# Patient Record
Sex: MALE | Race: WHITE
[De-identification: names, ages, dates, MRNs, and addresses within clinical notes are randomized per-mention and may not be internally consistent; named-entity substitution may affect disease eponyms.]

---

## 2017-03-08 ENCOUNTER — HOSPITAL ENCOUNTER (OUTPATIENT)
Dept: HOSPITAL 53 - M SFHCLERA | Age: 62
End: 2017-03-08
Attending: FAMILY MEDICINE
Payer: COMMERCIAL

## 2017-03-08 DIAGNOSIS — K86.9: Primary | ICD-10-CM

## 2017-03-08 DIAGNOSIS — I10: ICD-10-CM

## 2017-03-08 DIAGNOSIS — G45.9: ICD-10-CM

## 2017-03-08 LAB
ADD MORPHOLOGY?: YES
ALBUMIN SERPL BCG-MCNC: 3.5 GM/DL (ref 3.2–5.2)
ALBUMIN/GLOB SERPL: 1.17 {RATIO} (ref 1–1.93)
ALP SERPL-CCNC: 149 U/L (ref 45–117)
ALT SERPL W P-5'-P-CCNC: 17 U/L (ref 12–78)
AMYLASE SERPL-CCNC: 154 U/L (ref 25–115)
ANION GAP SERPL CALC-SCNC: 8 MEQ/L (ref 8–16)
ANISOCYTOSIS BLD QL SMEAR: (no result)
AST SERPL-CCNC: 21 U/L (ref 15–37)
BASOPHILS # BLD AUTO: 0 K/MM3 (ref 0–0.2)
BASOPHILS NFR BLD AUTO: 0.8 % (ref 0–1)
BILIRUB SERPL-MCNC: 0.2 MG/DL (ref 0.2–1)
BUN SERPL-MCNC: 23 MG/DL (ref 7–18)
CALCIUM SERPL-MCNC: 8.1 MG/DL (ref 8.8–10.2)
CHLORIDE SERPL-SCNC: 107 MEQ/L (ref 98–107)
CO2 SERPL-SCNC: 27 MEQ/L (ref 21–32)
CREAT SERPL-MCNC: 1.09 MG/DL (ref 0.7–1.3)
EOSINOPHIL # BLD AUTO: 0.3 K/MM3 (ref 0–0.5)
EOSINOPHIL NFR BLD AUTO: 6.3 % (ref 0–3)
ERYTHROCYTE [DISTWIDTH] IN BLOOD BY AUTOMATED COUNT: 14.9 % (ref 11.5–14.5)
GFR SERPL CREATININE-BSD FRML MDRD: > 60 ML/MIN/{1.73_M2} (ref 49–?)
GLUCOSE SERPL-MCNC: 72 MG/DL (ref 80–110)
HYPOCHROMIA BLD QL SMEAR: (no result)
LARGE UNSTAINED CELL #: 0.2 K/MM3 (ref 0–0.4)
LARGE UNSTAINED CELL %: 3.2 % (ref 0–4)
LYMPHOCYTES # BLD AUTO: 1.4 K/MM3 (ref 1.5–4.5)
LYMPHOCYTES NFR BLD AUTO: 27 % (ref 24–44)
MCH RBC QN AUTO: 19.5 PG (ref 27–33)
MCHC RBC AUTO-ENTMCNC: 27.5 G/DL (ref 32–36.5)
MCV RBC AUTO: 71 FL (ref 80–96)
MICROCYTES BLD QL SMEAR: (no result)
MONOCYTES # BLD AUTO: 0.4 K/MM3 (ref 0–0.8)
MONOCYTES NFR BLD AUTO: 7.4 % (ref 0–5)
NEUTROPHILS # BLD AUTO: 2.9 K/MM3 (ref 1.8–7.7)
NEUTROPHILS NFR BLD AUTO: 55.3 % (ref 36–66)
PLATELET # BLD AUTO: 219 K/MM3 (ref 150–450)
POTASSIUM SERPL-SCNC: 4.8 MEQ/L (ref 3.5–5.1)
PROT SERPL-MCNC: 6.5 GM/DL (ref 6.4–8.2)
SODIUM SERPL-SCNC: 142 MEQ/L (ref 136–145)
WBC # BLD AUTO: 5.2 K/MM3 (ref 4–10)

## 2017-03-22 ENCOUNTER — HOSPITAL ENCOUNTER (OUTPATIENT)
Dept: HOSPITAL 53 - M RAD | Age: 62
End: 2017-03-22
Attending: FAMILY MEDICINE
Payer: COMMERCIAL

## 2017-03-22 DIAGNOSIS — K86.9: Primary | ICD-10-CM

## 2017-03-22 NOTE — REP
MRI PANCREAS WITH AND WITHOUT CONTRAST:

 

TECHNIQUE: Multiple axial and coronal sequences obtained pre and post IV

gadolinium administration, with the intravenous administration of 13 mL of

gadolinium via thyroid injector.  This study is limited due to patient breathing

motion reportedly due to persistent coughing.

 

Comparison made with a CT from [facility] 11/09/2016 as well as CT exams from

[facility] dated 11/06/2016 and 02/18/2016.

 

Given the limitations of this exam, I do not see a definite mass involving the

pancreas.  There is no pancreatic duct dilatation and no evidence of biliary

dilatation.  Note is again made of multiple enlarged mesenteric lymph nodes as

seen on the CT exams dating back to 02/18/2016.  The adenopathy is seen centrally

in the mesentery just below the body of the pancreas and surrounding the

pancreatic head.  Findings appear similar to the prior CT of 11/09/2016.  I do

not see significant ascites in the visualized abdomen.  The visualized abdominal

aorta is normal in caliber. The visualized liver and spleen are grossly

unremarkable. There are small cysts in each kidney.

 

IMPRESSION:

 

Limited due to persistent breathing motion due to a persistent cough.  No

definite pancreatic mass within the limitations of this exam.  There is

mesenteric and peripancreatic adenopathy, which appears similar to the prior CT

study of 11/09/2016.  Recommend followup CT of the abdomen with multiphasic

intravenous contrast administration to better evaluate the pancreas and

mesentery, as CT exam would eliminate motion artifact.

 

 

Signed by

Leonel Orozco MD 03/22/2017 12:44 P

## 2017-05-03 ENCOUNTER — HOSPITAL ENCOUNTER (OUTPATIENT)
Dept: HOSPITAL 53 - M SFHCLERA | Age: 62
End: 2017-05-03
Attending: FAMILY MEDICINE
Payer: COMMERCIAL

## 2017-05-03 DIAGNOSIS — D64.89: Primary | ICD-10-CM

## 2017-05-03 DIAGNOSIS — F19.10: ICD-10-CM

## 2017-05-03 DIAGNOSIS — R59.1: ICD-10-CM

## 2017-05-03 DIAGNOSIS — K86.9: ICD-10-CM

## 2017-05-03 LAB
ALBUMIN SERPL BCG-MCNC: 3.3 GM/DL (ref 3.2–5.2)
ALBUMIN/GLOB SERPL: 1.18 {RATIO} (ref 1–1.93)
ALP SERPL-CCNC: 172 U/L (ref 45–117)
ALT SERPL W P-5'-P-CCNC: 21 U/L (ref 12–78)
ANION GAP SERPL CALC-SCNC: 8 MEQ/L (ref 8–16)
ANISOCYTOSIS BLD QL SMEAR: (no result)
AST SERPL-CCNC: 22 U/L (ref 15–37)
BILIRUB SERPL-MCNC: 0.4 MG/DL (ref 0.2–1)
BUN SERPL-MCNC: 28 MG/DL (ref 7–18)
CALCIUM SERPL-MCNC: 8 MG/DL (ref 8.8–10.2)
CHLORIDE SERPL-SCNC: 104 MEQ/L (ref 98–107)
CO2 SERPL-SCNC: 27 MEQ/L (ref 21–32)
CREAT SERPL-MCNC: 1.33 MG/DL (ref 0.7–1.3)
EOSINOPHIL NFR BLD MANUAL: 3 % (ref 0–5)
ERYTHROCYTE [DISTWIDTH] IN BLOOD BY AUTOMATED COUNT: 16.7 % (ref 11.5–14.5)
GFR SERPL CREATININE-BSD FRML MDRD: 58 ML/MIN/{1.73_M2} (ref 49–?)
GLUCOSE SERPL-MCNC: 92 MG/DL (ref 80–110)
HYPOCHROMIA BLD QL SMEAR: (no result)
MCH RBC QN AUTO: 19.9 PG (ref 27–33)
MCHC RBC AUTO-ENTMCNC: 28.4 G/DL (ref 32–36.5)
MCV RBC AUTO: 70.3 FL (ref 80–96)
MICROCYTES BLD QL SMEAR: (no result)
NEUTS BAND NFR BLD: 1 % (ref ?–11)
POIKILOCYTOSIS BLD QL SMEAR: (no result)
POTASSIUM SERPL-SCNC: 4.6 MEQ/L (ref 3.5–5.1)
PROT SERPL-MCNC: 6.1 GM/DL (ref 6.4–8.2)
SODIUM SERPL-SCNC: 139 MEQ/L (ref 136–145)
T4 FREE SERPL-MCNC: 1.09 NG/DL (ref 0.76–1.46)
WBC # BLD AUTO: 8.1 K/MM3 (ref 4–10)

## 2017-05-18 ENCOUNTER — HOSPITAL ENCOUNTER (OUTPATIENT)
Dept: HOSPITAL 53 - M SFHCLERA | Age: 62
End: 2017-05-18
Attending: FAMILY MEDICINE
Payer: COMMERCIAL

## 2017-05-18 DIAGNOSIS — R74.8: ICD-10-CM

## 2017-05-18 DIAGNOSIS — D64.9: Primary | ICD-10-CM

## 2017-05-18 LAB
FERRITIN SERPL-MCNC: 4 NG/ML (ref 26–388)
GGT SERPL-CCNC: 14 U/L (ref 15–85)
IRON SATN MFR SERPL: 5 % (ref 19.7–37.4)
TIBC SERPL-MCNC: 416 UG/DL (ref 250–450)

## 2017-05-24 ENCOUNTER — HOSPITAL ENCOUNTER (OUTPATIENT)
Dept: HOSPITAL 53 - M SFHCLERA | Age: 62
End: 2017-05-24
Attending: FAMILY MEDICINE
Payer: COMMERCIAL

## 2017-05-24 DIAGNOSIS — D64.9: Primary | ICD-10-CM

## 2017-05-24 LAB
ALBUMIN SERPL BCG-MCNC: 3.1 GM/DL (ref 3.2–5.2)
ALBUMIN/GLOB SERPL: 1.03 {RATIO} (ref 1–1.93)
ALP SERPL-CCNC: 169 U/L (ref 45–117)
ALT SERPL W P-5'-P-CCNC: 28 U/L (ref 12–78)
ANION GAP SERPL CALC-SCNC: 7 MEQ/L (ref 8–16)
ANISOCYTOSIS BLD QL SMEAR: (no result)
AST SERPL-CCNC: 25 U/L (ref 15–37)
BASOPHILS NFR BLD MANUAL: 3 % (ref 0–4)
BILIRUB SERPL-MCNC: 0.4 MG/DL (ref 0.2–1)
BUN SERPL-MCNC: 24 MG/DL (ref 7–18)
CALCIUM SERPL-MCNC: 7.8 MG/DL (ref 8.8–10.2)
CHLORIDE SERPL-SCNC: 102 MEQ/L (ref 98–107)
CO2 SERPL-SCNC: 29 MEQ/L (ref 21–32)
CREAT SERPL-MCNC: 1.28 MG/DL (ref 0.7–1.3)
EOSINOPHIL NFR BLD MANUAL: 5 % (ref 0–5)
ERYTHROCYTE [DISTWIDTH] IN BLOOD BY AUTOMATED COUNT: 16.4 % (ref 11.5–14.5)
GFR SERPL CREATININE-BSD FRML MDRD: > 60 ML/MIN/{1.73_M2} (ref 49–?)
GLUCOSE SERPL-MCNC: 91 MG/DL (ref 80–110)
HYPOCHROMIA BLD QL SMEAR: (no result)
MCH RBC QN AUTO: 19.7 PG (ref 27–33)
MCHC RBC AUTO-ENTMCNC: 29 G/DL (ref 32–36.5)
MCV RBC AUTO: 68.2 FL (ref 80–96)
MICROCYTES BLD QL SMEAR: (no result)
POTASSIUM SERPL-SCNC: 4.3 MEQ/L (ref 3.5–5.1)
PROT SERPL-MCNC: 6.1 GM/DL (ref 6.4–8.2)
SODIUM SERPL-SCNC: 138 MEQ/L (ref 136–145)
WBC # BLD AUTO: 8.8 K/MM3 (ref 4–10)

## 2017-06-07 ENCOUNTER — HOSPITAL ENCOUNTER (OUTPATIENT)
Dept: HOSPITAL 53 - M SFHCLERA | Age: 62
End: 2017-06-07
Attending: FAMILY MEDICINE
Payer: COMMERCIAL

## 2017-06-07 DIAGNOSIS — D64.9: Primary | ICD-10-CM

## 2017-06-07 LAB
ANION GAP SERPL CALC-SCNC: 6 MEQ/L (ref 8–16)
BUN SERPL-MCNC: 20 MG/DL (ref 7–18)
CALCIUM SERPL-MCNC: 8.1 MG/DL (ref 8.8–10.2)
CHLORIDE SERPL-SCNC: 107 MEQ/L (ref 98–107)
CO2 SERPL-SCNC: 28 MEQ/L (ref 21–32)
CREAT SERPL-MCNC: 1.1 MG/DL (ref 0.7–1.3)
ERYTHROCYTE [DISTWIDTH] IN BLOOD BY AUTOMATED COUNT: 16.2 % (ref 11.5–14.5)
GFR SERPL CREATININE-BSD FRML MDRD: > 60 ML/MIN/{1.73_M2} (ref 49–?)
GLUCOSE SERPL-MCNC: 92 MG/DL (ref 80–110)
MCH RBC QN AUTO: 19.3 PG (ref 27–33)
MCHC RBC AUTO-ENTMCNC: 27.7 G/DL (ref 32–36.5)
MCV RBC AUTO: 69.7 FL (ref 80–96)
PLATELET # BLD AUTO: 313 K/MM3 (ref 150–450)
POTASSIUM SERPL-SCNC: 4.1 MEQ/L (ref 3.5–5.1)
SODIUM SERPL-SCNC: 141 MEQ/L (ref 136–145)
WBC # BLD AUTO: 8.6 K/MM3 (ref 4–10)

## 2017-07-16 ENCOUNTER — HOSPITAL ENCOUNTER (EMERGENCY)
Dept: HOSPITAL 53 - M ED | Age: 62
Discharge: HOME | End: 2017-07-16
Payer: COMMERCIAL

## 2017-07-16 VITALS — SYSTOLIC BLOOD PRESSURE: 144 MMHG | DIASTOLIC BLOOD PRESSURE: 79 MMHG

## 2017-07-16 VITALS — WEIGHT: 136.69 LBS | BODY MASS INDEX: 20.24 KG/M2 | HEIGHT: 69 IN
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
## 2017-07-16 DIAGNOSIS — S80.861A: Primary | ICD-10-CM
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
## 2017-09-06 ENCOUNTER — HOSPITAL ENCOUNTER (OUTPATIENT)
Dept: HOSPITAL 53 - M SFHCLERA | Age: 62
End: 2017-09-06
Attending: FAMILY MEDICINE
Payer: COMMERCIAL

## 2017-09-06 ENCOUNTER — HOSPITAL ENCOUNTER (OUTPATIENT)
Dept: HOSPITAL 53 - M LRY | Age: 62
End: 2017-09-06
Attending: FAMILY MEDICINE
Payer: COMMERCIAL

## 2017-09-06 DIAGNOSIS — M79.5: ICD-10-CM

## 2017-09-06 DIAGNOSIS — M79.672: Primary | ICD-10-CM

## 2017-09-06 DIAGNOSIS — K86.9: Primary | ICD-10-CM

## 2017-09-06 NOTE — REP
LEFT FOOT, FOUR VIEWS:

 

HISTORY:  Foreign body

 

There is no acute fracture or dislocation.  The joint spaces are normal in

appearance. There is no radiopaque foreign body.

 

IMPRESSION:

 

There is no acute fracture or dislocation.

 

 

Signed by

Rodney Stevens MD 09/06/2017 03:20 P

## 2017-09-14 ENCOUNTER — HOSPITAL ENCOUNTER (OUTPATIENT)
Dept: HOSPITAL 53 - M SFHCLERA | Age: 62
End: 2017-09-14
Attending: FAMILY MEDICINE
Payer: COMMERCIAL

## 2017-09-14 DIAGNOSIS — F19.10: Primary | ICD-10-CM

## 2017-09-14 LAB
ALBUMIN SERPL BCG-MCNC: 2.6 GM/DL (ref 3.2–5.2)
ALBUMIN/GLOB SERPL: 0.84 {RATIO} (ref 1–1.93)
ALP SERPL-CCNC: 146 U/L (ref 45–117)
ALT SERPL W P-5'-P-CCNC: 23 U/L (ref 12–78)
ANION GAP SERPL CALC-SCNC: 10 MEQ/L (ref 8–16)
AST SERPL-CCNC: 21 U/L (ref 15–37)
BILIRUB SERPL-MCNC: 0.3 MG/DL (ref 0.2–1)
BUN SERPL-MCNC: 22 MG/DL (ref 7–18)
CALCIUM SERPL-MCNC: 8.2 MG/DL (ref 8.8–10.2)
CHLORIDE SERPL-SCNC: 104 MEQ/L (ref 98–107)
CO2 SERPL-SCNC: 25 MEQ/L (ref 21–32)
CREAT SERPL-MCNC: 1.34 MG/DL (ref 0.7–1.3)
ERYTHROCYTE [DISTWIDTH] IN BLOOD BY AUTOMATED COUNT: 19.8 % (ref 11.5–14.5)
GFR SERPL CREATININE-BSD FRML MDRD: 57.5 ML/MIN/{1.73_M2} (ref 49–?)
GLUCOSE SERPL-MCNC: 185 MG/DL (ref 80–110)
MCH RBC QN AUTO: 22.2 PG (ref 27–33)
MCHC RBC AUTO-ENTMCNC: 30.7 G/DL (ref 32–36.5)
MCV RBC AUTO: 72.3 FL (ref 80–96)
PLATELET # BLD AUTO: 298 K/MM3 (ref 150–450)
POTASSIUM SERPL-SCNC: 4.3 MEQ/L (ref 3.5–5.1)
PROT SERPL-MCNC: 5.7 GM/DL (ref 6.4–8.2)
SODIUM SERPL-SCNC: 139 MEQ/L (ref 136–145)
WBC # BLD AUTO: 6.8 K/MM3 (ref 4–10)

## 2017-09-26 LAB — SUMMARY: (no result)

## 2017-10-23 ENCOUNTER — HOSPITAL ENCOUNTER (EMERGENCY)
Dept: HOSPITAL 53 - M ED | Age: 62
Discharge: HOME | End: 2017-10-23
Payer: COMMERCIAL

## 2017-10-23 VITALS — HEIGHT: 66 IN | WEIGHT: 143.74 LBS | BODY MASS INDEX: 23.1 KG/M2

## 2017-10-23 VITALS — DIASTOLIC BLOOD PRESSURE: 79 MMHG | SYSTOLIC BLOOD PRESSURE: 135 MMHG

## 2017-10-23 DIAGNOSIS — Z72.0: ICD-10-CM

## 2017-10-23 DIAGNOSIS — K29.00: Primary | ICD-10-CM

## 2017-10-23 DIAGNOSIS — K86.9: ICD-10-CM

## 2017-10-23 DIAGNOSIS — F19.10: ICD-10-CM

## 2017-10-23 LAB
ADD MORPHOLOGY?: YES
ALBUMIN SERPL BCG-MCNC: 2.3 GM/DL (ref 3.2–5.2)
ALBUMIN/GLOB SERPL: 0.59 {RATIO} (ref 1–1.93)
ALP SERPL-CCNC: 141 U/L (ref 45–117)
ALT SERPL W P-5'-P-CCNC: 20 U/L (ref 12–78)
ANION GAP SERPL CALC-SCNC: 6 MEQ/L (ref 8–16)
AST SERPL-CCNC: 14 U/L (ref 15–37)
BASOPHILS # BLD AUTO: 0.1 10^3/UL (ref 0–0.2)
BASOPHILS NFR BLD AUTO: 0.6 % (ref 0–1)
BILIRUB CONJ SERPL-MCNC: < 0.1 MG/DL (ref 0–0.2)
BILIRUB SERPL-MCNC: 0.2 MG/DL (ref 0.2–1)
BUN SERPL-MCNC: 17 MG/DL (ref 7–18)
CALCIUM SERPL-MCNC: 8.2 MG/DL (ref 8.8–10.2)
CHLORIDE SERPL-SCNC: 104 MEQ/L (ref 98–107)
CO2 SERPL-SCNC: 27 MEQ/L (ref 21–32)
CREAT SERPL-MCNC: 1.03 MG/DL (ref 0.7–1.3)
EOSINOPHIL # BLD AUTO: 0.3 10^3/UL (ref 0–0.5)
EOSINOPHIL NFR BLD AUTO: 2.9 % (ref 0–3)
ERYTHROCYTE [DISTWIDTH] IN BLOOD BY AUTOMATED COUNT: 19 % (ref 11.5–14.5)
GFR SERPL CREATININE-BSD FRML MDRD: > 60 ML/MIN/{1.73_M2} (ref 49–?)
GLUCOSE SERPL-MCNC: 123 MG/DL (ref 80–110)
IMM GRANULOCYTES NFR BLD: 0.3 % (ref 0–0)
LYMPHOCYTES # BLD AUTO: 1.1 10^3/UL (ref 1.5–4.5)
LYMPHOCYTES NFR BLD AUTO: 11.6 % (ref 24–44)
MCH RBC QN AUTO: 22 PG (ref 27–33)
MCHC RBC AUTO-ENTMCNC: 30.1 G/DL (ref 32–36.5)
MCV RBC AUTO: 73.1 FL (ref 80–96)
METHADONE UR QL SCN: NEGATIVE
MICROCYTES BLD QL SMEAR: (no result)
MONOCYTES # BLD AUTO: 0.8 10^3/UL (ref 0–0.8)
MONOCYTES NFR BLD AUTO: 9.2 % (ref 0–5)
NEUTROPHILS # BLD AUTO: 6.9 10^3/UL (ref 1.8–7.7)
NEUTROPHILS NFR BLD AUTO: 75.4 % (ref 36–66)
NRBC BLD AUTO-RTO: 0 % (ref 0–0)
PLATELET # BLD AUTO: 267 10^3/UL (ref 150–450)
POSITIVE MORPH: (no result)
POTASSIUM SERPL-SCNC: 4.1 MEQ/L (ref 3.5–5.1)
PROT SERPL-MCNC: 6.2 GM/DL (ref 6.4–8.2)
SODIUM SERPL-SCNC: 137 MEQ/L (ref 136–145)
WBC # BLD AUTO: 9.1 10^3/UL (ref 4–10)

## 2017-10-23 PROCEDURE — 74177 CT ABD & PELVIS W/CONTRAST: CPT

## 2017-10-23 PROCEDURE — 71260 CT THORAX DX C+: CPT

## 2017-10-23 PROCEDURE — 83690 ASSAY OF LIPASE: CPT

## 2017-10-23 PROCEDURE — 80076 HEPATIC FUNCTION PANEL: CPT

## 2017-10-23 PROCEDURE — 93041 RHYTHM ECG TRACING: CPT

## 2017-10-23 PROCEDURE — 96374 THER/PROPH/DIAG INJ IV PUSH: CPT

## 2017-10-23 PROCEDURE — 80048 BASIC METABOLIC PNL TOTAL CA: CPT

## 2017-10-23 PROCEDURE — 80307 DRUG TEST PRSMV CHEM ANLYZR: CPT

## 2017-10-23 PROCEDURE — 74022 RADEX COMPL AQT ABD SERIES: CPT

## 2017-10-23 PROCEDURE — 96372 THER/PROPH/DIAG INJ SC/IM: CPT

## 2017-10-23 PROCEDURE — 85025 COMPLETE CBC W/AUTO DIFF WBC: CPT

## 2017-10-23 PROCEDURE — 99284 EMERGENCY DEPT VISIT MOD MDM: CPT

## 2017-10-23 NOTE — REP
CT of the chest with IV contrast:

 

Comparison is the most recent prior study of 12/12/2015.

 

There are no lung masses or nodules.  There are no infiltrates or effusions.

There is a calcified granuloma in the lingula.  This is unchanged.  There are

numerous small bulla throughout the lung parenchyma bilaterally compatible with

bullous emphysema, unchanged.

 

There is no mediastinal, hilar or axillary lymphadenopathy.

 

The thoracic aorta is unremarkable.  Cardiac size is normal.  There is no

pericardial effusion.

 

There are no lytic, blastic or destructive changes in the skeletal structures.

 

There are calcified granulomas in the right and left inés. .

 

Impression:

 

No evidence of metastatic disease.  No lymphadenopathy.  No infiltrate or

effusion.  Bullous emphysema.  Calcified granulomas.

 

 

Signed by

Leonel Welch MD 10/23/2017 12:11 P

## 2017-10-23 NOTE — REP
ABDOMEN SERIES:  Three views.

 

HISTORY:  Abdomen pain.

 

FINDINGS:  Upright chest radiograph is compared with the August 30, 2017 prior

study.  There are old granulomatous calcifications in the right upper lobe and in

the left hilus and left mediastinum unchanged.  Multiple healed bilateral rib

fractures are noted as before.  Heart is not enlarged.  Lung fields are otherwise

clear.  No infiltrate or free subdiaphragmatic air is seen.

 

Supine and erect views of the abdomen show a normal bowel gas pattern with air

and stool in a nondistended colon.  There is a bone island in the left iliac bone

unchanged from the August 30, 2017 prior radiographs.  Old rib fractures are

again noted.  Some vascular calcification is seen.  Psoas margins and flank

stripes are intact.  No mass organomegaly is seen.  No significant air fluid

level noted.

 

IMPRESSION:

 

Unremarkable bowel gas pattern.  Multiple old rib fractures bilaterally.  Old

granulomatous disease.

 

 

Signed by

Kumar Radford MD 10/23/2017 12:58 P

## 2017-10-23 NOTE — REP
CT abdomen pelvis with IV contrast, without bowel contrast:

 

Comparison is 08/30/2017.

 

The hepatic parenchyma is homogeneous and unchanged.  The gallbladder is

unremarkable and unchanged.

 

The head of the pancreas is enlarged measuring 4.7 cm transversely by 2.9 cm AP

by 4.8 cm craniocaudad.  This is unchanged from the comparison study.  The body

and tail of the pancreas are normal size, unchanged and unremarkable.

 

There is diffuse gastric wall thickening as previously.  This is nonspecific and

the stomach is nondistended and could merely be artifact from under distension.

 

There are numerous enlarged peripancreatic and mesenteric lymph nodes.  These are

similar to the prior study.

 

Pelvis:

 

There is no ascites.  There is no pelvic sidewall adenopathy.  The bladder is

unremarkable.  There is no inguinal adenopathy.  I suspect there is a left

scrotal hydrocele.

 

Impression:

 

Enlarged pancreatic head, unchanged from the prior study.

 

Parapancreatic and mesenteric lymphadenopathy, unchanged from the prior study.

 

No ascites.

 

Left scrotal hydrocele, unchanged.

 

There are no lytic, blastic or destructive skeletal changes.  There is

degenerative disc disease in the lumbar spine L5 S1.  I suspect there is an old

healed fracture of the posterior arch of the right tenth rib.

 

 

 

 

Signed by

Leonel Welch MD 10/23/2017 12:21 P

## 2017-12-19 ENCOUNTER — HOSPITAL ENCOUNTER (OUTPATIENT)
Dept: HOSPITAL 53 - M SFHCLERA | Age: 62
End: 2017-12-19
Attending: FAMILY MEDICINE
Payer: COMMERCIAL

## 2017-12-19 DIAGNOSIS — I10: Primary | ICD-10-CM

## 2017-12-19 LAB
ALBUMIN SERPL BCG-MCNC: 2.5 GM/DL (ref 3.2–5.2)
ALBUMIN/GLOB SERPL: 0.76 {RATIO} (ref 1–1.93)
ALP SERPL-CCNC: 145 U/L (ref 45–117)
ALT SERPL W P-5'-P-CCNC: 30 U/L (ref 12–78)
ANION GAP SERPL CALC-SCNC: 9 MEQ/L (ref 8–16)
AST SERPL-CCNC: 26 U/L (ref 7–37)
BILIRUB SERPL-MCNC: 0.1 MG/DL (ref 0.2–1)
BUN SERPL-MCNC: 22 MG/DL (ref 7–18)
CALCIUM SERPL-MCNC: 7.3 MG/DL (ref 8.8–10.2)
CHLORIDE SERPL-SCNC: 111 MEQ/L (ref 98–107)
CO2 SERPL-SCNC: 23 MEQ/L (ref 21–32)
CREAT SERPL-MCNC: 1.05 MG/DL (ref 0.7–1.3)
GFR SERPL CREATININE-BSD FRML MDRD: > 60 ML/MIN/{1.73_M2} (ref 49–?)
GLUCOSE SERPL-MCNC: 95 MG/DL (ref 80–110)
POTASSIUM SERPL-SCNC: 4.2 MEQ/L (ref 3.5–5.1)
PROT SERPL-MCNC: 5.8 GM/DL (ref 6.4–8.2)
SODIUM SERPL-SCNC: 143 MEQ/L (ref 136–145)

## 2018-03-21 ENCOUNTER — HOSPITAL ENCOUNTER (INPATIENT)
Dept: HOSPITAL 53 - M MS5PR | Age: 63
LOS: 5 days | Discharge: HOME | DRG: 383 | End: 2018-03-26
Attending: HOSPITALIST | Admitting: HOSPITALIST
Payer: COMMERCIAL

## 2018-03-21 DIAGNOSIS — L03.115: Primary | ICD-10-CM

## 2018-03-21 DIAGNOSIS — Z79.899: ICD-10-CM

## 2018-03-21 DIAGNOSIS — Z88.8: ICD-10-CM

## 2018-03-21 DIAGNOSIS — Z95.5: ICD-10-CM

## 2018-03-21 DIAGNOSIS — Z87.891: ICD-10-CM

## 2018-03-21 DIAGNOSIS — K21.9: ICD-10-CM

## 2018-03-21 DIAGNOSIS — I10: ICD-10-CM

## 2018-03-21 DIAGNOSIS — Z90.411: ICD-10-CM

## 2018-03-21 DIAGNOSIS — Z88.1: ICD-10-CM

## 2018-03-21 DIAGNOSIS — E03.9: ICD-10-CM

## 2018-03-21 DIAGNOSIS — I25.10: ICD-10-CM

## 2018-03-21 DIAGNOSIS — D50.9: ICD-10-CM

## 2018-03-21 DIAGNOSIS — J44.9: ICD-10-CM

## 2018-03-21 DIAGNOSIS — Z91.14: ICD-10-CM

## 2018-03-21 DIAGNOSIS — Z86.73: ICD-10-CM

## 2018-03-21 LAB
ANION GAP: 6 MEQ/L (ref 8–16)
BASO #: 0.1 10^3/UL (ref 0–0.2)
BASO %: 0.4 % (ref 0–1)
BLOOD UREA NITROGEN: 17 MG/DL (ref 7–18)
CALCIUM LEVEL: 7.4 MG/DL (ref 8.8–10.2)
CARBON DIOXIDE LEVEL: 28 MEQ/L (ref 21–32)
CHLORIDE LEVEL: 106 MEQ/L (ref 98–107)
CREATININE FOR GFR: 1.12 MG/DL (ref 0.7–1.3)
EOS #: 0.2 10^3/UL (ref 0–0.5)
EOSINOPHIL NFR BLD AUTO: 1.4 % (ref 0–3)
GFR SERPL CREATININE-BSD FRML MDRD: > 60 ML/MIN/{1.73_M2} (ref 49–?)
GLUCOSE, FASTING: 90 MG/DL (ref 70–100)
HEMATOCRIT: 27 % (ref 42–52)
HEMOGLOBIN: 7.2 G/DL (ref 14–18)
IMMATURE GRANULOCYTE %: 0.5 % (ref 0–3)
LACTIC ACID SEPSIS PROTOCOL: 1.7 MMOL/L (ref 0.4–2)
LYMPH #: 1.1 10^3/UL (ref 1.5–4.5)
LYMPH %: 8.4 % (ref 24–44)
MEAN CORPUSCULAR HEMOGLOBIN: 17.4 PG (ref 27–33)
MEAN CORPUSCULAR HGB CONC: 26.7 G/DL (ref 32–36.5)
MEAN CORPUSCULAR VOLUME: 65.4 FL (ref 80–96)
MONO #: 1.6 10^3/UL (ref 0–0.8)
MONO %: 12.3 % (ref 0–5)
NEUTROPHILS #: 10.1 10^3/UL (ref 1.8–7.7)
NEUTROPHILS %: 77 % (ref 36–66)
NRBC BLD AUTO-RTO: 0 % (ref 0–0)
PLATELET COUNT, AUTOMATED: 348 10^3/UL (ref 150–450)
POTASSIUM SERUM: 4.1 MEQ/L (ref 3.5–5.1)
RED BLOOD COUNT: 4.13 10^6/UL (ref 4.3–6.1)
RED CELL DISTRIBUTION WIDTH: 18.5 % (ref 11.5–14.5)
SODIUM LEVEL: 140 MEQ/L (ref 136–145)
WHITE BLOOD COUNT: 13.1 10^3/UL (ref 4–10)

## 2018-03-21 RX ADMIN — MORPHINE SULFATE 1 MG: 4 INJECTION INTRAVENOUS at 20:01

## 2018-03-21 RX ADMIN — SODIUM CHLORIDE 1 MLS/HR: 9 INJECTION, SOLUTION INTRAVENOUS at 19:54

## 2018-03-21 RX ADMIN — CEFAZOLIN SODIUM 1 MLS/HR: 100 INJECTION, POWDER, LYOPHILIZED, FOR SOLUTION INTRAVENOUS at 20:35

## 2018-03-21 RX ADMIN — MORPHINE SULFATE 1 MG: 4 INJECTION INTRAVENOUS at 22:43

## 2018-03-22 LAB
ANION GAP: 7 MEQ/L (ref 8–16)
BASO #: 0 10^3/UL (ref 0–0.2)
BASO %: 0.4 % (ref 0–1)
BLOOD UREA NITROGEN: 15 MG/DL (ref 7–18)
CALCIUM LEVEL: 7.7 MG/DL (ref 8.8–10.2)
CARBON DIOXIDE LEVEL: 24 MEQ/L (ref 21–32)
CHLORIDE LEVEL: 104 MEQ/L (ref 98–107)
CREATININE FOR GFR: 1.1 MG/DL (ref 0.7–1.3)
CRP SERPL-MCNC: 11.6 MG/DL (ref 0–0.3)
EOS #: 0.3 10^3/UL (ref 0–0.5)
EOSINOPHIL NFR BLD AUTO: 3.1 % (ref 0–3)
ERYTHROCYTE SEDIMENTATION RATE: 27 MM/HR (ref 0–20)
GFR SERPL CREATININE-BSD FRML MDRD: > 60 ML/MIN/{1.73_M2} (ref 49–?)
GLUCOSE, FASTING: 67 MG/DL (ref 70–100)
HEMATOCRIT: 34.3 % (ref 42–52)
HEMOGLOBIN: 9.7 G/DL (ref 14–18)
IMMATURE GRANULOCYTE %: 0.4 % (ref 0–3)
IMMEDIATE SPIN CROSSMATCH: 1 2
LYMPH #: 0.9 10^3/UL (ref 1.5–4.5)
LYMPH %: 9.4 % (ref 24–44)
MAGNESIUM LEVEL: 1.8 MG/DL (ref 1.8–2.4)
MEAN CORPUSCULAR HEMOGLOBIN: 19.5 PG (ref 27–33)
MEAN CORPUSCULAR HGB CONC: 28.3 G/DL (ref 32–36.5)
MEAN CORPUSCULAR VOLUME: 68.9 FL (ref 80–96)
MONO #: 1.2 10^3/UL (ref 0–0.8)
MONO %: 12 % (ref 0–5)
NEUTROPHILS #: 7.2 10^3/UL (ref 1.8–7.7)
NEUTROPHILS %: 74.7 % (ref 36–66)
NRBC BLD AUTO-RTO: 0 % (ref 0–0)
PLATELET COUNT, AUTOMATED: 238 10^3/UL (ref 150–450)
POTASSIUM SERUM: 4.1 MEQ/L (ref 3.5–5.1)
RED BLOOD COUNT: 4.98 10^6/UL (ref 4.3–6.1)
RED CELL DISTRIBUTION WIDTH: 21.2 % (ref 11.5–14.5)
SODIUM LEVEL: 135 MEQ/L (ref 136–145)
WHITE BLOOD COUNT: 9.7 10^3/UL (ref 4–10)

## 2018-03-22 PROCEDURE — 30233N1 TRANSFUSION OF NONAUTOLOGOUS RED BLOOD CELLS INTO PERIPHERAL VEIN, PERCUTANEOUS APPROACH: ICD-10-PCS

## 2018-03-22 RX ADMIN — DEXTROSE MONOHYDRATE 1 MLS/HR: 50 INJECTION, SOLUTION INTRAVENOUS at 08:52

## 2018-03-22 RX ADMIN — PANCRELIPASE 1 EA: 24000; 76000; 120000 CAPSULE, DELAYED RELEASE PELLETS ORAL at 17:49

## 2018-03-22 RX ADMIN — IPRATROPIUM BROMIDE AND ALBUTEROL SULFATE 1 ML: .5; 3 SOLUTION RESPIRATORY (INHALATION) at 12:08

## 2018-03-22 RX ADMIN — IPRATROPIUM BROMIDE AND ALBUTEROL SULFATE 1 ML: .5; 3 SOLUTION RESPIRATORY (INHALATION) at 14:00

## 2018-03-22 RX ADMIN — SUCRALFATE 1 GM: 1 TABLET ORAL at 20:22

## 2018-03-22 RX ADMIN — PANCRELIPASE 1 EA: 24000; 76000; 120000 CAPSULE, DELAYED RELEASE PELLETS ORAL at 12:43

## 2018-03-22 RX ADMIN — FERROUS SULFATE TAB 325 MG (65 MG ELEMENTAL FE) 1 MG: 325 (65 FE) TAB at 16:44

## 2018-03-22 RX ADMIN — SUCRALFATE 1 GM: 1 TABLET ORAL at 16:44

## 2018-03-22 RX ADMIN — ATORVASTATIN CALCIUM 1 MG: 20 TABLET, FILM COATED ORAL at 08:52

## 2018-03-22 RX ADMIN — SUCRALFATE 1 GM: 1 TABLET ORAL at 12:43

## 2018-03-22 RX ADMIN — FERROUS SULFATE TAB 325 MG (65 MG ELEMENTAL FE) 1 MG: 325 (65 FE) TAB at 20:22

## 2018-03-22 RX ADMIN — DEXTROSE MONOHYDRATE 1 MLS/HR: 50 INJECTION, SOLUTION INTRAVENOUS at 20:22

## 2018-03-22 RX ADMIN — PANCRELIPASE 1 EA: 24000; 76000; 120000 CAPSULE, DELAYED RELEASE PELLETS ORAL at 08:52

## 2018-03-22 RX ADMIN — IPRATROPIUM BROMIDE AND ALBUTEROL SULFATE 1 ML: .5; 3 SOLUTION RESPIRATORY (INHALATION) at 20:00

## 2018-03-22 RX ADMIN — LEVOFLOXACIN 1 MLS/HR: 5 INJECTION, SOLUTION INTRAVENOUS at 01:11

## 2018-03-22 RX ADMIN — ENOXAPARIN SODIUM 1 MG: 40 INJECTION SUBCUTANEOUS at 08:54

## 2018-03-22 RX ADMIN — FERROUS SULFATE TAB 325 MG (65 MG ELEMENTAL FE) 1 MG: 325 (65 FE) TAB at 12:44

## 2018-03-22 RX ADMIN — SUCRALFATE 1 GM: 1 TABLET ORAL at 09:01

## 2018-03-22 RX ADMIN — DEXTROSE MONOHYDRATE 1 MLS/HR: 50 INJECTION, SOLUTION INTRAVENOUS at 03:15

## 2018-03-22 RX ADMIN — LEVOFLOXACIN 1 MLS/HR: 5 INJECTION, SOLUTION INTRAVENOUS at 23:03

## 2018-03-22 RX ADMIN — PANTOPRAZOLE SODIUM 1 MG: 40 TABLET, DELAYED RELEASE ORAL at 08:53

## 2018-03-22 RX ADMIN — SUCRALFATE 1 GM: 1 TABLET ORAL at 01:17

## 2018-03-22 RX ADMIN — ASPIRIN 1 MG: 81 TABLET ORAL at 12:44

## 2018-03-23 LAB
ANION GAP: 8 MEQ/L (ref 8–16)
BASO #: 0 10^3/UL (ref 0–0.2)
BASO %: 0.4 % (ref 0–1)
BLOOD UREA NITROGEN: 17 MG/DL (ref 7–18)
CALCIUM LEVEL: 7.1 MG/DL (ref 8.8–10.2)
CARBON DIOXIDE LEVEL: 26 MEQ/L (ref 21–32)
CHLORIDE LEVEL: 101 MEQ/L (ref 98–107)
CREATININE FOR GFR: 1.24 MG/DL (ref 0.7–1.3)
EOS #: 0.2 10^3/UL (ref 0–0.5)
EOSINOPHIL NFR BLD AUTO: 2.4 % (ref 0–3)
GFR SERPL CREATININE-BSD FRML MDRD: > 60 ML/MIN/{1.73_M2} (ref 49–?)
GLUCOSE, FASTING: 102 MG/DL (ref 70–100)
HEMATOCRIT: 31.2 % (ref 42–52)
HEMOGLOBIN: 9.2 G/DL (ref 14–18)
IMMATURE GRANULOCYTE %: 0.3 % (ref 0–3)
LYMPH #: 1 10^3/UL (ref 1.5–4.5)
LYMPH %: 10.2 % (ref 24–44)
MAGNESIUM LEVEL: 1.8 MG/DL (ref 1.8–2.4)
MEAN CORPUSCULAR HEMOGLOBIN: 19.6 PG (ref 27–33)
MEAN CORPUSCULAR HGB CONC: 29.5 G/DL (ref 32–36.5)
MEAN CORPUSCULAR VOLUME: 66.4 FL (ref 80–96)
MONO #: 1.3 10^3/UL (ref 0–0.8)
MONO %: 13.6 % (ref 0–5)
NEUTROPHILS #: 6.9 10^3/UL (ref 1.8–7.7)
NEUTROPHILS %: 73.1 % (ref 36–66)
NRBC BLD AUTO-RTO: 0 % (ref 0–0)
PLATELET COUNT, AUTOMATED: 285 10^3/UL (ref 150–450)
POTASSIUM SERUM: 3.8 MEQ/L (ref 3.5–5.1)
RED BLOOD COUNT: 4.7 10^6/UL (ref 4.3–6.1)
RED CELL DISTRIBUTION WIDTH: 21.2 % (ref 11.5–14.5)
SODIUM LEVEL: 135 MEQ/L (ref 136–145)
THYROID STIMULATING HORMONE: 3.89 UIU/ML (ref 0.36–3.74)
VANCOMYCIN LEVEL TROUGH: 14.1 UG/ML (ref 10–20)
WHITE BLOOD COUNT: 9.4 10^3/UL (ref 4–10)

## 2018-03-23 RX ADMIN — IPRATROPIUM BROMIDE AND ALBUTEROL SULFATE 1 ML: .5; 3 SOLUTION RESPIRATORY (INHALATION) at 20:00

## 2018-03-23 RX ADMIN — SUCRALFATE 1 GM: 1 TABLET ORAL at 17:16

## 2018-03-23 RX ADMIN — FERROUS SULFATE TAB 325 MG (65 MG ELEMENTAL FE) 1 MG: 325 (65 FE) TAB at 10:17

## 2018-03-23 RX ADMIN — ENOXAPARIN SODIUM 1 MG: 40 INJECTION SUBCUTANEOUS at 10:16

## 2018-03-23 RX ADMIN — FERROUS SULFATE TAB 325 MG (65 MG ELEMENTAL FE) 1 MG: 325 (65 FE) TAB at 17:16

## 2018-03-23 RX ADMIN — FERROUS SULFATE TAB 325 MG (65 MG ELEMENTAL FE) 1 MG: 325 (65 FE) TAB at 20:49

## 2018-03-23 RX ADMIN — PANCRELIPASE 1 EA: 24000; 76000; 120000 CAPSULE, DELAYED RELEASE PELLETS ORAL at 10:19

## 2018-03-23 RX ADMIN — ASPIRIN 1 MG: 81 TABLET ORAL at 10:16

## 2018-03-23 RX ADMIN — DEXTROSE MONOHYDRATE 1 MLS/HR: 50 INJECTION, SOLUTION INTRAVENOUS at 10:09

## 2018-03-23 RX ADMIN — ALBUTEROL SULFATE 2 PUFF: 90 AEROSOL, METERED RESPIRATORY (INHALATION) at 04:01

## 2018-03-23 RX ADMIN — IPRATROPIUM BROMIDE AND ALBUTEROL SULFATE 1 ML: .5; 3 SOLUTION RESPIRATORY (INHALATION) at 02:00

## 2018-03-23 RX ADMIN — IPRATROPIUM BROMIDE AND ALBUTEROL SULFATE 1 ML: .5; 3 SOLUTION RESPIRATORY (INHALATION) at 13:33

## 2018-03-23 RX ADMIN — SUCRALFATE 1 GM: 1 TABLET ORAL at 07:37

## 2018-03-23 RX ADMIN — IPRATROPIUM BROMIDE AND ALBUTEROL SULFATE 1 ML: .5; 3 SOLUTION RESPIRATORY (INHALATION) at 07:31

## 2018-03-23 RX ADMIN — PANCRELIPASE 1 EA: 24000; 76000; 120000 CAPSULE, DELAYED RELEASE PELLETS ORAL at 12:30

## 2018-03-23 RX ADMIN — PANCRELIPASE 1 EA: 24000; 76000; 120000 CAPSULE, DELAYED RELEASE PELLETS ORAL at 17:16

## 2018-03-23 RX ADMIN — DEXTROSE MONOHYDRATE 1 MLS/HR: 50 INJECTION, SOLUTION INTRAVENOUS at 20:49

## 2018-03-23 RX ADMIN — SUCRALFATE 1 GM: 1 TABLET ORAL at 20:49

## 2018-03-23 RX ADMIN — PANTOPRAZOLE SODIUM 1 MG: 40 TABLET, DELAYED RELEASE ORAL at 10:20

## 2018-03-23 RX ADMIN — ATORVASTATIN CALCIUM 1 MG: 20 TABLET, FILM COATED ORAL at 10:16

## 2018-03-23 RX ADMIN — SUCRALFATE 1 GM: 1 TABLET ORAL at 12:30

## 2018-03-24 LAB
ANION GAP: 7 MEQ/L (ref 8–16)
BASO #: 0 10^3/UL (ref 0–0.2)
BASO %: 0.5 % (ref 0–1)
BLOOD UREA NITROGEN: 13 MG/DL (ref 7–18)
CALCIUM LEVEL: 7 MG/DL (ref 8.8–10.2)
CARBON DIOXIDE LEVEL: 26 MEQ/L (ref 21–32)
CHLORIDE LEVEL: 103 MEQ/L (ref 98–107)
CREATININE FOR GFR: 1.03 MG/DL (ref 0.7–1.3)
EOS #: 0.3 10^3/UL (ref 0–0.5)
EOSINOPHIL NFR BLD AUTO: 3.7 % (ref 0–3)
FREE T4: 0.9 NG/DL (ref 0.76–1.46)
GFR SERPL CREATININE-BSD FRML MDRD: > 60 ML/MIN/{1.73_M2} (ref 49–?)
GLUCOSE, FASTING: 109 MG/DL (ref 70–100)
HEMATOCRIT: 31.3 % (ref 42–52)
HEMOGLOBIN: 9.1 G/DL (ref 14–18)
IMMATURE GRANULOCYTE %: 0.6 % (ref 0–3)
LYMPH #: 0.9 10^3/UL (ref 1.5–4.5)
LYMPH %: 10.4 % (ref 24–44)
MAGNESIUM LEVEL: 1.8 MG/DL (ref 1.8–2.4)
MEAN CORPUSCULAR HEMOGLOBIN: 19.4 PG (ref 27–33)
MEAN CORPUSCULAR HGB CONC: 29.1 G/DL (ref 32–36.5)
MEAN CORPUSCULAR VOLUME: 66.9 FL (ref 80–96)
MONO #: 1 10^3/UL (ref 0–0.8)
MONO %: 11.7 % (ref 0–5)
NEUTROPHILS #: 6.2 10^3/UL (ref 1.8–7.7)
NEUTROPHILS %: 73.1 % (ref 36–66)
NRBC BLD AUTO-RTO: 0 % (ref 0–0)
PLATELET COUNT, AUTOMATED: 311 10^3/UL (ref 150–450)
POTASSIUM SERUM: 3.8 MEQ/L (ref 3.5–5.1)
RED BLOOD COUNT: 4.68 10^6/UL (ref 4.3–6.1)
RED CELL DISTRIBUTION WIDTH: 21.7 % (ref 11.5–14.5)
SODIUM LEVEL: 136 MEQ/L (ref 136–145)
WHITE BLOOD COUNT: 8.5 10^3/UL (ref 4–10)

## 2018-03-24 RX ADMIN — IPRATROPIUM BROMIDE AND ALBUTEROL SULFATE 1 ML: .5; 3 SOLUTION RESPIRATORY (INHALATION) at 02:00

## 2018-03-24 RX ADMIN — DEXTROSE MONOHYDRATE 1 MLS/HR: 50 INJECTION, SOLUTION INTRAVENOUS at 20:48

## 2018-03-24 RX ADMIN — ENOXAPARIN SODIUM 1 MG: 40 INJECTION SUBCUTANEOUS at 09:19

## 2018-03-24 RX ADMIN — PANCRELIPASE 1 EA: 24000; 76000; 120000 CAPSULE, DELAYED RELEASE PELLETS ORAL at 07:39

## 2018-03-24 RX ADMIN — DEXTROSE MONOHYDRATE 1 MLS/HR: 50 INJECTION, SOLUTION INTRAVENOUS at 09:18

## 2018-03-24 RX ADMIN — SUCRALFATE 1 GM: 1 TABLET ORAL at 20:48

## 2018-03-24 RX ADMIN — IPRATROPIUM BROMIDE AND ALBUTEROL SULFATE 1 ML: .5; 3 SOLUTION RESPIRATORY (INHALATION) at 07:14

## 2018-03-24 RX ADMIN — LEVOFLOXACIN 1 MLS/HR: 5 INJECTION, SOLUTION INTRAVENOUS at 00:12

## 2018-03-24 RX ADMIN — FERROUS SULFATE TAB 325 MG (65 MG ELEMENTAL FE) 1 MG: 325 (65 FE) TAB at 09:17

## 2018-03-24 RX ADMIN — SUCRALFATE 1 GM: 1 TABLET ORAL at 07:39

## 2018-03-24 RX ADMIN — PANCRELIPASE 1 EA: 24000; 76000; 120000 CAPSULE, DELAYED RELEASE PELLETS ORAL at 12:19

## 2018-03-24 RX ADMIN — FERROUS SULFATE TAB 325 MG (65 MG ELEMENTAL FE) 1 MG: 325 (65 FE) TAB at 16:17

## 2018-03-24 RX ADMIN — SUCRALFATE 1 GM: 1 TABLET ORAL at 18:04

## 2018-03-24 RX ADMIN — IPRATROPIUM BROMIDE AND ALBUTEROL SULFATE 1 ML: .5; 3 SOLUTION RESPIRATORY (INHALATION) at 13:20

## 2018-03-24 RX ADMIN — PANCRELIPASE 1 EA: 24000; 76000; 120000 CAPSULE, DELAYED RELEASE PELLETS ORAL at 18:04

## 2018-03-24 RX ADMIN — IPRATROPIUM BROMIDE AND ALBUTEROL SULFATE 1 ML: .5; 3 SOLUTION RESPIRATORY (INHALATION) at 20:00

## 2018-03-24 RX ADMIN — SUCRALFATE 1 GM: 1 TABLET ORAL at 12:19

## 2018-03-24 RX ADMIN — PANTOPRAZOLE SODIUM 1 MG: 40 TABLET, DELAYED RELEASE ORAL at 09:16

## 2018-03-24 RX ADMIN — ATORVASTATIN CALCIUM 1 MG: 20 TABLET, FILM COATED ORAL at 09:17

## 2018-03-24 RX ADMIN — FERROUS SULFATE TAB 325 MG (65 MG ELEMENTAL FE) 1 MG: 325 (65 FE) TAB at 20:48

## 2018-03-24 RX ADMIN — ASPIRIN 1 MG: 81 TABLET ORAL at 09:16

## 2018-03-25 LAB
ANION GAP: 9 MEQ/L (ref 8–16)
BASO #: 0.1 10^3/UL (ref 0–0.2)
BASO %: 0.5 % (ref 0–1)
BLOOD UREA NITROGEN: 16 MG/DL (ref 7–18)
CALCIUM LEVEL: 7.2 MG/DL (ref 8.8–10.2)
CARBON DIOXIDE LEVEL: 25 MEQ/L (ref 21–32)
CHLORIDE LEVEL: 102 MEQ/L (ref 98–107)
CREATININE FOR GFR: 1.05 MG/DL (ref 0.7–1.3)
EOS #: 0.6 10^3/UL (ref 0–0.5)
EOSINOPHIL NFR BLD AUTO: 5.9 % (ref 0–3)
GFR SERPL CREATININE-BSD FRML MDRD: > 60 ML/MIN/{1.73_M2} (ref 49–?)
GLUCOSE, FASTING: 100 MG/DL (ref 70–100)
HEMATOCRIT: 33.4 % (ref 42–52)
HEMOGLOBIN: 9.9 G/DL (ref 14–18)
IMMATURE GRANULOCYTE %: 0.7 % (ref 0–3)
LYMPH #: 0.8 10^3/UL (ref 1.5–4.5)
LYMPH %: 7.7 % (ref 24–44)
MAGNESIUM LEVEL: 1.8 MG/DL (ref 1.8–2.4)
MEAN CORPUSCULAR HEMOGLOBIN: 19.8 PG (ref 27–33)
MEAN CORPUSCULAR HGB CONC: 29.6 G/DL (ref 32–36.5)
MEAN CORPUSCULAR VOLUME: 66.7 FL (ref 80–96)
MONO #: 0.8 10^3/UL (ref 0–0.8)
MONO %: 7.4 % (ref 0–5)
NEUTROPHILS #: 8.2 10^3/UL (ref 1.8–7.7)
NEUTROPHILS %: 77.8 % (ref 36–66)
NRBC BLD AUTO-RTO: 0 % (ref 0–0)
PLATELET COUNT, AUTOMATED: 388 10^3/UL (ref 150–450)
POTASSIUM SERUM: 4 MEQ/L (ref 3.5–5.1)
RED BLOOD COUNT: 5.01 10^6/UL (ref 4.3–6.1)
RED CELL DISTRIBUTION WIDTH: 22.3 % (ref 11.5–14.5)
SODIUM LEVEL: 136 MEQ/L (ref 136–145)
WHITE BLOOD COUNT: 10.5 10^3/UL (ref 4–10)

## 2018-03-25 RX ADMIN — PANTOPRAZOLE SODIUM 1 MG: 40 TABLET, DELAYED RELEASE ORAL at 08:06

## 2018-03-25 RX ADMIN — FERROUS SULFATE TAB 325 MG (65 MG ELEMENTAL FE) 1 MG: 325 (65 FE) TAB at 20:27

## 2018-03-25 RX ADMIN — SULFAMETHOXAZOLE AND TRIMETHOPRIM 1 TAB: 800; 160 TABLET ORAL at 20:27

## 2018-03-25 RX ADMIN — SUCRALFATE 1 GM: 1 TABLET ORAL at 08:06

## 2018-03-25 RX ADMIN — LEVOFLOXACIN 1 MLS/HR: 5 INJECTION, SOLUTION INTRAVENOUS at 00:34

## 2018-03-25 RX ADMIN — IPRATROPIUM BROMIDE AND ALBUTEROL SULFATE 1 ML: .5; 3 SOLUTION RESPIRATORY (INHALATION) at 02:00

## 2018-03-25 RX ADMIN — FERROUS SULFATE TAB 325 MG (65 MG ELEMENTAL FE) 1 MG: 325 (65 FE) TAB at 08:06

## 2018-03-25 RX ADMIN — DEXTROSE MONOHYDRATE 1 MLS/HR: 50 INJECTION, SOLUTION INTRAVENOUS at 08:08

## 2018-03-25 RX ADMIN — SUCRALFATE 1 GM: 1 TABLET ORAL at 20:27

## 2018-03-25 RX ADMIN — ASPIRIN 1 MG: 81 TABLET ORAL at 08:07

## 2018-03-25 RX ADMIN — IPRATROPIUM BROMIDE AND ALBUTEROL SULFATE 1 ML: .5; 3 SOLUTION RESPIRATORY (INHALATION) at 13:41

## 2018-03-25 RX ADMIN — CALCIUM CARBONATE (ANTACID) CHEW TAB 500 MG 1 MG: 500 CHEW TAB at 16:42

## 2018-03-25 RX ADMIN — SUCRALFATE 1 GM: 1 TABLET ORAL at 16:41

## 2018-03-25 RX ADMIN — IPRATROPIUM BROMIDE AND ALBUTEROL SULFATE 1 ML: .5; 3 SOLUTION RESPIRATORY (INHALATION) at 20:50

## 2018-03-25 RX ADMIN — PANCRELIPASE 1 EA: 24000; 76000; 120000 CAPSULE, DELAYED RELEASE PELLETS ORAL at 17:31

## 2018-03-25 RX ADMIN — PANCRELIPASE 1 EA: 24000; 76000; 120000 CAPSULE, DELAYED RELEASE PELLETS ORAL at 08:06

## 2018-03-25 RX ADMIN — PANCRELIPASE 1 EA: 24000; 76000; 120000 CAPSULE, DELAYED RELEASE PELLETS ORAL at 12:32

## 2018-03-25 RX ADMIN — IPRATROPIUM BROMIDE AND ALBUTEROL SULFATE 1 ML: .5; 3 SOLUTION RESPIRATORY (INHALATION) at 08:00

## 2018-03-25 RX ADMIN — ATORVASTATIN CALCIUM 1 MG: 20 TABLET, FILM COATED ORAL at 08:06

## 2018-03-25 RX ADMIN — SUCRALFATE 1 GM: 1 TABLET ORAL at 12:32

## 2018-03-25 RX ADMIN — ENOXAPARIN SODIUM 1 MG: 40 INJECTION SUBCUTANEOUS at 08:07

## 2018-03-25 RX ADMIN — FERROUS SULFATE TAB 325 MG (65 MG ELEMENTAL FE) 1 MG: 325 (65 FE) TAB at 16:41

## 2018-03-26 LAB
ANION GAP: 7 MEQ/L (ref 8–16)
BASO #: 0.1 10^3/UL (ref 0–0.2)
BASO %: 0.6 % (ref 0–1)
BLOOD UREA NITROGEN: 26 MG/DL (ref 7–18)
CALCIUM LEVEL: 7.6 MG/DL (ref 8.8–10.2)
CARBON DIOXIDE LEVEL: 25 MEQ/L (ref 21–32)
CHLORIDE LEVEL: 103 MEQ/L (ref 98–107)
CREATININE FOR GFR: 1.23 MG/DL (ref 0.7–1.3)
CRP SERPL-MCNC: 1.16 MG/DL (ref 0–0.3)
EOS #: 0.6 10^3/UL (ref 0–0.5)
EOSINOPHIL NFR BLD AUTO: 7.3 % (ref 0–3)
GFR SERPL CREATININE-BSD FRML MDRD: > 60 ML/MIN/{1.73_M2} (ref 49–?)
GLUCOSE, FASTING: 102 MG/DL (ref 70–100)
HEMATOCRIT: 33.8 % (ref 42–52)
HEMOGLOBIN: 9.9 G/DL (ref 14–18)
IMMATURE GRANULOCYTE %: 0.8 % (ref 0–3)
LYMPH #: 1.1 10^3/UL (ref 1.5–4.5)
LYMPH %: 13.3 % (ref 24–44)
MAGNESIUM LEVEL: 1.9 MG/DL (ref 1.8–2.4)
MEAN CORPUSCULAR HEMOGLOBIN: 19.4 PG (ref 27–33)
MEAN CORPUSCULAR HGB CONC: 29.3 G/DL (ref 32–36.5)
MEAN CORPUSCULAR VOLUME: 66.4 FL (ref 80–96)
MONO #: 0.7 10^3/UL (ref 0–0.8)
MONO %: 8 % (ref 0–5)
NEUTROPHILS #: 5.9 10^3/UL (ref 1.8–7.7)
NEUTROPHILS %: 70 % (ref 36–66)
NRBC BLD AUTO-RTO: 0 % (ref 0–0)
PLATELET COUNT, AUTOMATED: 366 10^3/UL (ref 150–450)
POTASSIUM SERUM: 4 MEQ/L (ref 3.5–5.1)
RED BLOOD COUNT: 5.09 10^6/UL (ref 4.3–6.1)
RED CELL DISTRIBUTION WIDTH: 22.4 % (ref 11.5–14.5)
SODIUM LEVEL: 135 MEQ/L (ref 136–145)
WHITE BLOOD COUNT: 8.5 10^3/UL (ref 4–10)

## 2018-03-26 RX ADMIN — IPRATROPIUM BROMIDE AND ALBUTEROL SULFATE 1 ML: .5; 3 SOLUTION RESPIRATORY (INHALATION) at 08:40

## 2018-03-26 RX ADMIN — SUCRALFATE 1 GM: 1 TABLET ORAL at 08:27

## 2018-03-26 RX ADMIN — ATORVASTATIN CALCIUM 1 MG: 20 TABLET, FILM COATED ORAL at 08:21

## 2018-03-26 RX ADMIN — PANCRELIPASE 1 EA: 24000; 76000; 120000 CAPSULE, DELAYED RELEASE PELLETS ORAL at 08:20

## 2018-03-26 RX ADMIN — SULFAMETHOXAZOLE AND TRIMETHOPRIM 1 TAB: 800; 160 TABLET ORAL at 08:20

## 2018-03-26 RX ADMIN — ENOXAPARIN SODIUM 1 MG: 40 INJECTION SUBCUTANEOUS at 08:33

## 2018-03-26 RX ADMIN — PANTOPRAZOLE SODIUM 1 MG: 40 TABLET, DELAYED RELEASE ORAL at 08:21

## 2018-03-26 RX ADMIN — IPRATROPIUM BROMIDE AND ALBUTEROL SULFATE 1 ML: .5; 3 SOLUTION RESPIRATORY (INHALATION) at 02:00

## 2018-03-26 RX ADMIN — FERROUS SULFATE TAB 325 MG (65 MG ELEMENTAL FE) 1 MG: 325 (65 FE) TAB at 08:20

## 2018-03-26 RX ADMIN — ASPIRIN 1 MG: 81 TABLET ORAL at 08:20

## 2018-06-24 ENCOUNTER — HOSPITAL ENCOUNTER (EMERGENCY)
Dept: HOSPITAL 53 - M ED | Age: 63
Discharge: HOME | End: 2018-06-24
Payer: COMMERCIAL

## 2018-06-24 DIAGNOSIS — Z95.1: ICD-10-CM

## 2018-06-24 DIAGNOSIS — Z88.0: ICD-10-CM

## 2018-06-24 DIAGNOSIS — I12.9: ICD-10-CM

## 2018-06-24 DIAGNOSIS — Z88.8: ICD-10-CM

## 2018-06-24 DIAGNOSIS — N18.9: ICD-10-CM

## 2018-06-24 DIAGNOSIS — Z79.899: ICD-10-CM
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
## 2018-06-24 DIAGNOSIS — S61.250A: Primary | ICD-10-CM

## 2018-06-24 DIAGNOSIS — I25.10: ICD-10-CM

## 2018-06-24 DIAGNOSIS — G47.33: ICD-10-CM
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
## 2018-06-24 DIAGNOSIS — Z86.73: ICD-10-CM

## 2018-06-24 DIAGNOSIS — K21.9: ICD-10-CM

## 2018-06-24 PROCEDURE — 90715 TDAP VACCINE 7 YRS/> IM: CPT

## 2018-06-24 RX ADMIN — CEFTRIAXONE 1 GM: 1 INJECTION, POWDER, FOR SOLUTION INTRAMUSCULAR; INTRAVENOUS at 02:33

## 2018-06-24 RX ADMIN — TETANUS TOXOID, REDUCED DIPHTHERIA TOXOID AND ACELLULAR PERTUSSIS VACCINE, ADSORBED 1 ML: 5; 2.5; 8; 8; 2.5 SUSPENSION INTRAMUSCULAR at 02:34

## 2018-06-24 RX ADMIN — METRONIDAZOLE 1 MG: 500 TABLET ORAL at 02:33

## 2018-07-31 ENCOUNTER — HOSPITAL ENCOUNTER (OUTPATIENT)
Dept: HOSPITAL 53 - M SFHCLERA | Age: 63
End: 2018-07-31
Attending: FAMILY MEDICINE
Payer: COMMERCIAL

## 2018-07-31 DIAGNOSIS — I10: Primary | ICD-10-CM

## 2018-07-31 DIAGNOSIS — F15.10: ICD-10-CM

## 2018-07-31 LAB
ALBUMIN/GLOBULIN RATIO: 0.8 (ref 1–1.93)
ALBUMIN: 2.8 GM/DL (ref 3.2–5.2)
ALKALINE PHOSPHATASE: 144 U/L (ref 45–117)
ALT SERPL W P-5'-P-CCNC: 23 U/L (ref 12–78)
ANION GAP: 10 MEQ/L (ref 8–16)
APPEARANCE, URINE: CLEAR
AST SERPL-CCNC: 24 U/L (ref 7–37)
BACTERIA UR QL AUTO: NEGATIVE
BASO #: 0.1 10^3/UL (ref 0–0.2)
BASO %: 0.7 % (ref 0–1)
BILIRUBIN, URINE AUTO: NEGATIVE
BILIRUBIN,TOTAL: 0.3 MG/DL (ref 0.2–1)
BLOOD UREA NITROGEN: 21 MG/DL (ref 7–18)
BLOOD, URINE BLOOD: NEGATIVE
CALCIUM LEVEL: 8.1 MG/DL (ref 8.8–10.2)
CARBON DIOXIDE LEVEL: 25 MEQ/L (ref 21–32)
CHLORIDE LEVEL: 108 MEQ/L (ref 98–107)
CHOLEST SERPL-MCNC: 134 MG/DL (ref ?–200)
CHOLESTEROL RISK RATIO: 2.98 (ref ?–5)
CREATININE FOR GFR: 1.42 MG/DL (ref 0.7–1.3)
EOS #: 0.1 10^3/UL (ref 0–0.5)
EOSINOPHIL NFR BLD AUTO: 1.9 % (ref 0–3)
GFR SERPL CREATININE-BSD FRML MDRD: 53.6 ML/MIN/{1.73_M2} (ref 49–?)
GLUCOSE, FASTING: 78 MG/DL (ref 70–100)
GLUCOSE, URINE (UA) AUTO: NEGATIVE MG/DL
HDLC SERPL-MCNC: 45 MG/DL (ref 40–?)
HEMATOCRIT: 29.5 % (ref 42–52)
HEMOGLOBIN: 8.1 G/DL (ref 13.5–17.5)
IMMATURE GRANULOCYTE %: 0.3 % (ref 0–3)
KETONE, URINE AUTO: NEGATIVE MG/DL
LDL CHOLESTEROL: 63.6 MG/DL (ref ?–100)
LEUKOCYTE ESTERASE UR QL STRIP.AUTO: NEGATIVE
LYMPH #: 1.2 10^3/UL (ref 1.5–4.5)
LYMPH %: 17.8 % (ref 24–44)
MEAN CORPUSCULAR HEMOGLOBIN: 19 PG (ref 27–33)
MEAN CORPUSCULAR HGB CONC: 27.5 G/DL (ref 32–36.5)
MEAN CORPUSCULAR VOLUME: 69.1 FL (ref 80–96)
MONO #: 0.6 10^3/UL (ref 0–0.8)
MONO %: 8.5 % (ref 0–5)
MUCUS, URINE: (no result)
NEUTROPHILS #: 4.9 10^3/UL (ref 1.8–7.7)
NEUTROPHILS %: 70.8 % (ref 36–66)
NITRITE, URINE AUTO: NEGATIVE
NONHDLC SERPL-MCNC: 89 MG/DL
NRBC BLD AUTO-RTO: 0 % (ref 0–0)
PH,URINE: 5 UNITS (ref 5–9)
PLATELET COUNT, AUTOMATED: 278 10^3/UL (ref 150–450)
POTASSIUM SERUM: 4.5 MEQ/L (ref 3.5–5.1)
PROT UR QL STRIP.AUTO: NEGATIVE MG/DL
RBC, URINE AUTO: 1 /HPF (ref 0–3)
RED BLOOD COUNT: 4.27 10^6/UL (ref 4.3–6.1)
RED CELL DISTRIBUTION WIDTH: 17.1 % (ref 11.5–14.5)
SODIUM LEVEL: 143 MEQ/L (ref 136–145)
SPECIFIC GRAVITY URINE AUTO: 1.01 (ref 1–1.03)
SQUAMOUS #/AREA URNS AUTO: 0 /HPF (ref 0–6)
THYROID STIMULATING HORMONE: 5.45 UIU/ML (ref 0.36–3.74)
TOTAL PROTEIN: 6.3 GM/DL (ref 6.4–8.2)
TRIGLYCERIDES LEVEL: 127 MG/DL (ref ?–150)
UROBILINOGEN, URINE AUTO: 0.2 MG/DL (ref 0–2)
WBC, URINE AUTO: 1 /HPF (ref 0–3)
WHITE BLOOD COUNT: 6.9 10^3/UL (ref 4–10)

## 2018-08-08 LAB — SUMMARY: (no result)

## 2018-09-19 ENCOUNTER — HOSPITAL ENCOUNTER (EMERGENCY)
Dept: HOSPITAL 53 - M ED | Age: 63
LOS: 1 days | Discharge: HOME | End: 2018-09-20
Payer: COMMERCIAL

## 2018-09-19 DIAGNOSIS — H40.9: ICD-10-CM

## 2018-09-19 DIAGNOSIS — Z88.8: ICD-10-CM

## 2018-09-19 DIAGNOSIS — I10: ICD-10-CM

## 2018-09-19 DIAGNOSIS — J44.9: ICD-10-CM

## 2018-09-19 DIAGNOSIS — Z87.891: ICD-10-CM

## 2018-09-19 DIAGNOSIS — E07.9: ICD-10-CM

## 2018-09-19 DIAGNOSIS — D64.9: Primary | ICD-10-CM

## 2018-09-19 DIAGNOSIS — Z88.0: ICD-10-CM

## 2018-09-19 DIAGNOSIS — R19.7: ICD-10-CM

## 2018-09-19 DIAGNOSIS — N43.40: ICD-10-CM

## 2018-09-19 DIAGNOSIS — F19.10: ICD-10-CM

## 2018-09-19 DIAGNOSIS — K86.89: ICD-10-CM

## 2018-09-20 LAB
ALBUMIN/GLOBULIN RATIO: 0.76 (ref 1–1.93)
ALBUMIN: 2.6 GM/DL (ref 3.2–5.2)
ALKALINE PHOSPHATASE: 140 U/L (ref 45–117)
ALT SERPL W P-5'-P-CCNC: 27 U/L (ref 12–78)
ANION GAP: 7 MEQ/L (ref 8–16)
AST SERPL-CCNC: 29 U/L (ref 7–37)
BASO #: 0.1 10^3/UL (ref 0–0.2)
BASO %: 0.7 % (ref 0–1)
BILIRUB CONJ SERPL-MCNC: < 0.1 MG/DL (ref 0–0.2)
BILIRUBIN,TOTAL: 0.2 MG/DL (ref 0.2–1)
BLOOD UREA NITROGEN: 14 MG/DL (ref 7–18)
CALCIUM LEVEL: 6.7 MG/DL (ref 8.8–10.2)
CARBON DIOXIDE LEVEL: 23 MEQ/L (ref 21–32)
CHLORIDE LEVEL: 110 MEQ/L (ref 98–107)
CREATININE FOR GFR: 0.9 MG/DL (ref 0.7–1.3)
EOS #: 0.4 10^3/UL (ref 0–0.5)
EOSINOPHIL NFR BLD AUTO: 6.2 % (ref 0–3)
GFR SERPL CREATININE-BSD FRML MDRD: > 60 ML/MIN/{1.73_M2} (ref 49–?)
GLUCOSE, FASTING: 74 MG/DL (ref 70–100)
HEMATOCRIT: 29.1 % (ref 42–52)
HEMOGLOBIN: 7.9 G/DL (ref 13.5–17.5)
IMMATURE GRANULOCYTE %: 0.1 % (ref 0–3)
LEUKOCYTE ESTERASE UR AUTO RFX: NEGATIVE
LIPASE: 452 U/L (ref 73–393)
LYMPH #: 1.5 10^3/UL (ref 1.5–4.5)
LYMPH %: 20.3 % (ref 24–44)
MEAN CORPUSCULAR HEMOGLOBIN: 18.5 PG (ref 27–33)
MEAN CORPUSCULAR HGB CONC: 27.1 G/DL (ref 32–36.5)
MEAN CORPUSCULAR VOLUME: 68.1 FL (ref 80–96)
MONO #: 0.9 10^3/UL (ref 0–0.8)
MONO %: 12.5 % (ref 0–5)
NEUTROPHILS #: 4.3 10^3/UL (ref 1.8–7.7)
NEUTROPHILS %: 60.2 % (ref 36–66)
NRBC BLD AUTO-RTO: 0 % (ref 0–0)
PLATELET COUNT, AUTOMATED: 194 10^3/UL (ref 150–450)
POTASSIUM SERUM: 4.5 MEQ/L (ref 3.5–5.1)
RED BLOOD COUNT: 4.27 10^6/UL (ref 4.3–6.1)
RED CELL DISTRIBUTION WIDTH: 17.1 % (ref 11.5–14.5)
SODIUM LEVEL: 140 MEQ/L (ref 136–145)
SPECIFIC GRAVITY UR AUTO RFX: 1.01 (ref 1–1.03)
SQUAM EPITHELIAL CELL UR AURFX: 0 /HPF (ref 0–6)
TOTAL PROTEIN: 6 GM/DL (ref 6.4–8.2)
WHITE BLOOD COUNT: 7.1 10^3/UL (ref 4–10)

## 2018-09-20 RX ADMIN — DIATRIZOATE MEGLUMINE AND DIATRIZOATE SODIUM 1 ML: 600; 100 SOLUTION ORAL; RECTAL at 02:11

## 2018-09-20 RX ADMIN — DIATRIZOATE MEGLUMINE AND DIATRIZOATE SODIUM 1 ML: 600; 100 SOLUTION ORAL; RECTAL at 02:40

## 2018-09-20 RX ADMIN — SODIUM CHLORIDE 1 MLS/HR: 9 INJECTION, SOLUTION INTRAVENOUS at 02:11

## 2018-11-15 ENCOUNTER — HOSPITAL ENCOUNTER (OUTPATIENT)
Dept: HOSPITAL 53 - M SFHCLERA | Age: 63
End: 2018-11-15
Attending: FAMILY MEDICINE
Payer: COMMERCIAL

## 2018-11-15 DIAGNOSIS — D50.9: Primary | ICD-10-CM

## 2018-11-15 DIAGNOSIS — Z53.8: ICD-10-CM

## 2018-11-15 DIAGNOSIS — F15.10: ICD-10-CM

## 2018-11-15 DIAGNOSIS — E03.9: ICD-10-CM

## 2018-12-20 ENCOUNTER — HOSPITAL ENCOUNTER (OUTPATIENT)
Dept: HOSPITAL 53 - M ED | Age: 63
Setting detail: OBSERVATION
LOS: 2 days | Discharge: HOME | End: 2018-12-22
Attending: INTERNAL MEDICINE | Admitting: HOSPITALIST
Payer: COMMERCIAL

## 2018-12-20 ENCOUNTER — HOSPITAL ENCOUNTER (OUTPATIENT)
Dept: HOSPITAL 53 - M SFHCLERA | Age: 63
End: 2018-12-20
Attending: FAMILY MEDICINE
Payer: COMMERCIAL

## 2018-12-20 DIAGNOSIS — J44.9: ICD-10-CM

## 2018-12-20 DIAGNOSIS — D50.9: Primary | ICD-10-CM

## 2018-12-20 DIAGNOSIS — E03.9: ICD-10-CM

## 2018-12-20 DIAGNOSIS — Z88.0: ICD-10-CM

## 2018-12-20 DIAGNOSIS — K21.9: ICD-10-CM

## 2018-12-20 DIAGNOSIS — Z87.891: ICD-10-CM

## 2018-12-20 DIAGNOSIS — Z88.8: ICD-10-CM

## 2018-12-20 DIAGNOSIS — M54.5: ICD-10-CM

## 2018-12-20 DIAGNOSIS — F12.90: ICD-10-CM

## 2018-12-20 DIAGNOSIS — F15.10: ICD-10-CM

## 2018-12-20 DIAGNOSIS — Z79.899: ICD-10-CM

## 2018-12-20 DIAGNOSIS — F10.11: ICD-10-CM

## 2018-12-20 DIAGNOSIS — I10: ICD-10-CM

## 2018-12-20 DIAGNOSIS — F11.11: ICD-10-CM

## 2018-12-20 DIAGNOSIS — I25.10: ICD-10-CM

## 2018-12-20 DIAGNOSIS — E78.00: ICD-10-CM

## 2018-12-20 DIAGNOSIS — F15.20: ICD-10-CM

## 2018-12-20 DIAGNOSIS — Z98.61: ICD-10-CM

## 2018-12-20 LAB
ANION GAP: 7 MEQ/L (ref 8–16)
BASO #: 0 10^3/UL (ref 0–0.2)
BASO %: 0.6 % (ref 0–1)
BASOPHILS # BLD AUTO: 0 10^3/UL (ref 0–0.2)
BASOPHILS NFR BLD AUTO: 0.4 % (ref 0–1)
BLOOD UREA NITROGEN: 21 MG/DL (ref 7–18)
BUN SERPL-MCNC: 21 MG/DL (ref 7–18)
CALCIUM LEVEL: 7 MG/DL (ref 8.8–10.2)
CALCIUM SERPL-MCNC: 7 MG/DL (ref 8.8–10.2)
CARBON DIOXIDE LEVEL: 29 MEQ/L (ref 21–32)
CHLORIDE LEVEL: 105 MEQ/L (ref 98–107)
CHLORIDE SERPL-SCNC: 106 MEQ/L (ref 98–107)
CO2 SERPL-SCNC: 25 MEQ/L (ref 21–32)
CREAT SERPL-MCNC: 1.21 MG/DL (ref 0.7–1.3)
CREATININE FOR GFR: 1.3 MG/DL (ref 0.7–1.3)
EOS #: 0.3 10^3/UL (ref 0–0.5)
EOSINOPHIL # BLD AUTO: 0.3 10^3/UL (ref 0–0.5)
EOSINOPHIL NFR BLD AUTO: 2.9 % (ref 0–3)
EOSINOPHIL NFR BLD AUTO: 4.8 % (ref 0–3)
EST. AVERAGE GLUCOSE BLD GHB EST-MCNC: 114 MG/DL (ref 60–110)
FERRITIN SERPL-MCNC: 5 NG/ML (ref 26–388)
GFR SERPL CREATININE-BSD FRML MDRD: 59.2 ML/MIN/{1.73_M2} (ref 49–?)
GFR SERPL CREATININE-BSD FRML MDRD: > 60 ML/MIN/{1.73_M2} (ref 49–?)
GLUCOSE SERPL-MCNC: 79 MG/DL (ref 70–100)
GLUCOSE, FASTING: 90 MG/DL (ref 70–100)
HCT VFR BLD AUTO: 27.6 % (ref 42–52)
HEMATOCRIT: 24.8 % (ref 42–52)
HEMOGLOBIN: 6.7 G/DL (ref 13.5–17.5)
HGB BLD-MCNC: 7.1 G/DL (ref 13.5–17.5)
IMMATURE GRANULOCYTE %: 0.4 % (ref 0–3)
IMMEDIATE SPIN CROSSMATCH: 1 2
INR: 1.03
IRON SATN MFR SERPL: 7.9 % (ref 19.7–50)
IRON SERPL-MCNC: 33 UG/DL (ref 65–175)
LYMPH #: 1.1 10^3/UL (ref 1.5–4.5)
LYMPH %: 16.3 % (ref 24–44)
LYMPHOCYTES # BLD AUTO: 1.1 10^3/UL (ref 1.5–4.5)
LYMPHOCYTES NFR BLD AUTO: 10.7 % (ref 24–44)
MCH RBC QN AUTO: 17.9 PG (ref 27–33)
MCHC RBC AUTO-ENTMCNC: 25.7 G/DL (ref 32–36.5)
MCV RBC AUTO: 69.7 FL (ref 80–96)
MEAN CORPUSCULAR HEMOGLOBIN: 18.6 PG (ref 27–33)
MEAN CORPUSCULAR HGB CONC: 27 G/DL (ref 32–36.5)
MEAN CORPUSCULAR VOLUME: 68.9 FL (ref 80–96)
MONO #: 0.6 10^3/UL (ref 0–0.8)
MONO %: 8.8 % (ref 0–5)
MONOCYTES # BLD AUTO: 0.8 10^3/UL (ref 0–0.8)
MONOCYTES NFR BLD AUTO: 8 % (ref 0–5)
NEUTROPHILS # BLD AUTO: 8 10^3/UL (ref 1.8–7.7)
NEUTROPHILS #: 4.6 10^3/UL (ref 1.8–7.7)
NEUTROPHILS %: 69.1 % (ref 36–66)
NEUTROPHILS NFR BLD AUTO: 77.6 % (ref 36–66)
NRBC BLD AUTO-RTO: 0 % (ref 0–0)
PARTIAL THROMBOPLASTIN TIME: 24.4 SECONDS (ref 25.4–37.6)
PLATELET # BLD AUTO: 293 10^3/UL (ref 150–450)
PLATELET COUNT, AUTOMATED: 238 10^3/UL (ref 150–450)
POTASSIUM SERPL-SCNC: 4 MEQ/L (ref 3.5–5.1)
POTASSIUM SERUM: 3.7 MEQ/L (ref 3.5–5.1)
PROTHROMBIN TIME: 13.6 SECONDS (ref 12.1–14.4)
RBC # BLD AUTO: 3.96 10^6/UL (ref 4.3–6.1)
RED BLOOD COUNT: 3.6 10^6/UL (ref 4.3–6.1)
RED CELL DISTRIBUTION WIDTH: 18.1 % (ref 11.5–14.5)
SODIUM LEVEL: 141 MEQ/L (ref 136–145)
SODIUM SERPL-SCNC: 139 MEQ/L (ref 136–145)
TIBC SERPL-MCNC: 417 UG/DL (ref 250–450)
TSH SERPL DL<=0.005 MIU/L-ACNC: 3.2 UIU/ML (ref 0.36–3.74)
WBC # BLD AUTO: 10.3 10^3/UL (ref 4–10)
WHITE BLOOD COUNT: 6.7 10^3/UL (ref 4–10)

## 2018-12-20 PROCEDURE — 36430 TRANSFUSION BLD/BLD COMPNT: CPT

## 2018-12-20 RX ADMIN — ACETAMINOPHEN 1 MG: 325 TABLET ORAL at 23:29

## 2018-12-21 LAB
ANION GAP: 8 MEQ/L (ref 8–16)
APPEARANCE, URINE: CLEAR
BACTERIA UR QL AUTO: NEGATIVE
BILIRUBIN, URINE AUTO: NEGATIVE
BLOOD UREA NITROGEN: 21 MG/DL (ref 7–18)
BLOOD, URINE BLOOD: NEGATIVE
CALCIUM LEVEL: 7.3 MG/DL (ref 8.8–10.2)
CARBON DIOXIDE LEVEL: 25 MEQ/L (ref 21–32)
CHLORIDE LEVEL: 109 MEQ/L (ref 98–107)
CREATININE FOR GFR: 1.17 MG/DL (ref 0.7–1.3)
FOLATE SERPL-MCNC: 16.5 NG/ML (ref 5.4–?)
GFR SERPL CREATININE-BSD FRML MDRD: > 60 ML/MIN/{1.73_M2} (ref 49–?)
GLUCOSE, FASTING: 117 MG/DL (ref 70–100)
GLUCOSE, URINE (UA) AUTO: NEGATIVE MG/DL
HEMATOCRIT: 29.2 % (ref 42–52)
HEMOGLOBIN: 8.5 G/DL (ref 13.5–17.5)
KETONE, URINE AUTO: NEGATIVE MG/DL
LEUKOCYTE ESTERASE UR QL STRIP.AUTO: NEGATIVE
MEAN CORPUSCULAR HEMOGLOBIN: 20.8 PG (ref 27–33)
MEAN CORPUSCULAR HGB CONC: 29.1 G/DL (ref 32–36.5)
MEAN CORPUSCULAR VOLUME: 71.6 FL (ref 80–96)
NITRITE, URINE AUTO: NEGATIVE
NRBC BLD AUTO-RTO: 0 % (ref 0–0)
PH,URINE: 6 UNITS (ref 5–9)
PLATELET COUNT, AUTOMATED: 209 10^3/UL (ref 150–450)
POTASSIUM SERUM: 3.8 MEQ/L (ref 3.5–5.1)
PROT UR QL STRIP.AUTO: NEGATIVE MG/DL
RBC, URINE AUTO: 1 /HPF (ref 0–3)
RED BLOOD COUNT: 4.08 10^6/UL (ref 4.3–6.1)
RED CELL DISTRIBUTION WIDTH: 21.6 % (ref 11.5–14.5)
SODIUM LEVEL: 142 MEQ/L (ref 136–145)
SPECIFIC GRAVITY URINE AUTO: 1.02 (ref 1–1.03)
SQUAMOUS #/AREA URNS AUTO: 0 /HPF (ref 0–6)
TOTAL PROTEIN: 6 GM/DL (ref 6.4–8.2)
UROBILINOGEN, URINE AUTO: 0.2 MG/DL (ref 0–2)
VITAMIN B12 LEVEL: 871 PG/ML (ref 247–911)
WBC, URINE AUTO: 0 /HPF (ref 0–3)
WHITE BLOOD COUNT: 5 10^3/UL (ref 4–10)

## 2018-12-21 RX ADMIN — ONDANSETRON 1 MG: 2 INJECTION INTRAMUSCULAR; INTRAVENOUS at 22:13

## 2018-12-21 RX ADMIN — FERROUS SULFATE TAB 325 MG (65 MG ELEMENTAL FE) 1 MG: 325 (65 FE) TAB at 20:23

## 2018-12-21 RX ADMIN — SODIUM CHLORIDE 1 MLS/HR: 9 INJECTION, SOLUTION INTRAVENOUS at 10:18

## 2018-12-21 RX ADMIN — IPRATROPIUM BROMIDE AND ALBUTEROL SULFATE 1 ML: .5; 3 SOLUTION RESPIRATORY (INHALATION) at 20:36

## 2018-12-21 RX ADMIN — ALUMINUM HYDROXIDE, MAGNESIUM HYDROXIDE, AND SIMETHICONE 1 ML: 200; 200; 20 SUSPENSION ORAL at 16:55

## 2018-12-21 RX ADMIN — IPRATROPIUM BROMIDE AND ALBUTEROL SULFATE 1 ML: .5; 3 SOLUTION RESPIRATORY (INHALATION) at 07:05

## 2018-12-21 RX ADMIN — FERROUS SULFATE TAB 325 MG (65 MG ELEMENTAL FE) 1 MG: 325 (65 FE) TAB at 09:30

## 2018-12-21 RX ADMIN — LEVOTHYROXINE SODIUM 1 MCG: 88 TABLET ORAL at 05:35

## 2018-12-21 RX ADMIN — IPRATROPIUM BROMIDE AND ALBUTEROL SULFATE 1 ML: .5; 3 SOLUTION RESPIRATORY (INHALATION) at 13:20

## 2018-12-21 RX ADMIN — SODIUM CHLORIDE 1 MLS/HR: 9 INJECTION, SOLUTION INTRAVENOUS at 03:58

## 2018-12-21 RX ADMIN — ACETAMINOPHEN 1 MG: 325 TABLET ORAL at 03:31

## 2018-12-21 RX ADMIN — IPRATROPIUM BROMIDE AND ALBUTEROL SULFATE 1 ML: .5; 3 SOLUTION RESPIRATORY (INHALATION) at 02:22

## 2018-12-21 RX ADMIN — PANTOPRAZOLE SODIUM 1 MG: 40 TABLET, DELAYED RELEASE ORAL at 20:23

## 2018-12-21 RX ADMIN — SODIUM CHLORIDE 1 MLS/HR: 9 INJECTION, SOLUTION INTRAVENOUS at 20:24

## 2018-12-22 LAB
BASO #: 0.1 10^3/UL (ref 0–0.2)
BASO %: 0.6 % (ref 0–1)
EOS #: 0.5 10^3/UL (ref 0–0.5)
EOSINOPHIL NFR BLD AUTO: 5.3 % (ref 0–3)
HEMATOCRIT: 31.1 % (ref 42–52)
HEMOGLOBIN: 9 G/DL (ref 13.5–17.5)
IMMATURE GRANULOCYTE %: 0.5 % (ref 0–3)
LYMPH #: 0.9 10^3/UL (ref 1.5–4.5)
LYMPH %: 10 % (ref 24–44)
MEAN CORPUSCULAR HEMOGLOBIN: 20.7 PG (ref 27–33)
MEAN CORPUSCULAR HGB CONC: 28.9 G/DL (ref 32–36.5)
MEAN CORPUSCULAR VOLUME: 71.5 FL (ref 80–96)
MONO #: 0.6 10^3/UL (ref 0–0.8)
MONO %: 7.4 % (ref 0–5)
NEUTROPHILS #: 6.5 10^3/UL (ref 1.8–7.7)
NEUTROPHILS %: 76.2 % (ref 36–66)
NRBC BLD AUTO-RTO: 0.6 % (ref 0–0)
PLATELET COUNT, AUTOMATED: 280 10^3/UL (ref 150–450)
RED BLOOD COUNT: 4.35 10^6/UL (ref 4.3–6.1)
RED CELL DISTRIBUTION WIDTH: 21.6 % (ref 11.5–14.5)
WHITE BLOOD COUNT: 8.5 10^3/UL (ref 4–10)

## 2018-12-22 RX ADMIN — ALUMINUM HYDROXIDE, MAGNESIUM HYDROXIDE, AND SIMETHICONE 1 ML: 200; 200; 20 SUSPENSION ORAL at 02:13

## 2018-12-22 RX ADMIN — IPRATROPIUM BROMIDE AND ALBUTEROL SULFATE 1 ML: .5; 3 SOLUTION RESPIRATORY (INHALATION) at 01:47

## 2018-12-22 RX ADMIN — IPRATROPIUM BROMIDE AND ALBUTEROL SULFATE 1 ML: .5; 3 SOLUTION RESPIRATORY (INHALATION) at 08:36

## 2018-12-22 RX ADMIN — LEVOTHYROXINE SODIUM 1 MCG: 88 TABLET ORAL at 06:47

## 2018-12-22 RX ADMIN — FERROUS SULFATE TAB 325 MG (65 MG ELEMENTAL FE) 1 MG: 325 (65 FE) TAB at 09:48

## 2018-12-25 LAB
ALBUMIN %: 48.5 % (ref 55.8–66.1)
ALBUMIN: 2.91 GM/DL (ref 3.29–5.55)
ALPHA-1-GLOBULIN %: 8.5 % (ref 2.9–4.9)
ALPHA-1-GLOBULINS: 0.51 GM/DL (ref 0.17–0.41)
ALPHA-2-GLOBULINS %: 10 % (ref 7.1–11.8)
ALPHA-2-GLOBULINS: 0.6 GM/DL (ref 0.42–0.99)
B-GLOBULIN SERPL ELPH-MCNC: 0.6 GM/DL (ref 0.28–0.6)
BETA1 GLOB MFR SERPL ELPH: 10 % (ref 4.7–7.2)
BETA2 GLOB MFR SERPL ELPH: 8.7 % (ref 3.2–6.5)
BETA2 GLOB SERPL ELPH-MCNC: 0.52 GM/DL (ref 0.19–0.55)
GAMMA GLOBULIN %: 14.3 % (ref 11.1–18.8)
GAMMA GLOBULINS: 0.86 GM/DL (ref 0.65–1.58)
SPEP INTERPRETATION: (no result)

## 2019-06-25 ENCOUNTER — HOSPITAL ENCOUNTER (OUTPATIENT)
Dept: HOSPITAL 53 - M SFHCLERA | Age: 64
End: 2019-06-25
Attending: FAMILY MEDICINE
Payer: COMMERCIAL

## 2019-06-25 DIAGNOSIS — I10: Primary | ICD-10-CM

## 2019-06-25 DIAGNOSIS — E03.9: ICD-10-CM

## 2019-06-25 LAB
ALBUMIN SERPL BCG-MCNC: 3.2 GM/DL (ref 3.2–5.2)
ALT SERPL W P-5'-P-CCNC: 17 U/L (ref 12–78)
BASOPHILS # BLD AUTO: 0 10^3/UL (ref 0–0.2)
BASOPHILS NFR BLD AUTO: 0.3 % (ref 0–1)
BILIRUB SERPL-MCNC: 0.2 MG/DL (ref 0.2–1)
BUN SERPL-MCNC: 23 MG/DL (ref 7–18)
CALCIUM SERPL-MCNC: 8.2 MG/DL (ref 8.8–10.2)
CHLORIDE SERPL-SCNC: 106 MEQ/L (ref 98–107)
CO2 SERPL-SCNC: 26 MEQ/L (ref 21–32)
CREAT SERPL-MCNC: 1.17 MG/DL (ref 0.7–1.3)
EOSINOPHIL # BLD AUTO: 0.3 10^3/UL (ref 0–0.5)
EOSINOPHIL NFR BLD AUTO: 5 % (ref 0–3)
GFR SERPL CREATININE-BSD FRML MDRD: > 60 ML/MIN/{1.73_M2} (ref 49–?)
GLUCOSE SERPL-MCNC: 77 MG/DL (ref 70–100)
HCT VFR BLD AUTO: 40.2 % (ref 42–52)
HGB BLD-MCNC: 12.3 G/DL (ref 13.5–17.5)
LYMPHOCYTES # BLD AUTO: 1 10^3/UL (ref 1.5–4.5)
LYMPHOCYTES NFR BLD AUTO: 16.9 % (ref 24–44)
MCH RBC QN AUTO: 25.1 PG (ref 27–33)
MCHC RBC AUTO-ENTMCNC: 30.6 G/DL (ref 32–36.5)
MCV RBC AUTO: 82 FL (ref 80–96)
MONOCYTES # BLD AUTO: 0.5 10^3/UL (ref 0–0.8)
MONOCYTES NFR BLD AUTO: 8.8 % (ref 0–5)
NEUTROPHILS # BLD AUTO: 4.2 10^3/UL (ref 1.8–7.7)
NEUTROPHILS NFR BLD AUTO: 68.7 % (ref 36–66)
PLATELET # BLD AUTO: 143 10^3/UL (ref 150–450)
POTASSIUM SERPL-SCNC: 4.1 MEQ/L (ref 3.5–5.1)
PROT SERPL-MCNC: 6.5 GM/DL (ref 6.4–8.2)
RBC # BLD AUTO: 4.9 10^6/UL (ref 4.3–6.1)
SODIUM SERPL-SCNC: 140 MEQ/L (ref 136–145)
TSH SERPL DL<=0.005 MIU/L-ACNC: 3.66 UIU/ML (ref 0.36–3.74)
WBC # BLD AUTO: 6.2 10^3/UL (ref 4–10)

## 2019-09-17 ENCOUNTER — HOSPITAL ENCOUNTER (OUTPATIENT)
Dept: HOSPITAL 53 - M SFHCLERA | Age: 64
End: 2019-09-17
Attending: FAMILY MEDICINE
Payer: COMMERCIAL

## 2019-09-17 DIAGNOSIS — R10.84: Primary | ICD-10-CM

## 2019-09-17 DIAGNOSIS — E03.9: ICD-10-CM

## 2019-09-17 LAB
BASOPHILS # BLD AUTO: 0.1 10^3/UL (ref 0–0.2)
BASOPHILS NFR BLD AUTO: 0.6 % (ref 0–1)
EOSINOPHIL # BLD AUTO: 0.7 10^3/UL (ref 0–0.5)
EOSINOPHIL NFR BLD AUTO: 8.5 % (ref 0–3)
HCT VFR BLD AUTO: 39 % (ref 42–52)
HGB BLD-MCNC: 12.2 G/DL (ref 13.5–17.5)
LYMPHOCYTES # BLD AUTO: 1.4 10^3/UL (ref 1.5–5)
LYMPHOCYTES NFR BLD AUTO: 17.1 % (ref 24–44)
MCH RBC QN AUTO: 25.5 PG (ref 27–33)
MCHC RBC AUTO-ENTMCNC: 31.3 G/DL (ref 32–36.5)
MCV RBC AUTO: 81.6 FL (ref 80–96)
MONOCYTES # BLD AUTO: 0.9 10^3/UL (ref 0–0.8)
MONOCYTES NFR BLD AUTO: 10.9 % (ref 0–5)
NEUTROPHILS # BLD AUTO: 5 10^3/UL (ref 1.5–8.5)
NEUTROPHILS NFR BLD AUTO: 62 % (ref 36–66)
PLATELET # BLD AUTO: 193 10^3/UL (ref 150–450)
RBC # BLD AUTO: 4.78 10^6/UL (ref 4.3–6.1)
WBC # BLD AUTO: 8.1 10^3/UL (ref 4–10)

## 2019-09-30 ENCOUNTER — HOSPITAL ENCOUNTER (EMERGENCY)
Dept: HOSPITAL 53 - M ED | Age: 64
Discharge: HOME | End: 2019-09-30
Payer: COMMERCIAL

## 2019-09-30 VITALS — SYSTOLIC BLOOD PRESSURE: 141 MMHG | DIASTOLIC BLOOD PRESSURE: 79 MMHG

## 2019-09-30 VITALS — WEIGHT: 140.3 LBS | HEIGHT: 65 IN | BODY MASS INDEX: 23.38 KG/M2

## 2019-09-30 DIAGNOSIS — Z87.891: ICD-10-CM

## 2019-09-30 DIAGNOSIS — Z88.8: ICD-10-CM

## 2019-09-30 DIAGNOSIS — Z88.1: ICD-10-CM

## 2019-09-30 DIAGNOSIS — Z79.899: ICD-10-CM

## 2019-09-30 DIAGNOSIS — I10: ICD-10-CM

## 2019-09-30 DIAGNOSIS — Z79.51: ICD-10-CM

## 2019-09-30 DIAGNOSIS — J44.9: ICD-10-CM

## 2019-09-30 DIAGNOSIS — F19.10: Primary | ICD-10-CM

## 2019-09-30 LAB
ALBUMIN SERPL BCG-MCNC: 3.3 GM/DL (ref 3.2–5.2)
ALT SERPL W P-5'-P-CCNC: 19 U/L (ref 12–78)
AMPHETAMINES UR QL SCN: POSITIVE
APAP SERPL-MCNC: < 2 UG/ML (ref 10–30)
BARBITURATES UR QL SCN: NEGATIVE
BENZODIAZ UR QL SCN: NEGATIVE
BILIRUB CONJ SERPL-MCNC: < 0.1 MG/DL (ref 0–0.2)
BILIRUB SERPL-MCNC: 0.3 MG/DL (ref 0.2–1)
BUN SERPL-MCNC: 25 MG/DL (ref 7–18)
BZE UR QL SCN: NEGATIVE
CALCIUM SERPL-MCNC: 8.3 MG/DL (ref 8.8–10.2)
CANNABINOIDS UR QL SCN: POSITIVE
CHLORIDE SERPL-SCNC: 107 MEQ/L (ref 98–107)
CO2 SERPL-SCNC: 27 MEQ/L (ref 21–32)
CREAT SERPL-MCNC: 1.14 MG/DL (ref 0.7–1.3)
ETHANOL SERPL-MCNC: < 0.003 % (ref 0–0.01)
GFR SERPL CREATININE-BSD FRML MDRD: > 60 ML/MIN/{1.73_M2} (ref 49–?)
GLUCOSE SERPL-MCNC: 94 MG/DL (ref 70–100)
HCT VFR BLD AUTO: 39.6 % (ref 42–52)
HGB BLD-MCNC: 12.8 G/DL (ref 13.5–17.5)
MCH RBC QN AUTO: 26.3 PG (ref 27–33)
MCHC RBC AUTO-ENTMCNC: 32.3 G/DL (ref 32–36.5)
MCV RBC AUTO: 81.3 FL (ref 80–96)
METHADONE UR QL SCN: NEGATIVE
OPIATES UR QL SCN: NEGATIVE
PCP UR QL SCN: NEGATIVE
PLATELET # BLD AUTO: 188 10^3/UL (ref 150–450)
POTASSIUM SERPL-SCNC: 4.5 MEQ/L (ref 3.5–5.1)
PROT SERPL-MCNC: 6.4 GM/DL (ref 6.4–8.2)
RBC # BLD AUTO: 4.87 10^6/UL (ref 4.3–6.1)
SALICYLATES SERPL-MCNC: < 1.7 MG/DL (ref 5–30)
SODIUM SERPL-SCNC: 140 MEQ/L (ref 136–145)
TSH SERPL DL<=0.005 MIU/L-ACNC: 1.89 UIU/ML (ref 0.36–3.74)
WBC # BLD AUTO: 6.4 10^3/UL (ref 4–10)

## 2019-09-30 PROCEDURE — 80076 HEPATIC FUNCTION PANEL: CPT

## 2019-09-30 PROCEDURE — 96372 THER/PROPH/DIAG INJ SC/IM: CPT

## 2019-09-30 PROCEDURE — 85027 COMPLETE CBC AUTOMATED: CPT

## 2019-09-30 PROCEDURE — 99285 EMERGENCY DEPT VISIT HI MDM: CPT

## 2019-09-30 PROCEDURE — 93005 ELECTROCARDIOGRAM TRACING: CPT

## 2019-09-30 PROCEDURE — 84443 ASSAY THYROID STIM HORMONE: CPT

## 2019-09-30 PROCEDURE — 80048 BASIC METABOLIC PNL TOTAL CA: CPT

## 2019-09-30 PROCEDURE — 80307 DRUG TEST PRSMV CHEM ANLYZR: CPT

## 2019-10-02 NOTE — ECGEPIP
[facility] - ED

                                       

                                       Test Date:    2019

Pat Name:     CHACORTA NOBLES             Department:   

Patient ID:   [id number]                 Room:         -

Gender:       Male                     Technician:   

:          1955               Requested By: Ashely Varner 

Order Number: [id number]     Reading MD:   Ashely Varner

                                 Measurements

Intervals                              Axis          

Rate:         79                       P:            76

IN:           143                      QRS:          64

QRSD:         97                       T:            97

QT:           390                                    

QTc:          448                                    

                           Interpretive Statements

SINUS RHYTHM

POSSIBLE INFERIOR MYOCARDIAL INFARCTION, PROBABLY OLD

NO PRIOR

Electronically Signed on 10-2-2019 7:24:07 EDT by Ashely Varner

## 2020-01-27 ENCOUNTER — HOSPITAL ENCOUNTER (OUTPATIENT)
Dept: HOSPITAL 53 - M SFHCLERA | Age: 65
End: 2020-01-27
Attending: FAMILY MEDICINE
Payer: MEDICARE

## 2020-01-27 DIAGNOSIS — E03.9: ICD-10-CM

## 2020-01-27 DIAGNOSIS — I10: Primary | ICD-10-CM

## 2020-01-27 LAB
ALBUMIN SERPL BCG-MCNC: 3.2 GM/DL (ref 3.2–5.2)
ALT SERPL W P-5'-P-CCNC: 19 U/L (ref 12–78)
BASOPHILS # BLD AUTO: 0.1 10^3/UL (ref 0–0.2)
BASOPHILS NFR BLD AUTO: 0.7 % (ref 0–1)
BILIRUB SERPL-MCNC: 0.2 MG/DL (ref 0.2–1)
BUN SERPL-MCNC: 21 MG/DL (ref 7–18)
CALCIUM SERPL-MCNC: 7.7 MG/DL (ref 8.8–10.2)
CHLORIDE SERPL-SCNC: 106 MEQ/L (ref 98–107)
CO2 SERPL-SCNC: 27 MEQ/L (ref 21–32)
CREAT SERPL-MCNC: 1.13 MG/DL (ref 0.7–1.3)
EOSINOPHIL # BLD AUTO: 0.5 10^3/UL (ref 0–0.5)
EOSINOPHIL NFR BLD AUTO: 7.2 % (ref 0–3)
GFR SERPL CREATININE-BSD FRML MDRD: > 60 ML/MIN/{1.73_M2} (ref 49–?)
GLUCOSE SERPL-MCNC: 117 MG/DL (ref 70–100)
HCT VFR BLD AUTO: 39.5 % (ref 42–52)
HGB BLD-MCNC: 12.1 G/DL (ref 13.5–17.5)
LYMPHOCYTES # BLD AUTO: 1.3 10^3/UL (ref 1.5–5)
LYMPHOCYTES NFR BLD AUTO: 18 % (ref 24–44)
MCH RBC QN AUTO: 24.5 PG (ref 27–33)
MCHC RBC AUTO-ENTMCNC: 30.6 G/DL (ref 32–36.5)
MCV RBC AUTO: 80.1 FL (ref 80–96)
MONOCYTES # BLD AUTO: 0.7 10^3/UL (ref 0–0.8)
MONOCYTES NFR BLD AUTO: 9.8 % (ref 0–5)
NEUTROPHILS # BLD AUTO: 4.5 10^3/UL (ref 1.5–8.5)
NEUTROPHILS NFR BLD AUTO: 64.2 % (ref 36–66)
PLATELET # BLD AUTO: 190 10^3/UL (ref 150–450)
POTASSIUM SERPL-SCNC: 4.4 MEQ/L (ref 3.5–5.1)
PROT SERPL-MCNC: 6.2 GM/DL (ref 6.4–8.2)
RBC # BLD AUTO: 4.93 10^6/UL (ref 4.3–6.1)
SODIUM SERPL-SCNC: 139 MEQ/L (ref 136–145)
T4 FREE SERPL-MCNC: 1.03 NG/DL (ref 0.76–1.46)
TSH SERPL DL<=0.005 MIU/L-ACNC: 2.34 UIU/ML (ref 0.36–3.74)
WBC # BLD AUTO: 7 10^3/UL (ref 4–10)

## 2020-03-31 ENCOUNTER — HOSPITAL ENCOUNTER (OUTPATIENT)
Dept: HOSPITAL 53 - M SFHCLERA | Age: 65
End: 2020-03-31
Attending: FAMILY MEDICINE
Payer: COMMERCIAL

## 2020-03-31 DIAGNOSIS — E83.51: ICD-10-CM

## 2020-03-31 DIAGNOSIS — Z11.59: Primary | ICD-10-CM

## 2020-03-31 DIAGNOSIS — K86.9: ICD-10-CM

## 2020-09-17 ENCOUNTER — HOSPITAL ENCOUNTER (EMERGENCY)
Dept: HOSPITAL 53 - M ED | Age: 65
Discharge: HOME | End: 2020-09-17
Payer: MEDICARE

## 2020-09-17 VITALS — HEIGHT: 66 IN | WEIGHT: 150.31 LBS | BODY MASS INDEX: 24.16 KG/M2

## 2020-09-17 VITALS — SYSTOLIC BLOOD PRESSURE: 141 MMHG | DIASTOLIC BLOOD PRESSURE: 74 MMHG

## 2020-09-17 DIAGNOSIS — F10.10: ICD-10-CM

## 2020-09-17 DIAGNOSIS — Z79.899: ICD-10-CM

## 2020-09-17 DIAGNOSIS — J44.9: ICD-10-CM

## 2020-09-17 DIAGNOSIS — Z88.0: ICD-10-CM

## 2020-09-17 DIAGNOSIS — Z79.82: ICD-10-CM

## 2020-09-17 DIAGNOSIS — E78.5: ICD-10-CM

## 2020-09-17 DIAGNOSIS — F11.10: ICD-10-CM

## 2020-09-17 DIAGNOSIS — F15.10: Primary | ICD-10-CM

## 2020-09-17 DIAGNOSIS — F17.210: ICD-10-CM

## 2020-09-17 DIAGNOSIS — I10: ICD-10-CM

## 2020-09-17 DIAGNOSIS — Z88.8: ICD-10-CM

## 2020-09-17 DIAGNOSIS — E11.9: ICD-10-CM

## 2020-09-17 LAB
AMPHETAMINES UR QL SCN: POSITIVE
BARBITURATES UR QL SCN: NEGATIVE
BENZODIAZ UR QL SCN: NEGATIVE
BZE UR QL SCN: NEGATIVE
CANNABINOIDS UR QL SCN: NEGATIVE
METHADONE UR QL SCN: NEGATIVE
OPIATES UR QL SCN: NEGATIVE
PCP UR QL SCN: NEGATIVE

## 2020-09-17 NOTE — REPVR
PROCEDURE INFORMATION: 

Exam: XR Chest, 2 Views 

Exam date and time: 9/17/2020 9:06 PM 

Age: 64 years old 

Clinical indication: Shortness of breath; Additional info: SOB 



TECHNIQUE: 

Imaging protocol: XR of the chest 

Views: 2 views. 



COMPARISON: 

CT Chest with contrast 10/23/2017 11:26 AM 



FINDINGS: 

Lungs: There are small partially calcified granulomas on the right. There is 

mild prominence of the vascular markings which could be the result of 

borderline congestive change. 

Pleural space: There are small bilateral pleural effusions.  There is a small 

amount of fluid within the minor fissure. 

Heart/Mediastinum: Prosthetic valve is noted. 

Bones/joints: Severe degenerative and arthritic change right and left shoulder 

with osteophyte formation. Old healed fracture of the right clavicle. Old 

healed rib fractures right and left lower thorax. Sternal wires are present. 

There is mild kyphosis of the thoracic spine. 



IMPRESSION: 

1. There are small bilateral pleural effusions. 

2. Possible borderline congestive change 



Electronically signed by: Yasmany Mooney On 09/17/2020  21:35:19 PM

## 2020-09-18 ENCOUNTER — HOSPITAL ENCOUNTER (INPATIENT)
Dept: HOSPITAL 53 - M MSPAV | Age: 65
LOS: 5 days | Discharge: HOME | DRG: 291 | End: 2020-09-23
Attending: STUDENT IN AN ORGANIZED HEALTH CARE EDUCATION/TRAINING PROGRAM | Admitting: INTERNAL MEDICINE
Payer: MEDICARE

## 2020-09-18 VITALS — SYSTOLIC BLOOD PRESSURE: 154 MMHG | DIASTOLIC BLOOD PRESSURE: 74 MMHG

## 2020-09-18 VITALS — WEIGHT: 139.99 LBS | BODY MASS INDEX: 21.97 KG/M2 | HEIGHT: 67 IN

## 2020-09-18 DIAGNOSIS — E03.9: ICD-10-CM

## 2020-09-18 DIAGNOSIS — F12.10: ICD-10-CM

## 2020-09-18 DIAGNOSIS — Z86.73: ICD-10-CM

## 2020-09-18 DIAGNOSIS — Z88.8: ICD-10-CM

## 2020-09-18 DIAGNOSIS — E11.9: ICD-10-CM

## 2020-09-18 DIAGNOSIS — M54.5: ICD-10-CM

## 2020-09-18 DIAGNOSIS — Z87.891: ICD-10-CM

## 2020-09-18 DIAGNOSIS — D64.9: ICD-10-CM

## 2020-09-18 DIAGNOSIS — G25.81: ICD-10-CM

## 2020-09-18 DIAGNOSIS — I11.0: Primary | ICD-10-CM

## 2020-09-18 DIAGNOSIS — F15.10: ICD-10-CM

## 2020-09-18 DIAGNOSIS — M19.90: ICD-10-CM

## 2020-09-18 DIAGNOSIS — Z91.14: ICD-10-CM

## 2020-09-18 DIAGNOSIS — Z95.1: ICD-10-CM

## 2020-09-18 DIAGNOSIS — J96.91: ICD-10-CM

## 2020-09-18 DIAGNOSIS — I50.41: ICD-10-CM

## 2020-09-18 DIAGNOSIS — I25.10: ICD-10-CM

## 2020-09-18 DIAGNOSIS — Z88.0: ICD-10-CM

## 2020-09-18 DIAGNOSIS — E78.5: ICD-10-CM

## 2020-09-18 DIAGNOSIS — J44.9: ICD-10-CM

## 2020-09-18 DIAGNOSIS — K21.9: ICD-10-CM

## 2020-09-18 DIAGNOSIS — Z79.899: ICD-10-CM

## 2020-09-19 VITALS — DIASTOLIC BLOOD PRESSURE: 70 MMHG | SYSTOLIC BLOOD PRESSURE: 143 MMHG

## 2020-09-19 VITALS — DIASTOLIC BLOOD PRESSURE: 64 MMHG | SYSTOLIC BLOOD PRESSURE: 117 MMHG

## 2020-09-19 VITALS — DIASTOLIC BLOOD PRESSURE: 62 MMHG | SYSTOLIC BLOOD PRESSURE: 112 MMHG

## 2020-09-19 LAB
ALBUMIN SERPL BCG-MCNC: 2.8 GM/DL (ref 3.2–5.2)
ALT SERPL W P-5'-P-CCNC: 27 U/L (ref 12–78)
BILIRUB SERPL-MCNC: 0.3 MG/DL (ref 0.2–1)
BUN SERPL-MCNC: 22 MG/DL (ref 7–18)
CALCIUM SERPL-MCNC: 8 MG/DL (ref 8.8–10.2)
CHLORIDE SERPL-SCNC: 103 MEQ/L (ref 98–107)
CO2 SERPL-SCNC: 26 MEQ/L (ref 21–32)
CREAT SERPL-MCNC: 1.33 MG/DL (ref 0.7–1.3)
GFR SERPL CREATININE-BSD FRML MDRD: 57.4 ML/MIN/{1.73_M2} (ref 49–?)
GLUCOSE SERPL-MCNC: 184 MG/DL (ref 70–100)
HCT VFR BLD AUTO: 35.5 % (ref 42–52)
HGB BLD-MCNC: 10.5 G/DL (ref 13.5–17.5)
MAGNESIUM SERPL-MCNC: 1.4 MG/DL (ref 1.8–2.4)
MCH RBC QN AUTO: 25 PG (ref 27–33)
MCHC RBC AUTO-ENTMCNC: 29.6 G/DL (ref 32–36.5)
MCV RBC AUTO: 84.5 FL (ref 80–96)
PLATELET # BLD AUTO: 206 10^3/UL (ref 150–450)
POTASSIUM SERPL-SCNC: 3.9 MEQ/L (ref 3.5–5.1)
PROT SERPL-MCNC: 6 GM/DL (ref 6.4–8.2)
RBC # BLD AUTO: 4.2 10^6/UL (ref 4.3–6.1)
SODIUM SERPL-SCNC: 136 MEQ/L (ref 136–145)
T4 FREE SERPL-MCNC: 0.97 NG/DL (ref 0.76–1.46)
TSH SERPL DL<=0.005 MIU/L-ACNC: 1.34 UIU/ML (ref 0.36–3.74)
WBC # BLD AUTO: 5.8 10^3/UL (ref 4–10)

## 2020-09-19 RX ADMIN — FUROSEMIDE SCH MG: 10 INJECTION, SOLUTION INTRAMUSCULAR; INTRAVENOUS at 11:26

## 2020-09-19 RX ADMIN — CALCIUM CARBONATE (ANTACID) CHEW TAB 500 MG PRN MG: 500 CHEW TAB at 13:19

## 2020-09-19 RX ADMIN — ALBUTEROL SULFATE PRN PUFF: 90 AEROSOL, METERED RESPIRATORY (INHALATION) at 08:23

## 2020-09-19 RX ADMIN — IPRATROPIUM BROMIDE AND ALBUTEROL PRN PUFF: 20; 100 SPRAY, METERED RESPIRATORY (INHALATION) at 16:47

## 2020-09-19 RX ADMIN — ATORVASTATIN CALCIUM SCH MG: 20 TABLET, FILM COATED ORAL at 21:40

## 2020-09-19 RX ADMIN — MAGNESIUM SULFATE IN DEXTROSE SCH MLS/HR: 10 INJECTION, SOLUTION INTRAVENOUS at 14:44

## 2020-09-19 RX ADMIN — PANTOPRAZOLE SODIUM SCH MG: 20 TABLET, DELAYED RELEASE ORAL at 13:22

## 2020-09-19 RX ADMIN — CALCIUM CARBONATE (ANTACID) CHEW TAB 500 MG PRN MG: 500 CHEW TAB at 20:08

## 2020-09-19 RX ADMIN — IPRATROPIUM BROMIDE AND ALBUTEROL PRN PUFF: 20; 100 SPRAY, METERED RESPIRATORY (INHALATION) at 11:58

## 2020-09-19 RX ADMIN — FUROSEMIDE SCH MG: 10 INJECTION, SOLUTION INTRAMUSCULAR; INTRAVENOUS at 20:08

## 2020-09-19 RX ADMIN — LOSARTAN POTASSIUM SCH MG: 25 TABLET, FILM COATED ORAL at 21:39

## 2020-09-19 RX ADMIN — LEVOFLOXACIN SCH MLS/HR: 5 INJECTION, SOLUTION INTRAVENOUS at 23:37

## 2020-09-19 RX ADMIN — ENOXAPARIN SODIUM SCH MG: 40 INJECTION SUBCUTANEOUS at 09:38

## 2020-09-19 RX ADMIN — LEVOTHYROXINE SODIUM SCH MCG: 88 TABLET ORAL at 06:04

## 2020-09-19 RX ADMIN — FUROSEMIDE SCH MG: 10 INJECTION, SOLUTION INTRAMUSCULAR; INTRAVENOUS at 06:04

## 2020-09-19 RX ADMIN — MAGNESIUM SULFATE IN DEXTROSE SCH MLS/HR: 10 INJECTION, SOLUTION INTRAVENOUS at 13:20

## 2020-09-19 RX ADMIN — ACETAMINOPHEN PRN MG: 325 TABLET ORAL at 20:09

## 2020-09-19 RX ADMIN — LEVOFLOXACIN SCH MLS/HR: 5 INJECTION, SOLUTION INTRAVENOUS at 02:00

## 2020-09-19 RX ADMIN — ROPINIROLE HYDROCHLORIDE SCH MG: 0.25 TABLET, FILM COATED ORAL at 20:08

## 2020-09-19 RX ADMIN — FUROSEMIDE SCH MG: 10 INJECTION, SOLUTION INTRAMUSCULAR; INTRAVENOUS at 01:19

## 2020-09-19 RX ADMIN — ROPINIROLE HYDROCHLORIDE SCH MG: 0.25 TABLET, FILM COATED ORAL at 09:38

## 2020-09-19 RX ADMIN — LEVOFLOXACIN SCH MLS/HR: 5 INJECTION, SOLUTION INTRAVENOUS at 01:30

## 2020-09-19 RX ADMIN — MAGNESIUM OXIDE SCH MG: 400 TABLET ORAL at 21:40

## 2020-09-19 RX ADMIN — MAGNESIUM SULFATE IN DEXTROSE SCH MLS/HR: 10 INJECTION, SOLUTION INTRAVENOUS at 17:17

## 2020-09-19 RX ADMIN — ACETAMINOPHEN PRN MG: 325 TABLET ORAL at 01:26

## 2020-09-19 NOTE — REPVR
PROCEDURE INFORMATION: 

Exam: XR Chest, 1 View 

Exam date and time: 9/19/2020 1:33 AM 

Age: 64 years old 

Clinical indication: Shortness of breath; Additional info: SOB 



TECHNIQUE: 

Imaging protocol: XR of the chest 

Views: 1 view. 



COMPARISON: 

CR Chest, 2 view PA, Lat 9/17/2020 8:52 PM 



FINDINGS: 

Lungs: Slight interstitial prominence which is similar to the prior study. 

Minimal bibasilar infiltrates since the prior study. 

Pleural space: Unremarkable. No pleural effusion. No pneumothorax. 

Heart/Mediastinum: Left atrial occluding device. Mild cardiomegaly. 

Bones/joints: Status post sternotomy. Degenerative changes are noted in the 

shoulders, left greater than right. 



IMPRESSION: 

1. Minimal right base and left infrahilar infiltrates since 09/17/2020 which 

may reflect early pulmonary venous congestion. 

2. Otherwise stable chest. 



Electronically signed by: Kev Lucas On 09/19/2020  01:40:53 AM

## 2020-09-19 NOTE — HPEPDOC
[facility] Medical History & Physical


Date of Admission


Sep 18, 2020


Date of Service:  Sep 18, 2020


Attending Physician:  CHAVA PACHECO MD





History and Physical


CHIEF COMPLAINT: Dyspnea





HISTORY OF PRESENT ILLNESS:


64-year-old M with a recent history of a 5 vessel CABG for CAD, history of PSUD 

currently smoking meth, last confirmed use 2020, history of anemia, 

hypertension, hyperlipidemia, hypothyroidism, COPD/asthma, osteoarthritis, low 

back pain, GERD, TIA, history of pancreatic mass s/p resection c/b pancreatic 

insufficiency, DM who presented to [facility] with worsening SOB and GUPTA over a 

few days without a recent history of fever, chills, nausea, vomiting, diarrhea, 

peripheral edema, chest pain or palpitations.





At [facility] initial vitals were 121/85, hypoxemic requiring 3L NC, tachypneic to

a RR of 25, HR 99. On examination there, he had diminished breath sounds some 

scattered wheezing without stevenson crackles or edema. Work up at [facility] revealed

WBC 8.6, Hgb 11.6, platelets 206, K 3, Cl 115, bicarb 14, BUN 11, Cr 0.7, Ca 4.

9, lactate 3.1, EKG without acute ischemic signs with QTc of 493 otherwise NSR, 

elevated D-dimer for which he had a CTA that showed bilateral R>L (R loculated) 

moderate pleural effusions without a PE, while UA was bland, tox screen was 

negative, troponin was wnl (0.04) and proBNP was 4913. He is now being 

transferred from [facility] for new onset CHF with request for possible pulm/th

oracic surgery involvement for pleural effusions.





Review system: 10 point review systems negative and then those described in HPI.





PAST MEDICAL HISTORY:


HTN


Hypercholesterolemia


Hypothyroidism


COPD


Asthma


Osteoarthritis


Low Back Pain


Cataract


Acid Reflux


TIA


History of pancreatic mass


Anemia


Substance abuse: former EtOH and former smoker. Former heroine user. Still doing

meth and marijuana.


Noncompliance with medications





PAST SURGICAL HISTORY:


Tonsillectomy


Carotid endarterectomy


Left Subclavian artery bypass


Partial pancreatectomy


Recent CABG within the last 1 month





FAMILY HISTORY:


Father:  of cancer


Mother:  of MI and had a hx of cancer


5 sisters; 1  of cancer, and does not know the health history of his other 4

sisters.








SOCIAL HISTORY:


Quit smoking. Smoked 1 PPD x 40 years.


Quit EtOH. Used to drink whiskey.


Still smokes marijuana and uses methamphetamine.


Did use heroine in the past.


Has a remote hx of cocaine use.


On Social Security Disability. 


Does not work. Last worked in .





PHYSICAL EXAMINATION:


General: Appears older than stated age, NAD


HEENT: NCAT, wears corrective lenses, PERRLA, EOMI, clear posterior oropharynx


Neck: No JVD, supple


Pulm: Diminished breath sounds, better at apices, no stevenson crackles, wheezing or

rhonchi, on 4L NC


Cardiac: RRR, no mrg


Abd: normoactive sounds, soft, NTND


Ext: no LE edema, WWP


Neuro: CN2-12 intact, moving all extremities


Psych: AOx3





LABORATORY DATA: See above from [facility]





IMAGING: as described above.





MICROBIOLOGY: Had BCx drawn at [facility].





Assessment and plan:


64-year-old M with a recent history of a 5 vessel CABG for CAD, history of PSUD 

currently smoking meth, last confirmed use 2020, history of anemia, 

hypertension, hyperlipidemia, hypothyroidism, COPD/asthma, osteoarthritis, low 

back pain, GERD, TIA, history of pancreatic mass s/p resection c/b pancreatic 

insufficiency, DM who presented to [facility] with worsening SOB and GUPTA over a 

few days and found to have new onset HF with bilateral pleural effusions and 

interstitial edema and elevated BNP without evidence of ongoing ACS now 

transferred to [facility].





New onset CHF s/p recent CABG without evidence of ACS


-TTE


-Diuresis with 40 IV lasix Q46H, goal net negative 2L/24h


-daily weights


-consistent carb diet with 2g sodium and 2L/24h fluid restriction


-strict I/Os


-cardiology consult in the morning, does not have a cardiologist, just had a 

multivessel CABG, now with new onset HF


-EKG on arrival --> NSR


-telemetry


-Troponin 


-CXR





CAD:


-s/p recent CABG


-ASA 81





Hypoxemic respiratory failure: likely 2/2 CHF with bilateral pleural effusions 

however given active CT chest findings will entertain possible infectious 

etiology vs. other etiologies for the bilateral effusions


-respiratory panel on arrival for covid-19 r/o


-procalcitonin


-diuresis as above


-TTE as above


-CXR


-will defer to day team on decision to pursue thoracentesis, currently suspect 

HF post CABG and hemodynamically stable with mild hypoxemia


-empiric Levaquin


-CTA without PE





PSUD:


-No recent alcohol or nicotine, however admits to meth and marijuana and 

discussed deleterious health effects to which he reports plan to quit both 

immediately





DM:


-SSI


-hypoglycemia protocol


-FSBG AC/HS


-consistent carb diet





COPD:


-combivent and albuterol PRN


-hold off steroids at this time, s/p solumedrol at [facility]


-respiratory panel





HTN:


-hold amlodipine and HCTZ for hemodynamic accommodation to diurese


-lasix as above





RLS:


-continue ropinirole





DVT prophylaxis with TEDs and SCDs


Dispo: medsurg with tele





Home Medications


Scheduled


Amlodipine Besylate (Amlodipine Besylate) 5 Mg Tablet, 5 MG PO DAILY


Hydrochlorothiazide (Hydrochlorothiazide) 12.5 Mg Capsule, 12.5 MG PO DAILY


Levothyroxine Sodium (Synthroid) 88 Mcg Tab, 88 MCG PO DAILY


Ropinirole HCl (Ropinirole HCl) 0.5 Mg Tablet, 0.5 MG PO BID





Scheduled PRN


Albuterol Sulfate (Ventolin Hfa) 108 Mcg/Act Aer, 2 PUFFS INH Q4H PRN for 

SHORTNESS OF BREATH


Ipratropium/Albuterol Sulfate (Combivent Respimat  Mcg) 4 Gm Mist.inhal, 1

PUFF INH Q6H PRN for SHORTNESS OF BREATH


Tramadol HCl (Tramadol HCl) 50 Mg Tablet, 50 MG PO BID PRN for PAIN





Allergies


Coded Allergies:  


     amoxicillin (Verified  Allergy, Unknown, rash, 19)


     clavulanic acid (Verified  Allergy, Unknown, rash, 19)


     pregabalin (Verified  Adverse Reaction, Unknown, seizures, 19)


     warfarin (Verified  Adverse Reaction, Unknown, bleeding, 19)





A-FIB/CHADSVASC


A-FIB History


Current/History of A-Fib/PAF?:  No


Current PO Anticoag Therapy:  No





Age/Risk Factor Scoring


CHADSVASC:  








CHADSVASC Response (Comments) Value


 


Age Risk Factor Age < 65 years old 0


 


Gender Risk Factor Male 0


 


Hx of CHF Yes 1


 


Hx of HTN Yes 1


 


Hx of Stroke/TIA/or VTE No 0


 


Hx of Diabetes Yes 1


 


Hx of Vascular Disease Yes 1


 


Total  4











Treatment


Treatment ordered:  NONE


Reason Anticoagulant not given:  Not indicated/[id number]











CHAVA PACHECO MD   Sep 18, 2020 23:19

## 2020-09-19 NOTE — ECGEPIP
[facility]

                                       

                                       Test Date:    2020

Pat Name:     CHACORTA NOBLES             Department:   

Patient ID:   [id number]                 Room:         [place] [address]

Gender:       Male                     Technician:   OMAIRA

:          1955               Requested By: CHAVA EPPERSON

Order Number: [id number]     Reading MD:   Chuy Flores

                                 Measurements

Intervals                              Axis          

Rate:         87                       P:            67

AZ:           160                      QRS:          61

QRSD:         105                      T:            110

QT:           439                                    

QTc:          530                                    

                           Interpretive Statements

SINUS RHYTHM

POSSIBLE LEFT ATRIAL ENLARGEMENT

Prolonged QTc interval

Nonspecific ST-T wave abnormalities subtly changed from tracing done 19

Electronically Signed on 2020 16:51:39 EDT by Chuy Flores

## 2020-09-19 NOTE — IPNPDOC
Subjective


Date Seen


The patient was seen on 9/19/20.





Subjective


Chief Complaint/HPI


Mr. Roberts is a 64 year old male with history of polysubstance abuse with meth 

and a 5 vessel CABG for CAD here with dyspnea 2/2 decompensated heart failure. 

Overnight, he was diuresed which helped with his breathing. He is still 

requiring oxygen this morning, but we will wean as tolerated. Otherwise, denies 

fever/chills, N/V, diarrhea, chest pain, abdominal pain, or dysuria


Constitutional:  Denies: Chills, Fever


Pulmonary:  Reports: Dyspnea


Cardiovascular:  Denies: Chest Pain


Gastrointestinal:  Denies: Nausea, Vomiting, Abdominal Pain, Diarrhea


Genitourinary:  Denies: Dysuria





Objective


Physical Examination


General Exam:  Positive: Alert, Cooperative, Mild Distress


Eye Exam:  Positive: EOMI; 


   Negative: Sclera icteric


Neck Exam:  Positive: Supple


Chest Exam:  Positive: Other (Bibasilar crackles)


Heart Exam:  Positive: Rate Normal, Regular Rhythm


Abdomen Exam:  Positive: Normal bowel sounds


Extremity Exam:  Positive: Edema (bilateral pitting edema)


Psych Exam:  Positive: Mood NL





Assessment /Plan


Assessment


Mr. Roberts is a 64 year old male with polysubstance abuse with meth and recent 

5 vessel CABG here with dyspnea 2/2 CHF exacerbation. We will aggressively 

diurese him and put him on a 2L fluid restriction and 2g sodium diet. Echo has 

been ordered. Cardiology has been consulted, recommendations appreciated





Plan/VTE


VTE Prophylaxis Ordered?:  Yes





Plan


1. New onset CHF in setting of recent CABG


- Denies symptoms of ACS and troponin is within normal limits


- Will try to obtain records from [place] where he had his CABG


- Consistent carb diet with 2g sodium and 2L fluid restriction


- Continue with diuresis


- Pending results from [facility]


- Cardiology consulted, spoke with Dr. Bazzi, Recommendations appreciated





2. Hypoxemic respiratory failure


- Required 3L at [place]


- Improved with diuresis


- Continue oxygen supplementation as needed


- CXR demonstrated minimal pleural effusion, most likely CHF than infectious 

etiology (low WBC and improvement with diuresis), will wait for Procalcitonin 

results before discontinuing antibiotics. Since minimal pleural effusion on CXR,

will hold off on thoracic surgery consult for drainage. 





3. Recent 5 vessel CABG


- Continue ASA


- Will try to obtain records from [place]


- Cardiology consulted, recommendations appreciated





4. Polysubstance abuse


- Uses meth and marijuana


- No withdrawal symptoms at this time, will continue to monitor





5. DM 2


- Continue with SSI and consistent carbohydrate diet





6. Hypertension


- Amlodipine and HCTZ held due to diuresis


- Blood pressure stable 





7. RLS


- Ropinirole





8. DVT ppx


- SCD and TEDs





VS, I&O, 24H, Fishbone


Vital Signs/I&O





Vital Signs








  Date Time  Temp Pulse Resp B/P (MAP) Pulse Ox O2 Delivery O2 Flow Rate FiO2


 


9/19/20 08:56     97 Room Air  


 


9/19/20 06:00 97.9 84 20 117/64 (81)   3.0 














I&O- Last 24 Hours up to 6 AM 


 


 9/19/20





 06:00


 


Intake Total 0 ml


 


Output Total 1300 ml


 


Balance -1300 ml











Laboratory Data


24H LABS


Laboratory Tests 2


9/18/20 23:59: Bedside Glucose (Misc Panel) 264H


9/19/20 00:07: 


Lactic Acid Level 2.5*H, Troponin I 0.02


9/19/20 04:38: 


Anion Gap 7L, Glomerular Filtration Rate 57.4, Lactic Acid Followup at 4 Hours 

1.7, Calcium Level 8.0L, Magnesium Level 1.4L, Total Bilirubin 0.3, Aspartate 

Amino Transf (AST/SGOT) 15, Alanine Aminotransferase (ALT/SGPT) 27, Alkaline 

Phosphatase 216H, Total Protein 6.0L, Albumin 2.8L, Albumin/Globulin Ratio 0.9, 

Thyroid Stimulating Hormone (TSH) 1.340, Free Thyroxine 0.97


9/19/20 04:39: Nucleated Red Blood Cells % (auto) 0.3H


CBC/BMP


Laboratory Tests


9/19/20 04:38








9/19/20 04:39








Microbiology





Microbiology


9/19/20 Respiratory Virus Panel (PCR) ([facility]) - Final, Complete


          


9/19/20 Blood Culture, Received


          Pending


9/19/20 Blood Culture, Received


          Pending











MARTY GOMEZ DO               Sep 19, 2020 10:33

## 2020-09-20 VITALS — DIASTOLIC BLOOD PRESSURE: 75 MMHG | SYSTOLIC BLOOD PRESSURE: 124 MMHG

## 2020-09-20 VITALS — SYSTOLIC BLOOD PRESSURE: 116 MMHG | DIASTOLIC BLOOD PRESSURE: 68 MMHG

## 2020-09-20 VITALS — DIASTOLIC BLOOD PRESSURE: 52 MMHG | SYSTOLIC BLOOD PRESSURE: 95 MMHG

## 2020-09-20 LAB
BASOPHILS # BLD AUTO: 0 10^3/UL (ref 0–0.2)
BASOPHILS NFR BLD AUTO: 0.4 % (ref 0–1)
BUN SERPL-MCNC: 29 MG/DL (ref 7–18)
CALCIUM SERPL-MCNC: 8.3 MG/DL (ref 8.8–10.2)
CHLORIDE SERPL-SCNC: 97 MEQ/L (ref 98–107)
CO2 SERPL-SCNC: 29 MEQ/L (ref 21–32)
CREAT SERPL-MCNC: 1.47 MG/DL (ref 0.7–1.3)
EOSINOPHIL # BLD AUTO: 0.1 10^3/UL (ref 0–0.5)
EOSINOPHIL NFR BLD AUTO: 1.2 % (ref 0–3)
GFR SERPL CREATININE-BSD FRML MDRD: 51.2 ML/MIN/{1.73_M2} (ref 49–?)
GLUCOSE SERPL-MCNC: 108 MG/DL (ref 70–100)
HCT VFR BLD AUTO: 32.3 % (ref 42–52)
HGB BLD-MCNC: 9.8 G/DL (ref 13.5–17.5)
LYMPHOCYTES # BLD AUTO: 1 10^3/UL (ref 1.5–5)
LYMPHOCYTES NFR BLD AUTO: 9.6 % (ref 24–44)
MAGNESIUM SERPL-MCNC: 2 MG/DL (ref 1.8–2.4)
MCH RBC QN AUTO: 25 PG (ref 27–33)
MCHC RBC AUTO-ENTMCNC: 30.3 G/DL (ref 32–36.5)
MCV RBC AUTO: 82.4 FL (ref 80–96)
MONOCYTES # BLD AUTO: 0.8 10^3/UL (ref 0–0.8)
MONOCYTES NFR BLD AUTO: 7 % (ref 0–5)
NEUTROPHILS # BLD AUTO: 8.7 10^3/UL (ref 1.5–8.5)
NEUTROPHILS NFR BLD AUTO: 81.3 % (ref 36–66)
PLATELET # BLD AUTO: 219 10^3/UL (ref 150–450)
POTASSIUM SERPL-SCNC: 3.6 MEQ/L (ref 3.5–5.1)
RBC # BLD AUTO: 3.92 10^6/UL (ref 4.3–6.1)
SODIUM SERPL-SCNC: 135 MEQ/L (ref 136–145)
TROPONIN I SERPL-MCNC: 0.02 NG/ML (ref ?–0.1)
WBC # BLD AUTO: 10.7 10^3/UL (ref 4–10)

## 2020-09-20 RX ADMIN — ENOXAPARIN SODIUM SCH MG: 40 INJECTION SUBCUTANEOUS at 08:16

## 2020-09-20 RX ADMIN — IPRATROPIUM BROMIDE AND ALBUTEROL PRN PUFF: 20; 100 SPRAY, METERED RESPIRATORY (INHALATION) at 13:16

## 2020-09-20 RX ADMIN — CALCIUM CARBONATE (ANTACID) CHEW TAB 500 MG PRN MG: 500 CHEW TAB at 20:22

## 2020-09-20 RX ADMIN — SUCRALFATE SCH GM: 1 TABLET ORAL at 20:23

## 2020-09-20 RX ADMIN — IPRATROPIUM BROMIDE AND ALBUTEROL PRN PUFF: 20; 100 SPRAY, METERED RESPIRATORY (INHALATION) at 18:30

## 2020-09-20 RX ADMIN — ROPINIROLE HYDROCHLORIDE SCH MG: 0.25 TABLET, FILM COATED ORAL at 08:16

## 2020-09-20 RX ADMIN — FUROSEMIDE SCH MG: 10 INJECTION, SOLUTION INTRAMUSCULAR; INTRAVENOUS at 08:14

## 2020-09-20 RX ADMIN — IPRATROPIUM BROMIDE AND ALBUTEROL PRN PUFF: 20; 100 SPRAY, METERED RESPIRATORY (INHALATION) at 08:30

## 2020-09-20 RX ADMIN — ASPIRIN SCH MG: 81 TABLET ORAL at 08:16

## 2020-09-20 RX ADMIN — ATORVASTATIN CALCIUM SCH MG: 20 TABLET, FILM COATED ORAL at 20:23

## 2020-09-20 RX ADMIN — FAMOTIDINE SCH MG: 20 TABLET, FILM COATED ORAL at 12:55

## 2020-09-20 RX ADMIN — SUCRALFATE SCH GM: 1 TABLET ORAL at 18:01

## 2020-09-20 RX ADMIN — PANTOPRAZOLE SODIUM SCH MG: 20 TABLET, DELAYED RELEASE ORAL at 08:16

## 2020-09-20 RX ADMIN — MAGNESIUM OXIDE SCH MG: 400 TABLET ORAL at 08:16

## 2020-09-20 RX ADMIN — MAGNESIUM OXIDE SCH MG: 400 TABLET ORAL at 20:23

## 2020-09-20 RX ADMIN — ROPINIROLE HYDROCHLORIDE SCH MG: 0.25 TABLET, FILM COATED ORAL at 20:23

## 2020-09-20 RX ADMIN — LEVOTHYROXINE SODIUM SCH MCG: 88 TABLET ORAL at 05:49

## 2020-09-20 RX ADMIN — SUCRALFATE SCH GM: 1 TABLET ORAL at 12:55

## 2020-09-20 RX ADMIN — FAMOTIDINE SCH MG: 20 TABLET, FILM COATED ORAL at 20:22

## 2020-09-20 RX ADMIN — ALBUTEROL SULFATE PRN PUFF: 90 AEROSOL, METERED RESPIRATORY (INHALATION) at 04:34

## 2020-09-20 RX ADMIN — LEVOFLOXACIN SCH MLS/HR: 5 INJECTION, SOLUTION INTRAVENOUS at 23:04

## 2020-09-20 RX ADMIN — LOSARTAN POTASSIUM SCH MG: 25 TABLET, FILM COATED ORAL at 20:22

## 2020-09-20 RX ADMIN — METOPROLOL SUCCINATE SCH MG: 25 TABLET, EXTENDED RELEASE ORAL at 20:33

## 2020-09-20 NOTE — IPNPDOC
Subjective


Date Seen


The patient was seen on 9/20/20.





Subjective


Chief Complaint/HPI


Mr. Roberts is a 64 year old male with history of polysubstance abuse with meth 

and a 5 vessel CABG for CAD here with dyspnea 2/2 decompensated heart failure. 

Overnight, he felt short of breath but saturation was okay. Will give IS. 

Otherwise, denies fever/chills, N/V, diarrhea, chest pain, abdominal pain, or 

dysuria


Constitutional:  Denies: Chills, Fever


Pulmonary:  Reports: Dyspnea


Cardiovascular:  Denies: Chest Pain


Gastrointestinal:  Denies: Abdominal Pain


Genitourinary:  Denies: Dysuria





Objective


Physical Examination


General Exam:  Positive: Alert, Cooperative, Mild Distress


Eye Exam:  Positive: EOMI; 


   Negative: Sclera icteric


Neck Exam:  Positive: Supple


Chest Exam:  Positive: Other (Bibasilar crackles)


Heart Exam:  Positive: Rate Normal, Regular Rhythm


Abdomen Exam:  Positive: Normal bowel sounds


Extremity Exam:  Positive: Edema (bilateral pitting edema)


Psych Exam:  Positive: Mood NL





Assessment /Plan


Assessment


Mr. Roberts is a 64 year old male with polysubstance abuse with meth and recent 

5 vessel CABG here with dyspnea 2/2 CHF exacerbation. We will aggressively 

diurese him and put him on a 2L fluid restriction and 2g sodium diet. Echo has 

been ordered. Cardiology has been consulted, recommendations appreciated





Plan/VTE


VTE Prophylaxis Ordered?:  Yes





Plan


1. New onset CHF in setting of recent CABG


- Denies symptoms of ACS and troponin is within normal limits


- Will try to obtain records from [place] where he had his CABG


- Consistent carb diet with 2g sodium and 2L fluid restriction


- Continue with diuresis


- Pending results from [facility]


- Cardiology consulted, spoke with Dr. Bazzi, Recommendations appreciated


- On losartan and metoprolol





2. Hypoxemic respiratory failure


- Required 3L at [place]


- Improved with diuresis


- Continue oxygen supplementation as needed


- CXR demonstrated minimal pleural effusion, most likely CHF than infectious 

etiology (low WBC and improvement with diuresis), will wait for Procalcitonin 

results before discontinuing antibiotics. Since minimal pleural effusion on CXR,

will hold off on thoracic surgery consult for drainage. 


- Start IS





3. Recent 5 vessel CABG


- Continue ASA


- Will try to obtain records from [place]


- Cardiology consulted, recommendations appreciated





4. Polysubstance abuse


- Uses meth and marijuana


- No withdrawal symptoms at this time, will continue to monitor





5. DM 2


- Continue with SSI and consistent carbohydrate diet





6. Hypertension


- Blood pressure stable. Continue to monitor





7. RLS


- Ropinirole





8. DVT ppx


- SCD and TEDs





VS, I&O, 24H, Fishbone


Vital Signs/I&O





Vital Signs








  Date Time  Temp Pulse Resp B/P (MAP) Pulse Ox O2 Delivery O2 Flow Rate FiO2


 


9/20/20 14:00 98.4 81 17 116/68 (84) 98 Nasal Cannula 1.0 














I&O- Last 24 Hours up to 6 AM 


 


 9/20/20





 06:00


 


Intake Total 1820 ml


 


Output Total 1955 ml


 


Balance -135 ml











Laboratory Data


24H LABS


Laboratory Tests 2


9/19/20 19:53: Bedside Glucose (Misc Panel) 132H


9/20/20 06:56: 


Immature Granulocyte % (Auto) 0.5, Neutrophils (%) (Auto) 81.3H, Lymphocytes (%)

(Auto) 9.6L, Monocytes (%) (Auto) 7.0H, Eosinophils (%) (Auto) 1.2, Basophils 

(%) (Auto) 0.4, Neutrophils # (Auto) 8.7H, Lymphocytes # (Auto) 1.0L, Monocytes 

# (Auto) 0.8, Eosinophils # (Auto) 0.1, Basophils # (Auto) 0.0, Nucleated Red 

Blood Cells % (auto) 0.0, Anion Gap 9, Glomerular Filtration Rate 51.2, Calcium 

Level 8.3L, Magnesium Level 2.0, Troponin I 0.02


9/20/20 11:24: Bedside Glucose (Misc Panel) 100


9/20/20 16:26: Bedside Glucose (Misc Panel) 109


CBC/BMP


Laboratory Tests


9/20/20 06:56








Microbiology





Microbiology


9/19/20 Respiratory Virus Panel (PCR) ([facility]) - Final, Complete


          


9/19/20 Blood Culture - Preliminary, Resulted


          No growth after 24 hours . All specim...


9/19/20 Blood Culture - Preliminary, Resulted


          No growth after 24 hours . All specim...











MARTY GOMEZ DO               Sep 20, 2020 18:48

## 2020-09-21 VITALS — DIASTOLIC BLOOD PRESSURE: 56 MMHG | SYSTOLIC BLOOD PRESSURE: 113 MMHG

## 2020-09-21 VITALS — SYSTOLIC BLOOD PRESSURE: 127 MMHG | DIASTOLIC BLOOD PRESSURE: 60 MMHG

## 2020-09-21 VITALS — SYSTOLIC BLOOD PRESSURE: 108 MMHG | DIASTOLIC BLOOD PRESSURE: 59 MMHG

## 2020-09-21 LAB
BASOPHILS # BLD AUTO: 0 10^3/UL (ref 0–0.2)
BASOPHILS NFR BLD AUTO: 0.5 % (ref 0–1)
BUN SERPL-MCNC: 28 MG/DL (ref 7–18)
CALCIUM SERPL-MCNC: 7.6 MG/DL (ref 8.8–10.2)
CHLORIDE SERPL-SCNC: 97 MEQ/L (ref 98–107)
CO2 SERPL-SCNC: 29 MEQ/L (ref 21–32)
CREAT SERPL-MCNC: 1.47 MG/DL (ref 0.7–1.3)
EOSINOPHIL # BLD AUTO: 0.2 10^3/UL (ref 0–0.5)
EOSINOPHIL NFR BLD AUTO: 2.9 % (ref 0–3)
GFR SERPL CREATININE-BSD FRML MDRD: 51.2 ML/MIN/{1.73_M2} (ref 49–?)
GLUCOSE SERPL-MCNC: 100 MG/DL (ref 70–100)
HCT VFR BLD AUTO: 33.8 % (ref 42–52)
HGB BLD-MCNC: 10.2 G/DL (ref 13.5–17.5)
LYMPHOCYTES # BLD AUTO: 0.9 10^3/UL (ref 1.5–5)
LYMPHOCYTES NFR BLD AUTO: 11.6 % (ref 24–44)
MCH RBC QN AUTO: 25.4 PG (ref 27–33)
MCHC RBC AUTO-ENTMCNC: 30.2 G/DL (ref 32–36.5)
MCV RBC AUTO: 84.1 FL (ref 80–96)
MONOCYTES # BLD AUTO: 0.6 10^3/UL (ref 0–0.8)
MONOCYTES NFR BLD AUTO: 7.7 % (ref 0–5)
NEUTROPHILS # BLD AUTO: 6 10^3/UL (ref 1.5–8.5)
NEUTROPHILS NFR BLD AUTO: 76.4 % (ref 36–66)
PLATELET # BLD AUTO: 204 10^3/UL (ref 150–450)
POTASSIUM SERPL-SCNC: 3.6 MEQ/L (ref 3.5–5.1)
RBC # BLD AUTO: 4.02 10^6/UL (ref 4.3–6.1)
SODIUM SERPL-SCNC: 136 MEQ/L (ref 136–145)
WBC # BLD AUTO: 7.8 10^3/UL (ref 4–10)

## 2020-09-21 RX ADMIN — IPRATROPIUM BROMIDE AND ALBUTEROL PRN PUFF: 20; 100 SPRAY, METERED RESPIRATORY (INHALATION) at 11:20

## 2020-09-21 RX ADMIN — FLUTICASONE PROPIONATE AND SALMETEROL XINAFOATE SCH PUFF: 230; 21 AEROSOL, METERED RESPIRATORY (INHALATION) at 19:28

## 2020-09-21 RX ADMIN — ENOXAPARIN SODIUM SCH MG: 40 INJECTION SUBCUTANEOUS at 11:02

## 2020-09-21 RX ADMIN — LEVOTHYROXINE SODIUM SCH MCG: 88 TABLET ORAL at 05:37

## 2020-09-21 RX ADMIN — SUCRALFATE SCH GM: 1 TABLET ORAL at 07:30

## 2020-09-21 RX ADMIN — FAMOTIDINE SCH MG: 20 TABLET, FILM COATED ORAL at 11:02

## 2020-09-21 RX ADMIN — ALUMINUM HYDROXIDE, MAGNESIUM HYDROXIDE, AND SIMETHICONE PRN ML: 200; 200; 20 SUSPENSION ORAL at 17:30

## 2020-09-21 RX ADMIN — IPRATROPIUM BROMIDE AND ALBUTEROL PRN PUFF: 20; 100 SPRAY, METERED RESPIRATORY (INHALATION) at 16:48

## 2020-09-21 RX ADMIN — CALCIUM CARBONATE (ANTACID) CHEW TAB 500 MG PRN MG: 500 CHEW TAB at 15:55

## 2020-09-21 RX ADMIN — FUROSEMIDE SCH MG: 40 TABLET ORAL at 11:04

## 2020-09-21 RX ADMIN — SUCRALFATE SCH GM: 1 TABLET ORAL at 13:02

## 2020-09-21 RX ADMIN — ALBUTEROL SULFATE PRN PUFF: 90 AEROSOL, METERED RESPIRATORY (INHALATION) at 00:32

## 2020-09-21 RX ADMIN — PANTOPRAZOLE SODIUM SCH MG: 20 TABLET, DELAYED RELEASE ORAL at 11:05

## 2020-09-21 RX ADMIN — LOSARTAN POTASSIUM SCH MG: 25 TABLET, FILM COATED ORAL at 20:36

## 2020-09-21 RX ADMIN — MAGNESIUM OXIDE SCH MG: 400 TABLET ORAL at 11:03

## 2020-09-21 RX ADMIN — SUCRALFATE SCH GM: 1 TABLET ORAL at 20:35

## 2020-09-21 RX ADMIN — FAMOTIDINE SCH MG: 20 TABLET, FILM COATED ORAL at 20:35

## 2020-09-21 RX ADMIN — ROPINIROLE HYDROCHLORIDE SCH MG: 0.25 TABLET, FILM COATED ORAL at 11:04

## 2020-09-21 RX ADMIN — MAGNESIUM OXIDE SCH MG: 400 TABLET ORAL at 20:34

## 2020-09-21 RX ADMIN — ALBUTEROL SULFATE PRN PUFF: 90 AEROSOL, METERED RESPIRATORY (INHALATION) at 04:45

## 2020-09-21 RX ADMIN — CALCIUM CARBONATE (ANTACID) CHEW TAB 500 MG PRN MG: 500 CHEW TAB at 03:55

## 2020-09-21 RX ADMIN — ROPINIROLE HYDROCHLORIDE SCH MG: 0.25 TABLET, FILM COATED ORAL at 20:34

## 2020-09-21 RX ADMIN — SUCRALFATE SCH GM: 1 TABLET ORAL at 17:30

## 2020-09-21 RX ADMIN — ALUMINUM HYDROXIDE, MAGNESIUM HYDROXIDE, AND SIMETHICONE PRN ML: 200; 200; 20 SUSPENSION ORAL at 22:33

## 2020-09-21 RX ADMIN — ASPIRIN SCH MG: 81 TABLET ORAL at 11:05

## 2020-09-21 RX ADMIN — ATORVASTATIN CALCIUM SCH MG: 20 TABLET, FILM COATED ORAL at 20:35

## 2020-09-21 RX ADMIN — IPRATROPIUM BROMIDE AND ALBUTEROL SULFATE PRN ML: .5; 3 SOLUTION RESPIRATORY (INHALATION) at 17:31

## 2020-09-21 RX ADMIN — ACETAMINOPHEN PRN MG: 325 TABLET ORAL at 22:33

## 2020-09-21 RX ADMIN — ACETAMINOPHEN PRN MG: 325 TABLET ORAL at 01:46

## 2020-09-21 RX ADMIN — PAROXETINE HYDROCHLORIDE SCH MG: 10 TABLET, FILM COATED ORAL at 11:04

## 2020-09-21 RX ADMIN — TIOTROPIUM BROMIDE SCH INHALATION: 18 CAPSULE ORAL; RESPIRATORY (INHALATION) at 11:58

## 2020-09-21 RX ADMIN — METOPROLOL SUCCINATE SCH MG: 25 TABLET, EXTENDED RELEASE ORAL at 20:37

## 2020-09-21 NOTE — ECGEPIP
[facility]

                                       

                                       Test Date:    2020

Pat Name:     CHACORTA NOBLES             Department:   

Patient ID:   [id number]                 Room:         [place] [address]

Gender:       Male                     Technician:   OMAIRA

:          1955               Requested By: Shea Bazzi 

Order Number: [id number]     Reading MD:   Martínez Galarza

                                 Measurements

Intervals                              Axis          

Rate:         80                       P:            79

IL:           161                      QRS:          68

QRSD:         109                      T:            105

QT:           452                                    

QTc:          524                                    

                           Interpretive Statements

Normal sinus rhythm

Left atrial enlargement

Nonspecific repolarization abnormalities

No significant change when compared to prior tracing of 2020

Electronically Signed on 2020 20:30:59 EDT by Martínez Galarza

## 2020-09-21 NOTE — IPNPDOC
Subjective


Date Seen


The patient was seen on 9/21/20.





Subjective


Chief Complaint/HPI


Mr. Roberts is a 64 year old male with history of polysubstance abuse with meth 

and a 5 vessel CABG for CAD here with dyspnea 2/2 decompensated heart failure. 

He does not have oxygen at home. We weaned him off oxygen, but he feels dyspneic

without it. Saturated at 96 at room air. Ambulation saturated at 93. Spoke with 

cardiology about his dyspnea. He does have COPD and currently is not receiving 

medication for COPD. Will add on COPD treatment.  Otherwise, denies 

fever/chills, N/V, diarrhea, chest pain, abdominal pain, or dysuria


Constitutional:  Denies: Chills, Fever


Pulmonary:  Reports: Dyspnea


Cardiovascular:  Denies: Chest Pain


Gastrointestinal:  Reports: Other Symptoms (gerd); 


   Denies: Diarrhea


Genitourinary:  Denies: Dysuria





Objective


Physical Examination


General Exam:  Positive: Alert, Cooperative, Mild Distress


Eye Exam:  Positive: EOMI; 


   Negative: Sclera icteric


Neck Exam:  Positive: Supple


Chest Exam:  Positive: Other (Bibasilar crackles)


Heart Exam:  Positive: Rate Normal, Regular Rhythm


Abdomen Exam:  Positive: Normal bowel sounds


Extremity Exam:  Positive: Edema (bilateral pitting edema)


Psych Exam:  Positive: Mood NL





Assessment /Plan


Assessment


Mr. Roberts is a 64 year old male with polysubstance abuse with meth and recent 

5 vessel CABG here with dyspnea 2/2 CHF exacerbation. We will aggressively 

diurese him and put him on a 2L fluid restriction and 2g sodium diet. Echo has 

been ordered. Cardiology has been consulted, recommendations appreciated





Plan/VTE


VTE Prophylaxis Ordered?:  Yes





Plan


1. New onset CHF in setting of recent CABG


- Denies symptoms of ACS and troponin is within normal limits


- Will try to obtain records from [place] where he had his CABG


- Consistent carb diet with 2g sodium and 2L fluid restriction


- Continue with diuresis


- Pending results from [facility]


- Cardiology consulted, spoke with Dr. Bazzi, Recommendations appreciated


- On losartan and metoprolol





2. Hypoxemic respiratory failure


- Required 3L at [place], weaned off


- Improved with diuresis


- CXR demonstrated minimal pleural effusion, most likely CHF than infectious 

etiology (low WBC and improvement with diuresis), will wait for Procalcitonin 

results before discontinuing antibiotics. Since minimal pleural effusion on CXR,

will hold off on thoracic surgery consult for drainage. 


- Start IS





3. Recent 5 vessel CABG


- Continue ASA


- Will try to obtain records from [place]


- Cardiology consulted, recommendations appreciated





4. Polysubstance abuse


- Uses meth and marijuana


- No withdrawal symptoms at this time, will continue to monitor





5. DM 2


- Continue with SSI and consistent carbohydrate diet





6. Hypertension


- Blood pressure stable. Continue to monitor





7. RLS


- Ropinirole





8. COPD


- Will start on inhalers





9. DVT ppx


- SCD and TEDs





VS, I&O, 24H, Fishbone


Vital Signs/I&O





Vital Signs








  Date Time  Temp Pulse Resp B/P (MAP) Pulse Ox O2 Delivery O2 Flow Rate FiO2


 


9/21/20 16:00 97.4 87 19 113/56 (75) 96 Room Air  


 


9/21/20 09:00       0.0 














I&O- Last 24 Hours up to 6 AM 


 


 9/21/20





 06:00


 


Intake Total 1976 ml


 


Output Total 675 ml


 


Balance 1301 ml











Laboratory Data


24H LABS


Laboratory Tests 2


9/20/20 20:47: Bedside Glucose (Misc Panel) 89


9/21/20 05:07: Bedside Glucose (Misc Panel) 112


9/21/20 05:51: 


Immature Granulocyte % (Auto) 0.9, Neutrophils (%) (Auto) 76.4H, Lymphocytes (%)

(Auto) 11.6L, Monocytes (%) (Auto) 7.7H, Eosinophils (%) (Auto) 2.9, Basophils 

(%) (Auto) 0.5, Neutrophils # (Auto) 6.0, Lymphocytes # (Auto) 0.9L, Monocytes #

(Auto) 0.6, Eosinophils # (Auto) 0.2, Basophils # (Auto) 0.0, Nucleated Red B

lood Cells % (auto) 0.0, Anion Gap 10, Glomerular Filtration Rate 51.2, Calcium 

Level 7.6L


9/21/20 11:42: Bedside Glucose (Misc Panel) 82


9/21/20 17:03: Bedside Glucose (Misc Panel) 130H


CBC/BMP


Laboratory Tests


9/21/20 05:51








Microbiology





Microbiology


9/19/20 Respiratory Virus Panel (PCR) ([facility]) - Final, Complete


          


9/19/20 Blood Culture - Preliminary, Resulted


          No Growth after 48 hours. All Specime...


9/19/20 Blood Culture - Preliminary, Resulted


          No Growth after 48 hours. All Specime...











MARTY GOMEZ DO               Sep 21, 2020 20:08

## 2020-09-22 VITALS — SYSTOLIC BLOOD PRESSURE: 110 MMHG | DIASTOLIC BLOOD PRESSURE: 49 MMHG

## 2020-09-22 VITALS — SYSTOLIC BLOOD PRESSURE: 113 MMHG | DIASTOLIC BLOOD PRESSURE: 50 MMHG

## 2020-09-22 VITALS — SYSTOLIC BLOOD PRESSURE: 134 MMHG | DIASTOLIC BLOOD PRESSURE: 71 MMHG

## 2020-09-22 LAB
BASOPHILS # BLD AUTO: 0 10^3/UL (ref 0–0.2)
BASOPHILS NFR BLD AUTO: 0.4 % (ref 0–1)
BUN SERPL-MCNC: 24 MG/DL (ref 7–18)
CALCIUM SERPL-MCNC: 7.8 MG/DL (ref 8.8–10.2)
CHLORIDE SERPL-SCNC: 99 MEQ/L (ref 98–107)
CO2 SERPL-SCNC: 29 MEQ/L (ref 21–32)
CREAT SERPL-MCNC: 1.25 MG/DL (ref 0.7–1.3)
EOSINOPHIL # BLD AUTO: 0.3 10^3/UL (ref 0–0.5)
EOSINOPHIL NFR BLD AUTO: 3.8 % (ref 0–3)
GFR SERPL CREATININE-BSD FRML MDRD: > 60 ML/MIN/{1.73_M2} (ref 49–?)
GLUCOSE SERPL-MCNC: 102 MG/DL (ref 70–100)
HCT VFR BLD AUTO: 33.9 % (ref 42–52)
HGB BLD-MCNC: 10.5 G/DL (ref 13.5–17.5)
LYMPHOCYTES # BLD AUTO: 0.7 10^3/UL (ref 1.5–5)
LYMPHOCYTES NFR BLD AUTO: 9.7 % (ref 24–44)
MCH RBC QN AUTO: 25.4 PG (ref 27–33)
MCHC RBC AUTO-ENTMCNC: 31 G/DL (ref 32–36.5)
MCV RBC AUTO: 82.1 FL (ref 80–96)
MONOCYTES # BLD AUTO: 0.6 10^3/UL (ref 0–0.8)
MONOCYTES NFR BLD AUTO: 8.6 % (ref 0–5)
NEUTROPHILS # BLD AUTO: 5.6 10^3/UL (ref 1.5–8.5)
NEUTROPHILS NFR BLD AUTO: 76.7 % (ref 36–66)
PLATELET # BLD AUTO: 196 10^3/UL (ref 150–450)
POTASSIUM SERPL-SCNC: 4.3 MEQ/L (ref 3.5–5.1)
RBC # BLD AUTO: 4.13 10^6/UL (ref 4.3–6.1)
SODIUM SERPL-SCNC: 135 MEQ/L (ref 136–145)
WBC # BLD AUTO: 7.3 10^3/UL (ref 4–10)

## 2020-09-22 RX ADMIN — ACETAMINOPHEN PRN MG: 325 TABLET ORAL at 21:42

## 2020-09-22 RX ADMIN — PANTOPRAZOLE SODIUM SCH MG: 20 TABLET, DELAYED RELEASE ORAL at 09:19

## 2020-09-22 RX ADMIN — ALUMINUM HYDROXIDE, MAGNESIUM HYDROXIDE, AND SIMETHICONE PRN ML: 200; 200; 20 SUSPENSION ORAL at 11:47

## 2020-09-22 RX ADMIN — MAGNESIUM OXIDE SCH MG: 400 TABLET ORAL at 09:15

## 2020-09-22 RX ADMIN — FLUTICASONE PROPIONATE AND SALMETEROL XINAFOATE SCH PUFF: 230; 21 AEROSOL, METERED RESPIRATORY (INHALATION) at 20:26

## 2020-09-22 RX ADMIN — SUCRALFATE SCH GM: 1 TABLET ORAL at 09:16

## 2020-09-22 RX ADMIN — FUROSEMIDE SCH MG: 40 TABLET ORAL at 09:16

## 2020-09-22 RX ADMIN — SUCRALFATE SCH GM: 1 TABLET ORAL at 20:22

## 2020-09-22 RX ADMIN — LEVOTHYROXINE SODIUM SCH MCG: 88 TABLET ORAL at 05:49

## 2020-09-22 RX ADMIN — ROPINIROLE HYDROCHLORIDE SCH MG: 0.25 TABLET, FILM COATED ORAL at 09:16

## 2020-09-22 RX ADMIN — IPRATROPIUM BROMIDE AND ALBUTEROL SULFATE PRN ML: .5; 3 SOLUTION RESPIRATORY (INHALATION) at 11:26

## 2020-09-22 RX ADMIN — ATORVASTATIN CALCIUM SCH MG: 20 TABLET, FILM COATED ORAL at 20:24

## 2020-09-22 RX ADMIN — LOSARTAN POTASSIUM SCH MG: 25 TABLET, FILM COATED ORAL at 20:23

## 2020-09-22 RX ADMIN — MAGNESIUM OXIDE SCH MG: 400 TABLET ORAL at 20:23

## 2020-09-22 RX ADMIN — ROPINIROLE HYDROCHLORIDE SCH MG: 0.25 TABLET, FILM COATED ORAL at 20:22

## 2020-09-22 RX ADMIN — ASPIRIN SCH MG: 81 TABLET ORAL at 09:16

## 2020-09-22 RX ADMIN — ALUMINUM HYDROXIDE, MAGNESIUM HYDROXIDE, AND SIMETHICONE PRN ML: 200; 200; 20 SUSPENSION ORAL at 17:37

## 2020-09-22 RX ADMIN — FLUTICASONE PROPIONATE AND SALMETEROL XINAFOATE SCH PUFF: 230; 21 AEROSOL, METERED RESPIRATORY (INHALATION) at 06:28

## 2020-09-22 RX ADMIN — LEVOFLOXACIN SCH MLS/HR: 5 INJECTION, SOLUTION INTRAVENOUS at 00:13

## 2020-09-22 RX ADMIN — SUCRALFATE SCH GM: 1 TABLET ORAL at 11:47

## 2020-09-22 RX ADMIN — PAROXETINE HYDROCHLORIDE SCH MG: 10 TABLET, FILM COATED ORAL at 09:15

## 2020-09-22 RX ADMIN — SUCRALFATE SCH GM: 1 TABLET ORAL at 17:37

## 2020-09-22 RX ADMIN — TIOTROPIUM BROMIDE SCH INHALATION: 18 CAPSULE ORAL; RESPIRATORY (INHALATION) at 06:27

## 2020-09-22 RX ADMIN — ENOXAPARIN SODIUM SCH MG: 40 INJECTION SUBCUTANEOUS at 09:15

## 2020-09-22 RX ADMIN — FAMOTIDINE SCH MG: 20 TABLET, FILM COATED ORAL at 09:15

## 2020-09-22 RX ADMIN — FAMOTIDINE SCH MG: 20 TABLET, FILM COATED ORAL at 20:24

## 2020-09-22 NOTE — ECHO
DATE OF PROCEDURE: 09/20/2020 



Gender: Male 

Height: 60 kg  

Weight: 67 inches 



REFERRING PHYSICIAN: Dr. Craft 



INDICATION: Congestive heart failure, recent coronary artery bypass grafting 
(CABG).  



MEASUREMENTS:

   IVS 0.7 cm

   LV 5.2 cm

   LVPW 1.2 cm

   LA 4.3 cm

   Aorta 3.1 cm

   RV 2.7 

   IVC 1.6 cm

   Mitral E wave velocity 83 

   Mitral A wave 51 

   E prime septal 5.4

   E prime lateral 8.6



FINDINGS: This study is of fair technical quality with difficult visualization. 
Left ventricle is normal size. There is severe global hypokinesis with 
dyskinesis of the septum and akinesis of the apex. Overall estimated LVEF 
approximately 20%. Right ventricle does not appear grossly dilated and has 
preserved mobility. Left atrium is mildly enlarged. Right atrium appears normal.
Aortic valve is sclerotic, but mobility of cusp is preserved. There are 
prominent degenerative abnormalities of the mitral shad with mitral annular 
calcifications. Mobility of leaflets is preserved. Tricuspid valve appears 
normal. Pulmonic valve was not seen. No pericardial effusion is noted. Inferior 
vena cava is normal size. Aortic root is normal. Aortic arch and abdominal aorta
were not well seen. 



Doppler interrogation of the aortic valve reveals no stenosis or insufficiency. 
There is mild mitral insufficiency and mild tricuspid insufficiency. Calculated 
pulmonary artery pressure is within normal limits. 



Mitral inflow pattern on tissue Doppler imaging of the mitral annulus revealed 
grade 2 diastolic dysfunction.  



CONCLUSIONS:

1.  Study is of fair technical quality. Patient is in sinus rhythm.

2.  Normal left ventricle (LV) with extensive wall motion abnormalities as 
outlined above, and overall estimated left ventricular ejection fraction (LVEF) 
20%. Grade 2 diastolic dysfunction. 

3.  Mild mitral insufficiency.

4.  Likely normal central venous pressure and normal pulmonary artery pressure. 



COMMENTS:

This study is consistent with ischemic cardiomyopathy and resulting severe left 
ventricular systolic dysfunction, but is arguing against volume overloaded 
state.  

[facility]

## 2020-09-22 NOTE — IPNPDOC
Subjective


Date Seen


The patient was seen on 9/22/20.





Subjective


Chief Complaint/HPI


Mr. Roberts is a 64 year old male with history of polysubstance abuse with meth 

and a 5 vessel CABG for CAD here with dyspnea 2/2 decompensated heart failure. 

He has been weaned off oxygen and is on COPD regimen. Heart burn, better with 

Mylanta.  Otherwise, denies fever/chills, N/V, diarrhea, chest pain, abdominal 

pain, or dysuria


Constitutional:  Denies: Chills, Fever


Cardiovascular:  Denies: Chest Pain


Gastrointestinal:  Denies: Abdominal Pain


Genitourinary:  Denies: Dysuria





Objective


Physical Examination


General Exam:  Positive: Alert, Cooperative, Mild Distress


Eye Exam:  Positive: EOMI; 


   Negative: Sclera icteric


Neck Exam:  Positive: Supple


Chest Exam:  Positive: Other (diminished breath sounds)


Heart Exam:  Positive: Rate Normal, Regular Rhythm


Abdomen Exam:  Positive: Normal bowel sounds


Extremity Exam:  Positive: Edema (bilateral pitting edema)


Psych Exam:  Positive: Mood NL





Assessment /Plan


Assessment


Mr. Roberts is a 64 year old male with polysubstance abuse with meth and recent 

5 vessel CABG here with dyspnea 2/2 CHF exacerbation. We will aggressively 

diurese him and put him on a 2L fluid restriction and 2g sodium diet.. He still 

has some dyspnea, suspecting from COPD. When he was admitted, there was no COPD 

meds on his med list. We have started him on COPD medications





Plan/VTE


VTE Prophylaxis Ordered?:  Yes





Plan


1. New onset CHF in setting of recent CABG


- Denies symptoms of ACS and troponin is within normal limits


- Will try to obtain records from [place] where he had his CABG


- Consistent carb diet with 2g sodium and 2L fluid restriction


- Continue with diuresis


- Pending results from [facility]


- Cardiology consulted, spoke with Dr. Bazzi, Recommendations appreciated


- On losartan and metoprolol





2. Hypoxemic respiratory failure


- Required 3L at [place], weaned off


- Improved with diuresis


- CXR demonstrated minimal pleural effusion, most likely CHF than infectious 

etiology (low WBC and improvement with diuresis), will wait for Procalcitonin 

results before discontinuing antibiotics. Since minimal pleural effusion on CXR,

will hold off on thoracic surgery consult for drainage. 


- Start IS





3. Recent 5 vessel CABG


- Continue ASA


- Will try to obtain records from [place]


- Cardiology consulted, recommendations appreciated





4. Polysubstance abuse


- Uses meth and marijuana


- No withdrawal symptoms at this time, will continue to monitor





5. DM 2


- Continue with SSI and consistent carbohydrate diet





6. Hypertension


- Blood pressure stable. Continue to monitor





7. RLS


- Ropinirole





8. COPD


- Will start on inhalers





9. DVT ppx


- SCD and TEDs





VS, I&O, 24H, Fishbone


Vital Signs/I&O





Vital Signs








  Date Time  Temp Pulse Resp B/P (MAP) Pulse Ox O2 Delivery O2 Flow Rate FiO2


 


9/22/20 14:00 98.5 69 16 110/49 (69) 93 Room Air  














I&O- Last 24 Hours up to 6 AM 


 


 9/22/20





 06:00


 


Intake Total 1360 ml


 


Output Total 0 ml


 


Balance 1360 ml











Laboratory Data


24H LABS


Laboratory Tests 2


9/21/20 20:49: Bedside Glucose (Misc Panel) 120H


9/22/20 05:29: 


Immature Granulocyte % (Auto) 0.8, Neutrophils (%) (Auto) 76.7H, Lymphocytes (%)

(Auto) 9.7L, Monocytes (%) (Auto) 8.6H, Eosinophils (%) (Auto) 3.8H, Basophils 

(%) (Auto) 0.4, Neutrophils # (Auto) 5.6, Lymphocytes # (Auto) 0.7L, Monocytes #

(Auto) 0.6, Eosinophils # (Auto) 0.3, Basophils # (Auto) 0.0, Nucleated Red B

lood Cells % (auto) 0.0, Anion Gap 7L, Glomerular Filtration Rate > 60.0, 

Calcium Level 7.8L


9/22/20 12:19: Bedside Glucose (Misc Panel) 109


9/22/20 16:34: Bedside Glucose (Misc Panel) 154H


CBC/BMP


Laboratory Tests


9/22/20 05:29








Microbiology





Microbiology


9/19/20 Respiratory Virus Panel (PCR) ([facility]) - Final, Complete


          


9/19/20 Blood Culture - Preliminary, Resulted


          No Growth after 72 hours. All specime...


9/19/20 Blood Culture - Preliminary, Resulted


          No Growth after 72 hours. All specime...











MARTY GOMEZ DO               Sep 22, 2020 18:58

## 2020-09-23 VITALS — DIASTOLIC BLOOD PRESSURE: 70 MMHG | SYSTOLIC BLOOD PRESSURE: 122 MMHG

## 2020-09-23 LAB
BASOPHILS # BLD AUTO: 0 10^3/UL (ref 0–0.2)
BASOPHILS NFR BLD AUTO: 0.6 % (ref 0–1)
BUN SERPL-MCNC: 21 MG/DL (ref 7–18)
CALCIUM SERPL-MCNC: 7.8 MG/DL (ref 8.8–10.2)
CHLORIDE SERPL-SCNC: 99 MEQ/L (ref 98–107)
CO2 SERPL-SCNC: 29 MEQ/L (ref 21–32)
CREAT SERPL-MCNC: 1.26 MG/DL (ref 0.7–1.3)
EOSINOPHIL # BLD AUTO: 0.3 10^3/UL (ref 0–0.5)
EOSINOPHIL NFR BLD AUTO: 4.7 % (ref 0–3)
GFR SERPL CREATININE-BSD FRML MDRD: > 60 ML/MIN/{1.73_M2} (ref 49–?)
GLUCOSE SERPL-MCNC: 97 MG/DL (ref 70–100)
HCT VFR BLD AUTO: 36.4 % (ref 42–52)
HGB BLD-MCNC: 11.1 G/DL (ref 13.5–17.5)
LYMPHOCYTES # BLD AUTO: 1 10^3/UL (ref 1.5–5)
LYMPHOCYTES NFR BLD AUTO: 14.8 % (ref 24–44)
MCH RBC QN AUTO: 25.2 PG (ref 27–33)
MCHC RBC AUTO-ENTMCNC: 30.5 G/DL (ref 32–36.5)
MCV RBC AUTO: 82.5 FL (ref 80–96)
MONOCYTES # BLD AUTO: 0.6 10^3/UL (ref 0–0.8)
MONOCYTES NFR BLD AUTO: 8.7 % (ref 0–5)
NEUTROPHILS # BLD AUTO: 4.8 10^3/UL (ref 1.5–8.5)
NEUTROPHILS NFR BLD AUTO: 70.3 % (ref 36–66)
PLATELET # BLD AUTO: 237 10^3/UL (ref 150–450)
POTASSIUM SERPL-SCNC: 4 MEQ/L (ref 3.5–5.1)
RBC # BLD AUTO: 4.41 10^6/UL (ref 4.3–6.1)
SODIUM SERPL-SCNC: 132 MEQ/L (ref 136–145)
WBC # BLD AUTO: 6.9 10^3/UL (ref 4–10)

## 2020-09-23 RX ADMIN — LEVOTHYROXINE SODIUM SCH MCG: 88 TABLET ORAL at 05:49

## 2020-09-23 RX ADMIN — IPRATROPIUM BROMIDE AND ALBUTEROL SULFATE PRN ML: .5; 3 SOLUTION RESPIRATORY (INHALATION) at 04:57

## 2020-09-23 RX ADMIN — ACETAMINOPHEN PRN MG: 325 TABLET ORAL at 12:54

## 2020-09-23 RX ADMIN — FAMOTIDINE SCH MG: 20 TABLET, FILM COATED ORAL at 08:12

## 2020-09-23 RX ADMIN — ENOXAPARIN SODIUM SCH MG: 40 INJECTION SUBCUTANEOUS at 08:12

## 2020-09-23 RX ADMIN — FLUTICASONE PROPIONATE AND SALMETEROL XINAFOATE SCH PUFF: 230; 21 AEROSOL, METERED RESPIRATORY (INHALATION) at 07:11

## 2020-09-23 RX ADMIN — ALUMINUM HYDROXIDE, MAGNESIUM HYDROXIDE, AND SIMETHICONE PRN ML: 200; 200; 20 SUSPENSION ORAL at 14:08

## 2020-09-23 RX ADMIN — PAROXETINE HYDROCHLORIDE SCH MG: 10 TABLET, FILM COATED ORAL at 08:12

## 2020-09-23 RX ADMIN — SUCRALFATE SCH GM: 1 TABLET ORAL at 12:15

## 2020-09-23 RX ADMIN — ALUMINUM HYDROXIDE, MAGNESIUM HYDROXIDE, AND SIMETHICONE PRN ML: 200; 200; 20 SUSPENSION ORAL at 08:16

## 2020-09-23 RX ADMIN — SUCRALFATE SCH GM: 1 TABLET ORAL at 08:11

## 2020-09-23 RX ADMIN — ASPIRIN SCH MG: 81 TABLET ORAL at 08:12

## 2020-09-23 RX ADMIN — PANTOPRAZOLE SODIUM SCH MG: 20 TABLET, DELAYED RELEASE ORAL at 08:11

## 2020-09-23 RX ADMIN — TIOTROPIUM BROMIDE SCH INHALATION: 18 CAPSULE ORAL; RESPIRATORY (INHALATION) at 07:11

## 2020-09-23 RX ADMIN — FUROSEMIDE SCH MG: 40 TABLET ORAL at 08:11

## 2020-09-23 RX ADMIN — ROPINIROLE HYDROCHLORIDE SCH MG: 0.25 TABLET, FILM COATED ORAL at 08:11

## 2020-09-23 RX ADMIN — MAGNESIUM OXIDE SCH MG: 400 TABLET ORAL at 08:12

## 2020-09-23 NOTE — DS.PDOC
Discharge Summary


General


Date of Admission


Sep 18, 2020 at 23:37


Date of Discharge


Sep 23, 2020


Attending Physician:  MARTY GOMEZ DO


Specialist/Consultants Involve


Cardiology, Dr. Bazzi





Discharge Summary


PROCEDURES PERFORMED DURING STAY: Echocardiogram





ADMITTING DIAGNOSES: 


1. New onset CHF in the setting of recent CABG


2. CAD, no evidence of ACS at this time


3. Hypoxic respiratory failure


4. Polysubstance use disorder


5. Diabetes mellitus


6. COPD


8. Hypertension


9. Restless leg syndrome





DISCHARGE DIAGNOSES:


1. New onset CHF in the setting of recent CABG


2. CAD, no evidence of ACS at this time


3. Hypoxic respiratory failure


4. Polysubstance use disorder


5. Diabetes mellitus


6. COPD


8. Hypertension


9. Restless leg syndrome








COMPLICATIONS/CHIEF COMPLAINT: Congestive Heart Failure.





HISTORY OF PRESENT ILLNESS: Mr. Roberts is a 54-year-old male with recent 

history of a 5 vessel CABG for coronary artery disease, polysubstance use 

disorder with meth, hypertension, hyperlipidemia, COPD who initially presented 

to [place] for worsening shortness of breath and dyspnea on exertion who was 

transferred here for higher level of care. While at [place] his hypoxic 

requiring 3 L of nasal cannula when normally is not on oxygen.. EKG did not 

demonstrate any ischemic signs. Troponins were negative. Pro-BMP was 4913.





HOSPITAL COURSE: During his hospitalization cardiology was consulted. They 

helped manage his diuretics as well as his cardiac medications. He was put on a 

diuretic, and ARB, and a beta blocker. He is also started on aspirin and a stati

n. He was able to be weaned off of oxygen, based still felt dyspneic. At room 

air he was saturated at 96%, and when he walked he saturated 93%. When received 

a packet from [facility], he was on triple therapy for COPD. It was not on his 

home med list. It may have been too expensive for him. I started him on triple 

therapy with a long-acting beta agonist, a long-acting antimuscarinic, and a 

long-acting inhaled steroid. In addition, he does not have good help at home. He

has a lot of new medications which he will need help taking and staying 

compliant. On discharge she was feeling better. Denied any fever or chills, 

lightheadedness or dizziness, abdominal pain, diarrhea, or dysuria. He did have 

an episode of chest pain, bases that he gets this periodically since the recent 

CABG. We checked an EKG which was similar to his prior EKGs. He asked about 

paying for oxygen even though he did not qualify for oxygen. Since he had a 

history of smoking, I was worried that the oxygen with combusted. I explained I 

cannot write him a prescription for oxygen. Otherwise, the packet from [facility] 

also mentioned that he had a pancreatic mass. The biopsy of the nodule in his 

lung did not demonstrate cancer. I recommended that he follow-up with oncology 

for further evaluation.





DISCHARGE MEDICATIONS: Please see below.


 


ALLERGIES: Please see below.





PHYSICAL EXAMINATION ON DISCHARGE:


VITAL SIGNS: Please see below.


GENERAL: Comfortable, in no apparent distress.


HEENT: Head normocephalic/atraumatic, EOMI, sclera clear.


NECK: Supple


RESPIRATORY: Lungs clear to auscultation bilaterally, no rales, wheeze or 

rhonchi.


CARDIOVASCULAR: Regular rate and rhythm.


ABDOMEN: Soft, nontender, no guarding or rebound tenderness. Normal bowel 

sounds.


MUSCLE SKELETAL: Muscle strength 5/5 in all extremities.


NEUROLOGICAL:  grossly intact, no focal deficits noted.


PSYCHOLOGICAL: Normal mood and affect








LABORATORY DATA: Please see below.





PROGNOSIS: Stable





ACTIVITY: As tolerated





DIET: Low-salt diet with 2 L fluid ejection





DISCHARGE PLAN: Home





DISPOSITION: 01 Home, Self-Care.





DISCHARGE INSTRUCTIONS:


1. Follow-up with her PCP within a week


2. Follow-up with cardiology within a week


3. Follow-up with a pulmonologist for COPD management


4. Follow-up with oncology for evaluation of pancreatic mass





ITEMS TO FOLLOWUP ON ON OUTPATIENT:


1. Official echocardiogram results (the cardiologist said the EF was poor)





DISCHARGE CONDITION: Stable.





Total time spent on discharge planning, discharge summary, and med 

reconciliation a 55 minutes





Vital Signs/I&Os





Vital Signs








  Date Time  Temp Pulse Resp B/P (MAP) Pulse Ox O2 Delivery O2 Flow Rate FiO2


 


9/23/20 06:00 98.2 75 17 122/70 (87) 100 Room Air  


 


9/22/20 14:00        














I&O- Last 24 Hours up to 6 AM 


 


 9/23/20





 06:00


 


Intake Total 1060 ml


 


Balance 1060 ml











Laboratory Data


Labs 24H


Laboratory Tests 2


9/23/20 05:38: 


Immature Granulocyte % (Auto) 0.9, Neutrophils (%) (Auto) 70.3H, Lymphocytes (%)

(Auto) 14.8L, Monocytes (%) (Auto) 8.7H, Eosinophils (%) (Auto) 4.7H, Basophils 

(%) (Auto) 0.6, Neutrophils # (Auto) 4.8, Lymphocytes # (Auto) 1.0L, Monocytes #

(Auto) 0.6, Eosinophils # (Auto) 0.3, Basophils # (Auto) 0.0, Nucleated Red 

Blood Cells % (auto) 0.0, Anion Gap 4L, Glomerular Filtration Rate > 60.0, Trae

cium Level 7.8L


CBC/BMP


Laboratory Tests


9/23/20 05:38











Microbiology





Microbiology


9/19/20 Respiratory Virus Panel (PCR) ([facility]) - Final, Complete


          


9/19/20 Blood Culture - Preliminary, Resulted


          No Growth after 72 hours. All specime...


9/19/20 Blood Culture - Preliminary, Resulted


          No Growth after 72 hours. All specime...





Discharge Medications


Scheduled


Aspirin (Aspirin EC) 81 Mg Tablet.dr, 81 MG PO DAILY


Atorvastatin Calcium (Atorvastatin Calcium) 40 Mg Tablet, 40 MG PO QPM


Fluticasone Propion/Salmeterol (Advair Hfa 230-21 Mcg Inhaler) 12 Gm Hfa.aer.ad,

2 PUFF INH RBID


Furosemide (Furosemide) 40 Mg Tablet, 40 MG PO DAILY


Levothyroxine Sodium (Synthroid) 88 Mcg Tab, 88 MCG PO DAILY, (Reported)


Losartan Potassium (Cozaar) 25 Mg Tablet, 25 MG PO QHS


Metoprolol Succinate (Metoprolol Succinate) 25 Mg Tab.er.24h, 12.5 MG PO QHS


Paroxetine (Paroxetine HCl) 10 Mg Tablet, 10 MG PO DAILY


Ropinirole HCl (Ropinirole HCl) 0.5 Mg Tablet, 0.5 MG PO BID, (Reported)


Tiotropium Bromide (Spiriva Respimat) 4 Gm Mist.inhal, 2 INHALATION INH DAILY





Scheduled PRN


Albuterol Sulfate (Ventolin Hfa) 108 Mcg/Act Aer, 2 PUFFS INH Q4H PRN for 

SHORTNESS OF BREATH, (Reported)


Aluminum/Magnesium/Simeth (Mag-Al Plus Suspension) 30 Ml Oral.susp, 30 ML PO 

Q4HP PRN for INDIGESTION


Ipratropium/Albuterol Sulfate (Combivent Respimat  Mcg) 4 Gm Mist.inhal, 1

PUFF INH Q6H PRN for SHORTNESS OF BREATH, (Reported)


Tramadol HCl (Tramadol HCl) 50 Mg Tablet, 50 MG PO BID PRN for PAIN, (Reported)


[Pill Cutter] 1 EACH EA, 1 EACH XX ASDIRECTED PRN for TO SPLIT MEDICATION





Allergies


Coded Allergies:  


     amoxicillin (Verified  Allergy, Unknown, rash, 9/30/19)


     clavulanic acid (Verified  Allergy, Unknown, rash, 9/30/19)


     pregabalin (Verified  Adverse Reaction, Unknown, seizures, 9/30/19)


     warfarin (Verified  Adverse Reaction, Unknown, bleeding, 9/30/19)











MARTY GOMEZ DO               Sep 23, 2020 21:16

## 2020-09-24 NOTE — ECGEPIP
[facility]

                                       

                                       Test Date:    2020

Pat Name:     CHACORTA NOBLES             Department:   

Patient ID:   [id number]                 Room:         [place] [address]

Gender:       Male                     Technician:   EASTON

:          1955               Requested By: MARYT ESTES

Order Number: [id number]     Reading MD:   Martínez Galarza

                                 Measurements

Intervals                              Axis          

Rate:         77                       P:            71

VT:           168                      QRS:          57

QRSD:         106                      T:            118

QT:           442                                    

QTc:          502                                    

                           Interpretive Statements

Normal sinus rhythm

Nonspecific ST-T wave abnormalities

No significant change when compared to prior tracing of 2020

Electronically Signed on 2020 6:05:53 EDT by Martínez Galarza

## 2020-10-01 NOTE — IPN
DATE:  09/20/2020



Mr. Roberts tells me that he is feeling better than he did yesterday. He is a 
little less short of breath but still gets easily short of breath just 
ambulating in his room. Denies any chest discomfort other than his chronic 
heartburn. No other clinical events occurred overnight.



Vital signs: Blood pressure 124/75, heart rate has been in 80s and 90s, sinus 
rhythm. He is afebrile. Saturation is 97% on 1 liter of oxygen by nasal cannula.
Fluid balance yesterday was recorded as -1300 mL. Weight is 60.4 kg. He is 
alert, oriented, and appropriate. His jugular venous pressure (JVP) does not 
look high. Lungs are reasonably clear today. I barely can appreciate any 
diminished breath sounds over right base. Left lung sounds clear. Heart exam 
reveals regular rhythm without obvious gallop, murmur, or rub. Abdomen is soft 
without obvious tenderness, guarding, or dullness. The previous surgeries are 
apparent. Extremities are free of edema. Peripheral pulses are diminished but no
trophic defects are present. Neurologically, he is for the most part intact.



LABORATORIES:

CBC: WBC 10.7, hemoglobin 9.9, hematocrit 32.3, platelet count 217,000.



Basic metabolic panel this morning was sodium 135, potassium 3.6, BUN 29, 
creatinine 1.5, glucose 107, magnesium 2.0, and troponin was negative. 



Patient had an electrocardiogram that did not appreciably change from prior. He 
had an echocardiogram that I interpreted about an hour ago that revealed severe 
left ventricular systolic dysfunction with ejection fraction approximately 20%, 
grade 2 diastolic dysfunction, and no hemodynamically significant valvular 
disease. Central venous pressure and pulmonary artery pressure were normal.



ASSESSMENT AND PLAN: Mr. Roberts is a 64-year-old man who has a multitude of 
medical problems, but his dominant issue currently is congestive heart failure 
that is due to underlying severe left ventricular systolic dysfunction. He just 
had coronary bypass surgery on August 17. At this point, he is already 
reasonably euvolemic. We will be working on adjusting his medications to support
left ventricular (LV) function. Fortunately, his blood pressure seems to be 
holding reasonably well. I am going to discontinue his intravenous (IV) 
furosemide and leave him only on oral furosemide 40 mg daily. Will not advance 
the dose of losartan as yet, because his creatinine had jumped up since 
yesterday, I believe most likely as a consequence of IV contrast administered in
[place]. I will add a small dose of beta blocker. Because I foresee that 
compliance on the patient's part will be limited and because he has also chronic
obstructive pulmonary disease (COPD), I will give him Toprol-XL, even though 
carvedilol would typically be my drug of choice. But in this situation, but it 
is once a day dosing, and  beta 1 selectivity provides certain advantage. Social
services hopefully will see the patient tomorrow, because in my opinion it 
probably does not matter what we do in the hospital; if he returns home, he will
be back almost immediately, because he does not have any support, and I do not 
believe that he is able to comprehend the importance of medication compliance 
and also dietary compliance. As far as coronary artery disease is concerned, he 
continues to complain about heartburn, which probably indeed is heartburn. There
is no evidence for ischemia on EKG, and his troponin has remained negative. 
Overall his prognosis is certainly poor, not only due to underlying heart and 
lung disease but also due to history of drug use and very limited social 
support. 



NEELA

## 2020-10-02 NOTE — CR
DATE OF CONSULTATION:  2020



REFERRING PROVIDER:  Tariq Locke DO



INDICATION:  Congestive heart failure.



HISTORY OF PRESENT ILLNESS:  Mr. Roberts is previously unknown to me.  He is a 
64-year-old man who has a longstanding history of polysubstance abuse and most 
recently presented to [facility] in [place] with unstable coronary 
syndrome which prompted cardiac catheterization that was followed by bypass 
surgery on 2020 (total of five bypasses, free left internal mammary artery
(LIMA) to diagonal sequentially to left anterior descending artery (LAD), and 
sequential saphenous vein graft (SVG) to obtuse marginal-1 (OM1), OM2, and 
posterior descending artery (PDA).  Preoperatively, he had an echocardiogram 
that revealed left ventricular ejection fraction 25-30% with 1+ to 2+ mitral 
regurgitation (MR) and moderately severe pulmonary hypertension.  He was 
discharged home and apparently spent at home approximately 2 or 3 weeks but then
presented to [facility] yesterday with tachypnea and severe dyspnea.
 CT angiography of the chest did not reveal any evidence for pulmonary embolism 
but he was noted to have bilateral pleural effusions more on the right side and 
his BNP was high, close to 5000.  His lactic acid was also elevated and he was 
transferred to our facility for further management.  On arrival here, he was 
essentially treated with administration of diuretics and today tells me that he 
is feeling better even though he is still short of breath with fairly minimal 
exertion.  He denies any anginal symptoms but complains about heartburn which he
says, I have all the time. 



PAST MEDICAL HISTORY:  

1.  Coronary artery disease as above.

2.  Chronic obstructive pulmonary disease (COPD).

3.  Pulmonary nodule.

4.  Carotid artery disease.  He is followed by Dr. Berry.  Based on the exam, it
appears that he has probably ickhfaor-vh-hzmqzkr bypasses on both sides.

5.  Chronic aortic insufficiency.

6.  History of CVA.

7.  History of peptic ulcer disease with gastrointestinal (GI) bleeding.

8.  Glaucoma.

9.  Polysubstance drug abuse including crack cocaine, alcohol, cigarettes, and 
currently methamphetamine.

10.  Hyperlipidemia.

11.  Hypertension.

12.  History of pancreatis mass and recurrent pancreatitis status post 
surgeries.

13.  Hypothyroidism.

14.  History of GI bleeding.



FAMILY HISTORY:  Patient tells me that both his parents  of cancer even 
though the admission note states that his mother  of myocardial infarction 
(MI).  He had several sisters, he believes that they all  of cancer but is 
not certain.



SOCIAL HISTORY:  Patient is single, lives alone, does not have any relatives.  
He says that he never really worked and has been on social security disability 
virtually all of his adult life.  He quit smoking and drinking alcohol in  
after pancreatitis that was nearly lethal.  He still uses methamphetamine 
occasionally.



OUTPATIENT MEDICATIONS:  Based on admission note, his outpatient medications 
include:

- amlodipine 10 

- losartan/HCTZ 100/12.5 

- Synthroid 88

- Ventolin as needed 

- tramadol 

- pantoprazole 40 mg daily 



ALLERGIES:  He has reported allergies of AMOXICILIN, CLAVULANIC ACID, 
PREGABALIN, and WARFARIN.



REVIEW OF SYSTEMS:  Somewhat limited but he denies any fever, chills, nausea, 
vomiting, or diarrhea, if anything, he tends to be constipated.  He says that he
still has some soreness around the sternum.  Denies any obvious anginal symptoms
but does report exertional dyspnea, and New York Heart Association Class III-IV,
and also had episodes of paroxysmal nocturnal dyspnea (PND) and orthopnea.  No 
peripheral edema.  Denies trouble with urination.



PHYSICAL EXAMINATION:  Mr. Roberts is a 64-year-old man who appears 
substantially older than his calendar age.  He does not appear to be in any 
distress.  He is alert and oriented times three.  Vital signs:  Blood pressure 
143/70, heart rate has been in 80s and 90s, sinus rhythm.  There were no 
significant arrhythmias detected on telemetry.  He is afebrile, saturation is 
99% on room air.  I do not appreciate any distinct jugular venous pressure (JVP)
elevation.  There are scars below both clavicles and there are palpable, 
probably quecrzef-ho-itgrooj bypasses crossing clavicles on both sides, it 
appears much larger on the left.  There are bilateral carotid bruits.  Lungs are
reasonably clear with the exception of the diminished breath sounds over bases. 
Heart exam reveals regular rhythm, I do not appreciate any distinct gallop, rub,
or murmur.  Abdomen has scars after prior surgeries but overall is soft.  I do 
not appreciate any guarding.  No evidence for ascites.  I do not appreciate any 
obvious hepatosplenomegaly.  Extremities have, if anything, trace edema.  
Peripheral pulses are of full quality but no trophic defects.  Neurologically, 
there seems to be generalized weakness, but he is alert and oriented and for the
most part appropriate.  I do not appreciate any evidence for focal defects.  His
sternotomy seems to be healing appropriately without any discharge, redness, or 
other signs of infection.



EVALUATION AND LABORATORY DATA:  

His ECG reveals sinus rhythm with nonspecific ST-T abnormalities and no 
convincing evidence for prior myocardial infarction. 



Basic metabolic panel as of this morning:  Sodium 136, potassium 3.9, BUN 22, 
creatinine 1.3, GFR 57, glucose 184.  His lactic acid, the initial one, was 2.5 
here, which was better compared to [facility] and came down to 1.7 
as of this morning.  Magnesium 1.4.  Liver function tests otherwise normal.  
Troponin I was negative.  Albumin was 2.8.  TSH 1.3.



CBC reveals hemoglobin 10.5, hematocrit 35, platelet count 206,000, and WBC 
count 5.8.



Chest x-ray is consistent with mild congestive heart failure and sternotomy.



ASSESSMENT AND PLAN:  Mr. Roberts is a 64-year-old man who underwent five-vessel
bypass surgery on 2020 and now presents with heart failure.  At the time 
of my dictation, he already appears much improved.  He does have a lot of 
heartburn, which does not sound angina by his description.  His troponin was 
negative.  



Obviously, one of the dominant problems here is a history of drug abuse.  Also, 
the fact that he does not have any help at home and he says that it is virtually
impossible for him to even go shopping because he gets very short of breath.  He
says it is about half a mile and he uses an electric scooter that he just bought
but with approaching winter that certainly might be challenging.  So, I 
initially I asked for  to get involved because he probably will 
need some form of assistance at home.  As far as purely medical management is 
concerned, I believe that we need to put him on appropriate medications.  His 
current anemia is mildly elevated, but I still think he should be able to 
tolerate low dose ARB or angiotensin-converting enzyme.  I am not going to put 
him on full dose but will start him on losartan 25 mg daily.  Diuretics will be 
continued.  I think that he will be probably sufficiently compensated that we 
can start him on low dose beta blocker tomorrow.  I will add aspirin.  He is not
chronically anticoagulated because of history of gastrointestinal (GI) bleeding,
but at least enteric-coated baby aspirin he certainly should tolerate and he 
needs to be put on lipid lowering medications with extensive vascular disease 
and recent bypass surgery.  I will be happy to see him in followup on outpatient
basis, but I have to repeat that the principal determinate of his prognosis is 
probably not medical but social and he will need some form of support.



NEELA

## 2020-11-18 ENCOUNTER — HOSPITAL ENCOUNTER (INPATIENT)
Dept: HOSPITAL 53 - M MSPAV | Age: 65
LOS: 2 days | Discharge: HOME | DRG: 381 | End: 2020-11-20
Attending: INTERNAL MEDICINE | Admitting: GENERAL PRACTICE
Payer: MEDICARE

## 2020-11-18 VITALS — SYSTOLIC BLOOD PRESSURE: 119 MMHG | DIASTOLIC BLOOD PRESSURE: 59 MMHG

## 2020-11-18 VITALS — WEIGHT: 138.67 LBS | BODY MASS INDEX: 22.29 KG/M2 | HEIGHT: 66 IN

## 2020-11-18 VITALS — SYSTOLIC BLOOD PRESSURE: 139 MMHG | DIASTOLIC BLOOD PRESSURE: 74 MMHG

## 2020-11-18 DIAGNOSIS — Z88.0: ICD-10-CM

## 2020-11-18 DIAGNOSIS — E11.51: ICD-10-CM

## 2020-11-18 DIAGNOSIS — I13.0: ICD-10-CM

## 2020-11-18 DIAGNOSIS — K21.9: ICD-10-CM

## 2020-11-18 DIAGNOSIS — K28.9: ICD-10-CM

## 2020-11-18 DIAGNOSIS — M19.90: ICD-10-CM

## 2020-11-18 DIAGNOSIS — Z88.8: ICD-10-CM

## 2020-11-18 DIAGNOSIS — K86.89: ICD-10-CM

## 2020-11-18 DIAGNOSIS — I25.10: ICD-10-CM

## 2020-11-18 DIAGNOSIS — N18.30: ICD-10-CM

## 2020-11-18 DIAGNOSIS — K92.2: ICD-10-CM

## 2020-11-18 DIAGNOSIS — K31.1: Primary | ICD-10-CM

## 2020-11-18 DIAGNOSIS — K86.1: ICD-10-CM

## 2020-11-18 DIAGNOSIS — E78.5: ICD-10-CM

## 2020-11-18 DIAGNOSIS — Z86.73: ICD-10-CM

## 2020-11-18 DIAGNOSIS — E03.9: ICD-10-CM

## 2020-11-18 DIAGNOSIS — Z95.1: ICD-10-CM

## 2020-11-18 DIAGNOSIS — R91.8: ICD-10-CM

## 2020-11-18 DIAGNOSIS — H40.9: ICD-10-CM

## 2020-11-18 DIAGNOSIS — M54.5: ICD-10-CM

## 2020-11-18 DIAGNOSIS — D50.9: ICD-10-CM

## 2020-11-18 DIAGNOSIS — E11.22: ICD-10-CM

## 2020-11-18 DIAGNOSIS — I27.20: ICD-10-CM

## 2020-11-18 DIAGNOSIS — Z20.828: ICD-10-CM

## 2020-11-18 DIAGNOSIS — I35.1: ICD-10-CM

## 2020-11-18 DIAGNOSIS — J44.9: ICD-10-CM

## 2020-11-18 DIAGNOSIS — K20.90: ICD-10-CM

## 2020-11-18 DIAGNOSIS — Z79.899: ICD-10-CM

## 2020-11-18 DIAGNOSIS — Z87.891: ICD-10-CM

## 2020-11-18 DIAGNOSIS — G25.81: ICD-10-CM

## 2020-11-18 DIAGNOSIS — I50.22: ICD-10-CM

## 2020-11-18 LAB
ALBUMIN SERPL BCG-MCNC: 2.9 GM/DL (ref 3.2–5.2)
ALT SERPL W P-5'-P-CCNC: 20 U/L (ref 12–78)
BASOPHILS # BLD AUTO: 0.1 10^3/UL (ref 0–0.2)
BASOPHILS NFR BLD AUTO: 0.6 % (ref 0–1)
BILIRUB SERPL-MCNC: 0.4 MG/DL (ref 0.2–1)
BUN SERPL-MCNC: 25 MG/DL (ref 7–18)
CALCIUM SERPL-MCNC: 8.1 MG/DL (ref 8.8–10.2)
CHLORIDE SERPL-SCNC: 102 MEQ/L (ref 98–107)
CO2 SERPL-SCNC: 23 MEQ/L (ref 21–32)
CREAT SERPL-MCNC: 1.27 MG/DL (ref 0.7–1.3)
EOSINOPHIL # BLD AUTO: 0.2 10^3/UL (ref 0–0.5)
EOSINOPHIL NFR BLD AUTO: 1.2 % (ref 0–3)
GFR SERPL CREATININE-BSD FRML MDRD: > 60 ML/MIN/{1.73_M2} (ref 49–?)
GLUCOSE SERPL-MCNC: 81 MG/DL (ref 70–100)
HCT VFR BLD AUTO: 31.3 % (ref 42–52)
HGB BLD-MCNC: 8.9 G/DL (ref 13.5–17.5)
INR PPP: 1.07
LYMPHOCYTES # BLD AUTO: 1.3 10^3/UL (ref 1.5–5)
LYMPHOCYTES NFR BLD AUTO: 9.4 % (ref 24–44)
MCH RBC QN AUTO: 22.4 PG (ref 27–33)
MCHC RBC AUTO-ENTMCNC: 28.4 G/DL (ref 32–36.5)
MCV RBC AUTO: 78.6 FL (ref 80–96)
MONOCYTES # BLD AUTO: 0.9 10^3/UL (ref 0–0.8)
MONOCYTES NFR BLD AUTO: 6.2 % (ref 0–5)
NEUTROPHILS # BLD AUTO: 11.3 10^3/UL (ref 1.5–8.5)
NEUTROPHILS NFR BLD AUTO: 81.7 % (ref 36–66)
PLATELET # BLD AUTO: (no result) 10^3/UL (ref 150–450)
POTASSIUM SERPL-SCNC: 5.1 MEQ/L (ref 3.5–5.1)
PROT SERPL-MCNC: 6.6 GM/DL (ref 6.4–8.2)
PROTHROMBIN TIME: 14.1 SECONDS (ref 12.5–14.3)
RBC # BLD AUTO: 3.98 10^6/UL (ref 4.3–6.1)
SODIUM SERPL-SCNC: 134 MEQ/L (ref 136–145)
WBC # BLD AUTO: 13.8 10^3/UL (ref 4–10)

## 2020-11-18 PROCEDURE — 30233N1 TRANSFUSION OF NONAUTOLOGOUS RED BLOOD CELLS INTO PERIPHERAL VEIN, PERCUTANEOUS APPROACH: ICD-10-PCS | Performed by: INTERNAL MEDICINE

## 2020-11-18 RX ADMIN — METOPROLOL SUCCINATE SCH MG: 25 TABLET, EXTENDED RELEASE ORAL at 20:53

## 2020-11-18 RX ADMIN — ROPINIROLE HYDROCHLORIDE SCH MG: 0.25 TABLET, FILM COATED ORAL at 20:51

## 2020-11-18 RX ADMIN — IPRATROPIUM BROMIDE AND ALBUTEROL SCH PUFF: 20; 100 SPRAY, METERED RESPIRATORY (INHALATION) at 19:13

## 2020-11-18 RX ADMIN — ATORVASTATIN CALCIUM SCH MG: 20 TABLET, FILM COATED ORAL at 20:51

## 2020-11-18 NOTE — CR.PDOC
General


Date of Consultation:  Nov 18, 2020


Referring Provider:  SAROJ FONTAINE MD


Attending Physician:  MARVA MARSHALL MD





Consultation


Primary physician/ hospitalist: -Dr. Fontaine


Reason for consult:  -Anemia and abnormal CT scan showing esophageal and gastric

wall thickening, intra-abdominal lymphadenopathy 





HPI: 


64-year-old male with CAD/ CABG, PAD s/p left axillary carotid bypass, COPD, h

istory of [facility], history of anemia, hypertension, hyperlipidemia, hypothyroidism,

osteoarthritis, low back pain, GERD, TIA, history of pancreatic mass s/p 

resection c/b pancreatic insufficiency, DM, memory issues who presented to 

[facility] on 11/16/20 for sudden onset back pain. He also complained of dyspnea 

on exertion. He also complained of dizziness and light headedness and says 

sometimes he feels like he is going to pass out but has not actually passed out.

On evaluation there he was found to have a Hb of 6.1, his stool occult was 

negative but patient reports having dark stools for the past few days, soft and 

paste like stools. He was transfused 2 units of PRBC. He had CT of chest and 

abdomen done CT abdomen showed extensive retroperitoneal adenopathy, gastric 

wall thickening with irregularity and irregularity of distal esophagus. possible

pancreatic head enlargement. 





Pertinent negative GI symptoms:


Patient denies fever, sick contacts, recent travel,, diarrhea, abdominal pain, 

loss of appetite, early satiety or unintentional weight loss. No history of 

hematemesis, hematochezia. 





Review of Systems:


GI: as stated above 


CVS: No chest pain, No palpitations, No leg swelling.


RS: No Shortness of breath, No Wheezing, no cough


CNS: No dizziness, No motor weakness, No sensory problems 


Hematology: No bruising, No gum bleeding, 


Musculoskeletal: No joint pain, ambulating well.


Skin: No rash 


: No hematuria, No burning sensation of the urine 


ENT:  No ear discharge/ pain, No dysphagia.


Eyes: No photophobia. Jaundice





Home medications: reviewed. Antithrombotic agents:  - [ ]


Medical h/o: As above.


Surgical h/o: None on abdomen. 


Social h/o: Alcohol:  - [ ]  , smoking:   [ ] , IVDA/ drugs:   [ ]  . 


Family h/o of GI cancers - None


Prior Endoscopies: 


--- EGD: - [ ]


--- Colonoscopy: - [ ] 





Prior GI evaluations: - [ ] 





Exam: 


Vitals: reviewed 


General: Alert and oriented x 3, not in distress


HEENT: NO pallor, no icterus. Normal oropharynx,  NO cervical lymph nodes.


Chest: symmetric with bilateral clear air entry,


CVS: S1, S2 heard, normal, no murmurs .


Abdomen:  non-distended, no surgical scars, soft, non-tender, no palpable 

masses, normal bowel sounds heard.


Rectal exam: Patient refused / Deferred at this time in view of scheduled 

colonoscopy.


Extremities: no pedal edema, pulses palpable.


CNS: no focal motor or sensory deficits. Moves all extremities


Skin: no rash.





Labs: reviewed.


HCV screening  indicated  ordered / done   OR   Not indicated due to age.


Imaging: reviewed. 


[ ] 





Impression:





- Severe symptomatic anemia with abnormal CT scan -- needs further evaluation --

DDx-- lymphoma vs Gi cancer vs reactive lymphadenopathy.








Recommendations:





- Patient educated about the test results, possible differential diagnoses and 

All questions answered.


- Diet as tolerated for today and NPO after midnight.


- Monitor H/H and transfuse as needed.


- Will schedule for EGD tomorrow and depending on that will need EUS electively.


- The procedure, indications, elevated risks (bleeding, perforation, infection, 

hypotension, respiratory depression, allergy, need for endotracheal intubation, 

surgery, colostomy, cardiac arrest, even death), benefits, limitations (e.g., 

missing a lesion), and all other alternatives (including no intervention) were 

explained to the patient who understood and agreed for the procedure.





Plan of care discussed with patient and primary team. Patient verbalized 

understanding and agreed with the plan.





Vital Signs/I&O





Vital Signs








  Date Time  Temp Pulse Resp B/P (MAP) Pulse Ox O2 Delivery O2 Flow Rate FiO2


 


11/18/20 14:15 98.2 79 18 139/74 (95) 98 Nasal Cannula 2.0 98











Laboratory Data


Labs 24H


Laboratory Tests 2


11/18/20 16:29: 


Immature Granulocyte % (Auto) 0.9, Neutrophils (%) (Auto) 81.7H, Lymphocytes (%)

(Auto) 9.4L, Monocytes (%) (Auto) 6.2H, Eosinophils (%) (Auto) 1.2, Basophils 

(%) (Auto) 0.6, Neutrophils # (Auto) 11.3H, Lymphocytes # (Auto) 1.3L, Monocytes

# (Auto) 0.9H, Eosinophils # (Auto) 0.2, Basophils # (Auto) 0.1, Nucleated Red 

Blood Cells % (auto) 0.0, Prothrombin Time 14.1H, Prothromb Time International 

Ratio 1.07, Anion Gap 9, Glomerular Filtration Rate > 60.0, Calcium Level 8.1L, 

Total Bilirubin 0.4, Aspartate Amino Transf (AST/SGOT) 39H, Alanine 

Aminotransferase (ALT/SGPT) 20, Alkaline Phosphatase 225H, Total Protein 6.6, 

Albumin 2.9L, Albumin/Globulin Ratio 0.8


CBC/BMP


Laboratory Tests


11/18/20 16:29











Allergies


Coded Allergies:  


     amoxicillin (Verified  Allergy, Unknown, rash, 9/30/19)


     clavulanic acid (Verified  Allergy, Unknown, rash, 9/30/19)


     pregabalin (Verified  Adverse Reaction, Unknown, seizures, 9/30/19)


     warfarin (Verified  Adverse Reaction, Unknown, bleeding, 9/30/19)





Home Medications


Scheduled


Amlodipine Besylate (Amlodipine Besylate) 5 Mg Tablet, 5 MG PO DAILY, (Reported)


Atorvastatin Calcium (Atorvastatin Calcium) 40 Mg Tablet, 40 MG PO QHS, (Rep

orted)


Furosemide (Furosemide) 40 Mg Tablet, 40 MG PO DAILY, (Reported)


Ipratropium Bromide (Atrovent Hfa) 12.9 Gm Hfa.aer.ad, 2 PUFF INH Q6H, 

(Reported)


Losartan Potassium (Losartan Potassium) 25 Mg Tablet, 25 MG PO QHS, (Reported)


Metoprolol Succinate (Metoprolol Succinate) 25 Mg Tab.er.24h, 12.5 MG PO QHS, 

(Reported)


Paroxetine (Paroxetine HCl) 10 Mg Tablet, 10 MG PO DAILY, (Reported)


Ropinirole HCl (Ropinirole HCl) 0.5 Mg Tablet, 0.5 MG PO BID, (Reported)


Tiotropium Bromide (Spiriva Respimat) 4 Gm Mist.inhal, 2 PUFFS INH DAILY, 

(Reported)





Scheduled PRN


Albuterol Sulfate (Ventolin Hfa) 108 Mcg/Act Aer, 2 PUFFS INH Q4H PRN for SHORTN

ESS OF BREATH, (Reported)


Ipratropium/Albuterol Sulfate (Combivent Respimat  Mcg) 4 Gm Mist.inhal, 1

PUFF INH Q6H PRN for SHORTNESS OF BREATH, (Reported)


Tramadol HCl (Tramadol HCl) 50 Mg Tablet, 50 MG PO Q6H PRN for PAIN, (Reported)











MARVA MARSHALL MD      Nov 18, 2020 19:13

## 2020-11-18 NOTE — HPEPDOC
General


Date of Admission


20


Date of Service:  2020


Chief Complaint


The patient is a 64-year-old male admitted with a reason for visit of 

Symptomatic Anemia.





History of Present Illness


64-year-old male with CAD/ CABG, PAD s/p left axillary carotid bypass, COPD, 

history of PSUD, history of anemia, hypertension, hyperlipidemia, 

hypothyroidism, osteoarthritis, low back pain, GERD, TIA, history of pancreatic 

mass s/p resection c/b pancreatic insufficiency, DM, memory issues who presented

to [place] on 20 for sudden onset back pain. He also complained of 

dyspnea on exertion. He also complained of dizziness and light headedness and 

says sometimes he feels like he is going to pass out but has not actually passed

out. On evaluation there he was found to have a Hb of 6.1, his stool occult was 

negative. He was transfused 2 units of PRBC. He had CT of chest and abdomen done

CT abdomen showed extensive retroperitoneal adenopathy, gastric wall thickening 

with irregularity and irregularity of distal esophagus. possible pancreatic head

enlargement. CT chest showed COPD, bilateral pleural effusions. Underlying 

opacity in the right lung base and malignancy cannot be excluded. He was 

transfered here for symptomatic anemia, GI malignancy for GI work up with EGD/ 

ERCP.





Now patient complains of persistant back pain radiating to right shoulder It is 

sharp, stabbing in nature about 6/10  and constant. He still has exertional SOB 

but denies dizziness and light headedness.





Home Medications


Scheduled


Amlodipine Besylate (Amlodipine Besylate) 5 Mg Tablet, 5 MG PO DAILY, (Reported)


Atorvastatin Calcium (Atorvastatin Calcium) 40 Mg Tablet, 40 MG PO QHS, 

(Reported)


Furosemide (Furosemide) 40 Mg Tablet, 40 MG PO DAILY, (Reported)


Ipratropium Bromide (Atrovent Hfa) 12.9 Gm Hfa.aer.ad, 2 PUFF INH Q6H, 

(Reported)


Losartan Potassium (Losartan Potassium) 25 Mg Tablet, 25 MG PO QHS, (Reported)


Metoprolol Succinate (Metoprolol Succinate) 25 Mg Tab.er.24h, 12.5 MG PO QHS, 

(Reported)


Paroxetine (Paroxetine HCl) 10 Mg Tablet, 10 MG PO DAILY, (Reported)


Ropinirole HCl (Ropinirole HCl) 0.5 Mg Tablet, 0.5 MG PO BID, (Reported)


Tiotropium Bromide (Spiriva Respimat) 4 Gm Mist.inhal, 2 PUFFS INH DAILY, 

(Reported)





Scheduled PRN


Albuterol Sulfate (Ventolin Hfa) 108 Mcg/Act Aer, 2 PUFFS INH Q4H PRN for 

SHORTNESS OF BREATH, (Reported)


Ipratropium/Albuterol Sulfate (Combivent Respimat  Mcg) 4 Gm Mist.inhal, 1

PUFF INH Q6H PRN for SHORTNESS OF BREATH, (Reported)


Tramadol HCl (Tramadol HCl) 50 Mg Tablet, 50 MG PO Q6H PRN for PAIN, (Reported)





Allergies


Coded Allergies:  


     amoxicillin (Verified  Allergy, Unknown, rash, 19)


     clavulanic acid (Verified  Allergy, Unknown, rash, 19)


     pregabalin (Verified  Adverse Reaction, Unknown, seizures, 19)


     warfarin (Verified  Adverse Reaction, Unknown, bleeding, 19)





Past Medical History


Medical History


CAD s/p CABG in 2020 


History of severe pancreatitis 8 years ago related to alcohol abuse s/p partial 

resection 


Chronic pancreatic insufficiency, 


Pancreatic mass since 2017 had biopsy recently with G[facility] and 

[facility]patology of [facility]


DM 


Anemia


HTN


HLD


Hypothyroidism


COPD


Asthma


CKD stage 3


Osteoarthritis


Low Back Pain


Cataract


Glaucoma


TIA


Substance abuse: former EtOH and former smoker. Former heroine user. Still doing

meth and marijuana.


Noncompliance with medications


PAD with left axillary to carotid bypass. 


LBP RADIATING TO BLE


BILATERAL SHOULDER PAIN


Systolic CHF ejection fraction 25-30% with 1+ to 2+ mitral regurgitation (MR) 


Moderately severe pulmonary hypertension


Chronic aortic insufficiency.


History of CVA.


History of peptic ulcer disease with gastrointestinal (GI) bleeding.


Surgical History


Tonsillectomy


Carotid endarterectomy


Left Subclavian artery bypass


Partial pancreatectomy


CABG 2020





Family History


Father:  of cancer


Mother:  of MI and had a hx of cancer


5 sisters; 1  of cancer





Social History


* Smoker:  former Smoker


Alcohol:  Denies


Drugs:  marijuana (last used 2 weeks ago ), other (methamphetamine last use 

within 3 to 4  weeks ago)





A-FIB/CHADSVASC


A-FIB History


Current/History of A-Fib/PAF?:  No





Review of Systems


Constitutional:  Reports: Weakness, Fatigue; 


   Denies: Chills, Fever, Night Sweats


Eyes:  Denies: Pain, Vision change


ENT:  Denies: Head Aches, Ear Pain, Dysphagia


Skin:  Denies: Rash, Lesions, Breakdown


Pulmonary:  Reports: Dyspnea, Cough


Cardiovascular:  Reports: Lt Headedness; 


   Denies: Chest Pain, Palpitations, Orthopnea, Paroxysmal Noc. Dyspnea


Gastrointestinal:  Denies: Nausea, Vomiting, Abdominal Pain, Diarrhea


Genitourinary:  Denies: Dysuria, Frequency, Incontinence, Retention


Hematologic:  Denies: Bruising, Bleeding Excessively


Musculoskeletal:  Reports: Back Pain


Psych:  Reports: Memory Issues





Physical Examination


General Exam:  Positive: Alert, Cooperative, No Acute Distress


Eye Exam:  Positive: PERRLA, Conjunctiva & lids normal, EOMI; 


   Negative: Sclera icteric


ENT Exam:  Positive: Atraumatic, Mucous membr. moist/pink, Pharynx Normal


Neck Exam:  Positive: Supple, +2 carotid pulse wo bruit (on the left); 


   Negative: JVD, thyromegaly


Chest Exam:  Positive: Clear to auscultation, Diminished (diminished airentry)


Heart Exam:  Positive: Rate Normal, Regular Rhythm, Normal S1, Normal S2; 


   Negative: Murmurs, Rubs





 Assessment/Plan


64-year-old male with CAD/ CABG, PAD s/p left axillary carotid bypass, COPD, 

history of PSUD, history of anemia, hypertension, hyperlipidemia, hypothyro

idism, osteoarthritis, low back pain, GERD, TIA, history of pancreatic mass s/p 

resection c/b pancreatic insufficiency, DM, memory issues who presented to 

[place] on 20 for sudden onset back pain. He also complained of dyspnea 

on exertion. He also complained of dizziness and light headedness and says 

sometimes he feels like he is going to pass out but has not actually passed out.

On evaluation there he was found to have a Hb of 6.1, his stool occult was 

negative. He was transfused 2 units of PRBC. He had CT of chest and abdomen done

CT abdomen showed extensive retroperitoneal adenopathy, gastric wall thickening 

with irregularity and irregularity of distal esophagus. possible pancreatic head

enlargement. CT chest showed COPD, bilateral pleural effusions. Underlying 

opacity in the right lung base and malignancy cannot be excluded. He was 

transferred here for symptomatic anemia, GI malignancy for GI work up with EGD/ 

ERCP.





Symptomatic Anemia Hb 6.1 


no clear source of any bleed


Stool for occult blood Outside hospital was negative


Transfused 2 units of red blood cell at [place]. 


Has h/o severe iron deficiency


will check levels of iron, vit b12, folate


GI consult dr wu. 





Gastric wall thickening and esophageal wall thickening with retroperitoneal 

lymphadenopathy


Planned for EGD tomorrow. 





Pancreatic Mass known for 2 years


New CT with retroperitoneal lymphadenopathy


Reports had biopsy in the last 1 moth. will try to get records. 





CAD s/p CABG in 2020


metoprolol





Back pain


tramadol prn. 





Hypertension


amlodipine and Metoprolol





Hypothyroid


Synthroid





Asthma/COPD


combivent and spiriva





RLS


on requip





PAD


s/p Left carotid bypass surgery


follows with vascular as outpatient in [place].





Partial pancreatectomy for pancreatitis 8 years ago


with chronic pancreatitis.





H/O Substance abuse


Quit EtOH and tobacco use use 8 years ago Still smokes marijuana and does a 

little methamphetamine. Last use 2 weeks to 1 month ago. 


Did use heroine and cocaine use in the past, but has quit.





Plan / VTE


VTE Prophylaxis Ordered?:  Yes











SAROJ ELDER MD                   2020 14:29

## 2020-11-19 VITALS — DIASTOLIC BLOOD PRESSURE: 64 MMHG | SYSTOLIC BLOOD PRESSURE: 144 MMHG

## 2020-11-19 VITALS — SYSTOLIC BLOOD PRESSURE: 150 MMHG | DIASTOLIC BLOOD PRESSURE: 74 MMHG

## 2020-11-19 VITALS — DIASTOLIC BLOOD PRESSURE: 8 MMHG | SYSTOLIC BLOOD PRESSURE: 151 MMHG

## 2020-11-19 VITALS — DIASTOLIC BLOOD PRESSURE: 61 MMHG | SYSTOLIC BLOOD PRESSURE: 134 MMHG

## 2020-11-19 LAB
BASOPHILS # BLD AUTO: 0.1 10^3/UL (ref 0–0.2)
BASOPHILS NFR BLD AUTO: 0.7 % (ref 0–1)
BUN SERPL-MCNC: 26 MG/DL (ref 7–18)
CALCIUM SERPL-MCNC: 7.8 MG/DL (ref 8.8–10.2)
CHLORIDE SERPL-SCNC: 103 MEQ/L (ref 98–107)
CO2 SERPL-SCNC: 27 MEQ/L (ref 21–32)
CREAT SERPL-MCNC: 1.26 MG/DL (ref 0.7–1.3)
EOSINOPHIL # BLD AUTO: 0.2 10^3/UL (ref 0–0.5)
EOSINOPHIL NFR BLD AUTO: 2.6 % (ref 0–3)
FERRITIN SERPL-MCNC: 17 NG/ML (ref 26–388)
FOLATE SERPL-MCNC: 10.3 NG/ML (ref 5.4–?)
GFR SERPL CREATININE-BSD FRML MDRD: > 60 ML/MIN/{1.73_M2} (ref 49–?)
GLUCOSE SERPL-MCNC: 81 MG/DL (ref 70–100)
HCT VFR BLD AUTO: 28.9 % (ref 42–52)
HGB BLD-MCNC: 8.3 G/DL (ref 13.5–17.5)
IRON SATN MFR SERPL: 4.7 % (ref 19.7–50)
IRON SERPL-MCNC: 17 UG/DL (ref 65–175)
LYMPHOCYTES # BLD AUTO: 1.4 10^3/UL (ref 1.5–5)
LYMPHOCYTES NFR BLD AUTO: 17.6 % (ref 24–44)
MCH RBC QN AUTO: 22.4 PG (ref 27–33)
MCHC RBC AUTO-ENTMCNC: 28.7 G/DL (ref 32–36.5)
MCV RBC AUTO: 78.1 FL (ref 80–96)
MONOCYTES # BLD AUTO: 0.7 10^3/UL (ref 0–0.8)
MONOCYTES NFR BLD AUTO: 8.5 % (ref 0–5)
NEUTROPHILS # BLD AUTO: 5.6 10^3/UL (ref 1.5–8.5)
NEUTROPHILS NFR BLD AUTO: 69.5 % (ref 36–66)
PLATELET # BLD AUTO: 576 10^3/UL (ref 150–450)
POTASSIUM SERPL-SCNC: 4.8 MEQ/L (ref 3.5–5.1)
RBC # BLD AUTO: 3.7 10^6/UL (ref 4.3–6.1)
SODIUM SERPL-SCNC: 135 MEQ/L (ref 136–145)
TIBC SERPL-MCNC: 363 UG/DL (ref 250–450)
VIT B12 SERPL-MCNC: 756 PG/ML (ref 247–911)
WBC # BLD AUTO: 8 10^3/UL (ref 4–10)

## 2020-11-19 PROCEDURE — 0DB98ZX EXCISION OF DUODENUM, VIA NATURAL OR ARTIFICIAL OPENING ENDOSCOPIC, DIAGNOSTIC: ICD-10-PCS | Performed by: INTERNAL MEDICINE

## 2020-11-19 RX ADMIN — SUCRALFATE SCH GM: 1 SUSPENSION ORAL at 22:31

## 2020-11-19 RX ADMIN — ROPINIROLE HYDROCHLORIDE SCH MG: 0.25 TABLET, FILM COATED ORAL at 22:31

## 2020-11-19 RX ADMIN — ATORVASTATIN CALCIUM SCH MG: 20 TABLET, FILM COATED ORAL at 22:30

## 2020-11-19 RX ADMIN — IPRATROPIUM BROMIDE AND ALBUTEROL SCH PUFF: 20; 100 SPRAY, METERED RESPIRATORY (INHALATION) at 15:23

## 2020-11-19 RX ADMIN — IPRATROPIUM BROMIDE AND ALBUTEROL SCH PUFF: 20; 100 SPRAY, METERED RESPIRATORY (INHALATION) at 13:52

## 2020-11-19 RX ADMIN — TIOTROPIUM BROMIDE SCH INHALATION: 18 CAPSULE ORAL; RESPIRATORY (INHALATION) at 07:27

## 2020-11-19 RX ADMIN — IPRATROPIUM BROMIDE AND ALBUTEROL SCH PUFF: 20; 100 SPRAY, METERED RESPIRATORY (INHALATION) at 19:43

## 2020-11-19 RX ADMIN — METOPROLOL SUCCINATE SCH MG: 25 TABLET, EXTENDED RELEASE ORAL at 22:37

## 2020-11-19 RX ADMIN — IPRATROPIUM BROMIDE AND ALBUTEROL SCH PUFF: 20; 100 SPRAY, METERED RESPIRATORY (INHALATION) at 07:27

## 2020-11-19 RX ADMIN — ROPINIROLE HYDROCHLORIDE SCH MG: 0.25 TABLET, FILM COATED ORAL at 09:24

## 2020-11-19 NOTE — ROOR
________________________________________________________________________________

Patient Name: Rodney Roberts             Procedure Date: 11/19/2020 1:01 PM

MRN: [id number]                          Account Number: [id number]

YOB: 1955              Age: 64

                                       Gender: Male

Note Status: Finalized                 

________________________________________________________________________________

 

Procedure:            Upper GI endoscopy

Indications:          Acute post hemorrhagic anemia, Abnormal CT of the GI 

                      tract

Providers:            Jaspal Mcgowan MD

Referring MD:         Leonel DAVIS MD, 2. Inpatient 2. Inpatient

Requesting Provider:  

Medicines:            Monitored Anesthesia Care

Complications:        No immediate complications.

________________________________________________________________________________

Procedure:            Pre-Anesthesia Assessment:

                      - Prior to the procedure, a History and Physical was 

                      performed, and patient medications and allergies were 

                      reviewed. The patient is competent. The risks and 

                      benefits of the procedure and the sedation options and 

                      risks were discussed with the patient. All questions 

                      were answered and informed consent was obtained. Patient 

                      identification and proposed procedure were verified by 

                      the physician, the nurse and the anesthesiologist in the 

                      procedure room. Mental Status Examination: alert and 

                      oriented. Airway Examination: normal oropharyngeal 

                      airway and neck mobility. Respiratory Examination: clear 

                      to auscultation. CV Examination: normal. Prophylactic 

                      Antibiotics: The patient does not require prophylactic 

                      antibiotics. Prior Anticoagulants: The patient has taken 

                      no previous anticoagulant or antiplatelet agents. ASA 

                      Grade Assessment: II - A patient with mild systemic 

                      disease. After reviewing the risks and benefits, the 

                      patient was deemed in satisfactory condition to undergo 

                      the procedure. The anesthesia plan was to use monitored 

                      anesthesia care (MAC). Immediately prior to 

                      administration of medications, the patient was 

                      re-assessed for adequacy to receive sedatives. The heart 

                      rate, respiratory rate, oxygen saturations, blood 

                      pressure, adequacy of pulmonary ventilation, and 

                      response to care were monitored throughout the 

                      procedure. The physical status of the patient was 

                      re-assessed after the procedure.

                      The Endoscope was introduced through the mouth, and 

                      advanced to the second part of duodenum. The upper GI 

                      endoscopy was accomplished without difficulty. The 

                      patient tolerated the procedure well.

                                                                                

Findings:

     LA Grade D (one or more mucosal breaks involving at least 75% of 

     esophageal circumference) esophagitis with no bleeding was found in the 

     lower third of the esophagus.

     Evidence of a gastrojejunostomy was found in the gastric body (greater 

     curvature). This was characterized by erythema and ulceration. Multiple 

     biopsies were obtained at the anastomosis and ulcer with cold forceps for 

     histology. Verification of patient identification for the specimen was 

     done by the physician and nurse using the patient's name, birth date and 

     medical record number. Estimated blood loss was minimal.

     A suture was found at the pylorus.

     A benign appearing (likely from prior surgery) moderate stenosis was 

     found at the pylorus. This was traversed.

     The duodenal bulb, second portion of the duodenum and third portion of 

     the duodenum were normal. Duodenum contained clear bile.

     Normal mucosa was found in the jejunum.

                                                                                

Impression:           - LA Grade D reflux esophagitis.

                      - A gastrojejunostomy was found, characterized by 

                      erythema and ulceration.

                      - A suture was found in the stomach.

                      - Gastric stenosis was found at the pylorus.

                      - Normal duodenal bulb, second portion of the duodenum 

                      and third portion of the duodenum.

                      - Normal mucosa was found in the jejunum.

                      - Multiple biopsies were obtained at the anastomosis and 

                      ulcer.

Recommendation:       - Patient has a contact number available for 

                      emergencies. The signs and symptoms of potential delayed 

                      complications were discussed with the patient. Return to 

                      normal activities tomorrow. Written discharge 

                      instructions were provided to the patient.

                      - Clear liquid diet today, then advance as tolerated to 

                      high fiber diet.

                      - Continue present medications.

                      - Use Protonix (pantoprazole) 40 mg PO twice daily - to 

                      be taken in morning (1/2 hour before breakfast) and at 

                      bedtime ( atleast 3 hours after last meal) for 3 months.

                      - Observe patient's clinical course.

                      - Check hemoglobin and hematocrit q 12 hours for one day.

                      - Perform magnetic resonance imaging (MRI) with 

                      gadolinium.

                      - Perform an upper endoscopic ultrasound (UEUS) at 

                      appointment to be scheduled.

                      - Return to GI clinic 1 - 2 weeks. Please call GI clinic 

                      @ [phone] for apppointment date and time.

                      - Return to primary care physician.

                                                                                

Procedure Code(s):    --- Professional ---

                      17335, Esophagogastroduodenoscopy, flexible, transoral; 

                      with biopsy, single or multiple

Diagnosis Code(s):    --- Professional ---

                      K21.0, Gastro-esophageal reflux disease with esophagitis

                      Z98.0, Intestinal bypass and anastomosis status

                      T18.2XXA, Foreign body in stomach, initial encounter

                      K31.1, Adult hypertrophic pyloric stenosis

                      D62, Acute posthemorrhagic anemia

                      R93.3, Abnormal findings on diagnostic imaging of other 

                      parts of digestive tract

 

CPT copyright 2019 [employer]. All rights reserved.

 

The codes documented in this report are preliminary and upon  review may 

be revised to meet current compliance requirements.

 

Jaspal Mcgowan MD

_______________________

Jaspal Mcgowan MD

11/19/2020 2:26:15 PM

Electronically signed by Jaspal Mcgowan MD

Number of Addenda: 0

 

Note Initiated On: 11/19/2020 1:01 PM

Estimated Blood Loss: Estimated blood loss: none.

## 2020-11-19 NOTE — REP
INDICATION:

pleural efussion



COMPARISON:

09/19/2020



TECHNIQUE:

PA and lateral.



FINDINGS:

Mediastinum and cardiac silhouette stable with evidence for prior sternotomy and CABG.

Lung fields demonstrate chronic changes suggesting COPD/emphysematous disease.  No

focal consolidation.  Blunting to the costophrenic angles and diaphragmatic surface on

both frontal and lateral radiographs suggests chronic change although small pleural

reactions cannot be excluded.  No pneumothorax.  Skeletal structures intact.



IMPRESSION:

Presumed chronic stable changes.  No focal consolidation or significant large effusion.





<Electronically signed by Hadley Chao > 11/19/20 [id number]

## 2020-11-19 NOTE — IPNPDOC
Subjective


Date Seen


The patient was seen on 11/19/20.





Subjective


Chief Complaint/HPI


No complaints this morning. Asks when he can eat. No fever ro chills, no overt 

bleeding from anywhere.





Objective


Physical Examination


General Exam:  Positive: Alert, Cooperative, No Acute Distress


Eye Exam:  Positive: PERRLA, Conjunctiva & lids normal, EOMI; 


   Negative: Sclera icteric


ENT Exam:  Positive: Atraumatic, Mucous membr. moist/pink, Pharynx Normal


Neck Exam:  Positive: Supple, +2 carotid pulse wo bruit (on the left); 


   Negative: JVD, thyromegaly


Chest Exam:  Positive: Clear to auscultation, Diminished (diminished airentry)


Heart Exam:  Positive: Rate Normal, Regular Rhythm, Normal S1, Normal S2; 


   Negative: Murmurs, Rubs





Assessment /Plan


Assessment


64-year-old male with CAD/ CABG, PAD s/p left axillary carotid bypass, COPD, 

history of PSUD, history of anemia, hypertension, hyperlipidemia, 

hypothyroidism, osteoarthritis, low back pain, GERD, TIA, history of pancreatic 

mass s/p resection c/b pancreatic insufficiency, DM, memory issues who presented

to [place] on 11/16/20 for sudden onset back pain. He also complained of 

dyspnea on exertion. He also complained of dizziness and light headedness and 

says sometimes he feels like he is going to pass out but has not actually passed

out. On evaluation there he was found to have a Hb of 6.1, his stool occult was 

negative. He was transfused 2 units of PRBC. He had CT of chest and abdomen done

CT abdomen showed extensive retroperitoneal adenopathy, gastric wall thickening 

with irregularity and irregularity of distal esophagus. possible pancreatic head

enlargement. CT chest showed COPD, bilateral pleural effusions. Underlying 

opacity in the right lung base and malignancy cannot be excluded. He was 

transferred here for symptomatic anemia, GI malignancy for GI work up with EGD/ 

ERCP.





Symptomatic Anemia Hb 6.1 


no clear source of any bleed


Stool for occult blood Outside hospital was negative


Transfused 2 units of red blood cell at [place]. 


Has severe iron deficiency, noncompliant with oral iron supplements. Says he 

forgets his meds


will give venofer prior to discharge. 


vit b12, folate, Pending.


GI consult Dr Mcgowan. 





Gastric wall thickening and esophageal wall thickening with retroperitoneal 

lymphadenopathy


possible malignancy


Planned for EGD





Pancreatic Mass known for 2 years


New CT with retroperitoneal lymphadenopathy


Reports had biopsy in the last 1 month. Will try to get records. 





Pulmonary nodules. Retroperitoneal lymphadenopathy/ mesenteric lymphadenopathy


PET scan on 12/11/2019


slightly hypermetabolic RLL nodule SUV 1.0, 1.3 cm


Mild hypermetabolism in the mid and distal esophagus 2.3 SUV


Hypermetabolism with in the retroperitoneal and small bowel mesenteric lymph 

nodes compatible with likely malignancy 1.9 to 2.6 SUV


Mild low level metabolism in the region of pancreatic head fullness may be 

related to inflammation or neoplasm. 2.1 SUV


Bx of RLL nodule on 9/28/20: benign lung parenchyma wih fibroelastosis and a 

minute area of granulomatous inflammation and necrosis at the edge of the cores.

Negative for AFB and fungal organisms. No carcinoma is identified. 





CAD s/p CABG in August 2020


metoprolol





Back pain


tramadol prn. 





Hypertension


amlodipine and Metoprolol





Hypothyroid


Synthroid





Asthma/COPD


Combivent and Spiriva





RLS


on Requip





PAD


s/p bilateral carotid bypass surgery


follows with vascular as outpatient in [place].





Partial pancreatectomy for pancreatitis 8 years ago


with chronic pancreatitis.





H/O Substance abuse


Quit EtOH and tobacco use use 8 years ago Still smokes marijuana and does a 

little methamphetamine. Last use 2 weeks to 1 month ago. 


Did use heroine and cocaine use in the past, but has quit.





Plan/VTE


VTE Prophylaxis Ordered?:  Yes





VS, I&O, 24H, Fishbone


Vital Signs/I&O





Vital Signs








  Date Time  Temp Pulse Resp B/P (MAP) Pulse Ox O2 Delivery O2 Flow Rate FiO2


 


11/19/20 10:15   18     


 


11/19/20 09:26      Nasal Cannula  


 


11/19/20 09:25  78  147/70    


 


11/19/20 06:00 98.7    93  2.0 


 


11/18/20 14:15        98














I&O- Last 24 Hours up to 6 AM 


 


 11/19/20





 06:00


 


Intake Total 780 ml


 


Balance 780 ml











Laboratory Data


24H LABS


Laboratory Tests 2


11/18/20 16:29: 


Immature Granulocyte % (Auto) 0.9, Neutrophils (%) (Auto) 81.7H, Lymphocytes (%)

(Auto) 9.4L, Monocytes (%) (Auto) 6.2H, Eosinophils (%) (Auto) 1.2, Basophils 

(%) (Auto) 0.6, Neutrophils # (Auto) 11.3H, Lymphocytes # (Auto) 1.3L, Monocytes

# (Auto) 0.9H, Eosinophils # (Auto) 0.2, Basophils # (Auto) 0.1, Nucleated Red 

Blood Cells % (auto) 0.0, Prothrombin Time 14.1H, Prothromb Time International 

Ratio 1.07, Anion Gap 9, Glomerular Filtration Rate > 60.0, Calcium Level 8.1L, 

Total Bilirubin 0.4, Aspartate Amino Transf (AST/SGOT) 39H, Alanine 

Aminotransferase (ALT/SGPT) 20, Alkaline Phosphatase 225H, Total Protein 6.6, 

Albumin 2.9L, Albumin/Globulin Ratio 0.8


11/19/20 06:11: Coronavirus (COVID-19)(PCR) NEGATIVE


11/19/20 06:13: 


Immature Granulocyte % (Auto) 1.1, Neutrophils (%) (Auto) 69.5H, Lymphocytes (%)

(Auto) 17.6L, Monocytes (%) (Auto) 8.5H, Eosinophils (%) (Auto) 2.6, Basophils 

(%) (Auto) 0.7, Neutrophils # (Auto) 5.6, Lymphocytes # (Auto) 1.4L, Monocytes #

(Auto) 0.7, Eosinophils # (Auto) 0.2, Basophils # (Auto) 0.1, Nucleated Red 

Blood Cells % (auto) 0.0, Anion Gap 5L, Glomerular Filtration Rate > 60.0, Trae

cium Level 7.8L, Iron Level 17L, Total Iron Binding Capacity 363, Transferrin % 

Saturation 4.7L, Ferritin 17L


CBC/BMP


Laboratory Tests


11/18/20 16:29








11/19/20 06:13

















SAROJ ELDER MD                   Nov 19, 2020 10:54

## 2020-11-20 VITALS — DIASTOLIC BLOOD PRESSURE: 75 MMHG | SYSTOLIC BLOOD PRESSURE: 158 MMHG

## 2020-11-20 VITALS — SYSTOLIC BLOOD PRESSURE: 126 MMHG | DIASTOLIC BLOOD PRESSURE: 64 MMHG

## 2020-11-20 VITALS — DIASTOLIC BLOOD PRESSURE: 92 MMHG | SYSTOLIC BLOOD PRESSURE: 165 MMHG

## 2020-11-20 VITALS — SYSTOLIC BLOOD PRESSURE: 145 MMHG | DIASTOLIC BLOOD PRESSURE: 71 MMHG

## 2020-11-20 VITALS — SYSTOLIC BLOOD PRESSURE: 118 MMHG | DIASTOLIC BLOOD PRESSURE: 58 MMHG

## 2020-11-20 VITALS — DIASTOLIC BLOOD PRESSURE: 63 MMHG | SYSTOLIC BLOOD PRESSURE: 135 MMHG

## 2020-11-20 VITALS — SYSTOLIC BLOOD PRESSURE: 146 MMHG | DIASTOLIC BLOOD PRESSURE: 67 MMHG

## 2020-11-20 VITALS — SYSTOLIC BLOOD PRESSURE: 148 MMHG | DIASTOLIC BLOOD PRESSURE: 71 MMHG

## 2020-11-20 VITALS — OXYGEN SATURATION: 98 %

## 2020-11-20 VITALS — SYSTOLIC BLOOD PRESSURE: 162 MMHG | DIASTOLIC BLOOD PRESSURE: 95 MMHG

## 2020-11-20 VITALS — SYSTOLIC BLOOD PRESSURE: 116 MMHG | DIASTOLIC BLOOD PRESSURE: 55 MMHG

## 2020-11-20 VITALS — DIASTOLIC BLOOD PRESSURE: 57 MMHG | SYSTOLIC BLOOD PRESSURE: 120 MMHG

## 2020-11-20 LAB
BASOPHILS # BLD AUTO: 0.1 10^3/UL (ref 0–0.2)
BASOPHILS NFR BLD AUTO: 0.7 % (ref 0–1)
BUN SERPL-MCNC: 25 MG/DL (ref 7–18)
CALCIUM SERPL-MCNC: 7.2 MG/DL (ref 8.8–10.2)
CHLORIDE SERPL-SCNC: 104 MEQ/L (ref 98–107)
CO2 SERPL-SCNC: 26 MEQ/L (ref 21–32)
CREAT SERPL-MCNC: 1.15 MG/DL (ref 0.7–1.3)
EOSINOPHIL # BLD AUTO: 0.2 10^3/UL (ref 0–0.5)
EOSINOPHIL NFR BLD AUTO: 2.8 % (ref 0–3)
GFR SERPL CREATININE-BSD FRML MDRD: > 60 ML/MIN/{1.73_M2} (ref 49–?)
GLUCOSE SERPL-MCNC: 98 MG/DL (ref 70–100)
HCT VFR BLD AUTO: 27.9 % (ref 42–52)
HGB BLD-MCNC: 8.2 G/DL (ref 13.5–17.5)
LYMPHOCYTES # BLD AUTO: 1 10^3/UL (ref 1.5–5)
LYMPHOCYTES NFR BLD AUTO: 13.2 % (ref 24–44)
MCH RBC QN AUTO: 23.1 PG (ref 27–33)
MCHC RBC AUTO-ENTMCNC: 29.4 G/DL (ref 32–36.5)
MCV RBC AUTO: 78.6 FL (ref 80–96)
MONOCYTES # BLD AUTO: 0.5 10^3/UL (ref 0–0.8)
MONOCYTES NFR BLD AUTO: 7 % (ref 0–5)
NEUTROPHILS # BLD AUTO: 5.7 10^3/UL (ref 1.5–8.5)
NEUTROPHILS NFR BLD AUTO: 75.6 % (ref 36–66)
PLATELET # BLD AUTO: 508 10^3/UL (ref 150–450)
POTASSIUM SERPL-SCNC: 5 MEQ/L (ref 3.5–5.1)
RBC # BLD AUTO: 3.55 10^6/UL (ref 4.3–6.1)
SODIUM SERPL-SCNC: 136 MEQ/L (ref 136–145)
WBC # BLD AUTO: 7.6 10^3/UL (ref 4–10)

## 2020-11-20 RX ADMIN — SUCRALFATE SCH GM: 1 SUSPENSION ORAL at 08:24

## 2020-11-20 RX ADMIN — ACETAMINOPHEN PRN MG: 325 TABLET ORAL at 08:25

## 2020-11-20 RX ADMIN — SUCRALFATE SCH GM: 1 SUSPENSION ORAL at 12:27

## 2020-11-20 RX ADMIN — ACETAMINOPHEN PRN MG: 325 TABLET ORAL at 01:31

## 2020-11-20 RX ADMIN — TIOTROPIUM BROMIDE SCH INHALATION: 18 CAPSULE ORAL; RESPIRATORY (INHALATION) at 06:24

## 2020-11-20 RX ADMIN — IPRATROPIUM BROMIDE AND ALBUTEROL SCH PUFF: 20; 100 SPRAY, METERED RESPIRATORY (INHALATION) at 15:27

## 2020-11-20 RX ADMIN — IPRATROPIUM BROMIDE AND ALBUTEROL SCH PUFF: 20; 100 SPRAY, METERED RESPIRATORY (INHALATION) at 06:24

## 2020-11-20 RX ADMIN — ROPINIROLE HYDROCHLORIDE SCH MG: 0.25 TABLET, FILM COATED ORAL at 08:24

## 2020-11-20 RX ADMIN — IPRATROPIUM BROMIDE AND ALBUTEROL SCH PUFF: 20; 100 SPRAY, METERED RESPIRATORY (INHALATION) at 11:11

## 2020-11-21 NOTE — DS.PDOC
Discharge Summary


General


Date of Admission


Nov 18, 2020 at 13:57


Date of Discharge


11/20/20





Discharge Summary


PROCEDURES PERFORMED DURING STAY: 


EGD: Findings:


     LA Grade D (one or more mucosal breaks involving at least 75% of 


     esophageal circumference) esophagitis with no bleeding was found in the 


     lower third of the esophagus.


     Evidence of a gastrojejunostomy was found in the gastric body (greater 


     curvature). This was characterized by erythema and ulceration. Multiple 


     biopsies were obtained at the anastomosis and ulcer with cold forceps for 


     histology. Verification of patient identification for the specimen was 


     done by the physician and nurse using the patient's name, birth date and 


     medical record number. Estimated blood loss was minimal.


     A suture was found at the pylorus.


     A benign appearing (likely from prior surgery) moderate stenosis was 


     found at the pylorus. This was traversed.


     The duodenal bulb, second portion of the duodenum and third portion of 


     the duodenum were normal. Duodenum contained clear bile.


     Normal mucosa was found in the jejunum.





DISCHARGE DIAGNOSES:


GIB due to Anastomotic ulcer at the gastrojejunostomy.


Acute blood loss anemia


Pyloric stenosis


Esophagitis


Severe iron deficiency anemia


Polysubstance abuse with meth and marijuana till 3 weeks ago 





SECONDARY DIAGNOSIS:


CAD s/p CABG in August 2020 


History of severe pancreatitis 8 years ago related to alcohol abuse s/p partial 

resection and gastrojejunostomy.


Chronic pancreatic insufficiency, 


Pancreatic Head mass since 2017 had EUS biopsy recently with Gastroenterology 

and hepatology of [facility] in 2019


Pulmonary nodules . Had Bx of right Lower lobe pulmonary nodule in sept 2020


DM 


Anemia


HTN


HLD


Hypothyroidism


COPD


Asthma


CKD stage 3


Osteoarthritis


Low Back Pain


Cataract


Glaucoma


TIA


Substance abuse: former EtOH and former smoker. Former heroine user. Still doing

meth and marijuana till 3 weeks ago 


Noncompliance with medications


Carotid artery disease with bilateral axillary to carotid bypass. 


LBP RADIATING TO BLE


BILATERAL SHOULDER PAIN


Systolic CHF ejection fraction 25-30% with 1+ to 2+ mitral regurgitation (MR) 


Moderately severe pulmonary hypertension


Chronic aortic insufficiency.


History of CVA.


History of peptic ulcer disease with gastrointestinal (GI) bleeding.


Esophageal stenosis. 





COMPLICATIONS/CHIEF COMPLAINT: Symptomatic Anemia.





HOSPITAL COURSE: 64-year-old male with CAD/ CABG, PAD s/p left axillary carotid 

bypass, COPD, history of PSUD, history of anemia, hypertension, hyperlipidemia, 

hypothyroidism, osteoarthritis, low back pain, GERD, TIA, history of pancreatic 

mass s/p resection c/b pancreatic insufficiency, DM, memory issues who presented

to [place] on 11/16/20 for sudden onset back pain. He also complained of 

dyspnea on exertion. He also complained of dizziness and light headedness and 

says sometimes he feels like he is going to pass out but has not actually passed

out. On evaluation there he was found to have a Hb of 6.1, his stool occult was 

negative. He was transfused 2 units of PRBC. He had CT of chest and abdomen done

CT abdomen showed extensive retroperitoneal adenopathy, gastric wall thickening 

with irregularity and irregularity of distal esophagus. possible pancreatic head

enlargement. CT chest showed COPD, bilateral pleural effusions. Underlying 

opacity in the right lung base and malignancy cannot be excluded. He was 

transferred here for symptomatic anemia, GI malignancy for GI work up with EGD/ 

ERCP. Reviewed all records from [facility]ology and [facility]ology of [facility]. 





GIB with Acute blood loss anemia


Due to Anastomotic ulcer at the gastrojejunostomy site


Pantoprazole 40 bid. 


Sucralfate. 





Symptomatic Anemia acute on chronic / Iron Deficiency


Hb 6.1 at Outside hospital. 


Received total 3 units of PRBC transfusion 2 in [place] and 1 here. 


Has severe iron deficiency, noncompliant with oral iron supplements. Says he 

forgets his meds


Has Gastrojejunostomy


Received Venofer. 


vit b12, folate normal. 





Gastric wall thickening and esophageal wall thickening with retroperitoneal 

lymphadenopathy


possible malignancy


No diagnosis has been reached yet . Following with Dr Orlando


EGD as above. 





Pancreatic Head Mass known for 2 years


CT with retroperitoneal lymphadenopathy


Had EUS pancreatic head biopsy on 9/26/20 with Dr Orlando at Gastroenterology and

hepatology of [facility]


FNAC was negative for pancreatic malignancy however he felt it may not be a good

sample as due to his altered internal anatomy after his pancreatic surgery 

procedure years back the head is difficult to reach


He recommended CT guided biopsy if EUS biopsy was negative as the next step of 

diagnosis. 


He had a PET scan done on 12/2019





Pulmonary nodules. Retroperitoneal lymphadenopathy/ mesenteric lymphadenopathy


PET scan on 12/11/2019


slightly hypermetabolic RLL nodule SUV 1.0, 1.3 cm


Mild hypermetabolism in the mid and distal esophagus 2.3 SUV


Hypermetabolism with in the retroperitoneal and small bowel mesenteric lymph 

nodes compatible with likely malignancy 1.9 to 2.6 SUV


Mild low level metabolism in the region of pancreatic head fullness may be 

related to inflammation or neoplasm. 2.1 SUV


Bx of RLL nodule on 9/28/20: benign lung parenchyma with fibroelastosis and a 

minute area of granulomatous inflammation and necrosis at the edge of the cores.

Negative for AFB and fungal organisms. No carcinoma is identified. 





CAD s/p CABG in August 2020


metoprolol





Back pain


tramadol prn. 





Hypertension


amlodipine and Metoprolol





Hypothyroid


Synthroid





Asthma/COPD


Combivent and Spiriva





RLS


on Requip





PAD


s/p bilateral carotid bypass surgery


follows with vascular as outpatient in [place].





Partial pancreatectomy for pancreatitis 8 years ago


with chronic pancreatitis.





H/O Substance abuse


Quit EtOH and tobacco use use 8 years ago Still smokes marijuana and does a 

little methamphetamine. Last use 2 weeks to 1 month ago. 


Did use heroine and cocaine use in the past, but has quit.





DISCHARGE MEDICATIONS: Please see below.


 


ALLERGIES: Please see below.





PHYSICAL EXAMINATION ON DISCHARGE:


VITAL SIGNS: Please see below.


General Exam:  Positive: Alert, Cooperative, No Acute Distress


Eye Exam:  Positive: PERRLA, Conjunctiva & lids normal, EOMI; 


   Negative: Sclera icteric


ENT Exam:  Positive: Atraumatic, Mucous membr. moist/pink, Pharynx Normal


Neck Exam:  Positive: Supple, +2 carotid pulse with bruit (on the left); 


   Negative: JVD, thyromegaly


Chest Exam:  Positive: Bilateral mild wheezing with ronchi. 


Heart Exam:  Positive: Rate Normal, Regular Rhythm, Normal S1, Normal S2; 


   Negative: Murmurs, Rubs


Abdomen: Soft, nontender, bowel sounds normal


Extremities: No edema.


Back : tenderness at the right posterior sup illiac spine. 





LABORATORY DATA: Please see below.





ACTIVITY: [As tolerated].





DIET: As tolerated





DISPOSITION: 01 Home, Self-Care.





DISCHARGE INSTRUCTIONS:


MD in 1 week


Dr Orlando in [place] in 2 to 4 weeks. 





ITEMS TO FOLLOWUP ON ON OUTPATIENT:


Follow up Biopsy reports from EGD. 





DISCHARGE CONDITION: [Stable].





TIME SPENT ON DISCHARGE: 35 minutes.





Vital Signs/I&Os





Vital Signs








  Date Time  Temp Pulse Resp B/P (MAP) Pulse Ox O2 Delivery O2 Flow Rate FiO2


 


11/20/20 16:37 96.9 86  158/75 98 Room Air  


 


11/20/20 15:42   18     


 


11/20/20 12:39       0.0 


 


11/18/20 14:15        98














I&O- Last 24 Hours up to 6 AM 


 


 11/21/20





 07:00


 


Intake Total 1635 ml


 


Balance 1635 ml











Laboratory Data


Labs 24H


Laboratory Tests 2


11/20/20 06:39: 


Immature Granulocyte % (Auto) 0.7, Neutrophils (%) (Auto) 75.6H, Lymphocytes (%)

 (Auto) 13.2L, Monocytes (%) (Auto) 7.0H, Eosinophils (%) (Auto) 2.8, Basophils 

(%) (Auto) 0.7, Neutrophils # (Auto) 5.7, Lymphocytes # (Auto) 1.0L, Monocytes #

 (Auto) 0.5, Eosinophils # (Auto) 0.2, Basophils # (Auto) 0.1, Nucleated Red 

Blood Cells % (auto) 0.0, Anion Gap 6L, Glomerular Filtration Rate > 60.0, 

Calcium Level 7.2L


CBC/BMP


Laboratory Tests


11/20/20 06:39











Discharge Medications


Scheduled


Amlodipine Besylate (Amlodipine Besylate) 5 Mg Tablet, 5 MG PO DAILY, (Reported)


Atorvastatin Calcium (Atorvastatin Calcium) 40 Mg Tablet, 40 MG PO QHS, 

(Reported)


Furosemide (Furosemide) 40 Mg Tablet, 40 MG PO DAILY, (Reported)


Ipratropium Bromide (Atrovent Hfa) 12.9 Gm Hfa.aer.ad, 2 PUFF INH Q6H, 

(Reported)


Losartan Potassium (Losartan Potassium) 25 Mg Tablet, 25 MG PO QHS, (Reported)


Metoprolol Succinate (Metoprolol Succinate) 25 Mg Tab.er.24h, 12.5 MG PO QHS, 

(Reported)


Paroxetine (Paroxetine HCl) 10 Mg Tablet, 10 MG PO DAILY, (Reported)


Ropinirole HCl (Ropinirole HCl) 0.5 Mg Tablet, 0.5 MG PO BID, (Reported)


Tiotropium Bromide (Spiriva Respimat) 4 Gm Mist.inhal, 2 PUFFS INH DAILY, 

(Reported)





Scheduled PRN


Albuterol Sulf (Albuterol Sulfate) 2.5 Mg/3 Ml Vial.neb, 2.5 MG INH TIDP PRN for

 SHORTNESS OF BREATH


Albuterol Sulfate (Ventolin Hfa) 108 Mcg/Act Aer, 2 PUFFS INH Q4H PRN for SHOR

TNESS OF BREATH, (Reported)


Diclofenac Sodium (Diclofenac Sodium) 1% 100GM Gel..gram., 1 APLCT TOP TIDP PRN 

for back pain


Hydrocodone/Acetaminophen (Norco 5-325 Tablet) 1 Each Tablet, 1 TAB PO TIDP PRN 

for pain


Ipratropium/Albuterol Sulfate (Combivent Respimat  Mcg) 4 Gm Mist.inhal, 1

 PUFF INH Q6H PRN for SHORTNESS OF BREATH, (Reported)


Tramadol HCl (Tramadol HCl) 50 Mg Tablet, 50 MG PO Q6H PRN for PAIN, (Reported)





Allergies


Coded Allergies:  


     amoxicillin (Verified  Allergy, Unknown, rash, 9/30/19)


     clavulanic acid (Verified  Allergy, Unknown, rash, 9/30/19)


     pregabalin (Verified  Adverse Reaction, Unknown, seizures, 9/30/19)


     warfarin (Verified  Adverse Reaction, Unknown, bleeding, 9/30/19)











SAROJ ELDER MD                   Nov 21, 2020 06:20

## 2021-04-08 ENCOUNTER — HOSPITAL ENCOUNTER (INPATIENT)
Dept: HOSPITAL 53 - M ED | Age: 66
LOS: 3 days | Discharge: HOME | DRG: 194 | End: 2021-04-11
Attending: INTERNAL MEDICINE | Admitting: INTERNAL MEDICINE
Payer: MEDICARE

## 2021-04-08 VITALS — SYSTOLIC BLOOD PRESSURE: 123 MMHG | DIASTOLIC BLOOD PRESSURE: 69 MMHG

## 2021-04-08 VITALS — WEIGHT: 128.31 LBS | BODY MASS INDEX: 20.62 KG/M2 | HEIGHT: 66 IN

## 2021-04-08 VITALS — DIASTOLIC BLOOD PRESSURE: 70 MMHG | SYSTOLIC BLOOD PRESSURE: 132 MMHG

## 2021-04-08 VITALS — DIASTOLIC BLOOD PRESSURE: 70 MMHG | SYSTOLIC BLOOD PRESSURE: 134 MMHG

## 2021-04-08 DIAGNOSIS — K86.89: ICD-10-CM

## 2021-04-08 DIAGNOSIS — J44.0: ICD-10-CM

## 2021-04-08 DIAGNOSIS — R91.8: ICD-10-CM

## 2021-04-08 DIAGNOSIS — J22: ICD-10-CM

## 2021-04-08 DIAGNOSIS — I50.22: ICD-10-CM

## 2021-04-08 DIAGNOSIS — K86.1: ICD-10-CM

## 2021-04-08 DIAGNOSIS — Z95.1: ICD-10-CM

## 2021-04-08 DIAGNOSIS — E11.9: ICD-10-CM

## 2021-04-08 DIAGNOSIS — I11.0: ICD-10-CM

## 2021-04-08 DIAGNOSIS — I34.0: ICD-10-CM

## 2021-04-08 DIAGNOSIS — E78.5: ICD-10-CM

## 2021-04-08 DIAGNOSIS — J44.1: ICD-10-CM

## 2021-04-08 DIAGNOSIS — Z79.899: ICD-10-CM

## 2021-04-08 DIAGNOSIS — D50.9: ICD-10-CM

## 2021-04-08 DIAGNOSIS — Z88.8: ICD-10-CM

## 2021-04-08 DIAGNOSIS — Z87.891: ICD-10-CM

## 2021-04-08 DIAGNOSIS — I27.20: ICD-10-CM

## 2021-04-08 DIAGNOSIS — K28.9: ICD-10-CM

## 2021-04-08 DIAGNOSIS — I25.10: ICD-10-CM

## 2021-04-08 DIAGNOSIS — Z88.1: ICD-10-CM

## 2021-04-08 DIAGNOSIS — J18.9: Primary | ICD-10-CM

## 2021-04-08 LAB
ALBUMIN SERPL BCG-MCNC: 2.8 GM/DL (ref 3.2–5.2)
ALT SERPL W P-5'-P-CCNC: 16 U/L (ref 12–78)
AMPHETAMINES UR QL SCN: POSITIVE
ANISOCYTOSIS BLD QL SMEAR: (no result)
BARBITURATES UR QL SCN: NEGATIVE
BASE EXCESS BLDV CALC-SCNC: -5.9 MMOL/L (ref -2–2)
BENZODIAZ UR QL SCN: NEGATIVE
BILIRUB CONJ SERPL-MCNC: 0.1 MG/DL (ref 0–0.2)
BILIRUB SERPL-MCNC: 0.4 MG/DL (ref 0.2–1)
BUN SERPL-MCNC: 21 MG/DL (ref 7–18)
BZE UR QL SCN: NEGATIVE
CALCIUM SERPL-MCNC: 8.4 MG/DL (ref 8.8–10.2)
CANNABINOIDS UR QL SCN: NEGATIVE
CHLORIDE SERPL-SCNC: 105 MEQ/L (ref 98–107)
CK MB CFR.DF SERPL CALC: 2.72
CK MB SERPL-MCNC: 7.3 NG/ML (ref ?–3.6)
CK SERPL-CCNC: 268 U/L (ref 39–308)
CO2 BLDV CALC-SCNC: 17.8 MEQ/L (ref 24–28)
CO2 SERPL-SCNC: 21 MEQ/L (ref 21–32)
CREAT SERPL-MCNC: 1.14 MG/DL (ref 0.7–1.3)
GFR SERPL CREATININE-BSD FRML MDRD: > 60 ML/MIN/{1.73_M2} (ref 49–?)
GLUCOSE SERPL-MCNC: 155 MG/DL (ref 70–100)
HCO3 BLDV-SCNC: 17 MEQ/L (ref 23–27)
HCO3 STD BLDV-SCNC: 19.7 MEQ/L
HCT VFR BLD AUTO: 43.6 % (ref 42–52)
HGB BLD-MCNC: 12.7 G/DL (ref 13.5–17.5)
HYPOCHROMIA BLD QL SMEAR: (no result)
LYMPHOCYTES NFR BLD MANUAL: 6 % (ref 16–44)
MCH RBC QN AUTO: 23.5 PG (ref 27–33)
MCHC RBC AUTO-ENTMCNC: 29.1 G/DL (ref 32–36.5)
MCV RBC AUTO: 80.7 FL (ref 80–96)
METHADONE UR QL SCN: NEGATIVE
NEUTROPHILS NFR BLD MANUAL: 82 % (ref 28–66)
NT-PRO BNP: 2811 PG/ML (ref ?–125)
OPIATES UR QL SCN: NEGATIVE
PCO2 BLDV: 26.8 MMHG (ref 38–50)
PCP UR QL SCN: NEGATIVE
PH BLDV: 7.42 UNITS (ref 7.33–7.43)
PLATELET # BLD AUTO: 108 10^3/UL (ref 150–450)
PLATELET BLD QL SMEAR: (no result)
PO2 BLDV: 207.2 MMHG (ref 30–50)
POTASSIUM SERPL-SCNC: 4.4 MEQ/L (ref 3.5–5.1)
PROT SERPL-MCNC: 6.1 GM/DL (ref 6.4–8.2)
RBC # BLD AUTO: 5.4 10^6/UL (ref 4.3–6.1)
SAO2 % BLDV: 99.6 % (ref 60–80)
SODIUM SERPL-SCNC: 137 MEQ/L (ref 136–145)
T4 SERPL-MCNC: 7.2 UG/DL (ref 4.5–12)
TROPONIN I SERPL-MCNC: < 0.02 NG/ML (ref ?–0.1)
TSH SERPL DL<=0.005 MIU/L-ACNC: 1.26 UIU/ML (ref 0.36–3.74)
VARIANT LYMPHS NFR BLD MANUAL: 1 % (ref 0–5)
WBC # BLD AUTO: 22.3 10^3/UL (ref 4–10)

## 2021-04-08 PROCEDURE — 02HV33Z INSERTION OF INFUSION DEVICE INTO SUPERIOR VENA CAVA, PERCUTANEOUS APPROACH: ICD-10-PCS

## 2021-04-08 RX ADMIN — ALBUTEROL SULFATE PRN MG: 2.5 SOLUTION RESPIRATORY (INHALATION) at 22:23

## 2021-04-08 RX ADMIN — ROPINIROLE HYDROCHLORIDE SCH MG: 0.25 TABLET, FILM COATED ORAL at 21:52

## 2021-04-08 RX ADMIN — DEXTROSE MONOHYDRATE SCH MLS/HR: 5 INJECTION INTRAVENOUS at 17:35

## 2021-04-08 RX ADMIN — ALBUTEROL SULFATE PRN MG: 2.5 SOLUTION RESPIRATORY (INHALATION) at 18:27

## 2021-04-08 RX ADMIN — ATORVASTATIN CALCIUM SCH MG: 20 TABLET, FILM COATED ORAL at 21:52

## 2021-04-08 RX ADMIN — IPRATROPIUM BROMIDE AND ALBUTEROL SULFATE SCH ML: .5; 3 SOLUTION RESPIRATORY (INHALATION) at 22:48

## 2021-04-08 RX ADMIN — IPRATROPIUM BROMIDE AND ALBUTEROL SCH PUFF: 20; 100 SPRAY, METERED RESPIRATORY (INHALATION) at 08:27

## 2021-04-08 RX ADMIN — SODIUM CHLORIDE SCH ML: 9 INJECTION, SOLUTION INTRAMUSCULAR; INTRAVENOUS; SUBCUTANEOUS at 18:53

## 2021-04-08 RX ADMIN — METOPROLOL SUCCINATE SCH MG: 25 TABLET, EXTENDED RELEASE ORAL at 22:23

## 2021-04-08 RX ADMIN — Medication SCH UNITS: at 18:53

## 2021-04-08 RX ADMIN — IPRATROPIUM BROMIDE AND ALBUTEROL SCH PUFF: 20; 100 SPRAY, METERED RESPIRATORY (INHALATION) at 09:03

## 2021-04-08 RX ADMIN — ROPINIROLE HYDROCHLORIDE SCH MG: 0.25 TABLET, FILM COATED ORAL at 12:55

## 2021-04-08 RX ADMIN — IPRATROPIUM BROMIDE AND ALBUTEROL SCH PUFF: 20; 100 SPRAY, METERED RESPIRATORY (INHALATION) at 08:42

## 2021-04-08 RX ADMIN — IPRATROPIUM BROMIDE AND ALBUTEROL SULFATE SCH ML: .5; 3 SOLUTION RESPIRATORY (INHALATION) at 15:34

## 2021-04-08 NOTE — REP
INDICATION:

DYSPNEA/COUGH.



COMPARISON:

11/19/2020 PA and lateral chest.



TECHNIQUE:

Portable AP chest with the patient sitting.



FINDINGS:

There is a focal patchy infiltrate in the left mid lung field as an interval change.

The remainder of the lung fields are clear.  Cardiac size is normal.

Sternotomy wires and left atrial appendage clip are again noted, unchanged.





IMPRESSION:

Subtle patchy infiltrate in the left mid lung field.





<Electronically signed by Leonel Welch > 04/08/21 0875

## 2021-04-08 NOTE — REP
INDICATION:

venous access.



COMPARISON:

None.



TECHNIQUE:

The procedure was performed under the direct supervision of Dr. Orozco.



The risks and benefits of the procedure were explained to the patient and informed

consent was obtained.



The right brachial vein was localized using ultrasound guidance.  The skin was prepped

and draped in a sterile fashion.  2% lidocaine was used as a local anesthetic.  Using

ultrasound guidance the brachial vein was cannulated and a 0.018 guidewire was

inserted and advanced to the SVC using fluoroscopic guidance.  The needle was removed

and a 5.5 English dilator and peel-away sheath was inserted over the guide wire.  A 5.5

English dual lumen catheter was cut to length of 43 cm.  The dilator was removed and

the catheter was inserted over the guide wire with the tip ending in the SVC.  The

peel-away sheath was removed and the catheter was flushed with heparinized saline as

per Hospital protocol.  The catheter was affixed to the skin and a sterile dressing

was applied.





The patient tolerated the procedure well and there were no immediate complications.



0.1 minutes of fluoro time was utilized for this procedure.



FINDINGS:

None



IMPRESSION:

PICC line insertion right brachial vein with the tip ending in the SVC.



<Electronically signed by Raciel Cole > 04/08/21 1700

<Electronically signed by Leonel Orozco > 04/08/21 1700

## 2021-04-08 NOTE — ECGEPIP
[facility] - ED

                                       

                                       Test Date:    2021

Pat Name:     CHACORTA NOBLES             Department:   

Patient ID:   [id number]                 Room:         -

Gender:       Male                     Technician:   marisol

:          1955               Requested By: NICK BOLTON PA-C

Order Number: [id number]     Reading MD:   Edd Contreras

                                 Measurements

Intervals                              Axis          

Rate:         98                       P:            73

MN:           152                      QRS:          61

QRSD:         96                       T:            115

QT:           410                                    

QTc:          523                                    

                           Interpretive Statements

Normal sinus rhythm

Cannot rule out Inferior infarct , age undetermined

NSTTW ABNORMALITY(S)

SIMILAR TO 20

Electronically Signed on 2021 9:33:34 EDT by Edd Contreras

## 2021-04-08 NOTE — HPEPDOC
General


Date of Admission


2021 at 11:35


Date of Service:  2021


Chief Complaint


The patient is a 65-year-old male admitted with a reason for visit of Shortness 

Of Breath/Pneumonia.


Source:  Patient, RN/MD





History of Present Illness


65 year old male with multiple medical comorbidities as below presented to the 

ED with SOB for 3 days. He had gone to another hospital ED for this last night 

was there all night given lasix and antibiotics and discharged home. After 

reaching home he continued to feel bad to decided to call the ambulance and came

to our ED. Patient has not taken any of the antibiotics prescribed from the 

other hospital. He also complains of dry cough intermittent for the same 

duration. No phelgm production and no blood. His breathing has been so bad that 

he has not been able to sleep for 3 days. He also reported subjective fevers. He

says he does not use any inhalers or nebulizers at home as it does not help. 

Patient also used methamphetamine before coming to our ED. His EKG showed sinus 

rhythm with qtc prolongation to 523. He was somnolent during the interview 

though easily arousable. He reported that as now he is feeling a little better .

He is very tired and has not slept at all last night so he is very sleepy.  CXR 

showed early left mid field infiltrates.  He was admitted for Pneumonia.





Home Medications


Scheduled


Amlodipine Besylate (Amlodipine Besylate) 5 Mg Tablet, 5 MG PO DAILY, (Reported)


Atorvastatin Calcium (Atorvastatin Calcium) 40 Mg Tablet, 40 MG PO QHS, 

(Reported)


Losartan/Hydrochlorothiazide (Losartan-Hctz 100-12.5 mg Tab) 1 Each Tablet, 1 

TAB PO DAILY, (Reported)


Metoprolol Succinate (Metoprolol Succinate) 25 Mg Tab.er.24h, 12.5 MG PO QHS, 

(Reported)


Pantoprazole Sodium (Pantoprazole Sodium) 20 Mg Tablet.dr, 20 MG PO DAILY, (

Reported)


Paroxetine (Paroxetine HCl) 10 Mg Tablet, 10 MG PO DAILY, (Reported)


Ropinirole HCl (Ropinirole HCl) 0.5 Mg Tablet, 0.5 MG PO BID, (Reported)


Tiotropium Bromide (Spiriva Respimat) 4 Gm Mist.inhal, 2 PUFFS INH DAILY, 

(Reported)





Scheduled PRN


Albuterol Sulf (Albuterol Sulfate) 2.5 Mg/3 Ml Vial.neb, 2.5 MG INH TID PRN for 

SHORTNESS OF BREATH, (Reported)


Albuterol Sulfate (Ventolin Hfa) 108 Mcg/Act Aer, 2 PUFFS INH Q4H PRN for SH

ORTNESS OF BREATH, (Reported)


Ipratropium Bromide (Atrovent Hfa) 12.9 Gm Hfa.aer.ad, 2 PUFF INH Q6H PRN for 

SHORTNESS OF BREATH, (Reported)


Ipratropium/Albuterol Sulfate (Combivent Respimat  Mcg) 4 Gm Mist.inhal, 1

PUFF INH Q6H PRN for SHORTNESS OF BREATH, (Reported)





Miscellaneous Medications


[Patient Comment]  , (Reported)


   MED LIST OBTAINED FROM EXT [facility] H[facility], PATIENT STATES HE HAS NOT TAKEN 

MEDICATIONS IN A FEW MONTHS 





Allergies


Coded Allergies:  


     amoxicillin (Verified  Allergy, Unknown, rash, 19)


     clavulanic acid (Verified  Allergy, Unknown, rash, 19)


     pregabalin (Verified  Adverse Reaction, Unknown, seizures, 19)


     warfarin (Verified  Adverse Reaction, Unknown, bleeding, 19)





Past Medical History


Medical History


CAD s/p CABG in 2020 


Systolic CHF ejection fraction 25-30% with  mitral regurgitation (MR) 


Moderately severe pulmonary hypertension


Chronic aortic insufficiency.


History of severe pancreatitis 8 years ago related to alcohol abuse s/p partial 

resection and gastrojejunostomy.


Chronic pancreatic insufficiency, 


Pancreatic Head mass since 2017 had EUS biopsy with Gastroenterology and 

hepatology of [facility] in , supposed to have CT guided biopsy


Pulmonary nodules . Had Bx of right Lower lobe pulmonary nodule in 2020


DM 


HTN


HLD


Hypothyroidism


COPD


Asthma


CKD stage 3


Carotid artery disease with bilateral axillary to carotid bypass. 


History of CVA/TIA


Peptic ulcer disease with gastrointestinal (GI) bleeding.


Esophageal stenosis. 


H/o GIB due to Anastomotic ulcer at the gastrojejunostomy site in 2020


Pyloric stenosis


Esophagitis


Iron deficiency anemia


Substance abuse: former EtOH and former smoker. Former heroine user. Still doing

meth and marijuana.


Osteoarthritis


LBP RADIATING TO BLE


BILATERAL SHOULDER PAIN


Cataract


Glaucoma


Surgical History


Tonsillectomy


Bilateral Carotid bypasses


Left Subclavian artery bypass


Partial pancreatectomy


Recent CABG within the last 1 month





Family History


Father:  of cancer


Mother:  of MI and had a hx of cancer


5 sisters; 1  of cancer, and does not know the health history of his other 4

sisters.





Social History


* Smoker:  former Smoker


Alcohol:  sober (9 years)


Drugs:  marijuana, other (methaphetamine)





A-FIB/CHADSVASC


A-FIB History


Current/History of A-Fib/PAF?:  No





Review of Systems


Constitutional:  Reports: Fever, Fatigue; 


   Denies: Chills, Night Sweats


Eyes:  Denies: Pain, Vision change


ENT:  Denies: Head Aches, Ear Pain, Dysphagia


Skin:  Denies: Rash, Lesions, Breakdown


Pulmonary:  Reports: Dyspnea, Cough


Cardiovascular:  Denies: Chest Pain, Palpitations


Gastrointestinal:  Denies: Nausea, Vomiting, Abdominal Pain, Diarrhea


Genitourinary:  Denies: Dysuria, Frequency, Incontinence, Retention


Hematologic:  Denies: Bruising, Bleeding Excessively


Musculoskeletal:  Reports: Back Pain


Neurological:  Denies: Weakness, Numbness, Change in speech, Confusion





Physical Examination


General Exam:  Positive: Cooperative, No Acute Distress, Other (somnolent but 

easily arousable)


Eye Exam:  Positive: Conjunctiva & lids normal, EOMI


ENT Exam:  Positive: Atraumatic, Mucous membr. moist/pink, Pharynx Normal


Neck Exam:  Positive: Supple; 


   Negative: JVD, thyromegaly


Chest Exam:  Positive: Clear to auscultation, Diminished


Heart Exam:  Positive: Rate Normal, Regular Rhythm, Normal S1, Normal S2; 


   Negative: Murmurs, Rubs


Telemetry:  Positive: No significant arrhythmia


Abdomen Exam:  Positive: Normal bowel sounds, Soft; 


   Negative: Tenderness, Hepatospenomegaly


Extremity Exam:  Negative: Clubbing, Cyanosis, Edema


Skin Exam:  Positive: Nl turgor and temperature; 


   Negative: Breakdown, Lesion


Neuro Exam:  Positive: Normal Speech, Strength at 5/5 X4 ext





Vital Signs





Vital Signs








  Date Time  Temp Pulse Resp B/P (MAP) Pulse Ox O2 Delivery O2 Flow Rate FiO2


 


21 15:11 98.0 66 21  93 Nasal Cannula 2.0 


 


21 14:00    132/70 (90)    











Laboratory Data


Labs 24H


Laboratory Tests 2


21 08:16: 


Urine Opiates Screen NEGATIVE, Urine Methadone Screen NEGATIVE, Urine 

Barbiturates Screen NEGATIVE, Urine Phencyclidine Screen NEGATIVE, Urine 

Amphetamines Screen POSITIVEH, Urine Benzodiazepines Screen NEGATIVE, Urine 

Cocaine Metabolite Screen NEGATIVE, Urine Cannabinoids Screen NEGATIVE


21 09:19: 


Neutrophils (%) (Auto) , Nucleated Red Blood Cells % (auto) 0.0, Neutrophils 82

H, Band Neutrophils 11, Lymphocytes (Manual) 6L, Atypical Lymphocytes 1, 

Hypochromasia 1+, Anisocytosis 1+, Platelet Estimate DECREASED, Blood Gas 

Bicarbonate Standard 19.7, Venous Blood pH 7.419, Venous Blood Partial Pressure 

CO2 26.8L, Venous Blood Partial Pressure O2 207.2H, Venous Blood Total Carbon 

Dioxide 17.8L, Venous Blood HCO3 17.0L, Venous Blood Oxygen Saturation 99.6H, 

Venous Blood Base Excess -5.9L, Anion Gap 11, Glomerular Filtration Rate > 60.0,

Calcium Level 8.4L, Total Bilirubin 0.4, Direct Bilirubin 0.1, Aspartate Amino 

Transf (AST/SGOT) 25, Alanine Aminotransferase (ALT/SGPT) 16, Alkaline 

Phosphatase 154H, Total Creatine Kinase 268, Creatine Kinase MB 7.3H, Creatine 

Kinase MB Relative Index 2.72, Troponin I < 0.02, NT-Pro-B-Type Natriuretic 

Peptide 2811H, Total Protein 6.1L, Albumin 2.8L, Albumin/Globulin Ratio 0.8, 

Thyroid Stimulating Hormone (TSH) 1.260, Thyroxine (T4) 7.2


CBC/BMP


Laboratory Tests


21 09:19








Microbiology





Microbiology


21 Respiratory Virus Panel (PCR) ([facility]) - Final, Complete





 Assessment/Plan


65 year old male with multiple medical comorbidities as below presented to the 

ED with SOB, cough and subjective fevers for 3 days.  CXR showed early left mid 

field infiltrates.  He was admitted for Pneumonia.





Community acquired Pneumonia left


he has Qtc prolongation so will avoid azithromycin and levofloxacin


will give ceftaroline. 





COPD exacerbation


due to pneumonia


will continue with duonebs


Patient does not use any inhalers or nebs at home. 


now better. Will not give any steroids at this time. 


wean oxygen as tolerated





Systolic CHF ejection fraction 25-30% with  mitral regurgitation (MR)/ 

AR/Pulmonary hypertension severe. 


Had mild fluid overload on presentation


now improved with lasix.  





CAD s/p CABG in 2020 


No issues at this time


metoprolol, statin





History of severe pancreatitis 8 years ago related to alcohol abuse s/p partial 

resection and gastrojejunostomy.


Chronic pancreatic insufficiency, 





Pancreatic Head mass since  had EUS biopsy with Gastroenterology and 

hepatology of [facility] in 2019, supposed to have CT guided biopsy


patient does not want any more work up for this





Pulmonary nodules . Had Bx of right Lower lobe pulmonary nodule in 2020





DM


not on any meds at this time


 


HTN


metoprolol continued, continue lasix. 


will hold losartan, amlodipine and HCZ for now. 





HLD


statin





H/o Hypothyroidism


not on any meds





Carotid artery disease with bilateral axillary to carotid bypass. 





Peptic ulcer disease with gastrointestinal (GI) bleeding/ Esophageal stenosis/ 

pyloric stenosis


H/o GIB due to Anastomotic ulcer at the gastrojejunostomy site in 2020


continue pantoprazole





Chronic anemia/ iron def


will monitor hb. 





RLS


continue ropinirole.





Plan / VTE


VTE Prophylaxis Ordered?:  Yes











SAROJ ELDER MD                    2021 17:35

## 2021-04-09 VITALS — SYSTOLIC BLOOD PRESSURE: 130 MMHG | DIASTOLIC BLOOD PRESSURE: 60 MMHG

## 2021-04-09 VITALS — DIASTOLIC BLOOD PRESSURE: 69 MMHG | SYSTOLIC BLOOD PRESSURE: 133 MMHG

## 2021-04-09 VITALS — SYSTOLIC BLOOD PRESSURE: 132 MMHG | DIASTOLIC BLOOD PRESSURE: 72 MMHG

## 2021-04-09 LAB
BASOPHILS # BLD AUTO: 0 10^3/UL (ref 0–0.2)
BASOPHILS NFR BLD AUTO: 0.2 % (ref 0–1)
BUN SERPL-MCNC: 22 MG/DL (ref 7–18)
CALCIUM SERPL-MCNC: 7.6 MG/DL (ref 8.8–10.2)
CHLORIDE SERPL-SCNC: 105 MEQ/L (ref 98–107)
CO2 SERPL-SCNC: 24 MEQ/L (ref 21–32)
CREAT SERPL-MCNC: 1.09 MG/DL (ref 0.7–1.3)
EOSINOPHIL # BLD AUTO: 0.1 10^3/UL (ref 0–0.5)
EOSINOPHIL NFR BLD AUTO: 0.3 % (ref 0–3)
GFR SERPL CREATININE-BSD FRML MDRD: > 60 ML/MIN/{1.73_M2} (ref 49–?)
GLUCOSE SERPL-MCNC: 106 MG/DL (ref 70–100)
HCT VFR BLD AUTO: 34.8 % (ref 42–52)
HGB BLD-MCNC: 10.6 G/DL (ref 13.5–17.5)
LYMPHOCYTES # BLD AUTO: 1.1 10^3/UL (ref 1.5–5)
LYMPHOCYTES NFR BLD AUTO: 7 % (ref 24–44)
MCH RBC QN AUTO: 23.5 PG (ref 27–33)
MCHC RBC AUTO-ENTMCNC: 30.5 G/DL (ref 32–36.5)
MCV RBC AUTO: 77.2 FL (ref 80–96)
MONOCYTES # BLD AUTO: 1.1 10^3/UL (ref 0–0.8)
MONOCYTES NFR BLD AUTO: 7.2 % (ref 2–8)
NEUTROPHILS # BLD AUTO: 13.2 10^3/UL (ref 1.5–8.5)
NEUTROPHILS NFR BLD AUTO: 84.7 % (ref 36–66)
PLATELET # BLD AUTO: 268 10^3/UL (ref 150–450)
POTASSIUM SERPL-SCNC: 3.6 MEQ/L (ref 3.5–5.1)
RBC # BLD AUTO: 4.51 10^6/UL (ref 4.3–6.1)
SODIUM SERPL-SCNC: 137 MEQ/L (ref 136–145)
WBC # BLD AUTO: 15.6 10^3/UL (ref 4–10)

## 2021-04-09 RX ADMIN — ROPINIROLE HYDROCHLORIDE SCH MG: 0.25 TABLET, FILM COATED ORAL at 21:09

## 2021-04-09 RX ADMIN — DEXTROSE MONOHYDRATE SCH MLS/HR: 5 INJECTION INTRAVENOUS at 17:51

## 2021-04-09 RX ADMIN — Medication SCH UNITS: at 17:52

## 2021-04-09 RX ADMIN — ATORVASTATIN CALCIUM SCH MG: 20 TABLET, FILM COATED ORAL at 21:09

## 2021-04-09 RX ADMIN — ROPINIROLE HYDROCHLORIDE SCH MG: 0.25 TABLET, FILM COATED ORAL at 09:50

## 2021-04-09 RX ADMIN — PAROXETINE HYDROCHLORIDE SCH MG: 10 TABLET, FILM COATED ORAL at 09:50

## 2021-04-09 RX ADMIN — FUROSEMIDE SCH MG: 40 TABLET ORAL at 09:50

## 2021-04-09 RX ADMIN — SODIUM CHLORIDE SCH ML: 9 INJECTION, SOLUTION INTRAMUSCULAR; INTRAVENOUS; SUBCUTANEOUS at 05:45

## 2021-04-09 RX ADMIN — ALBUTEROL SULFATE PRN MG: 2.5 SOLUTION RESPIRATORY (INHALATION) at 19:30

## 2021-04-09 RX ADMIN — SODIUM CHLORIDE SCH ML: 9 INJECTION, SOLUTION INTRAMUSCULAR; INTRAVENOUS; SUBCUTANEOUS at 17:52

## 2021-04-09 RX ADMIN — ACETAMINOPHEN PRN MG: 325 TABLET ORAL at 00:57

## 2021-04-09 RX ADMIN — METOPROLOL SUCCINATE SCH MG: 25 TABLET, EXTENDED RELEASE ORAL at 21:09

## 2021-04-09 RX ADMIN — IPRATROPIUM BROMIDE AND ALBUTEROL SULFATE SCH ML: .5; 3 SOLUTION RESPIRATORY (INHALATION) at 15:49

## 2021-04-09 RX ADMIN — DEXTROSE MONOHYDRATE SCH MLS/HR: 5 INJECTION INTRAVENOUS at 05:45

## 2021-04-09 RX ADMIN — IPRATROPIUM BROMIDE AND ALBUTEROL SULFATE SCH ML: .5; 3 SOLUTION RESPIRATORY (INHALATION) at 07:41

## 2021-04-09 RX ADMIN — Medication SCH UNITS: at 05:45

## 2021-04-09 RX ADMIN — PANTOPRAZOLE SODIUM SCH MG: 20 TABLET, DELAYED RELEASE ORAL at 09:50

## 2021-04-09 NOTE — IPNPDOC
Subjective


Date Seen


The patient was seen on 4/9/21.





Subjective


Chief Complaint/HPI


feels better this morning. SOB has improved. Not much cough, No fever or chills.





Objective


Physical Examination


General Exam:  Positive: Cooperative, No Acute Distress, Other (somnolent but 

easily arousable)


Eye Exam:  Positive: Conjunctiva & lids normal, EOMI


ENT Exam:  Positive: Atraumatic, Mucous membr. moist/pink, Pharynx Normal


Neck Exam:  Positive: Supple; 


   Negative: JVD, thyromegaly


Chest Exam:  Positive: Clear to auscultation, Diminished


Heart Exam:  Positive: Rate Normal, Regular Rhythm, Normal S1, Normal S2; 


   Negative: Murmurs, Rubs


Telemetry:  Positive: No significant arrhythmia


Abdomen Exam:  Positive: Normal bowel sounds, Soft; 


   Negative: Tenderness, Hepatospenomegaly


Extremity Exam:  Negative: Clubbing, Cyanosis, Edema


Skin Exam:  Positive: Nl turgor and temperature; 


   Negative: Breakdown, Lesion


Neuro Exam:  Positive: Normal Speech, Strength at 5/5 X4 ext





Assessment /Plan


Assessment


65 year old male with multiple medical comorbidities as below presented to the 

ED with SOB, cough and subjective fevers for 3 days.  CXR showed early left mid 

field infiltrates.  He was admitted for Pneumonia.





Community acquired Pneumonia left


he has Qtc prolongation so will avoid azithromycin and levofloxacin


will give ceftaroline. 





COPD exacerbation


due to pneumonia


will continue with duonebs


Patient does not use any inhalers or nebs at home. 


now better. Will not give any steroids at this time. 


wean oxygen as tolerated





Systolic CHF ejection fraction 25-30% with  mitral regurgitation (MR)/ 

AR/Pulmonary hypertension severe. 


Had mild fluid overload on presentation


now improved with lasix.  





CAD s/p CABG in August 2020 


No issues at this time


metoprolol, statin





History of severe pancreatitis 8 years ago related to alcohol abuse s/p partial 

resection and gastrojejunostomy.


Chronic pancreatic insufficiency, 





Pancreatic Head mass since 2017 had EUS biopsy with Gastroenterology and 

hepatology of [facility] in 2019, supposed to have CT guided biopsy


patient does not want any more work up for this





Pulmonary nodules . Had Bx of right Lower lobe pulmonary nodule in sept 2020





DM


not on any meds at this time


 


HTN


metoprolol continued, continue lasix. 


will hold losartan, amlodipine and HCZ for now. 





HLD


statin





H/o Hypothyroidism


not on any meds





Carotid artery disease with bilateral axillary to carotid bypass. 





Peptic ulcer disease with gastrointestinal (GI) bleeding/ Esophageal stenosis/ 

pyloric stenosis


H/o GIB due to Anastomotic ulcer at the gastrojejunostomy site in nov 2020


continue pantoprazole





Chronic anemia/ iron def


will monitor hb. 





RLS


continue ropinirole.





Plan/VTE


VTE Prophylaxis Ordered?:  Yes





VS, I&O, 24H, Fishbone


Vital Signs/I&O





Vital Signs








  Date Time  Temp Pulse Resp B/P (MAP) Pulse Ox O2 Delivery O2 Flow Rate FiO2


 


4/9/21 06:00 98.4 98 19 133/69 (90) 97 Nasal Cannula 0.5 














I&O- Last 24 Hours up to 6 AM 


 


 4/9/21





 06:00


 


Intake Total 1310 ml


 


Output Total 800 ml


 


Balance 510 ml











Laboratory Data


24H LABS


Laboratory Tests 2


4/9/21 07:58: 


Immature Granulocyte % (Auto) 0.6, Neutrophils (%) (Auto) 84.7H, Lymphocytes (%)

(Auto) 7.0L, Monocytes (%) (Auto) 7.2, Eosinophils (%) (Auto) 0.3, Basophils (%)

(Auto) 0.2, Neutrophils # (Auto) 13.2H, Lymphocytes # (Auto) 1.1L, Monocytes # 

(Auto) 1.1H, Eosinophils # (Auto) 0.1, Basophils # (Auto) 0.0, Nucleated Red 

Blood Cells % (auto) 0.0, Anion Gap 8, Glomerular Filtration Rate > 60.0, 

Calcium Level 7.6L


CBC/BMP


Laboratory Tests


4/9/21 07:58








Microbiology





Microbiology


4/8/21 Respiratory Virus Panel (PCR) ([facility]) - Final, Complete











SAROJ ELDER MD                    Apr 9, 2021 11:16

## 2021-04-10 VITALS — DIASTOLIC BLOOD PRESSURE: 70 MMHG | SYSTOLIC BLOOD PRESSURE: 135 MMHG

## 2021-04-10 VITALS — DIASTOLIC BLOOD PRESSURE: 63 MMHG | SYSTOLIC BLOOD PRESSURE: 132 MMHG

## 2021-04-10 VITALS — SYSTOLIC BLOOD PRESSURE: 135 MMHG | DIASTOLIC BLOOD PRESSURE: 70 MMHG

## 2021-04-10 VITALS — OXYGEN SATURATION: 98 %

## 2021-04-10 VITALS — SYSTOLIC BLOOD PRESSURE: 138 MMHG | DIASTOLIC BLOOD PRESSURE: 72 MMHG

## 2021-04-10 LAB
BASOPHILS # BLD AUTO: 0 10^3/UL (ref 0–0.2)
BASOPHILS NFR BLD AUTO: 0.3 % (ref 0–1)
BUN SERPL-MCNC: 16 MG/DL (ref 7–18)
CALCIUM SERPL-MCNC: 7.2 MG/DL (ref 8.8–10.2)
CHLORIDE SERPL-SCNC: 107 MEQ/L (ref 98–107)
CO2 SERPL-SCNC: 25 MEQ/L (ref 21–32)
CREAT SERPL-MCNC: 1.11 MG/DL (ref 0.7–1.3)
EOSINOPHIL # BLD AUTO: 0.4 10^3/UL (ref 0–0.5)
EOSINOPHIL NFR BLD AUTO: 4.2 % (ref 0–3)
GFR SERPL CREATININE-BSD FRML MDRD: > 60 ML/MIN/{1.73_M2} (ref 49–?)
GLUCOSE SERPL-MCNC: 88 MG/DL (ref 70–100)
HCT VFR BLD AUTO: 34.5 % (ref 42–52)
HGB BLD-MCNC: 10.1 G/DL (ref 13.5–17.5)
LYMPHOCYTES # BLD AUTO: 0.8 10^3/UL (ref 1.5–5)
LYMPHOCYTES NFR BLD AUTO: 8.7 % (ref 24–44)
MCH RBC QN AUTO: 23.4 PG (ref 27–33)
MCHC RBC AUTO-ENTMCNC: 29.3 G/DL (ref 32–36.5)
MCV RBC AUTO: 80 FL (ref 80–96)
MONOCYTES # BLD AUTO: 0.8 10^3/UL (ref 0–0.8)
MONOCYTES NFR BLD AUTO: 8.1 % (ref 2–8)
NEUTROPHILS # BLD AUTO: 7.2 10^3/UL (ref 1.5–8.5)
NEUTROPHILS NFR BLD AUTO: 78.2 % (ref 36–66)
PLATELET # BLD AUTO: 228 10^3/UL (ref 150–450)
POTASSIUM SERPL-SCNC: 3.8 MEQ/L (ref 3.5–5.1)
RBC # BLD AUTO: 4.31 10^6/UL (ref 4.3–6.1)
SODIUM SERPL-SCNC: 137 MEQ/L (ref 136–145)
WBC # BLD AUTO: 9.2 10^3/UL (ref 4–10)

## 2021-04-10 RX ADMIN — ALBUTEROL SULFATE PRN MG: 2.5 SOLUTION RESPIRATORY (INHALATION) at 15:40

## 2021-04-10 RX ADMIN — PANTOPRAZOLE SODIUM SCH MG: 20 TABLET, DELAYED RELEASE ORAL at 10:15

## 2021-04-10 RX ADMIN — IPRATROPIUM BROMIDE AND ALBUTEROL SULFATE SCH ML: .5; 3 SOLUTION RESPIRATORY (INHALATION) at 07:21

## 2021-04-10 RX ADMIN — METOPROLOL SUCCINATE SCH MG: 25 TABLET, EXTENDED RELEASE ORAL at 20:06

## 2021-04-10 RX ADMIN — ROPINIROLE HYDROCHLORIDE SCH MG: 0.25 TABLET, FILM COATED ORAL at 20:06

## 2021-04-10 RX ADMIN — PAROXETINE HYDROCHLORIDE SCH MG: 10 TABLET, FILM COATED ORAL at 10:15

## 2021-04-10 RX ADMIN — SODIUM CHLORIDE SCH ML: 9 INJECTION, SOLUTION INTRAMUSCULAR; INTRAVENOUS; SUBCUTANEOUS at 05:43

## 2021-04-10 RX ADMIN — ROPINIROLE HYDROCHLORIDE SCH MG: 0.25 TABLET, FILM COATED ORAL at 10:15

## 2021-04-10 RX ADMIN — DEXTROSE MONOHYDRATE SCH MLS/HR: 5 INJECTION INTRAVENOUS at 05:42

## 2021-04-10 RX ADMIN — Medication SCH UNITS: at 17:42

## 2021-04-10 RX ADMIN — Medication SCH UNITS: at 05:43

## 2021-04-10 RX ADMIN — IPRATROPIUM BROMIDE AND ALBUTEROL SULFATE SCH ML: .5; 3 SOLUTION RESPIRATORY (INHALATION) at 00:50

## 2021-04-10 RX ADMIN — SODIUM CHLORIDE SCH ML: 9 INJECTION, SOLUTION INTRAMUSCULAR; INTRAVENOUS; SUBCUTANEOUS at 17:42

## 2021-04-10 RX ADMIN — IPRATROPIUM BROMIDE AND ALBUTEROL SULFATE SCH ML: .5; 3 SOLUTION RESPIRATORY (INHALATION) at 20:27

## 2021-04-10 RX ADMIN — FUROSEMIDE SCH MG: 40 TABLET ORAL at 10:15

## 2021-04-10 RX ADMIN — DEXTROSE MONOHYDRATE SCH MLS/HR: 5 INJECTION INTRAVENOUS at 17:42

## 2021-04-10 RX ADMIN — ATORVASTATIN CALCIUM SCH MG: 20 TABLET, FILM COATED ORAL at 20:06

## 2021-04-10 NOTE — IPNPDOC
Subjective


Date Seen


The patient was seen on 4/10/21.





Subjective


Chief Complaint/HPI


Feels better. Denies any SOB. No fever or chils, Had 3 liquids stools yesterday 

but none overnight. No abdominal pain or nausea.





Objective


Physical Examination


General Exam:  Positive: Cooperative, No Acute Distress, Other (somnolent but 

easily arousable)


Eye Exam:  Positive: Conjunctiva & lids normal, EOMI


ENT Exam:  Positive: Atraumatic, Mucous membr. moist/pink, Pharynx Normal


Neck Exam:  Positive: Supple; 


   Negative: JVD, thyromegaly


Chest Exam:  Positive: Diminished, Other (crackles at the left base)


Heart Exam:  Positive: Rate Normal, Regular Rhythm, Normal S1, Normal S2; 


   Negative: Murmurs, Rubs


Telemetry:  Positive: No significant arrhythmia


Abdomen Exam:  Positive: Normal bowel sounds, Soft; 


   Negative: Tenderness, Hepatospenomegaly


Extremity Exam:  Negative: Clubbing, Cyanosis, Edema


Skin Exam:  Positive: Nl turgor and temperature; 


   Negative: Breakdown, Lesion


Neuro Exam:  Positive: Normal Speech, Strength at 5/5 X4 ext





Assessment /Plan


Assessment


65 year old male with multiple medical comorbidities as below presented to the E

D with SOB, cough and subjective fevers for 3 days.  CXR showed early left mid 

field infiltrates.  He was admitted for Pneumonia.





Community acquired Pneumonia left


he has Qtc prolongation so will avoid azithromycin and levofloxacin


will give ceftaroline. 





COPD exacerbation


due to pneumonia


will continue with duonebs


Patient does not use any inhalers or nebs at home. 


now better. Will not give any steroids at this time. 


wean oxygen as tolerated





Systolic CHF ejection fraction 25-30% with  mitral regurgitation (MR)/ 

AR/Pulmonary hypertension severe. 


Had mild fluid overload on presentation


now improved with lasix.  





CAD s/p CABG in August 2020 


No issues at this time


metoprolol, statin





History of severe pancreatitis 8 years ago related to alcohol abuse s/p partial 

resection and gastrojejunostomy.


Chronic pancreatic insufficiency, 





Pancreatic Head mass since 2017 had EUS biopsy with Gastroenterology and 

hepatology of [facility] in 2019, supposed to have CT guided biopsy


patient does not want any more work up for this





Pulmonary nodules . Had Bx of right Lower lobe pulmonary nodule in sept 2020





DM


not on any meds at this time


 


HTN


metoprolol continued, continue lasix. 


will hold losartan, amlodipine and HCZ for now. 





HLD


statin





H/o Hypothyroidism


not on any meds





Carotid artery disease with bilateral axillary to carotid bypass. 





Peptic ulcer disease with gastrointestinal (GI) bleeding/ Esophageal stenosis/ 

pyloric stenosis


H/o GIB due to Anastomotic ulcer at the gastrojejunostomy site in nov 2020


continue pantoprazole





Chronic anemia/ iron def


will monitor hb. 





RLS


continue ropinirole.





Plan/VTE


VTE Prophylaxis Ordered?:  Yes





VS, I&O, 24H, Fishbone


Vital Signs/I&O





Vital Signs








  Date Time  Temp Pulse Resp B/P (MAP) Pulse Ox O2 Delivery O2 Flow Rate FiO2


 


4/10/21 06:00 97.4 67 18 132/63 (86) 96 Nasal Cannula 0.5 














I&O- Last 24 Hours up to 6 AM 


 


 4/10/21





 06:00


 


Intake Total 1290 ml


 


Output Total 200 ml


 


Balance 1090 ml











Laboratory Data


24H LABS


Laboratory Tests 2


4/10/21 05:44: 


Immature Granulocyte % (Auto) 0.5, Neutrophils (%) (Auto) 78.2H, Lymphocytes (%)

(Auto) 8.7L, Monocytes (%) (Auto) 8.1H, Eosinophils (%) (Auto) 4.2H, Basophils 

(%) (Auto) 0.3, Neutrophils # (Auto) 7.2, Lymphocytes # (Auto) 0.8L, Monocytes #

(Auto) 0.8, Eosinophils # (Auto) 0.4, Basophils # (Auto) 0.0, Nucleated Red 

Blood Cells % (auto) 0.0, Anion Gap 5L, Glomerular Filtration Rate > 60.0, 

Calcium Level 7.2L


CBC/BMP


Laboratory Tests


4/10/21 05:44








Microbiology





Microbiology


4/8/21 Respiratory Virus Panel (PCR) ([facility]) - Final, Complete











SAROJ ELDER MD                   Apr 10, 2021 08:29

## 2021-04-11 VITALS — DIASTOLIC BLOOD PRESSURE: 75 MMHG | SYSTOLIC BLOOD PRESSURE: 144 MMHG

## 2021-04-11 VITALS — DIASTOLIC BLOOD PRESSURE: 72 MMHG | SYSTOLIC BLOOD PRESSURE: 139 MMHG

## 2021-04-11 LAB
BASOPHILS # BLD AUTO: 0 10^3/UL (ref 0–0.2)
BASOPHILS NFR BLD AUTO: 0.3 % (ref 0–1)
BUN SERPL-MCNC: 13 MG/DL (ref 7–18)
CALCIUM SERPL-MCNC: 7.4 MG/DL (ref 8.8–10.2)
CHLORIDE SERPL-SCNC: 110 MEQ/L (ref 98–107)
CO2 SERPL-SCNC: 28 MEQ/L (ref 21–32)
CREAT SERPL-MCNC: 1.11 MG/DL (ref 0.7–1.3)
EOSINOPHIL # BLD AUTO: 0.6 10^3/UL (ref 0–0.5)
EOSINOPHIL NFR BLD AUTO: 6.7 % (ref 0–3)
GFR SERPL CREATININE-BSD FRML MDRD: > 60 ML/MIN/{1.73_M2} (ref 49–?)
GLUCOSE SERPL-MCNC: 92 MG/DL (ref 70–100)
HCT VFR BLD AUTO: 35 % (ref 42–52)
HGB BLD-MCNC: 10.2 G/DL (ref 13.5–17.5)
LYMPHOCYTES # BLD AUTO: 1 10^3/UL (ref 1.5–5)
LYMPHOCYTES NFR BLD AUTO: 10.7 % (ref 24–44)
MCH RBC QN AUTO: 23 PG (ref 27–33)
MCHC RBC AUTO-ENTMCNC: 29.1 G/DL (ref 32–36.5)
MCV RBC AUTO: 78.8 FL (ref 80–96)
MONOCYTES # BLD AUTO: 0.8 10^3/UL (ref 0–0.8)
MONOCYTES NFR BLD AUTO: 8.9 % (ref 2–8)
NEUTROPHILS # BLD AUTO: 6.5 10^3/UL (ref 1.5–8.5)
NEUTROPHILS NFR BLD AUTO: 72.8 % (ref 36–66)
PLATELET # BLD AUTO: 218 10^3/UL (ref 150–450)
POTASSIUM SERPL-SCNC: 3.8 MEQ/L (ref 3.5–5.1)
RBC # BLD AUTO: 4.44 10^6/UL (ref 4.3–6.1)
SODIUM SERPL-SCNC: 140 MEQ/L (ref 136–145)
WBC # BLD AUTO: 8.9 10^3/UL (ref 4–10)

## 2021-04-11 RX ADMIN — ROPINIROLE HYDROCHLORIDE SCH MG: 0.25 TABLET, FILM COATED ORAL at 09:28

## 2021-04-11 RX ADMIN — ACETAMINOPHEN PRN MG: 325 TABLET ORAL at 09:29

## 2021-04-11 RX ADMIN — FUROSEMIDE SCH MG: 40 TABLET ORAL at 09:28

## 2021-04-11 RX ADMIN — ALBUTEROL SULFATE PRN MG: 2.5 SOLUTION RESPIRATORY (INHALATION) at 06:04

## 2021-04-11 RX ADMIN — DEXTROSE MONOHYDRATE SCH MLS/HR: 5 INJECTION INTRAVENOUS at 05:23

## 2021-04-11 RX ADMIN — IPRATROPIUM BROMIDE AND ALBUTEROL SULFATE SCH ML: .5; 3 SOLUTION RESPIRATORY (INHALATION) at 08:00

## 2021-04-11 RX ADMIN — SODIUM CHLORIDE SCH ML: 9 INJECTION, SOLUTION INTRAMUSCULAR; INTRAVENOUS; SUBCUTANEOUS at 05:23

## 2021-04-11 RX ADMIN — Medication SCH UNITS: at 05:23

## 2021-04-11 RX ADMIN — PANTOPRAZOLE SODIUM SCH MG: 20 TABLET, DELAYED RELEASE ORAL at 09:28

## 2021-04-11 RX ADMIN — PAROXETINE HYDROCHLORIDE SCH MG: 10 TABLET, FILM COATED ORAL at 09:28

## 2021-05-06 NOTE — DS.PDOC
Discharge Summary


General


Date of Admission


Apr 8, 2021 at 11:35


Date of Discharge


4/11/21





Discharge Summary


PROCEDURES PERFORMED DURING STAY: [None].





DISCHARGE DIAGNOSES:


Community acquired pneumonia


COPD exacerbation





SECONDARY DIAGNOSIS:


CAD s/p CABG in August 2020 


History of severe pancreatitis 8 years ago related to alcohol abuse s/p partial 

resection and gastrojejunostomy.


Chronic pancreatic insufficiency, 


Pancreatic Head mass since 2017 had EUS biopsy recently with Gastroenterology 

and hepatology of [facility] in 2019


Pulmonary nodules . Had Bx of right Lower lobe pulmonary nodule in sept 2020


DM 


Anemia


HTN


HLD


Hypothyroidism


COPD


Asthma


CKD stage 3


Osteoarthritis


Low Back Pain


Cataract


Glaucoma


TIA


Substance abuse: former EtOH and former smoker. Former heroine user. Still doing

 meth and marijuana.


Noncompliance with medications


Carotid artery disease with bilateral axillary to carotid bypass. 


LBP RADIATING TO BLE


BILATERAL SHOULDER PAIN


Systolic CHF ejection fraction 25-30% with mitral regurgitation (MR) 


Moderately severe pulmonary hypertension


Chronic aortic insufficiency.


History of CVA.


History of peptic ulcer disease with gastrointestinal (GI) bleeding.


Esophageal stenosis.


H/o GIB due to Anastomotic ulcer at the gastrojejunostomy.


Pyloric stenosis


Esophagitis


iron deficiency anemia 


RLS





COMPLICATIONS/CHIEF COMPLAINT: Shortness Of Breath/Pneumonia.





HOSPITAL COURSE: 65 year old male with multiple medical comorbidities as below 

presented to the ED with SOB, cough and subjective fevers for 3 days.  CXR 

showed early left mid field infiltrates.  He was admitted for Pneumonia.





Community acquired Pneumonia left


he has Qtc prolongation so will avoid azithromycin and levofloxacin


given ceftaroline in hospital then discharged on cefdinir. 





COPD exacerbation


due to pneumonia


will continue with duonebs


Patient does not use any inhalers or nebs at home. Says that he had stopped most

 of his meds  several months ago. 


now better. Will not give any steroids at this time. 





Systolic CHF ejection fraction 25-30% with  mitral regurgitation (MR)/ 

AR/Pulmonary hypertension severe. 


Had mild fluid overload on presentation


now improved with lasix.  





CAD s/p CABG in August 2020 


No issues at this time


metoprolol, statin





History of severe pancreatitis 8 years ago related to alcohol abuse s/p partial 

resection and gastrojejunostomy.


Chronic pancreatic insufficiency, 





Pancreatic Head mass since 2017 had EUS biopsy with Gastroenterology and 

hepatology of [facility] in 2019, supposed to have CT guided biopsy


patient does not want any more work up for this





Pulmonary nodules . Had Bx of right Lower lobe pulmonary nodule in sept 2020





DM


not on any meds at this time


 


HTN


metoprolol continued, continue lasix. 


will hold losartan, amlodipine and HCZ for now. 





HLD


statin





H/o Hypothyroidism


not on any meds





Carotid artery disease with bilateral axillary to carotid bypass. 





Peptic ulcer disease with gastrointestinal (GI) bleeding/ Esophageal stenosis/ 

pyloric stenosis


H/o GIB due to Anastomotic ulcer at the gastrojejunostomy site in nov 2020


continue pantoprazole





Chronic anemia/ iron def


will monitor hb. 





RLS


continue ropinirole.





DISCHARGE MEDICATIONS: Please see below.


 


ALLERGIES: Please see below.





PHYSICAL EXAMINATION ON DISCHARGE:


VITAL SIGNS: Please see below.


General Exam:  Positive: Cooperative, No Acute Distress, Other (somnolent but 

easily arousable)


Eye Exam:  Positive: Conjunctiva & lids normal, EOMI


ENT Exam:  Positive: Atraumatic, Mucous membr. moist/pink, Pharynx Normal


Neck Exam:  Positive: Supple; 


   Negative: JVD, thyromegaly


Chest Exam:  Positive: Diminished, Other (crackles at the left base)


Heart Exam:  Positive: Rate Normal, Regular Rhythm, Normal S1, Normal S2; 


   Negative: Murmurs, Rubs


Telemetry:  Positive: No significant arrhythmia


Abdomen Exam:  Positive: Normal bowel sounds, Soft; 


   Negative: Tenderness, Hepatosplenomegaly


Extremity Exam:  Negative: Clubbing, Cyanosis, Edema


Skin Exam:  Positive: Nl turgor and temperature; 


   Negative: Breakdown, Lesion


Neuro Exam:  Positive: Normal Speech, Strength at 5/5 X4 ext





LABORATORY DATA: Please see below.





ACTIVITY: [As tolerated].





DIET: As tolerated





DISPOSITION: 01 Home, Self-Care.





DISCHARGE INSTRUCTIONS:


PMD in 1 to 2 weeks





DISCHARGE CONDITION: [Stable].





TIME SPENT ON DISCHARGE:40 minutes.





Vital Signs/I&Os





Vital Signs








  Date Time  Temp Pulse Resp B/P (MAP) Pulse Ox O2 Delivery O2 Flow Rate FiO2


 


4/11/21 14:00 98.1 89 16 144/75 (98) 100 Room Air  


 


4/11/21 06:00       0.5 


 


4/10/21 20:29        24











Microbiology





Microbiology


4/8/21 Respiratory Virus Panel (PCR) ([facility]) - Final, Complete





Discharge Medications


Scheduled


Amlodipine Besylate (Amlodipine Besylate) 5 Mg Tablet, 5 MG PO DAILY, (Reported)


Atorvastatin Calcium (Atorvastatin Calcium) 40 Mg Tablet, 40 MG PO QHS, 

(Reported)


Cefdinir (Cefdinir) 300 Mg Capsule, 300 MG PO BID


Furosemide (Lasix) 40 Mg Tablet, 40 MG PO DAILY


Metoprolol Succinate (Metoprolol Succinate) 25 Mg Tab.er.24h, 12.5 MG PO QHS, 

(Reported)


Pantoprazole Sodium (Pantoprazole Sodium) 20 Mg Tablet.dr, 20 MG PO DAILY, 

(Reported)


Ropinirole HCl (Ropinirole HCl) 0.5 Mg Tablet, 0.5 MG PO BID, (Reported)


Tiotropium Bromide (Spiriva Respimat) 4 Gm Mist.inhal, 2 PUFFS INH DAILY, 

(Reported)





Scheduled PRN


Albuterol Sulf (Albuterol Sulfate) 2.5 Mg/3 Ml Vial.neb, 2.5 MG INH TID PRN for 

SHORTNESS OF BREATH, (Reported)


Albuterol Sulfate (Ventolin Hfa) 108 Mcg/Act Aer, 2 PUFFS INH Q4H PRN for 

SHORTNESS OF BREATH, (Reported)


Ipratropium Bromide (Atrovent Hfa) 12.9 Gm Hfa.aer.ad, 2 PUFF INH Q6H PRN for 

SHORTNESS OF BREATH, (Reported)


Ipratropium/Albuterol Sulfate (Combivent Respimat  Mcg) 4 Gm Mist.inhal, 1

 PUFF INH Q6H PRN for SHORTNESS OF BREATH, (Reported)





Miscellaneous Medications


[Patient Comment]  , (Reported)


   MED LIST OBTAINED FROM EXT [facility] HX, PATIENT STATES HE HAS NOT TAKEN 

MEDICATIONS IN A FEW MONTHS 





Allergies


Coded Allergies:  


     amoxicillin (Verified  Allergy, Unknown, rash, 9/30/19)


     clavulanic acid (Verified  Allergy, Unknown, rash, 9/30/19)


     pregabalin (Verified  Adverse Reaction, Unknown, seizures, 9/30/19)


     warfarin (Verified  Adverse Reaction, Unknown, bleeding, 9/30/19)











SAROJ ELDER MD                   Apr 13, 2021 01:12

## 2021-07-17 ENCOUNTER — HOSPITAL ENCOUNTER (INPATIENT)
Dept: HOSPITAL 53 - M ED | Age: 66
LOS: 8 days | Discharge: HOME | DRG: 189 | End: 2021-07-25
Attending: STUDENT IN AN ORGANIZED HEALTH CARE EDUCATION/TRAINING PROGRAM | Admitting: STUDENT IN AN ORGANIZED HEALTH CARE EDUCATION/TRAINING PROGRAM
Payer: MEDICAID

## 2021-07-17 VITALS — SYSTOLIC BLOOD PRESSURE: 139 MMHG | DIASTOLIC BLOOD PRESSURE: 65 MMHG

## 2021-07-17 VITALS — SYSTOLIC BLOOD PRESSURE: 129 MMHG | DIASTOLIC BLOOD PRESSURE: 62 MMHG

## 2021-07-17 VITALS — WEIGHT: 127.65 LBS | HEIGHT: 66 IN | BODY MASS INDEX: 20.51 KG/M2

## 2021-07-17 VITALS — DIASTOLIC BLOOD PRESSURE: 59 MMHG | SYSTOLIC BLOOD PRESSURE: 132 MMHG

## 2021-07-17 VITALS — DIASTOLIC BLOOD PRESSURE: 59 MMHG | SYSTOLIC BLOOD PRESSURE: 126 MMHG

## 2021-07-17 DIAGNOSIS — Z88.8: ICD-10-CM

## 2021-07-17 DIAGNOSIS — Z98.49: ICD-10-CM

## 2021-07-17 DIAGNOSIS — E03.9: ICD-10-CM

## 2021-07-17 DIAGNOSIS — R19.7: ICD-10-CM

## 2021-07-17 DIAGNOSIS — H40.9: ICD-10-CM

## 2021-07-17 DIAGNOSIS — M54.5: ICD-10-CM

## 2021-07-17 DIAGNOSIS — I11.0: ICD-10-CM

## 2021-07-17 DIAGNOSIS — K55.1: ICD-10-CM

## 2021-07-17 DIAGNOSIS — I25.10: ICD-10-CM

## 2021-07-17 DIAGNOSIS — Z87.891: ICD-10-CM

## 2021-07-17 DIAGNOSIS — Z88.0: ICD-10-CM

## 2021-07-17 DIAGNOSIS — Z79.899: ICD-10-CM

## 2021-07-17 DIAGNOSIS — D50.9: ICD-10-CM

## 2021-07-17 DIAGNOSIS — J44.1: ICD-10-CM

## 2021-07-17 DIAGNOSIS — J96.01: Primary | ICD-10-CM

## 2021-07-17 DIAGNOSIS — I50.22: ICD-10-CM

## 2021-07-17 DIAGNOSIS — Z95.1: ICD-10-CM

## 2021-07-17 DIAGNOSIS — F15.10: ICD-10-CM

## 2021-07-17 LAB
ALBUMIN SERPL BCG-MCNC: 2.7 GM/DL (ref 3.2–5.2)
ALT SERPL W P-5'-P-CCNC: 55 U/L (ref 12–78)
BASOPHILS # BLD AUTO: 0 10^3/UL (ref 0–0.2)
BASOPHILS NFR BLD AUTO: 0.4 % (ref 0–1)
BILIRUB CONJ SERPL-MCNC: 0.2 MG/DL (ref 0–0.2)
BILIRUB SERPL-MCNC: 0.4 MG/DL (ref 0.2–1)
BUN SERPL-MCNC: 18 MG/DL (ref 7–18)
CALCIUM SERPL-MCNC: 8.1 MG/DL (ref 8.8–10.2)
CHLORIDE SERPL-SCNC: 109 MEQ/L (ref 98–107)
CK MB CFR.DF SERPL CALC: 6.92
CK MB SERPL-MCNC: 8.1 NG/ML (ref ?–3.6)
CK SERPL-CCNC: 117 U/L (ref 39–308)
CO2 SERPL-SCNC: 23 MEQ/L (ref 21–32)
CREAT SERPL-MCNC: 1.03 MG/DL (ref 0.7–1.3)
EOSINOPHIL # BLD AUTO: 0.1 10^3/UL (ref 0–0.5)
EOSINOPHIL NFR BLD AUTO: 1.3 % (ref 0–3)
GFR SERPL CREATININE-BSD FRML MDRD: > 60 ML/MIN/{1.73_M2} (ref 49–?)
GLUCOSE SERPL-MCNC: 101 MG/DL (ref 70–100)
HCT VFR BLD AUTO: 33.2 % (ref 42–52)
HGB BLD-MCNC: 9.1 G/DL (ref 13.5–17.5)
LYMPHOCYTES # BLD AUTO: 0.8 10^3/UL (ref 1.5–5)
LYMPHOCYTES NFR BLD AUTO: 7.1 % (ref 24–44)
MCH RBC QN AUTO: 19.7 PG (ref 27–33)
MCHC RBC AUTO-ENTMCNC: 27.4 G/DL (ref 32–36.5)
MCV RBC AUTO: 71.7 FL (ref 80–96)
MONOCYTES # BLD AUTO: 0.7 10^3/UL (ref 0–0.8)
MONOCYTES NFR BLD AUTO: 6.3 % (ref 2–8)
NEUTROPHILS # BLD AUTO: 9.2 10^3/UL (ref 1.5–8.5)
NEUTROPHILS NFR BLD AUTO: 84.5 % (ref 36–66)
NT-PRO BNP: (no result) PG/ML (ref ?–125)
PLATELET # BLD AUTO: 311 10^3/UL (ref 150–450)
POTASSIUM SERPL-SCNC: 4.8 MEQ/L (ref 3.5–5.1)
PROT SERPL-MCNC: 6.3 GM/DL (ref 6.4–8.2)
RBC # BLD AUTO: 4.63 10^6/UL (ref 4.3–6.1)
SODIUM SERPL-SCNC: 139 MEQ/L (ref 136–145)
TROPONIN I SERPL-MCNC: 0.04 NG/ML (ref ?–0.1)
TSH SERPL DL<=0.005 MIU/L-ACNC: 2.91 UIU/ML (ref 0.36–3.74)
WBC # BLD AUTO: 10.9 10^3/UL (ref 4–10)

## 2021-07-17 PROCEDURE — 30233N1 TRANSFUSION OF NONAUTOLOGOUS RED BLOOD CELLS INTO PERIPHERAL VEIN, PERCUTANEOUS APPROACH: ICD-10-PCS | Performed by: INTERNAL MEDICINE

## 2021-07-17 RX ADMIN — METHYLPREDNISOLONE SODIUM SUCCINATE SCH MG: 125 INJECTION, POWDER, FOR SOLUTION INTRAMUSCULAR; INTRAVENOUS at 13:44

## 2021-07-17 RX ADMIN — IPRATROPIUM BROMIDE AND ALBUTEROL SULFATE SCH ML: .5; 3 SOLUTION RESPIRATORY (INHALATION) at 19:51

## 2021-07-17 RX ADMIN — METHYLPREDNISOLONE SODIUM SUCCINATE SCH MG: 125 INJECTION, POWDER, FOR SOLUTION INTRAMUSCULAR; INTRAVENOUS at 10:28

## 2021-07-17 RX ADMIN — IPRATROPIUM BROMIDE AND ALBUTEROL SULFATE SCH ML: .5; 3 SOLUTION RESPIRATORY (INHALATION) at 15:25

## 2021-07-17 RX ADMIN — METHYLPREDNISOLONE SODIUM SUCCINATE SCH MG: 125 INJECTION, POWDER, FOR SOLUTION INTRAMUSCULAR; INTRAVENOUS at 21:08

## 2021-07-17 RX ADMIN — METOPROLOL TARTRATE SCH MG: 25 TABLET, FILM COATED ORAL at 21:08

## 2021-07-17 RX ADMIN — DEXTROSE MONOHYDRATE SCH MLS/HR: 50 INJECTION, SOLUTION INTRAVENOUS at 10:59

## 2021-07-17 RX ADMIN — ALUMINUM HYDROXIDE, MAGNESIUM HYDROXIDE, AND SIMETHICONE PRN ML: 200; 200; 20 SUSPENSION ORAL at 19:44

## 2021-07-17 RX ADMIN — ROPINIROLE HYDROCHLORIDE SCH MG: 0.25 TABLET, FILM COATED ORAL at 21:08

## 2021-07-17 RX ADMIN — METOPROLOL TARTRATE SCH MG: 25 TABLET, FILM COATED ORAL at 12:39

## 2021-07-17 RX ADMIN — IPRATROPIUM BROMIDE AND ALBUTEROL SCH PUFF: 20; 100 SPRAY, METERED RESPIRATORY (INHALATION) at 05:57

## 2021-07-17 RX ADMIN — IPRATROPIUM BROMIDE AND ALBUTEROL SCH PUFF: 20; 100 SPRAY, METERED RESPIRATORY (INHALATION) at 05:56

## 2021-07-17 RX ADMIN — CEFTRIAXONE SCH MLS/HR: 1 INJECTION, POWDER, FOR SOLUTION INTRAMUSCULAR; INTRAVENOUS at 12:41

## 2021-07-17 RX ADMIN — ROPINIROLE HYDROCHLORIDE SCH MG: 0.25 TABLET, FILM COATED ORAL at 13:44

## 2021-07-17 RX ADMIN — LOSARTAN POTASSIUM SCH MG: 50 TABLET, FILM COATED ORAL at 12:40

## 2021-07-17 RX ADMIN — ATORVASTATIN CALCIUM SCH MG: 20 TABLET, FILM COATED ORAL at 21:08

## 2021-07-17 RX ADMIN — IPRATROPIUM BROMIDE AND ALBUTEROL SULFATE SCH ML: .5; 3 SOLUTION RESPIRATORY (INHALATION) at 12:00

## 2021-07-17 RX ADMIN — ASPIRIN SCH MG: 81 TABLET ORAL at 12:39

## 2021-07-17 RX ADMIN — IPRATROPIUM BROMIDE AND ALBUTEROL SULFATE SCH ML: .5; 3 SOLUTION RESPIRATORY (INHALATION) at 08:20

## 2021-07-17 NOTE — REPVR
PROCEDURE INFORMATION: 

Exam: XR Chest 

Exam date and time: 7/17/2021 5:39 AM 

Age: 65 years old 

Clinical indication: Other: Dyspnea/cough 



TECHNIQUE: 

Imaging protocol: XR of the chest. 

Views: 1 view. 



COMPARISON: 

[facility] PORTABLE CHEST X-RAY 4/8/2021 7:49 AM 



FINDINGS: 

Lungs: Emphysematous changes. Minimal patchy consolidation in the right lung 

base. 

Pleural spaces: Unremarkable. No pleural effusion. No pneumothorax. 

Heart/Mediastinum: Left atrial appendage clip. Paratracheal surgical clips. 

Bones/joints: Median sternotomy wires. Degenerative change of the spine. 

Bilateral glenohumeral joint DJD. Chronic rib fractures. 



IMPRESSION: 

Minimal patchy consolidation in the right lung base. 



Electronically signed by: Ramona Qureshi On 07/17/2021  09:33:46 AM

## 2021-07-17 NOTE — HPEPDOC
General


Date of Admission


2021 at 08:42


Date of Service:  2021


Chief Complaint


The patient is a 65-year-old male admitted with a reason for visit of Acute 

Hypoxemic Respiratory Failure, Copd.


Source:  Patient, RN/MD





History of Present Illness


Mr. Roberts is a 65-year-old male with COPD, hypothyroidism, and polysubstance 

abuse who presents with worsening dyspnea.  When I saw patient in the ED, he was

in respiratory distress and history taking was limited.  He was sitting up and 

tripoding as well as gasping for air.  He kept asking for his inhalers.  He was 

able to tell me that he has been feeling short of breath for the past year.  It 

has been progressively worsening, but today was the worst.  He was not able to 

breathe.  He was brought in on a nonrebreather.  ED provider said his lungs were

very tight, and after steroids and breathing treatments, he was wheezy.  He was 

weaned down from nonrebreather to 4 L of oxygen.  VBG was obtained and patient 

PCO2 was 45.5.  Otherwise, patient has told me that he has not been taking his 

medications.  Denies any fever or chills.  Complains of chest tightness and 

abdominal pain related to shortness of breath.  Patient will be admitted for 

COPD exacerbation.





Home Medications


Scheduled


Atorvastatin Calcium (Atorvastatin Calcium) 40 Mg Tablet, 40 MG PO QHS, 

(Reported)


Furosemide (Furosemide) 40 Mg Tablet, 40 MG PO DAILY, (Reported)


Losartan/Hydrochlorothiazide (Losartan-Hctz 100-12.5 mg Tab) 1 Each Tablet, 1 

TAB PO DAILY, (Reported)


Ropinirole HCl (Ropinirole HCl) 0.5 Mg Tablet, 0.5 MG PO BID, (Reported)





Scheduled PRN


Albuterol Sulf (Albuterol Sulfate) 2.5 Mg/3 Ml Vial.neb, 2.5 MG INH TID PRN for 

SHORTNESS OF BREATH, (Reported)


Albuterol Sulfate (Ventolin Hfa) 108 Mcg/Act Aer, 2 PUFFS INH Q4H PRN for 

SHORTNESS OF BREATH, (Reported)


Ipratropium Bromide (Atrovent Hfa) 12.9 Gm Hfa.aer.ad, 2 PUFF INH Q6H PRN for 

SHORTNESS OF BREATH, (Reported)





Miscellaneous Medications


[Patient Comment]  , (Reported)


   MED LIST OBTAINED FROM EXT MED HX 





Allergies


Coded Allergies:  


     amoxicillin (Verified  Allergy, Unknown, rash, 19)


     clavulanic acid (Verified  Allergy, Unknown, rash, 19)


     pregabalin (Verified  Adverse Reaction, Unknown, seizures, 19)


     warfarin (Verified  Adverse Reaction, Unknown, bleeding, 19)





Past Medical History


Medical History


1.  COPD


2.  Hypertension


3.  Pancreatic insufficiency


4.  Low back pain


5.  Bilateral shoulder pain


6.  Hypothyroidism


7.  Polysubstance abuse


8.  Glaucoma


9.  Cataracts


10.  CVA status post carotid bypass


11.  Questionable diabetes mellitus


12.  Spermatocele


13.  Pancreatic mass status post resection


Surgical History


1.  Cataract surgery


2.  Carotid endarterectomy


3.  Left subclavian artery bypass


4.  Tonsillectomy


5.  Partial pancreatectomy


6.  Heart bypass 2020





Family History


Father:  from cancer


Mother:  of MI.  History of cancer





Social History


* Smoker:  former Smoker


Alcohol:  sober


Drugs:  other (Methamphetamines)





A-FIB/CHADSVASC


A-FIB History


Current/History of A-Fib/PAF?:  No





Review of Systems


Constitutional:  Denies: Chills, Fever


Eyes:  Denies: Vision change


ENT:  Denies: Sore Throat


Skin:  Denies: Rash


Pulmonary:  Reports: Dyspnea, Cough


Cardiovascular:  Reports: Other Symptoms (Chest tightness from dyspnea)


Gastrointestinal:  Reports: Nausea, Abdominal Pain (Mild abdominal pain)


Genitourinary:  Denies: Dysuria


Hematologic:  Denies: Bruising


Neurological:  Denies: Numbness


Psych:  Reports: Anxiety





Physical Examination


General Exam:  Positive: Alert, Cooperative, Moderate Distress


Eye Exam:  Negative: Sclera icteric


ENT Exam:  Positive: Atraumatic


Neck Exam:  Positive: Supple


Chest Exam:  Positive: Wheezing


Heart Exam:  Positive: Tachycardic, Regular Rhythm


Abdomen Exam:  Positive: Normal bowel sounds, Soft


Extremity Exam:  Negative: Edema (Mild pitting)


Neuro Exam:  Positive: Normal Speech


Psych Exam:  Positive: Anxiety





Vital Signs





Vital Signs








  Date Time  Temp Pulse Resp B/P (MAP) Pulse Ox O2 Delivery O2 Flow Rate FiO2


 


21 09:00  90  165/88 (113) 92   


 


21 08:15      Nasal Cannula 4.0 


 


21 06:13   24     


 


21 05:29 97.6       











Laboratory Data


Labs 24H


Laboratory Tests 2


21 05:38: 


POC pH (Misc Panel) 7.319L, POC Base Excess (Misc Panel) -3.0L, POC Saturated 

Percent O2 (Misc) 100H, POC pO2 (Misc Panel) 331.0H, POC pCO2 (Misc Panel) 

45.5H, POC HCO3 (Misc Panel) 23.4, POC Total CO2 (Misc Panel) 25.0


21 06:42: 


Immature Granulocyte % (Auto) 0.4, Neutrophils (%) (Auto) 84.5H, Lymphocytes (%)

(Auto) 7.1L, Monocytes (%) (Auto) 6.3, Eosinophils (%) (Auto) 1.3, Basophils (%)

(Auto) 0.4, Neutrophils # (Auto) 9.2H, Lymphocytes # (Auto) 0.8L, Monocytes # 

(Auto) 0.7, Eosinophils # (Auto) 0.1, Basophils # (Auto) 0.0, Nucleated Red 

Blood Cells % (auto) 0.0, Anion Gap 7L, Glomerular Filtration Rate > 60.0, 

Lactic Acid Level 2.4*H, Calcium Level 8.1L, Total Bilirubin 0.4, Direct 

Bilirubin 0.2, Aspartate Amino Transf (AST/SGOT) 43H, Alanine Aminotransferase 

(ALT/SGPT) 55, Alkaline Phosphatase 216H, Total Creatine Kinase 117, Creatine 

Kinase MB 8.1H, Creatine Kinase MB Relative Index 6.92H, Troponin I 0.04, 

NT-Pro-B-Type Natriuretic Peptide 85949M, Total Protein 6.3L, Albumin 2.7L, 

Albumin/Globulin Ratio 0.8, Thyroid Stimulating Hormone (TSH) 2.910


21 09:22: 


21 11:04: 


CBC/BMP


Laboratory Tests


21 06:42








Microbiology





Microbiology


21 Blood Culture, Received


          Pending


21 Blood Culture, Received


          Pending


21 Respiratory Virus Panel (PCR) ([facility]) - Final, Complete





 Assessment/Plan


Mr. Roberts is a 65-year-old male with COPD, hypothyroidism, and polysubstance 

abuse who presents with worsening dyspnea.  Patient is in COPD exacerbation, 

possibly secondary to medical noncompliance.  We will give Solu-Medrol every 8 

hours, IV ceftriaxone and IV azithromycin, and DuoNeb's scheduled and as needed.





Plan / VTE


VTE Prophylaxis Ordered?:  Yes





Plan


Plan


1.  Acute hypoxic respiratory failure


On admission patient required nonrebreather, now weaned down to 4 L of oxygen


Continue with IV steroids and breathing treatment





2.  COPD exacerbation


Possibly secondary to noncompliance


Continue with IV steroids, empiric IV antibiotics, and breathing treatments


Ceftriaxone and azithromycin day 1


We will order a procalcitonin tomorrow





3.  Heart failure with reduced ejection fraction


Echocardiogram 2020 demonstrated EF of 20%


BNP elevated at 13,000


We will increase Lasix from 40 mg p.o. to 40 mg IV


Pending clinical improvement


Patient not on beta-blocker, will start metoprolol tartrate





4.  CAD status post CABG


Continue losartan and atorvastatin


Patient not on aspirin or beta-blocker


We will start aspirin and metoprolol tartrate





5.  Hypertension


Continue losartan and HCTZ


Lopressor recently started





6.  DVT prophylaxis


Heparin Subq





Disposition: pending clinical improvement and improvement in oxygen levels











MARTY GOMEZ DO               2021 11:59

## 2021-07-17 NOTE — ECGEPIP
[facility] - ED

                                       

                                       Test Date:    2021

Pat Name:     CHACORTA NOBLES             Department:   

Patient ID:   [id number]                 Room:         -

Gender:       Male                     Technician:   SENG

:          [id number]               Requested By: RISHI HELMS

Order Number: [id number]     Reading MD:   Ashely Varner

                                 Measurements

Intervals                              Axis          

Rate:         93                       P:            72

WV:           150                      QRS:          24

QRSD:         96                       T:            108

QT:           396                                    

QTc:          492                                    

                           Interpretive Statements

Normal sinus rhythm

Minimal voltage criteria for LVH, may be normal variant ( [place] product )

Cannot rule out Inferior infarct , age undetermined

T wave abnormality, consider ischemia

similar 21

Electronically Signed on 2021 10:59:51 EDT by Ashely Varner

## 2021-07-18 VITALS — SYSTOLIC BLOOD PRESSURE: 149 MMHG | DIASTOLIC BLOOD PRESSURE: 71 MMHG

## 2021-07-18 VITALS — SYSTOLIC BLOOD PRESSURE: 138 MMHG | DIASTOLIC BLOOD PRESSURE: 68 MMHG

## 2021-07-18 VITALS — DIASTOLIC BLOOD PRESSURE: 66 MMHG | SYSTOLIC BLOOD PRESSURE: 130 MMHG

## 2021-07-18 VITALS — DIASTOLIC BLOOD PRESSURE: 62 MMHG | SYSTOLIC BLOOD PRESSURE: 130 MMHG

## 2021-07-18 VITALS — DIASTOLIC BLOOD PRESSURE: 49 MMHG | SYSTOLIC BLOOD PRESSURE: 103 MMHG

## 2021-07-18 LAB
ALBUMIN SERPL BCG-MCNC: 2.4 GM/DL (ref 3.2–5.2)
ALT SERPL W P-5'-P-CCNC: 63 U/L (ref 12–78)
APTT BLD: 28.6 SECONDS (ref 24.2–38.5)
BASOPHILS # BLD AUTO: 0 10^3/UL (ref 0–0.2)
BASOPHILS NFR BLD AUTO: 0.1 % (ref 0–1)
BILIRUB SERPL-MCNC: 0.2 MG/DL (ref 0.2–1)
BUN SERPL-MCNC: 28 MG/DL (ref 7–18)
BUN SERPL-MCNC: 31 MG/DL (ref 7–18)
CALCIUM SERPL-MCNC: 7.7 MG/DL (ref 8.8–10.2)
CALCIUM SERPL-MCNC: 8.3 MG/DL (ref 8.8–10.2)
CHLORIDE SERPL-SCNC: 101 MEQ/L (ref 98–107)
CHLORIDE SERPL-SCNC: 104 MEQ/L (ref 98–107)
CO2 SERPL-SCNC: 27 MEQ/L (ref 21–32)
CO2 SERPL-SCNC: 27 MEQ/L (ref 21–32)
CREAT SERPL-MCNC: 1.05 MG/DL (ref 0.7–1.3)
CREAT SERPL-MCNC: 1.09 MG/DL (ref 0.7–1.3)
EOSINOPHIL # BLD AUTO: 0 10^3/UL (ref 0–0.5)
EOSINOPHIL NFR BLD AUTO: 0 % (ref 0–3)
FERRITIN SERPL-MCNC: 10 NG/ML (ref 26–388)
GFR SERPL CREATININE-BSD FRML MDRD: > 60 ML/MIN/{1.73_M2} (ref 49–?)
GFR SERPL CREATININE-BSD FRML MDRD: > 60 ML/MIN/{1.73_M2} (ref 49–?)
GLUCOSE SERPL-MCNC: 166 MG/DL (ref 70–100)
GLUCOSE SERPL-MCNC: 177 MG/DL (ref 70–100)
HCT VFR BLD AUTO: 27.9 % (ref 42–52)
HCT VFR BLD AUTO: 29.1 % (ref 42–52)
HCT VFR BLD AUTO: 31 % (ref 42–52)
HGB BLD-MCNC: 7.7 G/DL (ref 13.5–17.5)
HGB BLD-MCNC: 8.2 G/DL (ref 13.5–17.5)
HGB BLD-MCNC: 8.8 G/DL (ref 13.5–17.5)
INR PPP: 1.07
IRON SATN MFR SERPL: 5.3 % (ref 19.7–50)
IRON SERPL-MCNC: 15 UG/DL (ref 65–175)
LDH SERPL L TO P-CCNC: 177 U/L (ref 87–241)
LYMPHOCYTES # BLD AUTO: 0.5 10^3/UL (ref 1.5–5)
LYMPHOCYTES NFR BLD AUTO: 1.9 % (ref 24–44)
MCH RBC QN AUTO: 19.2 PG (ref 27–33)
MCH RBC QN AUTO: 19.6 PG (ref 27–33)
MCH RBC QN AUTO: 20 PG (ref 27–33)
MCHC RBC AUTO-ENTMCNC: 27.6 G/DL (ref 32–36.5)
MCHC RBC AUTO-ENTMCNC: 28.2 G/DL (ref 32–36.5)
MCHC RBC AUTO-ENTMCNC: 28.4 G/DL (ref 32–36.5)
MCV RBC AUTO: 69.6 FL (ref 80–96)
MCV RBC AUTO: 69.6 FL (ref 80–96)
MCV RBC AUTO: 70.5 FL (ref 80–96)
MONOCYTES # BLD AUTO: 0.2 10^3/UL (ref 0–0.8)
MONOCYTES NFR BLD AUTO: 0.9 % (ref 2–8)
NEUTROPHILS # BLD AUTO: 25.9 10^3/UL (ref 1.5–8.5)
NEUTROPHILS NFR BLD AUTO: 96.3 % (ref 36–66)
PLATELET # BLD AUTO: 407 10^3/UL (ref 150–450)
PLATELET # BLD AUTO: 492 10^3/UL (ref 150–450)
PLATELET # BLD AUTO: 548 10^3/UL (ref 150–450)
POTASSIUM SERPL-SCNC: 4.3 MEQ/L (ref 3.5–5.1)
POTASSIUM SERPL-SCNC: 4.5 MEQ/L (ref 3.5–5.1)
PROT SERPL-MCNC: 6.2 GM/DL (ref 6.4–8.2)
PROTHROMBIN TIME: 14.1 SECONDS (ref 12.5–14.3)
RBC # BLD AUTO: 4.01 10^6/UL (ref 4.3–6.1)
RBC # BLD AUTO: 4.18 10^6/UL (ref 4.3–6.1)
RBC # BLD AUTO: 4.4 10^6/UL (ref 4.3–6.1)
SODIUM SERPL-SCNC: 138 MEQ/L (ref 136–145)
SODIUM SERPL-SCNC: 139 MEQ/L (ref 136–145)
TIBC SERPL-MCNC: 282 UG/DL (ref 250–450)
WBC # BLD AUTO: 15.9 10^3/UL (ref 4–10)
WBC # BLD AUTO: 25.4 10^3/UL (ref 4–10)
WBC # BLD AUTO: 26.9 10^3/UL (ref 4–10)

## 2021-07-18 RX ADMIN — METHYLPREDNISOLONE SODIUM SUCCINATE SCH MG: 125 INJECTION, POWDER, FOR SOLUTION INTRAMUSCULAR; INTRAVENOUS at 13:51

## 2021-07-18 RX ADMIN — METOPROLOL TARTRATE SCH MG: 25 TABLET, FILM COATED ORAL at 21:51

## 2021-07-18 RX ADMIN — ATORVASTATIN CALCIUM SCH MG: 20 TABLET, FILM COATED ORAL at 21:49

## 2021-07-18 RX ADMIN — DEXTROSE MONOHYDRATE SCH MLS/HR: 50 INJECTION, SOLUTION INTRAVENOUS at 09:03

## 2021-07-18 RX ADMIN — ALUMINUM HYDROXIDE, MAGNESIUM HYDROXIDE, AND SIMETHICONE PRN ML: 200; 200; 20 SUSPENSION ORAL at 09:04

## 2021-07-18 RX ADMIN — METOPROLOL TARTRATE SCH MG: 25 TABLET, FILM COATED ORAL at 09:03

## 2021-07-18 RX ADMIN — ALUMINUM HYDROXIDE, MAGNESIUM HYDROXIDE, AND SIMETHICONE PRN ML: 200; 200; 20 SUSPENSION ORAL at 17:13

## 2021-07-18 RX ADMIN — DIATRIZOATE MEGLUMINE AND DIATRIZOATE SODIUM SCH ML: 600; 100 SOLUTION ORAL; RECTAL at 13:51

## 2021-07-18 RX ADMIN — IPRATROPIUM BROMIDE AND ALBUTEROL SULFATE SCH ML: .5; 3 SOLUTION RESPIRATORY (INHALATION) at 04:03

## 2021-07-18 RX ADMIN — DIATRIZOATE MEGLUMINE AND DIATRIZOATE SODIUM SCH ML: 600; 100 SOLUTION ORAL; RECTAL at 14:20

## 2021-07-18 RX ADMIN — IPRATROPIUM BROMIDE AND ALBUTEROL SULFATE SCH ML: .5; 3 SOLUTION RESPIRATORY (INHALATION) at 20:26

## 2021-07-18 RX ADMIN — METHYLPREDNISOLONE SODIUM SUCCINATE SCH MG: 125 INJECTION, POWDER, FOR SOLUTION INTRAMUSCULAR; INTRAVENOUS at 05:53

## 2021-07-18 RX ADMIN — METHYLPREDNISOLONE SODIUM SUCCINATE SCH MG: 125 INJECTION, POWDER, FOR SOLUTION INTRAMUSCULAR; INTRAVENOUS at 21:48

## 2021-07-18 RX ADMIN — ROPINIROLE HYDROCHLORIDE SCH MG: 0.25 TABLET, FILM COATED ORAL at 09:01

## 2021-07-18 RX ADMIN — IPRATROPIUM BROMIDE AND ALBUTEROL SULFATE SCH ML: .5; 3 SOLUTION RESPIRATORY (INHALATION) at 00:15

## 2021-07-18 RX ADMIN — ROPINIROLE HYDROCHLORIDE SCH MG: 0.25 TABLET, FILM COATED ORAL at 21:49

## 2021-07-18 RX ADMIN — METRONIDAZOLE SCH MLS/HR: 500 INJECTION, SOLUTION INTRAVENOUS at 21:48

## 2021-07-18 RX ADMIN — IPRATROPIUM BROMIDE AND ALBUTEROL SULFATE SCH ML: .5; 3 SOLUTION RESPIRATORY (INHALATION) at 11:20

## 2021-07-18 RX ADMIN — LOSARTAN POTASSIUM SCH MG: 50 TABLET, FILM COATED ORAL at 09:02

## 2021-07-18 RX ADMIN — IPRATROPIUM BROMIDE AND ALBUTEROL SULFATE SCH ML: .5; 3 SOLUTION RESPIRATORY (INHALATION) at 15:07

## 2021-07-18 RX ADMIN — ASPIRIN SCH MG: 81 TABLET ORAL at 09:02

## 2021-07-18 RX ADMIN — CEFTRIAXONE SCH MLS/HR: 1 INJECTION, POWDER, FOR SOLUTION INTRAMUSCULAR; INTRAVENOUS at 10:20

## 2021-07-18 RX ADMIN — IPRATROPIUM BROMIDE AND ALBUTEROL SULFATE SCH ML: .5; 3 SOLUTION RESPIRATORY (INHALATION) at 07:49

## 2021-07-18 NOTE — REPVR
PROCEDURE INFORMATION: 

Exam: XR Chest 

Exam date and time: 7/18/2021 10:48 PM 

Age: 65 years old 

Clinical indication: Device placement; Ng tube; Additional info: Ng tube 

placement check 



TECHNIQUE: 

Imaging protocol: XR of the chest. 

Views: 1 view. 



COMPARISON: 

CR PORTABLE CHEST X-RAY 7/17/2021 5:24 AM 



FINDINGS: 

Tubes, catheters and devices:  Left atrial appendage exclusion device. NG tube 

extending into the gastric fundus since the prior study. 



Lungs: Coarse interstitium which is similar to the prior study. There are no 

interval infiltrates. 

Pleural spaces: Unremarkable. No pleural effusion. No pneumothorax. 

Heart/Mediastinum:  Borderline cardiomegaly.

Bones/joints: Status post sternotomy. Degenerative change of the left shoulder 

which is similar. 



IMPRESSION: 

1. NG tube placement with the tip in the gastric fundus since 07/17/2021. 

2. Otherwise stable chest. 



Electronically signed by: Kev Lucas On 07/18/2021  23:29:50 PM

## 2021-07-18 NOTE — REP
INDICATION:

abdominal pain, pain out of proportion.



COMPARISON:

[facility] 07/18/2021, CT 09/20/2018



TECHNIQUE:

Oral Gastrografin mixture per our protocol for 2 doses and bolus of 100 mL Isovue 370

given scanning through the abdomen and pelvis with coronal and sagittal

reconstructions.



FINDINGS:

CT abdomen/pelvis: There are bilateral pleural effusions now present.  Slightly larger

right than left.  Some dependent or compressive atelectatic change deep sulcus right

lower lobe less on the left.  Cylindrical bronchiectatic change in both lower lobes

and emphysematous change.  Heart mildly prominent small hiatal hernia.  No pericardial

thickening or effusion.  Stomach is distended with retained oral contrast and recent

meal remnants.  Third portion of the duodenum is compressed as it crosses anterior to

the aorta deep to the SMA.  Third portion proximal 4th portion the duodenum and

jejunum are distended.  Loop measuring up to 5.5 cm is noted, see image 50.  Oral

contrast passes through these proximal jejunal loops to the mid jejunum.  The distal

jejunum and ileum are more normal in caliber.  The colon shows stool in fluid from the

cecum and is tortuous and ectatic.  The right colon and hepatic flexure are prominent.

The transverse and proximal left colon are also mildly prominent.  The distal left

colon gradually tapers into the proximal sigmoid but this is a redundant sigmoid

extending into the right upper quadrant.  Some small bowel loops are thick walled.

The lung window review shows no sign of perforation, free air or pneumatosis.  I do

not see any definite ascites.  Liver is not enlarged.  No focal hepatic mass or

biliary dilatation.  I do not see splenomegaly on today's study.  The gallbladder

shows a some mild wall thickening or pericholecystic fluid without calcified stone or

mass.  Pancreas shows some fullness of the pancreatic head up to 4.1 cm similar to the

previous study with mildly prominent pancreatic and common bile ducts.  These is all

unchanged.  No gross atrophy, mass in the pancreas or peripancreatic fluid

collections.  Small nodes noted in the retroperitoneum, peripancreatic area and

mesentery as on previous studies.  Extensive calcified plaque scattered in the aorta

and branches.  Takeoff of the celiac axis is stenotic due to extensive plaque.  There

is plaque throughout its proximal course as well as the SMA and all of their branches

representing significant atherosclerotic disease.  The renal arteries also show

atherosclerotic plaque at their origins and there are bilateral zones of ischemia in

the periphery of the kidneys as well as a few scattered cysts.  This represents a

pattern of poor enhancement more suggestive of a developing infarcts than

pyelonephritis.  There is no hydronephrosis or hydroureter.  No definite solid renal

mass.  Nonobstructing calculi or vascular calcifications upper poles of each kidney.

No definite ureteral dilatation or stone.  Bladder well distended without stone, wall

thickening or mass.  Mild indentation of the bladder base by prostate.  Small amount

of residual fluid in the upper left inguinal canal compared to the previous CT, none

on the right side.  No bowel herniation.  No ventral hernia or inguinal pathologic

sized adenopathy.  No pelvic adenopathy.  Bone windows show degenerative disc changes

greatest at L5-S1 without acute compression deformity or destructive lesion.  Facet

arthropathy lower lumbar spine.  Visualized ribs unremarkable.  The bony sacrum,

pelvis  and hips show some degenerative changes without destructive lesions or

fractures.



IMPRESSION:

1. Heavy vascular calcifications aorta and major branches with particular stenosis of

the origin of the celiac axis and scattered but significant plaques along the SMA.

2. Bilateral renal artery calcifications there are bilateral fairly well-defined

peripheral zones of non enhancement in the renal cortex more suggestive of a

developing renal infarcts than pyelonephritis.  No hydronephrosis.  No hydroureter.

Vascular calcifications and a few nonobstructing calculi suggested within the kidneys.

No ureteral or bladder stone.

3. Stomach distended and there is narrowing of the 3rd portion of the duodenum as it

crosses anterior to the aorta, deep to the SMA.  The distal 3rd and 4th portion of the

duodenum and proximal jejunum loops are dilated up to 5.4 cm with oral contrast and

other loops to mid jejunum are less dilated but prominent.  Mid to distal jejunum and

ileum more normal caliber.  This may reflect a focal ileus or partial small bowel

obstruction.  There is a gradual decrease in caliber of the jejunum along the left

lateral abdominal wall near the iliac crest, no abrupt caliber change.

4. The colon is generally distended with stool and fluid through much of its course

and an elongated sigmoid is evident.  No oral contrast reaches it.: It is difficult to

separate some of these bowel loops distally.  No gross mass but technically

challenging evaluation.

5. Fullness of the pancreatic head similar to studies in 2018 and earlier with some

mild prominence of the common duct and pancreatic duct but grossly unchanged.

Mesenteric and peripancreatic nodes are again seen.

6. Gallbladder wall with some pericholecystic fluid suggested or wall thickening.  No

calcified stones or mass.  Liver and spleen not enlarged and without focal lesion.

The spleen is decreased in size since 2018.  No ascites on today's exam.





<Electronically signed by Rajinder Reid > 07/18/21 [id number]

## 2021-07-18 NOTE — IPNPDOC
Subjective


Date Seen


The patient was seen on 7/18/21.





Subjective


Chief Complaint/HPI


Mr. Roberts is a 65-year-old male with COPD, hypothyroidism, and polysubstance 

abuse who presents with worsening dyspnea. This morning, he appears more 

comfortable. No longer tripoding. Mild wheezing still present. Otherwise, 

patient still reports dyspnea.





Objective


Physical Examination


General Exam:  Positive: Alert, Cooperative


Eye Exam:  Negative: Sclera icteric


ENT Exam:  Positive: Atraumatic


Neck Exam:  Positive: Supple


Chest Exam:  Positive: Wheezing


Heart Exam:  Positive: Rate Normal, Regular Rhythm


Abdomen Exam:  Positive: Normal bowel sounds, Soft


Extremity Exam:  Negative: Edema (Mild pitting)


Neuro Exam:  Positive: Normal Speech


Psych Exam:  Positive: Anxiety





Assessment /Plan


Assessment


Mr. Roberts is a 65-year-old male with COPD, hypothyroidism, and polysubstance 

abuse who presents with worsening dyspnea.  Patient is in COPD exacerbation, 

possibly secondary to medical noncompliance.  We will give Solu-Medrol every 8 

hours, IV ceftriaxone and IV azithromycin, and DuoNeb's scheduled and as needed.





Plan/VTE


VTE Prophylaxis Ordered?:  Yes





Plan


1.  Acute hypoxic respiratory failure


On admission patient required nonrebreather, now weaned down to 4 L of oxygen


Continue with IV steroids and breathing treatment





2.  COPD exacerbation


Possibly secondary to noncompliance


Continue with IV steroids, empiric IV antibiotics, and breathing treatments


-Procalcitonin level negative at 0.07. Will discontinue ceftriaxone


Azithromycin day 2





3.  Heart failure with reduced ejection fraction


Echocardiogram 9/20/2020 demonstrated EF of 20%


BNP elevated at 13,000


-Lasix held due to low BP


Pending clinical improvement


Patient not on beta-blocker, will start metoprolol tartrate





4.  CAD status post CABG


Continue losartan and atorvastatin


Patient not on aspirin or beta-blocker


We will start aspirin and metoprolol tartrate





5.  Hypertension


Continue losartan and HCTZ


Lopressor recently started





6.  Microcytic anemia


-Hemoglobin dropped to 7.7 from 9.1


-Will repeat CBC with reticulocyte count and order iron studies. Will also order

for occult stool





7.  DVT prophylaxis


Heparin Subq discontinued due to rapid drop in H&H


-Ordered for SCD and TEDs





Disposition: Pending clinical improvement and improvement in oxygen levels





VS, I&O, 24H, Fishbone


Vital Signs/I&O





Vital Signs








  Date Time  Temp Pulse Resp B/P (MAP) Pulse Ox O2 Delivery O2 Flow Rate FiO2


 


7/18/21 09:03  92      


 


7/18/21 09:02    103/49    


 


7/18/21 08:00 97.8  17  97 Nasal Cannula 2.0 














I&O- Last 24 Hours up to 6 AM 


 


 7/18/21





 05:59


 


Intake Total 2725 ml


 


Output Total 1850 ml


 


Balance 875 ml











Laboratory Data


24H LABS


Laboratory Tests 2


7/17/21 11:04: Lactic Acid Followup at 4 Hours 2.1*H


7/17/21 15:23: Lactic Acid Level 3.8*H


7/17/21 19:58: Lactic Acid Followup at 4 Hours 4.3*H


7/18/21 05:13: 


Nucleated Red Blood Cells % (auto) 0.0, Anion Gap 8, Glomerular Filtration Rate 

> 60.0, Calcium Level 7.7L, Procalcitonin 0.07


CBC/BMP


Laboratory Tests


7/18/21 05:13








Microbiology





Microbiology


7/17/21 Blood Culture - Preliminary, Resulted


          No growth after 24 hours . All specim...


7/17/21 Blood Culture - Preliminary, Resulted


          No growth after 24 hours . All specim...


7/17/21 Respiratory Virus Panel (PCR) ([facility]) - Final, Complete











MARTY GOMEZ DO               Jul 18, 2021 10:50

## 2021-07-18 NOTE — REP
INDICATION:

abdominal pain.



COMPARISON:

[facility] 10/23/2017, CT 09/20/2018.



TECHNIQUE:

AP supine



FINDINGS:

The gas pattern is nonspecific.  There is scattered gas in the right, transverse colon

and the splenic flexure.  Only small amounts of gas in the distal of bowel loops and

the sigmoid.  Some left upper quadrant surgical clips noted.  Do not see gross

evidence for obstruction or mass.  There is heavy vascular calcification in the iliac

and femoral arteries.  Some bone island over the left iliac wing unchanged.  Some

degenerative changes in the lower lumbar spine and mild hip joint space narrowing

symmetric and unchanged.  No definite stones over the renal fossa, expected course of

the ureters or in the true pelvis.



IMPRESSION:

Nonspecific gas pattern without definite obstruction, mass or abnormal calcification

over urinary tract region.



Heavy vascular calcification iliac and femoral arteries.



Some degenerative changes spine and hips.  Stable appearance





<Electronically signed by Rajinder Reid > 07/18/21 [id number]

## 2021-07-19 VITALS — DIASTOLIC BLOOD PRESSURE: 63 MMHG | SYSTOLIC BLOOD PRESSURE: 135 MMHG

## 2021-07-19 VITALS — SYSTOLIC BLOOD PRESSURE: 110 MMHG | DIASTOLIC BLOOD PRESSURE: 53 MMHG

## 2021-07-19 VITALS — SYSTOLIC BLOOD PRESSURE: 136 MMHG | DIASTOLIC BLOOD PRESSURE: 63 MMHG

## 2021-07-19 VITALS — DIASTOLIC BLOOD PRESSURE: 58 MMHG | SYSTOLIC BLOOD PRESSURE: 123 MMHG

## 2021-07-19 VITALS — DIASTOLIC BLOOD PRESSURE: 60 MMHG | SYSTOLIC BLOOD PRESSURE: 130 MMHG

## 2021-07-19 VITALS — SYSTOLIC BLOOD PRESSURE: 131 MMHG | DIASTOLIC BLOOD PRESSURE: 59 MMHG

## 2021-07-19 LAB
BUN SERPL-MCNC: 30 MG/DL (ref 7–18)
CALCIUM SERPL-MCNC: 7.9 MG/DL (ref 8.8–10.2)
CHLORIDE SERPL-SCNC: 105 MEQ/L (ref 98–107)
CO2 SERPL-SCNC: 29 MEQ/L (ref 21–32)
CREAT SERPL-MCNC: 1.02 MG/DL (ref 0.7–1.3)
GFR SERPL CREATININE-BSD FRML MDRD: > 60 ML/MIN/{1.73_M2} (ref 49–?)
GLUCOSE SERPL-MCNC: 143 MG/DL (ref 70–100)
HCT VFR BLD AUTO: 25.4 % (ref 42–52)
HCT VFR BLD AUTO: 26.3 % (ref 42–52)
HGB BLD-MCNC: 7.2 G/DL (ref 13.5–17.5)
HGB BLD-MCNC: 7.4 G/DL (ref 13.5–17.5)
MCH RBC QN AUTO: 19.8 PG (ref 27–33)
MCH RBC QN AUTO: 20.1 PG (ref 27–33)
MCHC RBC AUTO-ENTMCNC: 28.1 G/DL (ref 32–36.5)
MCHC RBC AUTO-ENTMCNC: 28.3 G/DL (ref 32–36.5)
MCV RBC AUTO: 70.3 FL (ref 80–96)
MCV RBC AUTO: 70.9 FL (ref 80–96)
PLATELET # BLD AUTO: 409 10^3/UL (ref 150–450)
PLATELET # BLD AUTO: 454 10^3/UL (ref 150–450)
POTASSIUM SERPL-SCNC: 4.4 MEQ/L (ref 3.5–5.1)
RBC # BLD AUTO: 3.58 10^6/UL (ref 4.3–6.1)
RBC # BLD AUTO: 3.74 10^6/UL (ref 4.3–6.1)
SODIUM SERPL-SCNC: 139 MEQ/L (ref 136–145)
WBC # BLD AUTO: 23.6 10^3/UL (ref 4–10)
WBC # BLD AUTO: 28.5 10^3/UL (ref 4–10)

## 2021-07-19 RX ADMIN — DOCUSATE SODIUM SCH MG: 100 CAPSULE, LIQUID FILLED ORAL at 20:22

## 2021-07-19 RX ADMIN — IPRATROPIUM BROMIDE AND ALBUTEROL SULFATE SCH ML: .5; 3 SOLUTION RESPIRATORY (INHALATION) at 12:00

## 2021-07-19 RX ADMIN — LOSARTAN POTASSIUM SCH MG: 50 TABLET, FILM COATED ORAL at 09:03

## 2021-07-19 RX ADMIN — ROPINIROLE HYDROCHLORIDE SCH MG: 0.25 TABLET, FILM COATED ORAL at 20:22

## 2021-07-19 RX ADMIN — METOPROLOL TARTRATE SCH MG: 25 TABLET, FILM COATED ORAL at 20:23

## 2021-07-19 RX ADMIN — DEXTROSE MONOHYDRATE SCH MLS/HR: 50 INJECTION, SOLUTION INTRAVENOUS at 08:59

## 2021-07-19 RX ADMIN — SODIUM CHLORIDE SCH MLS/HR: 9 INJECTION, SOLUTION INTRAVENOUS at 01:48

## 2021-07-19 RX ADMIN — IPRATROPIUM BROMIDE AND ALBUTEROL SULFATE SCH ML: .5; 3 SOLUTION RESPIRATORY (INHALATION) at 03:59

## 2021-07-19 RX ADMIN — ROPINIROLE HYDROCHLORIDE SCH MG: 0.25 TABLET, FILM COATED ORAL at 08:58

## 2021-07-19 RX ADMIN — METOPROLOL TARTRATE SCH MG: 25 TABLET, FILM COATED ORAL at 09:03

## 2021-07-19 RX ADMIN — SENNOSIDES SCH TAB: 8.6 TABLET, FILM COATED ORAL at 20:22

## 2021-07-19 RX ADMIN — SODIUM CHLORIDE SCH MLS/HR: 9 INJECTION, SOLUTION INTRAVENOUS at 20:23

## 2021-07-19 RX ADMIN — METRONIDAZOLE SCH MLS/HR: 500 INJECTION, SOLUTION INTRAVENOUS at 20:20

## 2021-07-19 RX ADMIN — METHYLPREDNISOLONE SODIUM SUCCINATE SCH MG: 125 INJECTION, POWDER, FOR SOLUTION INTRAMUSCULAR; INTRAVENOUS at 16:44

## 2021-07-19 RX ADMIN — METHYLPREDNISOLONE SODIUM SUCCINATE SCH MG: 125 INJECTION, POWDER, FOR SOLUTION INTRAMUSCULAR; INTRAVENOUS at 06:06

## 2021-07-19 RX ADMIN — IPRATROPIUM BROMIDE AND ALBUTEROL SULFATE SCH ML: .5; 3 SOLUTION RESPIRATORY (INHALATION) at 00:00

## 2021-07-19 RX ADMIN — IPRATROPIUM BROMIDE AND ALBUTEROL SULFATE SCH ML: .5; 3 SOLUTION RESPIRATORY (INHALATION) at 19:22

## 2021-07-19 RX ADMIN — CEFTRIAXONE SCH MLS/HR: 1 INJECTION, POWDER, FOR SOLUTION INTRAMUSCULAR; INTRAVENOUS at 10:25

## 2021-07-19 RX ADMIN — IPRATROPIUM BROMIDE AND ALBUTEROL SULFATE SCH ML: .5; 3 SOLUTION RESPIRATORY (INHALATION) at 22:58

## 2021-07-19 RX ADMIN — IPRATROPIUM BROMIDE AND ALBUTEROL SULFATE SCH ML: .5; 3 SOLUTION RESPIRATORY (INHALATION) at 03:37

## 2021-07-19 RX ADMIN — IPRATROPIUM BROMIDE AND ALBUTEROL SULFATE SCH ML: .5; 3 SOLUTION RESPIRATORY (INHALATION) at 15:12

## 2021-07-19 RX ADMIN — SODIUM CHLORIDE SCH MLS/HR: 9 INJECTION, SOLUTION INTRAVENOUS at 09:10

## 2021-07-19 RX ADMIN — METRONIDAZOLE SCH MLS/HR: 500 INJECTION, SOLUTION INTRAVENOUS at 04:11

## 2021-07-19 RX ADMIN — METRONIDAZOLE SCH MLS/HR: 500 INJECTION, SOLUTION INTRAVENOUS at 12:57

## 2021-07-19 RX ADMIN — IPRATROPIUM BROMIDE AND ALBUTEROL SULFATE SCH ML: .5; 3 SOLUTION RESPIRATORY (INHALATION) at 08:16

## 2021-07-19 NOTE — IPNPDOC
Subjective


Date Seen


The patient was seen on 7/19/21.





Subjective


Chief Complaint/HPI


Mr. Roberts is a 65-year-old male with COPD, hypothyroidism, and polysubstance 

abuse who presents with worsening dyspnea. Last night, patient was made NPO and 

NGT was initially inserted. Patient could not tolerate NGT and it was removed. 

This morning, he appears fatigued and tired. Abdomen is not as tense as 

yesterday.





Objective


Physical Examination


General Exam:  Positive: Alert, Cooperative


Eye Exam:  Negative: Sclera icteric


ENT Exam:  Positive: Atraumatic


Neck Exam:  Positive: Supple


Chest Exam:  Positive: Wheezing


Heart Exam:  Positive: Rate Normal, Regular Rhythm


Abdomen Exam:  Positive: Normal bowel sounds, Soft


Extremity Exam:  Negative: Edema (Mild pitting)


Neuro Exam:  Positive: Normal Speech


Psych Exam:  Positive: Anxiety





Assessment /Plan


Assessment


Mr. Roberts is a 65-year-old male with COPD, hypothyroidism, and polysubstance 

abuse who presents with worsening dyspnea.  Patient is in COPD exacerbation, 

possibly secondary to medical noncompliance.  We will give parental steroids, IV

antibiotics, and DuoNeb's scheduled and as needed.





On 7/18/21, patient started to develop severe abdominal pain. Lactic acid was 

elevated. CT angio abd/pelvis demonstrated stenosis of SMA and ileus vs partial 

SBO. Consulted general surgery, recommendations appreciated. At this time, NPO. 

I will discontinue Iron, Vitamin C, aspirin (in case if surgery is needed), a

torvastatin, HCTZ, and calcium carbonate as they are not essential medications 

at this time. These will need to be restarted at another time.





Plan/VTE


VTE Prophylaxis Ordered?:  Yes





Plan


1.  Acute hypoxic respiratory failure


On admission patient required nonrebreather, now weaned down to 1 L of oxygen


Continue with breathing treatment


-Weaned down IV solumedrol from q8h to q12h





2.  COPD exacerbation


Possibly secondary to noncompliance


Continue with IV steroids, empiric IV antibiotics, and breathing treatments


-Procalcitonin level negative at 0.07. 


Azithromycin day 3





3. Ileus vs partial SBO


-General surgery consulted, recommendations  appreciate


-NPO


-Patient has PCN allergy. Ceftriaxone and Flagyl day 1





4.  Heart failure with reduced ejection fraction


Echocardiogram 9/20/2020 demonstrated EF of 20%


BNP elevated at 13,000


-Lasix held due to low BP


Pending clinical improvement


Patient not on beta-blocker, will start metoprolol tartrate





5.  CAD status post CABG


Continue losartan 


Patient not on aspirin or beta-blocker


We will start metoprolol tartrate


-Hold aspirin in case of surgery


-Hold atorvastatin for NPO





6.  Hypertension


Continue losartan 


Lopressor recently started


-Hold HCTZ





7.  DVT prophylaxis


Heparin Subq discontinued due to drop in H&H


-Ordered for SCD and TEDs





Disposition: Pending clinical improvement in ileus and improvement in 

respiratory status.





VS, I&O, 24H, Fishbone


Vital Signs/I&O





Vital Signs








  Date Time  Temp Pulse Resp B/P (MAP) Pulse Ox O2 Delivery O2 Flow Rate FiO2


 


7/19/21 04:00 96.7 88 22 135/63 (87) 97 Nasal Cannula 1.0 














I&O- Last 24 Hours up to 6 AM 


 


 7/19/21





 05:59


 


Intake Total 1445 ml


 


Output Total 300 ml


 


Balance 1145 ml











Laboratory Data


24H LABS


Laboratory Tests 2


7/18/21 11:17: 


Reticulocyte # (auto) 93.7H, Nucleated Red Blood Cells % (auto) 0.0, Percent 

Reticulocyte Count 1.9H, Reticulocyte Hemoglobin Equivalent 17.5L


7/18/21 11:23: Troponin I 0.02#


7/18/21 21:01: 


Nucleated Red Blood Cells % (auto) 0.0, Immature Granulocyte % (Auto) 0.8, 

Neutrophils (%) (Auto) 96.3H, Lymphocytes (%) (Auto) 1.9L, Monocytes (%) (Auto) 

0.9L, Eosinophils (%) (Auto) 0.0, Basophils (%) (Auto) 0.1, Neutrophils # (Auto)

25.9H, Lymphocytes # (Auto) 0.5L, Monocytes # (Auto) 0.2, Eosinophils # (Auto) 

0.0, Basophils # (Auto) 0.0, Prothrombin Time 14.1H, Prothromb Time 

International Ratio 1.07, Activated Partial Thromboplast Time 28.6, Anion Gap 

10, Glomerular Filtration Rate > 60.0, Lactic Acid Level 3.5*H, Calcium Level 

8.3L, Total Bilirubin 0.2, Aspartate Amino Transf (AST/SGOT) 37, Alanine 

Aminotransferase (ALT/SGPT) 63, Alkaline Phosphatase 195H, Total Protein 6.2L, 

Albumin 2.4L, Albumin/Globulin Ratio 0.6


7/19/21 01:36: Lactic Acid Followup at 4 Hours 2.4*H


7/19/21 05:10: 


Nucleated Red Blood Cells % (auto) 0.0, Anion Gap 5L, Glomerular Filtration Rate

> 60.0, Calcium Level 7.9L


CBC/BMP


Laboratory Tests


7/18/21 11:17








7/18/21 21:01








7/19/21 05:10








Microbiology





Microbiology


7/17/21 Blood Culture - Preliminary, Resulted


          No Growth after 48 hours. All Specime...


7/17/21 Blood Culture - Preliminary, Resulted


          No Growth after 48 hours. All Specime...


7/17/21 Respiratory Virus Panel (PCR) ([facility]) - Final, Complete











MARTY GOMEZ DO               Jul 19, 2021 09:03

## 2021-07-19 NOTE — ECHO
ECHOCARDIOGRAM



DATE OF PROCEDURE: 07/19/2021



Age: 65

Gender: Male

Height: 61 inches

Weight: 168 pounds





REFERRING PHYSICIAN: Dr. Tariq Locke



INDICATION: Heart failure unspecified



2D MEASUREMENTS:

     Aortic annulus 2.0 cm

     Ventricular septum 6.0 cm

     Left ventricle systolic 5.5 cm

     Ventricular septum 0.70 cm

     Posterior wall 0.94 cm

     Aortic root 3.4 cm

     Left atrium 4.6 cm

     Inferior vena cava more than 50$ respiratory variation



Doppler Measurements:

     No aortic regurgitation

     No aortic stenosis

     LVOT velocity 86.3 cm/s

     LVOT VTI 16.1 cm

     Moderate mitral regurgitation

     No mitral stenosis 

     Mitral E velocity 112 cm/s

     Mitral A velocity 38.3 cm/s

     Mitral deceleration time 145 m/sec

     Mild tricuspid regurgitation 

     Estimated right ventricle systolic pressure 46-51 mmHg

     Estimated right pressure 5-10 mmHg

     No pulmonic regurgitation 



MITRAL ANNULAR TISSUE DOPPLER:

     E prime lateral 7.7 cm/s

     E prime septal 5.4 cm/s.



DESCRIPTION: Rhythm was sinus. This was a moderately technically difficult

echocardiogram. No pericardial effusion. This was a 2D, M-mode, color flow

Doppler and pulsed wave Doppler examination including mitral annular tissue

Doppler. 



CONCLUSIONS:

  1.  Mildly dilated left ventricle at end-diastole. Normal LV wall thickness.

      Moderate to moderately-severe global LV hypokinesis of left ventricle.

      Severe reduction overall LV systolic function. LVEF of 32% (3D).

      Indeterminate assessment of LV diastolic function due to presence of

      moderate mitral regurgitation. No LV thrombus. 

  2.  Mild left atrial dilatation.

  3.  Moderate aortic valve sclerosis of a 3-cuspid aortic valve. No aortic

      stenosis or regurgitation.

  4.  Moderate mitral annular calcification. Moderate mitral regurgitation. No

      mitral stenosis.

  5.  Suggestive of moderate elevation of estimated right ventricle systolic

      pressure. Mild tricuspid regurgitation. Normal right ventricle size and

      systolic function.

  6.  No pericardial effusion. 

MTDD

## 2021-07-20 VITALS — SYSTOLIC BLOOD PRESSURE: 118 MMHG | DIASTOLIC BLOOD PRESSURE: 56 MMHG

## 2021-07-20 VITALS — SYSTOLIC BLOOD PRESSURE: 107 MMHG | DIASTOLIC BLOOD PRESSURE: 56 MMHG

## 2021-07-20 VITALS — SYSTOLIC BLOOD PRESSURE: 135 MMHG | DIASTOLIC BLOOD PRESSURE: 64 MMHG

## 2021-07-20 VITALS — SYSTOLIC BLOOD PRESSURE: 136 MMHG | DIASTOLIC BLOOD PRESSURE: 66 MMHG

## 2021-07-20 VITALS — SYSTOLIC BLOOD PRESSURE: 132 MMHG | DIASTOLIC BLOOD PRESSURE: 57 MMHG

## 2021-07-20 VITALS — SYSTOLIC BLOOD PRESSURE: 127 MMHG | DIASTOLIC BLOOD PRESSURE: 59 MMHG

## 2021-07-20 VITALS — DIASTOLIC BLOOD PRESSURE: 52 MMHG | SYSTOLIC BLOOD PRESSURE: 104 MMHG

## 2021-07-20 VITALS — DIASTOLIC BLOOD PRESSURE: 51 MMHG | SYSTOLIC BLOOD PRESSURE: 100 MMHG

## 2021-07-20 VITALS — DIASTOLIC BLOOD PRESSURE: 65 MMHG | SYSTOLIC BLOOD PRESSURE: 137 MMHG

## 2021-07-20 VITALS — SYSTOLIC BLOOD PRESSURE: 130 MMHG | DIASTOLIC BLOOD PRESSURE: 64 MMHG

## 2021-07-20 LAB
BUN SERPL-MCNC: 34 MG/DL (ref 7–18)
CALCIUM SERPL-MCNC: 7.8 MG/DL (ref 8.8–10.2)
CHLORIDE SERPL-SCNC: 105 MEQ/L (ref 98–107)
CO2 SERPL-SCNC: 29 MEQ/L (ref 21–32)
CREAT SERPL-MCNC: 1.04 MG/DL (ref 0.7–1.3)
FERRITIN SERPL-MCNC: 9 NG/ML (ref 26–388)
FOLATE SERPL-MCNC: 6.4 NG/ML (ref 5.4–?)
GFR SERPL CREATININE-BSD FRML MDRD: > 60 ML/MIN/{1.73_M2} (ref 49–?)
GLUCOSE SERPL-MCNC: 139 MG/DL (ref 70–100)
HCT VFR BLD AUTO: 24.1 % (ref 42–52)
HGB BLD-MCNC: 6.8 G/DL (ref 13.5–17.5)
IRON SATN MFR SERPL: 3 % (ref 19.7–50)
IRON SERPL-MCNC: 8 UG/DL (ref 65–175)
MCH RBC QN AUTO: 19.7 PG (ref 27–33)
MCHC RBC AUTO-ENTMCNC: 28.2 G/DL (ref 32–36.5)
MCV RBC AUTO: 69.7 FL (ref 80–96)
PLATELET # BLD AUTO: 349 10^3/UL (ref 150–450)
POTASSIUM SERPL-SCNC: 4.2 MEQ/L (ref 3.5–5.1)
RBC # BLD AUTO: 3.46 10^6/UL (ref 4.3–6.1)
SODIUM SERPL-SCNC: 139 MEQ/L (ref 136–145)
TIBC SERPL-MCNC: 271 UG/DL (ref 250–450)
VIT B12 SERPL-MCNC: 1062 PG/ML (ref 247–911)
WBC # BLD AUTO: 23.2 10^3/UL (ref 4–10)

## 2021-07-20 RX ADMIN — IPRATROPIUM BROMIDE AND ALBUTEROL SULFATE SCH ML: .5; 3 SOLUTION RESPIRATORY (INHALATION) at 15:05

## 2021-07-20 RX ADMIN — METRONIDAZOLE SCH MLS/HR: 500 INJECTION, SOLUTION INTRAVENOUS at 20:58

## 2021-07-20 RX ADMIN — CEFTRIAXONE SCH MLS/HR: 1 INJECTION, POWDER, FOR SOLUTION INTRAMUSCULAR; INTRAVENOUS at 09:46

## 2021-07-20 RX ADMIN — METOPROLOL TARTRATE SCH MG: 25 TABLET, FILM COATED ORAL at 20:59

## 2021-07-20 RX ADMIN — IPRATROPIUM BROMIDE AND ALBUTEROL SULFATE SCH ML: .5; 3 SOLUTION RESPIRATORY (INHALATION) at 07:03

## 2021-07-20 RX ADMIN — IPRATROPIUM BROMIDE AND ALBUTEROL SULFATE SCH ML: .5; 3 SOLUTION RESPIRATORY (INHALATION) at 11:11

## 2021-07-20 RX ADMIN — IPRATROPIUM BROMIDE AND ALBUTEROL SULFATE SCH ML: .5; 3 SOLUTION RESPIRATORY (INHALATION) at 19:45

## 2021-07-20 RX ADMIN — SENNOSIDES SCH TAB: 8.6 TABLET, FILM COATED ORAL at 20:58

## 2021-07-20 RX ADMIN — DOCUSATE SODIUM SCH MG: 100 CAPSULE, LIQUID FILLED ORAL at 20:58

## 2021-07-20 RX ADMIN — METRONIDAZOLE SCH MLS/HR: 500 INJECTION, SOLUTION INTRAVENOUS at 05:51

## 2021-07-20 RX ADMIN — DOCUSATE SODIUM SCH MG: 100 CAPSULE, LIQUID FILLED ORAL at 08:15

## 2021-07-20 RX ADMIN — ALUMINUM HYDROXIDE, MAGNESIUM HYDROXIDE, AND SIMETHICONE PRN ML: 200; 200; 20 SUSPENSION ORAL at 18:38

## 2021-07-20 RX ADMIN — ALUMINUM HYDROXIDE, MAGNESIUM HYDROXIDE, AND SIMETHICONE PRN ML: 200; 200; 20 SUSPENSION ORAL at 08:24

## 2021-07-20 RX ADMIN — ROPINIROLE HYDROCHLORIDE SCH MG: 0.25 TABLET, FILM COATED ORAL at 21:06

## 2021-07-20 RX ADMIN — METRONIDAZOLE SCH MLS/HR: 500 INJECTION, SOLUTION INTRAVENOUS at 11:34

## 2021-07-20 RX ADMIN — SODIUM CHLORIDE SCH MLS/HR: 9 INJECTION, SOLUTION INTRAVENOUS at 09:46

## 2021-07-20 RX ADMIN — DEXTROSE MONOHYDRATE SCH MLS/HR: 50 INJECTION, SOLUTION INTRAVENOUS at 08:15

## 2021-07-20 RX ADMIN — ALUMINUM HYDROXIDE, MAGNESIUM HYDROXIDE, AND SIMETHICONE PRN ML: 200; 200; 20 SUSPENSION ORAL at 00:30

## 2021-07-20 RX ADMIN — METHYLPREDNISOLONE SODIUM SUCCINATE SCH MG: 125 INJECTION, POWDER, FOR SOLUTION INTRAMUSCULAR; INTRAVENOUS at 05:50

## 2021-07-20 RX ADMIN — METHYLPREDNISOLONE SODIUM SUCCINATE SCH MG: 125 INJECTION, POWDER, FOR SOLUTION INTRAMUSCULAR; INTRAVENOUS at 18:11

## 2021-07-20 RX ADMIN — LOSARTAN POTASSIUM SCH MG: 50 TABLET, FILM COATED ORAL at 09:00

## 2021-07-20 RX ADMIN — METOPROLOL TARTRATE SCH MG: 25 TABLET, FILM COATED ORAL at 09:00

## 2021-07-20 RX ADMIN — ROPINIROLE HYDROCHLORIDE SCH MG: 0.25 TABLET, FILM COATED ORAL at 08:15

## 2021-07-20 RX ADMIN — IPRATROPIUM BROMIDE AND ALBUTEROL SULFATE SCH ML: .5; 3 SOLUTION RESPIRATORY (INHALATION) at 03:46

## 2021-07-20 NOTE — IPN
PROGRESS NOTE



DATE:  07/20/2021



SUBJECTIVE: Patient has made some great progress throughout the night and this

morning. He is not complaining of any abdominal pain or discomfort. He has had

some bowel movements and states that his bowel movements are starting to

thicken up a little bit. He has been tolerating a clear liquid diet earlier

this morning without nausea, without vomiting. He would like to increase his

diet as well, but otherwise his laboratory exam reveals that he still has an

elevated white count and stool studies are still pending I guess. 



IMPRESSION/PLAN: Patient has resolving enteritis of undetermined etiology. At

this point, I would recommend progression in his diet as tolerated and

supportive care. From a surgical standpoint, please contact us if you have

additional questions or concerns, but otherwise we will sign off at this point.

I anticipate from a dietary standpoint, he will tolerate his diet. Obviously

the question is whether this was an infectious enteritis that he had or whether

he has some other gastroenteritis issues or longstanding GI issues, for which I

would recommend the GI follow-up as an outpatient and evaluation, upper/lower

endoscopy, etc.

## 2021-07-20 NOTE — IPNPDOC
Text Note


Date of Service


The patient was seen on 7/20/21.





NOTE


Subjective: Patient is a 65-year-old male with COPD, hypothyroidism, and poly

substance abuse who presented with worsening dyspnea.  Patient was made n.p.o. 

nasogastric tube was initially inserted but were removed with the patient not 

being able to tolerate it.  Patient was having some tense abdomen and was 

thought to have an ileus.  Patient is feeling hungry today really wants to eat. 

Patient says his abdomen is feeling much better and he has no pain in his 

abdomen.





Review of systems:


General: Patient denies fevers


HEENT: Patient denies headaches


Cardiovascular: Patient denies chest pain


Respiratory: Patient denies shortness of breath, cough


GI: Patient denies abdominal pain, nausea, vomiting, diarrhea


: Patient denies increased frequency or pain with urination


Extremities: Patient denies swelling or pain in extremities


Neurological: Patient denies numbness or tingling in legs











Physical exam:


Vitals: See below


General: Alert and oriented male patient who was sitting in the chair when I 

walked in the room.  Patient not appear to be in any acute distress.


HEENT: Normocephalic, atraumatic, moist mucous membranes.


Neck: No lymphadenopathy or thyromegaly


Cardiac: Regular rate and rhythm, no murmurs, normal S1, normal S2


Pulm: Clear to auscultation bilaterally. No wheezes, rhonchi, rales


Abd: Nondistended, nontender to palpation, normal bowel sounds


Ext: No edema bilateral lower extremities





Labs: See below


Imaging: No new imaging has been performed





Assessment/plan:


65-year-old male with COPD, hypothyroid, and polysubstance abuse who presents 

with worsening dyspnea.  Patient is in COPD exacerbation possibly secondary to 

medical noncompliance.  Patient developed severe abdominal pain and CT angio of 

the abdomen pelvis demonstrated stenosis of the SMA and ileus versus partial 

small bowel obstruction.





1.  Acute hypoxic respiratory failure patient required nonrebreather and is now 

on 1 L of oxygen.  Continue weaning the patient of oxygen as he is not on home 

oxygen.





2.  COPD exacerbation.  Secondary to noncompliance.  Continue IV steroids, em

piric IV antibiotics and breathing treatments.  Patient is on azithromycin day 

4.





3.  Ileus versus partial small bowel obstruction.  General surgery consulted and

recommendations appreciated.  Patient is less distended and we will advance the 

diet to clear liquids.  Patient is on ceftriaxone and Flagyl day 2.





4.  Anemia.  Patient's hemoglobin was 6.8 overnight and the patient was ordered 

to give blood.  Patient was consented and patient will receive 2 units and will 

recheck in the morning.





5.  Heart failure with reduced ejection fraction.  Echocardiogram on 9/20/2020 

demonstrated EF of 20%.  We will continue to monitor the patient he is currently

on beta-blocker therapy.





6.  Coronary artery disease status post CABG.  Continue losartan.  Aspirin is 

held in case of surgery.





7.  Hypertension.  Continue losartan and Lopressor.





DVT Prophylaxis: Sequential compression devices and teds





Disposition: Pending clinical improvement and advancement of diet





VS,Fishbone, I+O


VS, Fishbone, I+O


Laboratory Tests


7/20/21 05:35











Vital Signs








  Date Time  Temp Pulse Resp B/P (MAP) Pulse Ox O2 Delivery O2 Flow Rate FiO2


 


7/20/21 11:55 97.9 88 17 100/51 100 Nasal Cannula 2.0 














I&O- Last 24 Hours up to 6 AM 


 


 7/20/21





 05:59


 


Intake Total 540 ml


 


Balance 540 ml

















MARY NGUYEN DO               Jul 20, 2021 13:31

## 2021-07-20 NOTE — IPN
PROGRESS NOTE



DATE:  07/19/2021



SUBJECTIVE: The patient actually has made some progress overnight and looks

better this morning, and overall he was kept n.p.o., and had an NG tube placed,

however he removed it several times with coughing etc., so we kept it out

overnight and he is actually looking better this morning, having less

complaints. He has had some diarrheal bowel movements. His abdomen is softly

distended, tympanitic, and he at this point is distended enough, I feel that he

should stay n.p.o. for right now. He really does not have a tender abdomen. No

guarding, no rebound, no peritoneal signs are appreciated. 



IMPRESSION AND PLAN: The patient has some heavy calcifications and that is

consistent with some arterial insufficiency and maybe even he has some baseline

decreased flow to his gut, but his white count elevation is most likely from

his steroids and at this point other possibilities obviously include infectious

colitis as another possibility and his antibiotic coverage was extended out

yesterday. But I would keep him n.p.o. and I anticipate since he is not having

progressive abdominal pain and distention, that he will be able to be supported

throughout this issue and probably start him on some clears, etc., in the next

few days.

## 2021-07-21 VITALS — SYSTOLIC BLOOD PRESSURE: 133 MMHG | DIASTOLIC BLOOD PRESSURE: 75 MMHG

## 2021-07-21 VITALS — SYSTOLIC BLOOD PRESSURE: 111 MMHG | DIASTOLIC BLOOD PRESSURE: 56 MMHG

## 2021-07-21 VITALS — SYSTOLIC BLOOD PRESSURE: 135 MMHG | DIASTOLIC BLOOD PRESSURE: 66 MMHG

## 2021-07-21 VITALS — SYSTOLIC BLOOD PRESSURE: 163 MMHG | DIASTOLIC BLOOD PRESSURE: 72 MMHG

## 2021-07-21 VITALS — SYSTOLIC BLOOD PRESSURE: 120 MMHG | DIASTOLIC BLOOD PRESSURE: 65 MMHG

## 2021-07-21 VITALS — DIASTOLIC BLOOD PRESSURE: 57 MMHG | SYSTOLIC BLOOD PRESSURE: 112 MMHG

## 2021-07-21 LAB
BACTERIA ID TEST ISLT QL CULT: (no result)
BACTERIA SPEC BFLD CULT: (no result)
BUN SERPL-MCNC: 32 MG/DL (ref 7–18)
CALCIUM SERPL-MCNC: 7.3 MG/DL (ref 8.8–10.2)
CHLORIDE SERPL-SCNC: 106 MEQ/L (ref 98–107)
CO2 SERPL-SCNC: 29 MEQ/L (ref 21–32)
CREAT SERPL-MCNC: 1.01 MG/DL (ref 0.7–1.3)
GFR SERPL CREATININE-BSD FRML MDRD: > 60 ML/MIN/{1.73_M2} (ref 49–?)
GLUCOSE SERPL-MCNC: 128 MG/DL (ref 70–100)
HCT VFR BLD AUTO: 32.4 % (ref 42–52)
HGB BLD-MCNC: 9.6 G/DL (ref 13.5–17.5)
MCH RBC QN AUTO: 22.2 PG (ref 27–33)
MCHC RBC AUTO-ENTMCNC: 29.6 G/DL (ref 32–36.5)
MCV RBC AUTO: 75 FL (ref 80–96)
PLATELET # BLD AUTO: 391 10^3/UL (ref 150–450)
POTASSIUM SERPL-SCNC: 4.1 MEQ/L (ref 3.5–5.1)
RBC # BLD AUTO: 4.32 10^6/UL (ref 4.3–6.1)
SODIUM SERPL-SCNC: 138 MEQ/L (ref 136–145)
WBC # BLD AUTO: 22.2 10^3/UL (ref 4–10)

## 2021-07-21 RX ADMIN — IPRATROPIUM BROMIDE AND ALBUTEROL SULFATE SCH ML: .5; 3 SOLUTION RESPIRATORY (INHALATION) at 05:49

## 2021-07-21 RX ADMIN — DEXTROSE MONOHYDRATE SCH MLS/HR: 50 INJECTION, SOLUTION INTRAVENOUS at 08:18

## 2021-07-21 RX ADMIN — METOPROLOL TARTRATE SCH MG: 25 TABLET, FILM COATED ORAL at 22:25

## 2021-07-21 RX ADMIN — LOSARTAN POTASSIUM SCH MG: 50 TABLET, FILM COATED ORAL at 08:18

## 2021-07-21 RX ADMIN — CEFTRIAXONE SCH MLS/HR: 1 INJECTION, POWDER, FOR SOLUTION INTRAMUSCULAR; INTRAVENOUS at 10:20

## 2021-07-21 RX ADMIN — IPRATROPIUM BROMIDE AND ALBUTEROL SULFATE SCH ML: .5; 3 SOLUTION RESPIRATORY (INHALATION) at 15:31

## 2021-07-21 RX ADMIN — IPRATROPIUM BROMIDE AND ALBUTEROL SULFATE SCH ML: .5; 3 SOLUTION RESPIRATORY (INHALATION) at 11:17

## 2021-07-21 RX ADMIN — METRONIDAZOLE SCH MLS/HR: 500 INJECTION, SOLUTION INTRAVENOUS at 12:03

## 2021-07-21 RX ADMIN — IPRATROPIUM BROMIDE AND ALBUTEROL SULFATE SCH ML: .5; 3 SOLUTION RESPIRATORY (INHALATION) at 20:05

## 2021-07-21 RX ADMIN — METHYLPREDNISOLONE SODIUM SUCCINATE SCH MG: 125 INJECTION, POWDER, FOR SOLUTION INTRAMUSCULAR; INTRAVENOUS at 05:35

## 2021-07-21 RX ADMIN — ALUMINUM HYDROXIDE, MAGNESIUM HYDROXIDE, AND SIMETHICONE PRN ML: 200; 200; 20 SUSPENSION ORAL at 15:39

## 2021-07-21 RX ADMIN — DOCUSATE SODIUM SCH MG: 100 CAPSULE, LIQUID FILLED ORAL at 08:18

## 2021-07-21 RX ADMIN — METRONIDAZOLE SCH MLS/HR: 500 INJECTION, SOLUTION INTRAVENOUS at 05:35

## 2021-07-21 RX ADMIN — ALUMINUM HYDROXIDE, MAGNESIUM HYDROXIDE, AND SIMETHICONE PRN ML: 200; 200; 20 SUSPENSION ORAL at 22:28

## 2021-07-21 RX ADMIN — ALUMINUM HYDROXIDE, MAGNESIUM HYDROXIDE, AND SIMETHICONE PRN ML: 200; 200; 20 SUSPENSION ORAL at 05:50

## 2021-07-21 RX ADMIN — ALUMINUM HYDROXIDE, MAGNESIUM HYDROXIDE, AND SIMETHICONE PRN ML: 200; 200; 20 SUSPENSION ORAL at 00:35

## 2021-07-21 RX ADMIN — IPRATROPIUM BROMIDE AND ALBUTEROL SULFATE SCH ML: .5; 3 SOLUTION RESPIRATORY (INHALATION) at 00:35

## 2021-07-21 RX ADMIN — PROBIOTIC PRODUCT - TAB SCH EA: TAB at 09:00

## 2021-07-21 RX ADMIN — METRONIDAZOLE SCH MG: 500 TABLET ORAL at 22:24

## 2021-07-21 RX ADMIN — METOPROLOL TARTRATE SCH MG: 25 TABLET, FILM COATED ORAL at 08:17

## 2021-07-21 RX ADMIN — IPRATROPIUM BROMIDE AND ALBUTEROL SULFATE SCH ML: .5; 3 SOLUTION RESPIRATORY (INHALATION) at 07:29

## 2021-07-21 RX ADMIN — ROPINIROLE HYDROCHLORIDE SCH MG: 0.25 TABLET, FILM COATED ORAL at 22:26

## 2021-07-21 RX ADMIN — IPRATROPIUM BROMIDE AND ALBUTEROL SULFATE SCH ML: .5; 3 SOLUTION RESPIRATORY (INHALATION) at 23:08

## 2021-07-21 RX ADMIN — ROPINIROLE HYDROCHLORIDE SCH MG: 0.25 TABLET, FILM COATED ORAL at 08:17

## 2021-07-21 NOTE — IPNPDOC
Text Note


Date of Service


The patient was seen on 7/21/21.





NOTE


Subjective:.Patient is a 65-year-old male with COPD, hypothyroidism, and 

polysubstance abuse who presented with worsening dyspnea.  Patient was made 

n.p.o. initially with a nasogastric tube initially inserted but was removed 

because the patient did not tolerate it.  Patient was able to tolerate a clear 

liquid diet yesterday and Dr. Gosselin of general surgery gave the patient a 

regular diet today.  Patient states that his abdomen is feeling better today.





Review of systems:


General: Patient denies fevers


HEENT: Patient denies headaches


Cardiovascular: Patient denies chest pain


Respiratory: Patient denies shortness of breath, cough


GI: Patient denies abdominal pain, nausea, vomiting, diarrhea


: Patient denies increased frequency or pain with urination


Extremities: Patient denies swelling or pain in extremities


Neurological: Patient denies numbness or tingling in legs











Physical exam:


Vitals: See below


General: Alert and oriented male patient who was laying in bed when I walked in 

the room.  Patient has nasal cannula oxygen in place.  Patient did not appear to

be in any acute distress.


HEENT: Normocephalic, atraumatic, moist mucous membranes.


Neck: No lymphadenopathy or thyromegaly


Cardiac: Regular rate and rhythm, no murmurs, normal S1, normal S2


Pulm: Clear to auscultation bilaterally. No wheezes, rhonchi, rales


Abd: Nondistended, mild tenderness to palpation, no rebound tenderness, normal 

bowel sounds


Ext: No edema bilateral lower extremities





Labs: See below


Imaging: No new imaging has been performed








Assessment/plan:


65-year-old male with COPD, hypothyroidism, and polysubstance abuse who presents

with worsening dyspnea.  Patient had COPD exacerbation possibly secondary to 

medical noncompliance or polysubstance abuse as the patient admitted to using 

methamphetamine on a daily basis.  Patient developed severe abdominal pain and 

CT angio of the abdomen and pelvis demonstrated stenosis of the SMA and ileus 

versus partial small bowel obstruction.





1.  Acute hypoxic respiratory failure.  Patient initially required nonrebreather

and now is on 1 L oxygen.  Patient will need to be continue to wean off oxygen 

as he is not on home oxygen.





2.  COPD exacerbation secondary to either noncompliance or smoking 

methamphetamine.  IV steroids have been switched to p.o.  Continue breathing 

treatments.  Today is the final day of azithromycin.





3.  Ileus versus partial small bowel obstruction.  General surgery has been 

consulted and recommendations appreciated.  Diet has been advanced to full.





4.  Anemia.  Patient's hemoglobin was 6.82 nights ago.  Patient received 2 units

of blood and his hemoglobin is now 9.





5.  Heart failure with reduced ejection fraction.  Echocardiogram on 09/20/2020 

demonstrates EF of 20%.  We will continue to monitor and he is currently on 

beta-blocker therapy.





6.  Coronary artery disease status post CABG.  Continue losartan.  Aspirin can 

be restarted at this time as the patient will likely not can have surgery.





7.  Hypertension.  Continue losartan Lopressor





DVT Prophylaxis: Sequential compression devices and teds





Disposition: Pending clinical improvement and advancement of diet.  Possible 

discharge next 24 to 48 hours.





VS,Fishbone, I+O


VS, Fishbone, I+O


Laboratory Tests


7/21/21 05:34











Vital Signs








  Date Time  Temp Pulse Resp B/P (MAP) Pulse Ox O2 Delivery O2 Flow Rate FiO2


 


7/21/21 08:18    114/63    


 


7/21/21 08:17  74      


 


7/21/21 08:00 97.9  17  100 Room Air 2.0 














I&O- Last 24 Hours up to 6 AM 


 


 7/21/21





 05:59


 


Intake Total 3785 ml


 


Output Total 500 ml


 


Balance 3285 ml

















MARY NGUYEN DO               Jul 21, 2021 13:01

## 2021-07-22 VITALS — SYSTOLIC BLOOD PRESSURE: 115 MMHG | DIASTOLIC BLOOD PRESSURE: 72 MMHG

## 2021-07-22 VITALS — SYSTOLIC BLOOD PRESSURE: 154 MMHG | DIASTOLIC BLOOD PRESSURE: 67 MMHG

## 2021-07-22 VITALS — DIASTOLIC BLOOD PRESSURE: 64 MMHG | SYSTOLIC BLOOD PRESSURE: 121 MMHG

## 2021-07-22 VITALS — DIASTOLIC BLOOD PRESSURE: 72 MMHG | SYSTOLIC BLOOD PRESSURE: 155 MMHG

## 2021-07-22 VITALS — SYSTOLIC BLOOD PRESSURE: 108 MMHG | DIASTOLIC BLOOD PRESSURE: 55 MMHG

## 2021-07-22 VITALS — DIASTOLIC BLOOD PRESSURE: 54 MMHG | SYSTOLIC BLOOD PRESSURE: 112 MMHG

## 2021-07-22 LAB
BUN SERPL-MCNC: 28 MG/DL (ref 7–18)
CALCIUM SERPL-MCNC: 7.1 MG/DL (ref 8.8–10.2)
CHLORIDE SERPL-SCNC: 102 MEQ/L (ref 98–107)
CO2 SERPL-SCNC: 31 MEQ/L (ref 21–32)
CREAT SERPL-MCNC: 0.94 MG/DL (ref 0.7–1.3)
GFR SERPL CREATININE-BSD FRML MDRD: > 60 ML/MIN/{1.73_M2} (ref 49–?)
GLUCOSE SERPL-MCNC: 115 MG/DL (ref 70–100)
HCT VFR BLD AUTO: 34.2 % (ref 42–52)
HGB BLD-MCNC: 10.3 G/DL (ref 13.5–17.5)
MCH RBC QN AUTO: 22.3 PG (ref 27–33)
MCHC RBC AUTO-ENTMCNC: 30.1 G/DL (ref 32–36.5)
MCV RBC AUTO: 74 FL (ref 80–96)
PLATELET # BLD AUTO: 256 10^3/UL (ref 150–450)
POTASSIUM SERPL-SCNC: 4.1 MEQ/L (ref 3.5–5.1)
RBC # BLD AUTO: 4.62 10^6/UL (ref 4.3–6.1)
SODIUM SERPL-SCNC: 136 MEQ/L (ref 136–145)
WBC # BLD AUTO: 8.1 10^3/UL (ref 4–10)

## 2021-07-22 RX ADMIN — IPRATROPIUM BROMIDE AND ALBUTEROL SULFATE SCH ML: .5; 3 SOLUTION RESPIRATORY (INHALATION) at 23:10

## 2021-07-22 RX ADMIN — ALUMINUM HYDROXIDE, MAGNESIUM HYDROXIDE, AND SIMETHICONE PRN ML: 200; 200; 20 SUSPENSION ORAL at 20:58

## 2021-07-22 RX ADMIN — IPRATROPIUM BROMIDE AND ALBUTEROL SULFATE SCH ML: .5; 3 SOLUTION RESPIRATORY (INHALATION) at 19:12

## 2021-07-22 RX ADMIN — METOPROLOL TARTRATE SCH MG: 25 TABLET, FILM COATED ORAL at 08:28

## 2021-07-22 RX ADMIN — METRONIDAZOLE SCH MG: 500 TABLET ORAL at 15:24

## 2021-07-22 RX ADMIN — LOSARTAN POTASSIUM SCH MG: 50 TABLET, FILM COATED ORAL at 08:28

## 2021-07-22 RX ADMIN — IPRATROPIUM BROMIDE AND ALBUTEROL SULFATE SCH ML: .5; 3 SOLUTION RESPIRATORY (INHALATION) at 11:34

## 2021-07-22 RX ADMIN — ROPINIROLE HYDROCHLORIDE SCH MG: 0.25 TABLET, FILM COATED ORAL at 20:59

## 2021-07-22 RX ADMIN — METRONIDAZOLE SCH MG: 500 TABLET ORAL at 06:49

## 2021-07-22 RX ADMIN — IPRATROPIUM BROMIDE AND ALBUTEROL SULFATE SCH ML: .5; 3 SOLUTION RESPIRATORY (INHALATION) at 07:11

## 2021-07-22 RX ADMIN — PROBIOTIC PRODUCT - TAB SCH EA: TAB at 08:29

## 2021-07-22 RX ADMIN — IPRATROPIUM BROMIDE AND ALBUTEROL SULFATE SCH ML: .5; 3 SOLUTION RESPIRATORY (INHALATION) at 16:00

## 2021-07-22 RX ADMIN — FUROSEMIDE SCH MG: 40 TABLET ORAL at 08:29

## 2021-07-22 RX ADMIN — IPRATROPIUM BROMIDE AND ALBUTEROL SULFATE SCH ML: .5; 3 SOLUTION RESPIRATORY (INHALATION) at 03:11

## 2021-07-22 RX ADMIN — METRONIDAZOLE SCH MG: 500 TABLET ORAL at 20:59

## 2021-07-22 RX ADMIN — ROPINIROLE HYDROCHLORIDE SCH MG: 0.25 TABLET, FILM COATED ORAL at 08:27

## 2021-07-22 RX ADMIN — METOPROLOL TARTRATE SCH MG: 25 TABLET, FILM COATED ORAL at 20:59

## 2021-07-22 RX ADMIN — ALUMINUM HYDROXIDE, MAGNESIUM HYDROXIDE, AND SIMETHICONE PRN ML: 200; 200; 20 SUSPENSION ORAL at 15:24

## 2021-07-22 NOTE — IPNPDOC
Text Note


Date of Service


The patient was seen on 7/22/21.





NOTE


Subjective: 65-year-old male with COPD, hypothyroidism, polysubstance abuse, and

heart failure with reduced ejection fraction who presented with worsening 

dyspnea.  Patient's dyspnea has improved.  Patient had been eating a regular 

diet but is complaining of some more abdominal pain and bloating today.  Patient

says he does not want to eat the solid food.  Patient is also been having some 

diarrhea which patient states has been going on for quite some time.  Patient's 

been having liquid bowel movements today.  Patient did admit to using 

methamphetamine quite frequently prior to coming into the hospital.  Patient was

feeling like he was having difficulty breathing yesterday and was given a dose 

of IV Lasix which did help his breathing.  Patient is no longer wearing oxygen.





Review of systems:


General: Patient denies fevers


HEENT: Patient denies headaches


Cardiovascular: Patient denies chest pain


Respiratory: Patient denies shortness of breath, cough


GI: Patient reports abdominal pain and diarrhea.  Patient denies nausea or 

vomiting


: Patient denies increased frequency or pain with urination


Extremities: Patient denies swelling or pain in extremities


Neurological: Patient denies numbness or tingling in legs











Physical exam:


Vitals: See below


General: Alert and oriented male patient who was sitting up with breakfast when 

I walked in the room.  Patient did not appear to be in any acute distress.


HEENT: Normocephalic, atraumatic, moist mucous membranes.


Neck: No lymphadenopathy or thyromegaly


Cardiac: Regular rate and rhythm, no murmurs, normal S1, normal S2


Pulm: Clear to auscultation bilaterally. No wheezes, rhonchi, rales


Abd: Mildly distended with mild tenderness to palpation throughout the abdomen, 

no rebound tenderness, normal bowel sounds


Ext: No edema bilateral lower extremities





Labs: See below


Imaging: No new imaging has been performed








Assessment/plan:


65-year-old male with COPD, hypothyroidism, polysubstance abuse, and heart 

failure with reduced ejection fraction who presented with worsening dyspnea was 

also found to have possible small bowel obstruction versus ileus.  Patient has 

had worsening in his abdominal exam and symptoms today.





1.  Acute hypoxic respiratory failure.  Patient initially hearted nonrebreather 

but is on room air at this point.  We will continue to monitor.





2.  COPD exacerbation secondary to either noncompliance or smoking 

methamphetamine.  Steroids switched to p.o. continue with breathing treatments. 

Yesterday his final day of azithromycin.





3.  Ileus versus partial small bowel obstruction.  I have reconsulted general 

surgery as the patient appears to be more distended today.  Patient's regular d

iet has been canceled.





4.  Anemia.  Patient received 2 units of blood and his hemoglobin is now stable.





5.  Heart failure with reduced ejection fraction.  Patient has an ejection 

fraction about 32% according to echocardiogram that was recently done.  Patient 

is on beta-blocker therapy and his diuretics have been restarted.





6.  Coronary artery disease status post CABG.  Continue losartan.  Aspirin has 

been restarted.





7.  Hypertension.  Continue losartan and Lopressor.





DVT Prophylaxis: Sequential compression devices and teds





Disposition: Pending clinical improvement.





VS,Carmenza, I+O


VS, Carmenza, I+O


Laboratory Tests


7/22/21 05:45











Vital Signs








  Date Time  Temp Pulse Resp B/P (MAP) Pulse Ox O2 Delivery O2 Flow Rate FiO2


 


7/22/21 08:28  84  114/60    


 


7/22/21 08:00 97.0  16  96 Room Air  


 


7/21/21 20:00       2.0 














I&O- Last 24 Hours up to 6 AM 


 


 7/22/21





 06:00


 


Intake Total 705 ml


 


Output Total 400 ml


 


Balance 305 ml

















MARY NGUYEN DO               Jul 22, 2021 12:46

## 2021-07-23 VITALS — SYSTOLIC BLOOD PRESSURE: 138 MMHG | DIASTOLIC BLOOD PRESSURE: 67 MMHG

## 2021-07-23 VITALS — DIASTOLIC BLOOD PRESSURE: 58 MMHG | SYSTOLIC BLOOD PRESSURE: 111 MMHG

## 2021-07-23 VITALS — SYSTOLIC BLOOD PRESSURE: 143 MMHG | DIASTOLIC BLOOD PRESSURE: 67 MMHG

## 2021-07-23 VITALS — SYSTOLIC BLOOD PRESSURE: 134 MMHG | DIASTOLIC BLOOD PRESSURE: 66 MMHG

## 2021-07-23 VITALS — SYSTOLIC BLOOD PRESSURE: 107 MMHG | DIASTOLIC BLOOD PRESSURE: 56 MMHG

## 2021-07-23 RX ADMIN — IPRATROPIUM BROMIDE AND ALBUTEROL SULFATE SCH ML: .5; 3 SOLUTION RESPIRATORY (INHALATION) at 07:31

## 2021-07-23 RX ADMIN — CALCIUM CARBONATE (ANTACID) CHEW TAB 500 MG PRN MG: 500 CHEW TAB at 20:09

## 2021-07-23 RX ADMIN — METRONIDAZOLE SCH MG: 500 TABLET ORAL at 14:03

## 2021-07-23 RX ADMIN — PROBIOTIC PRODUCT - TAB SCH EA: TAB at 16:34

## 2021-07-23 RX ADMIN — PROBIOTIC PRODUCT - TAB SCH EA: TAB at 00:08

## 2021-07-23 RX ADMIN — ROPINIROLE HYDROCHLORIDE SCH MG: 0.25 TABLET, FILM COATED ORAL at 09:03

## 2021-07-23 RX ADMIN — FUROSEMIDE SCH MG: 40 TABLET ORAL at 09:04

## 2021-07-23 RX ADMIN — METOPROLOL TARTRATE SCH MG: 25 TABLET, FILM COATED ORAL at 09:04

## 2021-07-23 RX ADMIN — IPRATROPIUM BROMIDE AND ALBUTEROL SULFATE SCH ML: .5; 3 SOLUTION RESPIRATORY (INHALATION) at 23:55

## 2021-07-23 RX ADMIN — ALUMINUM HYDROXIDE, MAGNESIUM HYDROXIDE, AND SIMETHICONE PRN ML: 200; 200; 20 SUSPENSION ORAL at 01:29

## 2021-07-23 RX ADMIN — DIPHENOXYLATE HYDROCHLORIDE AND ATROPINE SULFATE SCH EA: 2.5; .025 TABLET ORAL at 16:34

## 2021-07-23 RX ADMIN — PROBIOTIC PRODUCT - TAB SCH EA: TAB at 09:03

## 2021-07-23 RX ADMIN — DIPHENOXYLATE HYDROCHLORIDE AND ATROPINE SULFATE SCH EA: 2.5; .025 TABLET ORAL at 09:03

## 2021-07-23 RX ADMIN — DIPHENOXYLATE HYDROCHLORIDE AND ATROPINE SULFATE SCH EA: 2.5; .025 TABLET ORAL at 00:08

## 2021-07-23 RX ADMIN — ROPINIROLE HYDROCHLORIDE SCH MG: 0.25 TABLET, FILM COATED ORAL at 20:08

## 2021-07-23 RX ADMIN — LOSARTAN POTASSIUM SCH MG: 50 TABLET, FILM COATED ORAL at 08:38

## 2021-07-23 RX ADMIN — SIMETHICONE PRN MG: 80 TABLET, CHEWABLE ORAL at 17:49

## 2021-07-23 RX ADMIN — IPRATROPIUM BROMIDE AND ALBUTEROL SULFATE SCH ML: .5; 3 SOLUTION RESPIRATORY (INHALATION) at 12:00

## 2021-07-23 RX ADMIN — IPRATROPIUM BROMIDE AND ALBUTEROL SULFATE SCH ML: .5; 3 SOLUTION RESPIRATORY (INHALATION) at 16:46

## 2021-07-23 RX ADMIN — PROBIOTIC PRODUCT - TAB SCH EA: TAB at 12:06

## 2021-07-23 RX ADMIN — METOPROLOL TARTRATE SCH MG: 25 TABLET, FILM COATED ORAL at 20:09

## 2021-07-23 RX ADMIN — METRONIDAZOLE SCH MG: 500 TABLET ORAL at 05:17

## 2021-07-23 RX ADMIN — IPRATROPIUM BROMIDE AND ALBUTEROL SULFATE SCH ML: .5; 3 SOLUTION RESPIRATORY (INHALATION) at 03:31

## 2021-07-23 RX ADMIN — SIMETHICONE PRN MG: 80 TABLET, CHEWABLE ORAL at 12:06

## 2021-07-23 RX ADMIN — DIPHENOXYLATE HYDROCHLORIDE AND ATROPINE SULFATE SCH EA: 2.5; .025 TABLET ORAL at 12:06

## 2021-07-23 RX ADMIN — SIMETHICONE PRN MG: 80 TABLET, CHEWABLE ORAL at 20:08

## 2021-07-23 RX ADMIN — DIPHENOXYLATE HYDROCHLORIDE AND ATROPINE SULFATE SCH EA: 2.5; .025 TABLET ORAL at 20:08

## 2021-07-23 RX ADMIN — PROBIOTIC PRODUCT - TAB SCH EA: TAB at 20:07

## 2021-07-23 RX ADMIN — ALUMINUM HYDROXIDE, MAGNESIUM HYDROXIDE, AND SIMETHICONE PRN ML: 200; 200; 20 SUSPENSION ORAL at 17:49

## 2021-07-23 RX ADMIN — IPRATROPIUM BROMIDE AND ALBUTEROL SULFATE SCH ML: .5; 3 SOLUTION RESPIRATORY (INHALATION) at 19:30

## 2021-07-23 RX ADMIN — METRONIDAZOLE SCH MG: 500 TABLET ORAL at 21:01

## 2021-07-23 NOTE — IPN
PROGRESS NOTE



DATE:  07/22/2021



SUBJECTIVE: I was asked to reevaluate Mr. Roberts given that he has had

multiple bowel movements. He states that these are watery bowel movements. He

has not had any nausea or vomiting. He has been tolerating a clear liquid diet

although there is some question whether he got a little bit more distended and

bloated but is mostly complaining of diarrhea as his major issue. His GI is not

available for consults at this time and thus they are asking me to evaluate the

patient from a surgical standpoint again. No surgical intervention is necessary

at this time, however, he has some ongoing diarrhea that will need to be

assessed with an upper and lower endoscopy and GI evaluation for

irritable/inflammatory bowel issues and at some point I anticipate he will

probably need further workup of his mesenteric vessels to rule out chronic

mesenteric ischemia  issues. Will start him on some Lomotil to see if that does

not make a difference, increase his Lactobacillus and continue with his

Simethicone for his obvious abdominal distention, tympany that is appreciated.

## 2021-07-23 NOTE — IPNPDOC
Text Note


Date of Service


The patient was seen on 7/23/21.





NOTE


Subjective: 65-year-old male with COPD, hypothyroidism, polysubstance abuse, and

heart failure with reduced ejection fraction presents with worsening dyspnea.  

Patient's dyspnea is improved but his abdominal pain and bloating has not.  

Patient is now back on a liquid diet.  He was also having some diarrhea.  

General surgery was reconsulted.  General surgery started him on Lomotil and kelley

ggested simethicone for his bloating.  Patient is complaining of the hiccups 

today.  Patient states he has had an having the hiccups for quite some time and 

they are painful.  Patient was asking for something to be done for his hiccups.





Review of systems:


General: Patient denies fevers


HEENT: Patient denies headaches


Cardiovascular: Patient denies chest pain


Respiratory: Patient denies shortness of breath, cough


GI: Patient reports abdominal pain and diarrhea but the diarrhea has slowed 

down.  Patient denies any nausea or vomiting


: Patient denies increased frequency or pain with urination


Extremities: Patient denies swelling or pain in extremities


Neurological: Patient denies numbness or tingling in legs











Physical exam:


Vitals: See below


General: Alert and oriented male patient who was laying in bed hiccuping when I 

walked in the room.  Patient did not appear to be in any acute distress.


HEENT: Normocephalic, atraumatic, moist mucous membranes.


Neck: No lymphadenopathy or thyromegaly


Cardiac: Regular rate and rhythm, no murmurs, normal S1, normal S2


Pulm: Clear to auscultation bilaterally. No wheezes, rhonchi, rales


Abd: Mildly distended with mild tenderness to palpation with no rebound 

tenderness.  Normal bowel sounds


Ext: No edema bilateral lower extremities





Labs: See below


Imaging: No new imaging has been performed








Assessment/plan:


65-year-old male with COPD, hypothyroidism, polysubstance abuse, and heart 

failure with reduced ejection fraction who presented with worsening dyspnea who 

was found to have a possible small bowel obstruction versus ileus.  Patient's 

abdominal exam has worsened and he was hiccuping today.





1.  Acute hypoxic respiratory failure.  Patient initially had a nonrebreather on

but is on room air at this time.  Continue to monitor.





2.  COPD exacerbation secondary to either noncompliance or smoking 

methamphetamine.  Steroid switched to p.o.  Continue breathing treatments.  

Patient finished azithromycin 2 days ago.





3.  Ileus versus partial small bowel obstruction.  This is most likely ileus.  I

reconsulted general surgery yesterday who saw him.  Patient's regular diet has 

been canceled.  Patient is doing better with his diarrhea today.





4.  Hiccups.  I believe this is secondary to the patient's gastric distention.  

I advised the patient to stop eating solid food and go back to a liquid diet.  I

have written for simethicone to help break down any gaseous distention of the 

stomach.





5.  Anemia.  Stabilized.  Patient did receive 2 units of blood.





6.  Heart failure with reduced ejection fraction.  Patient ejection fraction 

about 30% according echocardiogram that recently done.  Patient is on beta-

blocker and diuretics have been restarted.





7.  Coronary artery disease.  Status post CABG.  Continue losartan and aspirin.





8.  Hypertension.  Continue losartan and Lopressor.





DVT Prophylaxis: Sequential compression devices and teds





Disposition: Pending clinical improvement.  Patient will be transferred to the 

medical surgical floor at this time.





VS,Fishbone, I+O


VS, Fishbone, I+O





Vital Signs








  Date Time  Temp Pulse Resp B/P (MAP) Pulse Ox O2 Delivery O2 Flow Rate FiO2


 


7/23/21 12:36   18     


 


7/23/21 12:00 96.8 76  107/56 (73) 97 Room Air  


 


7/23/21 04:05       2.0 














I&O- Last 24 Hours up to 6 AM 


 


 7/23/21





 06:00


 


Intake Total 4635 ml


 


Output Total 0 ml


 


Balance 4635 ml

















MARY NGUYEN DO               Jul 23, 2021 14:11

## 2021-07-24 VITALS — SYSTOLIC BLOOD PRESSURE: 129 MMHG | DIASTOLIC BLOOD PRESSURE: 83 MMHG

## 2021-07-24 VITALS — SYSTOLIC BLOOD PRESSURE: 114 MMHG | DIASTOLIC BLOOD PRESSURE: 56 MMHG

## 2021-07-24 VITALS — SYSTOLIC BLOOD PRESSURE: 132 MMHG | DIASTOLIC BLOOD PRESSURE: 87 MMHG

## 2021-07-24 VITALS — SYSTOLIC BLOOD PRESSURE: 116 MMHG | DIASTOLIC BLOOD PRESSURE: 59 MMHG

## 2021-07-24 VITALS — SYSTOLIC BLOOD PRESSURE: 131 MMHG | DIASTOLIC BLOOD PRESSURE: 66 MMHG

## 2021-07-24 VITALS — DIASTOLIC BLOOD PRESSURE: 66 MMHG | SYSTOLIC BLOOD PRESSURE: 136 MMHG

## 2021-07-24 LAB
BUN SERPL-MCNC: 16 MG/DL (ref 7–18)
CALCIUM SERPL-MCNC: 7.3 MG/DL (ref 8.8–10.2)
CHLORIDE SERPL-SCNC: 95 MEQ/L (ref 98–107)
CK MB CFR.DF SERPL CALC: 6.59
CK MB SERPL-MCNC: 2.9 NG/ML (ref ?–3.6)
CK SERPL-CCNC: 44 U/L (ref 39–308)
CO2 SERPL-SCNC: 32 MEQ/L (ref 21–32)
CREAT SERPL-MCNC: 1 MG/DL (ref 0.7–1.3)
GFR SERPL CREATININE-BSD FRML MDRD: > 60 ML/MIN/{1.73_M2} (ref 49–?)
GLUCOSE SERPL-MCNC: 154 MG/DL (ref 70–100)
HCT VFR BLD AUTO: 31.3 % (ref 42–52)
HGB BLD-MCNC: 9.3 G/DL (ref 13.5–17.5)
MAGNESIUM SERPL-MCNC: 1.7 MG/DL (ref 1.8–2.4)
MCH RBC QN AUTO: 21.9 PG (ref 27–33)
MCHC RBC AUTO-ENTMCNC: 29.7 G/DL (ref 32–36.5)
MCV RBC AUTO: 73.8 FL (ref 80–96)
PLATELET # BLD AUTO: 347 10^3/UL (ref 150–450)
POTASSIUM SERPL-SCNC: 4.3 MEQ/L (ref 3.5–5.1)
RBC # BLD AUTO: 4.24 10^6/UL (ref 4.3–6.1)
SODIUM SERPL-SCNC: 133 MEQ/L (ref 136–145)
TROPONIN I SERPL-MCNC: 0.02 NG/ML (ref ?–0.1)
WBC # BLD AUTO: 14.4 10^3/UL (ref 4–10)

## 2021-07-24 RX ADMIN — METRONIDAZOLE SCH MG: 500 TABLET ORAL at 05:14

## 2021-07-24 RX ADMIN — DIPHENOXYLATE HYDROCHLORIDE AND ATROPINE SULFATE SCH EA: 2.5; .025 TABLET ORAL at 21:17

## 2021-07-24 RX ADMIN — PROBIOTIC PRODUCT - TAB SCH EA: TAB at 08:36

## 2021-07-24 RX ADMIN — METOPROLOL TARTRATE SCH MG: 25 TABLET, FILM COATED ORAL at 08:38

## 2021-07-24 RX ADMIN — PROBIOTIC PRODUCT - TAB SCH EA: TAB at 21:18

## 2021-07-24 RX ADMIN — CALCIUM CARBONATE (ANTACID) CHEW TAB 500 MG PRN MG: 500 CHEW TAB at 12:11

## 2021-07-24 RX ADMIN — IPRATROPIUM BROMIDE AND ALBUTEROL SULFATE SCH ML: .5; 3 SOLUTION RESPIRATORY (INHALATION) at 07:15

## 2021-07-24 RX ADMIN — FUROSEMIDE SCH MG: 40 TABLET ORAL at 08:37

## 2021-07-24 RX ADMIN — IPRATROPIUM BROMIDE AND ALBUTEROL SULFATE SCH ML: .5; 3 SOLUTION RESPIRATORY (INHALATION) at 11:27

## 2021-07-24 RX ADMIN — LOSARTAN POTASSIUM SCH MG: 50 TABLET, FILM COATED ORAL at 08:34

## 2021-07-24 RX ADMIN — SIMETHICONE PRN MG: 80 TABLET, CHEWABLE ORAL at 14:50

## 2021-07-24 RX ADMIN — IPRATROPIUM BROMIDE AND ALBUTEROL SULFATE SCH ML: .5; 3 SOLUTION RESPIRATORY (INHALATION) at 15:18

## 2021-07-24 RX ADMIN — DIPHENOXYLATE HYDROCHLORIDE AND ATROPINE SULFATE SCH EA: 2.5; .025 TABLET ORAL at 18:02

## 2021-07-24 RX ADMIN — METOPROLOL TARTRATE SCH MG: 25 TABLET, FILM COATED ORAL at 21:17

## 2021-07-24 RX ADMIN — METRONIDAZOLE SCH MG: 500 TABLET ORAL at 21:18

## 2021-07-24 RX ADMIN — ROPINIROLE HYDROCHLORIDE SCH MG: 0.25 TABLET, FILM COATED ORAL at 21:17

## 2021-07-24 RX ADMIN — ALUMINUM HYDROXIDE, MAGNESIUM HYDROXIDE, AND SIMETHICONE PRN ML: 200; 200; 20 SUSPENSION ORAL at 18:20

## 2021-07-24 RX ADMIN — METRONIDAZOLE SCH MG: 500 TABLET ORAL at 13:21

## 2021-07-24 RX ADMIN — PROBIOTIC PRODUCT - TAB SCH EA: TAB at 18:02

## 2021-07-24 RX ADMIN — IPRATROPIUM BROMIDE AND ALBUTEROL SULFATE SCH ML: .5; 3 SOLUTION RESPIRATORY (INHALATION) at 04:03

## 2021-07-24 RX ADMIN — DIPHENOXYLATE HYDROCHLORIDE AND ATROPINE SULFATE SCH EA: 2.5; .025 TABLET ORAL at 08:37

## 2021-07-24 RX ADMIN — IPRATROPIUM BROMIDE AND ALBUTEROL SULFATE SCH ML: .5; 3 SOLUTION RESPIRATORY (INHALATION) at 19:48

## 2021-07-24 RX ADMIN — ALUMINUM HYDROXIDE, MAGNESIUM HYDROXIDE, AND SIMETHICONE PRN ML: 200; 200; 20 SUSPENSION ORAL at 12:10

## 2021-07-24 RX ADMIN — PROBIOTIC PRODUCT - TAB SCH EA: TAB at 12:10

## 2021-07-24 RX ADMIN — ROPINIROLE HYDROCHLORIDE SCH MG: 0.25 TABLET, FILM COATED ORAL at 08:37

## 2021-07-24 RX ADMIN — DIPHENOXYLATE HYDROCHLORIDE AND ATROPINE SULFATE SCH EA: 2.5; .025 TABLET ORAL at 12:10

## 2021-07-24 NOTE — IPNPDOC
Text Note


Date of Service


The patient was seen on 7/24/21.





NOTE


Subjective: Patient is a 65-year-old male who presented to the hospital with 

acute toxic respiratory failure who was found to have a possible ileus or 

partial small bowel obstruction.  Patient did well on a full liquid diet 

yesterday and is asking for a full diet today.  Patient still complaining of 

some hiccups.  Patient's breathing has been doing better.  Patient says he is 

feeling better today than he was yesterday.  Patient did not have any acute 

events overnight and is feeling otherwise well.





Review of systems:


General: Patient denies fevers


HEENT: Patient denies headaches


Cardiovascular: Patient denies chest pain


Respiratory: Patient denies shortness of breath, cough


GI: Patient reports decreased abdominal pain, nausea, vomiting, diarrhea


: Patient denies increased frequency or pain with urination


Extremities: Patient denies swelling or pain in extremities


Neurological: Patient denies numbness or tingling in legs











Physical exam:


Vitals: See below


General: Alert and oriented male patient who is sitting up in bed eating 

breakfast when I walked in the room.  Patient did not appear to be in any acute 

distress.


HEENT: Normocephalic, atraumatic, moist mucous membranes.


Neck: No lymphadenopathy or thyromegaly


Cardiac: Regular rate and rhythm, no murmurs, normal S1, normal S2


Pulm: Clear to auscultation bilaterally. No wheezes, rhonchi, rales


Abd: Nondistended, nontender to palpation, normal bowel sounds


Ext: No edema bilateral lower extremities





Labs: See below


Imaging: No new imaging has been performed








Assessment/plan:


65-year-old male with COPD, hypothyroidism, polysubstance abuse, and heart 

failure with reduced ejection fraction who presented with worsening dyspnea who 

was found to have possible small bowel obstruction versus ileus.  Patient 

abdominal exam is improved.





1.  Acute hypoxic respiratory failure.  Patient was initially on nonrebreather 

but is now on room air.  Continue to monitor.





2.  COPD exacerbation secondary to either noncompliance or smoking 

methamphetamine.  Steroids switched to p.o.  Continue breathing treatments.  

Patient finished azithromycin 3 days ago.





3.  Ileus versus partial small bowel obstruction.  This is most likely an ileus.

 General surgery saw the patient 2 days ago.  Patient was now on a regular diet.

 Patient's diarrhea is improving.





4.  Hiccups.  This is most likely secondary to the patient's gastric distention.

 Patient's hiccups have improved.





5.  Anemia.  Stabilized.  Patient did receive 2 units of blood.  I did advise 

the patient that we do need to get labs before the patient will be discharged.





6.  Heart failure with reduced ejection fraction.  Patient's ejection fraction 

was about 30% according to recent echocardiogram.  Patient is on beta-blocker 

and diuretics been restarted.  Patient is also on ARB.





7.  Coronary artery disease.  Status post CABG.  Continue losartan and aspirin.





8.  Hypertension.  Continue losartan and Lopressor.





DVT Prophylaxis: Sequential compression devices and teds





Disposition: Pending clinical improvement.  Possible discharge tomorrow





VS,Fishbone, I+O


VS, Fishbone, I+O





Vital Signs








  Date Time  Temp Pulse Resp B/P (MAP) Pulse Ox O2 Delivery O2 Flow Rate FiO2


 


7/24/21 12:00 97.4 82 18 131/66 (87) 98 Room Air  


 


7/24/21 08:40       2.0 














I&O- Last 24 Hours up to 6 AM 


 


 7/24/21





 06:00


 


Intake Total 2480 ml


 


Output Total 350 ml


 


Balance 2130 ml

















MARY NGUYEN DO               Jul 24, 2021 14:39

## 2021-07-25 VITALS — SYSTOLIC BLOOD PRESSURE: 135 MMHG | DIASTOLIC BLOOD PRESSURE: 67 MMHG

## 2021-07-25 VITALS — DIASTOLIC BLOOD PRESSURE: 67 MMHG | SYSTOLIC BLOOD PRESSURE: 135 MMHG

## 2021-07-25 LAB
BUN SERPL-MCNC: 18 MG/DL (ref 7–18)
CALCIUM SERPL-MCNC: 7.3 MG/DL (ref 8.8–10.2)
CHLORIDE SERPL-SCNC: 98 MEQ/L (ref 98–107)
CO2 SERPL-SCNC: 35 MEQ/L (ref 21–32)
CREAT SERPL-MCNC: 0.92 MG/DL (ref 0.7–1.3)
GFR SERPL CREATININE-BSD FRML MDRD: > 60 ML/MIN/{1.73_M2} (ref 49–?)
GLUCOSE SERPL-MCNC: 106 MG/DL (ref 70–100)
HCT VFR BLD AUTO: 30.1 % (ref 42–52)
HGB BLD-MCNC: 8.7 G/DL (ref 13.5–17.5)
MAGNESIUM SERPL-MCNC: 1.9 MG/DL (ref 1.8–2.4)
MCH RBC QN AUTO: 21.4 PG (ref 27–33)
MCHC RBC AUTO-ENTMCNC: 28.9 G/DL (ref 32–36.5)
MCV RBC AUTO: 74 FL (ref 80–96)
PLATELET # BLD AUTO: 299 10^3/UL (ref 150–450)
POTASSIUM SERPL-SCNC: 4.1 MEQ/L (ref 3.5–5.1)
RBC # BLD AUTO: 4.07 10^6/UL (ref 4.3–6.1)
SODIUM SERPL-SCNC: 135 MEQ/L (ref 136–145)
WBC # BLD AUTO: 11.9 10^3/UL (ref 4–10)

## 2021-07-25 RX ADMIN — ALUMINUM HYDROXIDE, MAGNESIUM HYDROXIDE, AND SIMETHICONE PRN ML: 200; 200; 20 SUSPENSION ORAL at 04:58

## 2021-07-25 RX ADMIN — CALCIUM CARBONATE (ANTACID) CHEW TAB 500 MG PRN MG: 500 CHEW TAB at 15:23

## 2021-07-25 RX ADMIN — DIPHENOXYLATE HYDROCHLORIDE AND ATROPINE SULFATE SCH EA: 2.5; .025 TABLET ORAL at 12:45

## 2021-07-25 RX ADMIN — PROBIOTIC PRODUCT - TAB SCH EA: TAB at 12:45

## 2021-07-25 RX ADMIN — ALUMINUM HYDROXIDE, MAGNESIUM HYDROXIDE, AND SIMETHICONE PRN ML: 200; 200; 20 SUSPENSION ORAL at 11:29

## 2021-07-25 RX ADMIN — DIPHENOXYLATE HYDROCHLORIDE AND ATROPINE SULFATE SCH EA: 2.5; .025 TABLET ORAL at 08:49

## 2021-07-25 RX ADMIN — IPRATROPIUM BROMIDE AND ALBUTEROL SULFATE SCH ML: .5; 3 SOLUTION RESPIRATORY (INHALATION) at 00:57

## 2021-07-25 RX ADMIN — ALUMINUM HYDROXIDE, MAGNESIUM HYDROXIDE, AND SIMETHICONE PRN ML: 200; 200; 20 SUSPENSION ORAL at 15:23

## 2021-07-25 RX ADMIN — IPRATROPIUM BROMIDE AND ALBUTEROL SULFATE SCH ML: .5; 3 SOLUTION RESPIRATORY (INHALATION) at 14:26

## 2021-07-25 RX ADMIN — IPRATROPIUM BROMIDE AND ALBUTEROL SULFATE SCH ML: .5; 3 SOLUTION RESPIRATORY (INHALATION) at 07:04

## 2021-07-25 RX ADMIN — METRONIDAZOLE SCH MG: 500 TABLET ORAL at 04:58

## 2021-07-25 RX ADMIN — ROPINIROLE HYDROCHLORIDE SCH MG: 0.25 TABLET, FILM COATED ORAL at 08:49

## 2021-07-25 RX ADMIN — METOPROLOL TARTRATE SCH MG: 25 TABLET, FILM COATED ORAL at 08:49

## 2021-07-25 RX ADMIN — ALUMINUM HYDROXIDE, MAGNESIUM HYDROXIDE, AND SIMETHICONE PRN ML: 200; 200; 20 SUSPENSION ORAL at 00:17

## 2021-07-25 RX ADMIN — IPRATROPIUM BROMIDE AND ALBUTEROL SULFATE SCH ML: .5; 3 SOLUTION RESPIRATORY (INHALATION) at 11:18

## 2021-07-25 RX ADMIN — PROBIOTIC PRODUCT - TAB SCH EA: TAB at 08:49

## 2021-07-25 RX ADMIN — ALUMINUM HYDROXIDE, MAGNESIUM HYDROXIDE, AND SIMETHICONE PRN ML: 200; 200; 20 SUSPENSION ORAL at 05:44

## 2021-07-25 RX ADMIN — METRONIDAZOLE SCH MG: 500 TABLET ORAL at 12:45

## 2021-07-25 RX ADMIN — LOSARTAN POTASSIUM SCH MG: 50 TABLET, FILM COATED ORAL at 08:49

## 2021-07-25 RX ADMIN — IPRATROPIUM BROMIDE AND ALBUTEROL SULFATE SCH ML: .5; 3 SOLUTION RESPIRATORY (INHALATION) at 04:00

## 2021-07-25 RX ADMIN — FUROSEMIDE SCH MG: 40 TABLET ORAL at 08:49

## 2021-07-25 NOTE — ECGEPIP
[facility]

                                       

                                       Test Date:    2021

Pat Name:     CHACORTA NOBLES             Department:   

Patient ID:   [id number]                 Room:         [place] [address]

Gender:       Male                     Technician:   kervin

:          1955               Requested By: MARY NGUYEN 

Order Number: [id number]     Reading MD:   Natalio Leong

                                 Measurements

Intervals                              Axis          

Rate:         87                       P:            64

NY:           154                      QRS:          -4

QRSD:         96                       T:            110

QT:           402                                    

QTc:          483                                    

                           Interpretive Statements

*** Poor data quality, interpretation may be adversely affected

Normal sinus rhythm

Inferior infarct , age undetermined

Non specific ST/T abnormality

Compared to prior tracings (3) in the system.  No remarkable changes

Electronically Signed on 2021 20:28:32 EDT by Natalio Leong

## 2021-07-25 NOTE — DS.PDOC
Discharge Summary


General


Date of Admission


Jul 17, 2021 at 08:42


Date of Discharge


7/25/2021


Primary Care Physician:  CHACORTA FAIR DO


Attending Physician:  MARY NGUYEN DO


Specialist/Consultants Involve:  Gosselin Jr,Leo J





Discharge Summary


PROCEDURES PERFORMED DURING STAY: None.





ADMITTING DIAGNOSES: 


1.  Acute hypoxic respiratory failure.


2.  COPD exacerbation


3.  Heart failure with reduced ejection fraction


4.  Coronary artery disease status post CABG


5.  Hypertension





DISCHARGE DIAGNOSES:


1.  Acute hypoxic respiratory failure, resolved.


2.  COPD exacerbation, resolved


3.  Iron deficiency anemia


4.  Ileus, resolved


5.  Diarrhea, improved


6.  Heart failure with reduced ejection fraction


7.  Coronary artery disease status post CABG


8.  Hypertension





COMPLICATIONS/CHIEF COMPLAINT: Acute Hypoxemic Respiratory Failure, Copd.





HISTORY OF PRESENT ILLNESS: Patient is a 65-year-old male who presented to the 

hospital with worsening dyspnea.  Patient has a history of COPD and 

polysubstance abuse and was sitting up tripoding gasping for air.  Patient kept 

asking for inhalers.  Patient was able to tell provider that the patient had be

en feeling short of breath for the past year.  It has been progressively 

worsening but today had been the worst.  Patient was unable to breathe.  Patient

was brought in on a nonrebreather.  Patient received IV steroids and breathing 

treatments and he was able to be weaned down to 4 L of oxygen.  Patient was 

admitted for COPD exacerbation..





HOSPITAL COURSE: Patient began complaining of abdominal pain on 7/18/2021 and a 

KUB showed heavy calcifications.  CT angio of the abdomen pelvis demonstrated 

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  Vascular surgery 

at outside facilities was contacted but there was no bed availability.  Vascular

surgery at [facility] in [place], [place] was contacted and 

explained that the SMA was still patent so would not be the cause of the 

patient's symptoms.  Patient was seen by general surgery at our facility who 

diagnosed the patient with an ileus.  NG tube has been placed and the patient 

was made n.p.o. however, patient could not tolerate the NG tube and it was 

removed.  Patient's abdomen did become less tense.  Patient initially was moved 

to a clear liquid diet then to a full liquid diet as the patient was stating he 

was very hungry.  Patient then began to have a regular diet and then began to 

have increased abdominal pain.  Patient hemoglobin was 6.8 and the patient 

received 2 units of blood and had some respiratory distress after this.  Patient

received a dose of IV Lasix which fixed the patient's respiratory distress.  

Patient was moved back to a full liquid diet and did well with this.  Patient 

was advanced to a regular diet.  Patient was having some diarrhea initially 

having 10 bowel movements on 7/22/2021.  Patient was started on Lomotil and 

fiber tablets which slowed down the patient's diarrhea.  On 7/25/2021, patient 

was feeling much better although he was still having some loose stools.  Patient

was deemed ready for discharge at this time.





DISCHARGE MEDICATIONS: Please see below.


 


ALLERGIES: Please see below.





PHYSICAL EXAMINATION ON DISCHARGE:


VITAL SIGNS: Please see below.


General: Alert and oriented male patient who was sitting up in bed when I walked

in.  Patient did not appear to be in any acute distress.


HEENT: Normocephalic, atraumatic, moist mucous membranes.


Neck: No lymphadenopathy or thyromegaly


Cardiac: Regular rate and rhythm, no murmurs, normal S1, normal S2


Pulm: Clear to auscultation bilaterally. No wheezes, rhonchi, rales


Abd: Nondistended, nontender to palpation, normal bowel sounds


Ext: No edema bilateral lower extremities





LABORATORY DATA: Please see below.





IMAGING: Chest x-ray performed on 7/17/2021 was reported to show minimal patchy 

consolidation of the right lung base.





Abdominal x-ray performed on 7/18/2021 is reported to show nonspecific gas 

pattern without definite obstruction, mass or abdominal calcification over the 

urinary tract.  Heavy vascular calcification iliac and femoral arteries.  Some 

degenerative changes spine and hips.  Stable appearance.





A CT angiogram of the abdomen pelvis performed on 7/18/2021 was reported to show

heavy vascular calcifications aorta and major branches with particular stenosis 

of the origin of the celiac axis and scattered but significant plaques along the

SMA.  Bilateral renal artery calcifications there are bilateral fairly well-

defined peripheral zones of nonenhancement in the renal cortex more suggestive 

of a developing renal infarct and pyelonephritis.  No hydronephrosis.  No 

hydroureter.  Vascular calcifications with a few nonobstructing calculi 

suggested within the kidneys.  No ureteral or bladder stones.  Stomach distended

and there is narrowing of the third portion of the duodenum as it crosses 

anterior to the aorta, deep to the SMA.  The distal third and fourth portion of 

the duodenum and proximal jejunal loops are dilated up to 5.4 cm with oral 

contrast and other loops to the mid jejunum are less dilated prominent.  Mid to 

distal jejunum and ileum are more normal in caliber.  This may reflect a focal 

ileus or partial small bowel obstruction.  There is a gradual decrease in the 

caliber of the jejunum along the left lateral abdominal wall near the iliac 

crest, no abrupt caliber change.  The colon is generally distended with stool 

and fluid through much of its course and elongated sigmoid is evident.  No oral 

contrast reaches the.  Is difficult to separate some of these bowel loops 

distally.  No gross mass but technically challenging evaluation.  Fullness of 

the pancreatic head similar to studies in 2018 and earlier with some mild 

prominence of the common bile duct and pancreatic duct but grossly unchanged.  

Mesenteric and peripancreatic nodes are again seen.  Gallbladder wall with some 

pericholecystic fluid suggested or wall thickening.  No calcified stone or mass.

 Liver and spleen are nonenlarged without focal lesion.  The spleen is decreased

in size since 2018.  No ascites on today's exam.





Chest x-ray performed on 7/18/2021 is reported to show NG tube placement with 

the tip in the gastric fundus since 7/17/2021, otherwise stable chest.





PROGNOSIS: Fair





ACTIVITY: As tolerated.





DIET: Regular





DISCHARGE PLAN: Discharge home





DISPOSITION: 01 Home, Self-Care.





DISCHARGE INSTRUCTIONS:


1.  Follow-up with your primary care provider in 3 to 5 days.


2.  Follow-up with gastroenterology for diarrhea


3.  Return to the ER if your symptoms worsen





ITEMS TO FOLLOWUP ON ON OUTPATIENT:


1.  Possible gastroenterology referral for diarrhea.





DISCHARGE CONDITION: Stable.





TIME SPENT ON DISCHARGE: 35 minutes.





Vital Signs/I&Os





Vital Signs








  Date Time  Temp Pulse Resp B/P (MAP) Pulse Ox O2 Delivery O2 Flow Rate FiO2


 


7/25/21 14:26  2 18     


 


7/25/21 12:00      Room Air  


 


7/25/21 08:49    135/67    


 


7/25/21 06:00 97.4    95   


 


7/24/21 08:40       2.0 














I&O- Last 24 Hours up to 6 AM 


 


 7/25/21





 06:00


 


Intake Total 2760 ml


 


Output Total 250 ml


 


Balance 2510 ml











Laboratory Data


Labs 24H


Laboratory Tests 2


7/24/21 19:12: 


Anion Gap 6L, Glomerular Filtration Rate > 60.0, Calcium Level 7.3L, Magnesium 

Level 1.7L, Total Creatine Kinase 44, Creatine Kinase MB 2.9, Creatine Kinase MB

Relative Index 6.59H, Troponin I 0.02


7/24/21 19:13: Nucleated Red Blood Cells % (auto) 0.0


7/25/21 06:07: 


Anion Gap 2L, Glomerular Filtration Rate > 60.0, Calcium Level 7.3L, Magnesium 

Level 1.9, Nucleated Red Blood Cells % (auto) 0.0


CBC/BMP


Laboratory Tests


7/24/21 19:12








7/24/21 19:13








7/25/21 06:07











Microbiology





Microbiology


7/22/21 Stool Occult Blood ([facility]) - Final, Complete


          


7/17/21 Blood Culture - Final, Complete


          NO GROWTH AFTER 5 DAYS


7/17/21 Blood Culture - Final, Complete


          NO GROWTH AFTER 5 DAYS


7/17/21 Respiratory Virus Panel (PCR) ([facility]) - Final, Complete





Discharge Medications


Scheduled


Atorvastatin Calcium (Atorvastatin Calcium) 40 Mg Tablet, 40 MG PO QHS, 

(Reported)


Diphenoxylate HCl/Atropine (Diphenoxylate-Atrop 2.5-0.025) 1 Each Tablet, 1 EA 

PO QID


Ferrous Sulfate (Ferrous Sulfate) 325 Mg Tablet, 325 MG PO DAILY


Furosemide (Furosemide) 40 Mg Tablet, 40 MG PO DAILY, (Reported)


L.acidoph/L.bulg/B.bif/S.therm (Ketty-Bid Caplet) 1 Each Tablet, 1 EA PO QID


Losartan/Hydrochlorothiazide (Losartan-Hctz 100-12.5 mg Tab) 1 Each Tablet, 1 

TAB PO DAILY, (Reported)


Ropinirole HCl (Ropinirole HCl) 0.5 Mg Tablet, 0.5 MG PO BID, (Reported)





Scheduled PRN


Albuterol Sulf (Albuterol Sulfate) 2.5 Mg/3 Ml Vial.neb, 2.5 MG INH TID PRN for 

SHORTNESS OF BREATH, (Reported)


Albuterol Sulfate (Ventolin Hfa) 108 Mcg/Act Aer, 2 PUFFS INH Q4H PRN for 

SHORTNESS OF BREATH, (Reported)


Ipratropium Bromide (Atrovent Hfa) 12.9 Gm Hfa.aer.ad, 2 PUFF INH Q6H PRN for 

SHORTNESS OF BREATH, (Reported)





Miscellaneous Medications


[Patient Comment]  , (Reported)


   MED LIST OBTAINED FROM EXT MED HX 





Allergies


Coded Allergies:  


     amoxicillin (Verified  Allergy, Unknown, rash, 9/30/19)


     clavulanic acid (Verified  Allergy, Unknown, rash, 9/30/19)


     pregabalin (Verified  Adverse Reaction, Unknown, seizures, 9/30/19)


     warfarin (Verified  Adverse Reaction, Unknown, bleeding, 9/30/19)











MARY NGUYEN DO               Jul 25, 2021 18:45

## 2021-07-29 ENCOUNTER — HOSPITAL ENCOUNTER (INPATIENT)
Dept: HOSPITAL 53 - M ED | Age: 66
LOS: 6 days | Discharge: HOME HEALTH | DRG: 871 | End: 2021-08-04
Attending: GENERAL PRACTICE | Admitting: FAMILY MEDICINE
Payer: MEDICARE

## 2021-07-29 VITALS — WEIGHT: 127.43 LBS | HEIGHT: 66 IN | BODY MASS INDEX: 20.48 KG/M2

## 2021-07-29 DIAGNOSIS — K86.81: ICD-10-CM

## 2021-07-29 DIAGNOSIS — Z91.19: ICD-10-CM

## 2021-07-29 DIAGNOSIS — I11.0: ICD-10-CM

## 2021-07-29 DIAGNOSIS — I48.92: ICD-10-CM

## 2021-07-29 DIAGNOSIS — I50.22: ICD-10-CM

## 2021-07-29 DIAGNOSIS — Z72.3: ICD-10-CM

## 2021-07-29 DIAGNOSIS — D64.9: ICD-10-CM

## 2021-07-29 DIAGNOSIS — Z79.899: ICD-10-CM

## 2021-07-29 DIAGNOSIS — Z88.1: ICD-10-CM

## 2021-07-29 DIAGNOSIS — A41.9: Primary | ICD-10-CM

## 2021-07-29 DIAGNOSIS — Z20.822: ICD-10-CM

## 2021-07-29 DIAGNOSIS — E03.9: ICD-10-CM

## 2021-07-29 DIAGNOSIS — J44.1: ICD-10-CM

## 2021-07-29 DIAGNOSIS — Z79.82: ICD-10-CM

## 2021-07-29 DIAGNOSIS — Z88.8: ICD-10-CM

## 2021-07-29 DIAGNOSIS — J96.01: ICD-10-CM

## 2021-07-29 DIAGNOSIS — R65.20: ICD-10-CM

## 2021-07-29 DIAGNOSIS — E87.0: ICD-10-CM

## 2021-07-29 DIAGNOSIS — E87.5: ICD-10-CM

## 2021-07-29 DIAGNOSIS — I25.10: ICD-10-CM

## 2021-07-29 DIAGNOSIS — J18.9: ICD-10-CM

## 2021-07-29 DIAGNOSIS — N17.9: ICD-10-CM

## 2021-07-29 LAB
ALBUMIN SERPL BCG-MCNC: 2.8 GM/DL (ref 3.2–5.2)
ALT SERPL W P-5'-P-CCNC: 26 U/L (ref 12–78)
BASOPHILS # BLD AUTO: 0.1 10^3/UL (ref 0–0.2)
BASOPHILS NFR BLD AUTO: 0.2 % (ref 0–1)
BILIRUB CONJ SERPL-MCNC: 0.1 MG/DL (ref 0–0.2)
BILIRUB SERPL-MCNC: 0.2 MG/DL (ref 0.2–1)
BUN SERPL-MCNC: 16 MG/DL (ref 7–18)
CALCIUM SERPL-MCNC: 8.5 MG/DL (ref 8.8–10.2)
CHLORIDE SERPL-SCNC: 125 MEQ/L (ref 98–107)
CK MB CFR.DF SERPL CALC: 11.26
CK MB SERPL-MCNC: 13.4 NG/ML (ref ?–3.6)
CK SERPL-CCNC: 119 U/L (ref 39–308)
CO2 SERPL-SCNC: 26 MEQ/L (ref 21–32)
CREAT SERPL-MCNC: 1.76 MG/DL (ref 0.7–1.3)
EOSINOPHIL # BLD AUTO: 0.2 10^3/UL (ref 0–0.5)
EOSINOPHIL NFR BLD AUTO: 0.8 % (ref 0–3)
GFR SERPL CREATININE-BSD FRML MDRD: 41.4 ML/MIN/{1.73_M2} (ref 49–?)
GLUCOSE SERPL-MCNC: 84 MG/DL (ref 70–100)
HCT VFR BLD AUTO: 35.6 % (ref 42–52)
HGB BLD-MCNC: 9.8 G/DL (ref 13.5–17.5)
LIPASE SERPL-CCNC: 42 U/L (ref 73–393)
LYMPHOCYTES # BLD AUTO: 1.1 10^3/UL (ref 1.5–5)
LYMPHOCYTES NFR BLD AUTO: 4.3 % (ref 24–44)
MCH RBC QN AUTO: 21.5 PG (ref 27–33)
MCHC RBC AUTO-ENTMCNC: 27.5 G/DL (ref 32–36.5)
MCV RBC AUTO: 78.1 FL (ref 80–96)
MONOCYTES # BLD AUTO: 1.6 10^3/UL (ref 0–0.8)
MONOCYTES NFR BLD AUTO: 6.1 % (ref 2–8)
NEUTROPHILS # BLD AUTO: 23.2 10^3/UL (ref 1.5–8.5)
NEUTROPHILS NFR BLD AUTO: 88 % (ref 36–66)
PLATELET # BLD AUTO: 381 10^3/UL (ref 150–450)
POTASSIUM SERPL-SCNC: 4.6 MEQ/L (ref 3.5–5.1)
PROT SERPL-MCNC: 5.9 GM/DL (ref 6.4–8.2)
RBC # BLD AUTO: 4.56 10^6/UL (ref 4.3–6.1)
SODIUM SERPL-SCNC: 158 MEQ/L (ref 136–145)
TROPONIN I SERPL-MCNC: 0.39 NG/ML (ref ?–0.1)
WBC # BLD AUTO: 26.3 10^3/UL (ref 4–10)

## 2021-07-29 RX ADMIN — BUDESONIDE SCH MG: 0.5 INHALANT RESPIRATORY (INHALATION) at 20:00

## 2021-07-29 NOTE — REPVR
PROCEDURE INFORMATION: 

Exam: XR Chest 

Exam date and time: 7/29/2021 8:08 PM 

Age: 65 years old 

Clinical indication: Other: SOB 



TECHNIQUE: 

Imaging protocol: XR of the chest. 

Views: 1 view. 



COMPARISON: 

CR PORTABLE CHEST X-RAY 7/18/2021 10:34 PM 



FINDINGS: 

Lungs: Airspace infiltrate in the right upper lobe. Lungs are otherwise clear. 

Pleural spaces: No pleural effusion. No pneumothorax. 

Heart/Mediastinum: Mild cardiomegaly. 

Bones/joints: Skeletal degenerative changes are noted. Previous sternotomy. 

Healed right clavicle and rib fractures. 



IMPRESSION: 

1. Right upper lobe pneumonia. 

2. Cardiomegaly. 



Electronically signed by: Mayur Tabor On 07/29/2021  21:59:43 PM

## 2021-07-30 VITALS — SYSTOLIC BLOOD PRESSURE: 122 MMHG | DIASTOLIC BLOOD PRESSURE: 78 MMHG

## 2021-07-30 VITALS — SYSTOLIC BLOOD PRESSURE: 90 MMHG | DIASTOLIC BLOOD PRESSURE: 53 MMHG

## 2021-07-30 VITALS — DIASTOLIC BLOOD PRESSURE: 59 MMHG | SYSTOLIC BLOOD PRESSURE: 123 MMHG

## 2021-07-30 VITALS — SYSTOLIC BLOOD PRESSURE: 117 MMHG | DIASTOLIC BLOOD PRESSURE: 71 MMHG

## 2021-07-30 VITALS — DIASTOLIC BLOOD PRESSURE: 56 MMHG | SYSTOLIC BLOOD PRESSURE: 112 MMHG

## 2021-07-30 VITALS — DIASTOLIC BLOOD PRESSURE: 73 MMHG | SYSTOLIC BLOOD PRESSURE: 137 MMHG

## 2021-07-30 VITALS — SYSTOLIC BLOOD PRESSURE: 118 MMHG | DIASTOLIC BLOOD PRESSURE: 58 MMHG

## 2021-07-30 VITALS — SYSTOLIC BLOOD PRESSURE: 124 MMHG | DIASTOLIC BLOOD PRESSURE: 61 MMHG

## 2021-07-30 VITALS — DIASTOLIC BLOOD PRESSURE: 63 MMHG | SYSTOLIC BLOOD PRESSURE: 98 MMHG

## 2021-07-30 VITALS — DIASTOLIC BLOOD PRESSURE: 58 MMHG | SYSTOLIC BLOOD PRESSURE: 126 MMHG

## 2021-07-30 VITALS — SYSTOLIC BLOOD PRESSURE: 99 MMHG | DIASTOLIC BLOOD PRESSURE: 56 MMHG

## 2021-07-30 VITALS — DIASTOLIC BLOOD PRESSURE: 77 MMHG | SYSTOLIC BLOOD PRESSURE: 119 MMHG

## 2021-07-30 VITALS — SYSTOLIC BLOOD PRESSURE: 148 MMHG | DIASTOLIC BLOOD PRESSURE: 73 MMHG

## 2021-07-30 VITALS — DIASTOLIC BLOOD PRESSURE: 61 MMHG | SYSTOLIC BLOOD PRESSURE: 116 MMHG

## 2021-07-30 VITALS — SYSTOLIC BLOOD PRESSURE: 110 MMHG | DIASTOLIC BLOOD PRESSURE: 54 MMHG

## 2021-07-30 VITALS — DIASTOLIC BLOOD PRESSURE: 73 MMHG | SYSTOLIC BLOOD PRESSURE: 108 MMHG

## 2021-07-30 VITALS — SYSTOLIC BLOOD PRESSURE: 131 MMHG | DIASTOLIC BLOOD PRESSURE: 66 MMHG

## 2021-07-30 VITALS — SYSTOLIC BLOOD PRESSURE: 93 MMHG | DIASTOLIC BLOOD PRESSURE: 55 MMHG

## 2021-07-30 VITALS — DIASTOLIC BLOOD PRESSURE: 60 MMHG | SYSTOLIC BLOOD PRESSURE: 122 MMHG

## 2021-07-30 VITALS — DIASTOLIC BLOOD PRESSURE: 52 MMHG | SYSTOLIC BLOOD PRESSURE: 86 MMHG

## 2021-07-30 VITALS — SYSTOLIC BLOOD PRESSURE: 115 MMHG | DIASTOLIC BLOOD PRESSURE: 85 MMHG

## 2021-07-30 VITALS — SYSTOLIC BLOOD PRESSURE: 101 MMHG | DIASTOLIC BLOOD PRESSURE: 53 MMHG

## 2021-07-30 VITALS — SYSTOLIC BLOOD PRESSURE: 122 MMHG | DIASTOLIC BLOOD PRESSURE: 58 MMHG

## 2021-07-30 VITALS — SYSTOLIC BLOOD PRESSURE: 97 MMHG | DIASTOLIC BLOOD PRESSURE: 58 MMHG

## 2021-07-30 VITALS — SYSTOLIC BLOOD PRESSURE: 96 MMHG | DIASTOLIC BLOOD PRESSURE: 55 MMHG

## 2021-07-30 VITALS — DIASTOLIC BLOOD PRESSURE: 54 MMHG | SYSTOLIC BLOOD PRESSURE: 89 MMHG

## 2021-07-30 VITALS — DIASTOLIC BLOOD PRESSURE: 70 MMHG | SYSTOLIC BLOOD PRESSURE: 133 MMHG

## 2021-07-30 VITALS — SYSTOLIC BLOOD PRESSURE: 89 MMHG | DIASTOLIC BLOOD PRESSURE: 50 MMHG

## 2021-07-30 VITALS — SYSTOLIC BLOOD PRESSURE: 106 MMHG | DIASTOLIC BLOOD PRESSURE: 69 MMHG

## 2021-07-30 VITALS — SYSTOLIC BLOOD PRESSURE: 121 MMHG | DIASTOLIC BLOOD PRESSURE: 67 MMHG

## 2021-07-30 LAB
ALBUMIN SERPL BCG-MCNC: 2.2 GM/DL (ref 3.2–5.2)
ALT SERPL W P-5'-P-CCNC: 21 U/L (ref 12–78)
BASOPHILS # BLD AUTO: 0 10^3/UL (ref 0–0.2)
BASOPHILS NFR BLD AUTO: 0.1 % (ref 0–1)
BILIRUB SERPL-MCNC: 0.2 MG/DL (ref 0.2–1)
BUN SERPL-MCNC: 16 MG/DL (ref 7–18)
BUN SERPL-MCNC: 18 MG/DL (ref 7–18)
BUN SERPL-MCNC: 21 MG/DL (ref 7–18)
CALCIUM SERPL-MCNC: 7 MG/DL (ref 8.8–10.2)
CALCIUM SERPL-MCNC: 7.3 MG/DL (ref 8.8–10.2)
CALCIUM SERPL-MCNC: 7.8 MG/DL (ref 8.8–10.2)
CHLORIDE SERPL-SCNC: 115 MEQ/L (ref 98–107)
CHLORIDE SERPL-SCNC: 121 MEQ/L (ref 98–107)
CHLORIDE SERPL-SCNC: 121 MEQ/L (ref 98–107)
CK MB CFR.DF SERPL CALC: 10
CK MB SERPL-MCNC: 10.6 NG/ML (ref ?–3.6)
CK SERPL-CCNC: 106 U/L (ref 39–308)
CO2 SERPL-SCNC: 25 MEQ/L (ref 21–32)
CO2 SERPL-SCNC: 25 MEQ/L (ref 21–32)
CO2 SERPL-SCNC: 26 MEQ/L (ref 21–32)
CREAT SERPL-MCNC: 1.59 MG/DL (ref 0.7–1.3)
CREAT SERPL-MCNC: 1.7 MG/DL (ref 0.7–1.3)
CREAT SERPL-MCNC: 1.72 MG/DL (ref 0.7–1.3)
EOSINOPHIL # BLD AUTO: 0 10^3/UL (ref 0–0.5)
EOSINOPHIL NFR BLD AUTO: 0 % (ref 0–3)
GFR SERPL CREATININE-BSD FRML MDRD: 42.6 ML/MIN/{1.73_M2} (ref 49–?)
GFR SERPL CREATININE-BSD FRML MDRD: 43.1 ML/MIN/{1.73_M2} (ref 49–?)
GFR SERPL CREATININE-BSD FRML MDRD: 46.6 ML/MIN/{1.73_M2} (ref 49–?)
GLUCOSE SERPL-MCNC: 126 MG/DL (ref 70–100)
GLUCOSE SERPL-MCNC: 155 MG/DL (ref 70–100)
GLUCOSE SERPL-MCNC: 175 MG/DL (ref 70–100)
HCT VFR BLD AUTO: 29.7 % (ref 42–52)
HGB BLD-MCNC: 8.4 G/DL (ref 13.5–17.5)
LYMPHOCYTES # BLD AUTO: 0.4 10^3/UL (ref 1.5–5)
LYMPHOCYTES NFR BLD AUTO: 1.1 % (ref 24–44)
MAGNESIUM SERPL-MCNC: 1.6 MG/DL (ref 1.8–2.4)
MCH RBC QN AUTO: 21.6 PG (ref 27–33)
MCHC RBC AUTO-ENTMCNC: 28.3 G/DL (ref 32–36.5)
MCV RBC AUTO: 76.3 FL (ref 80–96)
MONOCYTES # BLD AUTO: 0.2 10^3/UL (ref 0–0.8)
MONOCYTES NFR BLD AUTO: 0.8 % (ref 2–8)
NEUTROPHILS # BLD AUTO: 29.8 10^3/UL (ref 1.5–8.5)
NEUTROPHILS NFR BLD AUTO: 97 % (ref 36–66)
PLATELET # BLD AUTO: 306 10^3/UL (ref 150–450)
POTASSIUM SERPL-SCNC: 4.7 MEQ/L (ref 3.5–5.1)
POTASSIUM SERPL-SCNC: 5.2 MEQ/L (ref 3.5–5.1)
POTASSIUM SERPL-SCNC: 5.2 MEQ/L (ref 3.5–5.1)
PROT SERPL-MCNC: 5.1 GM/DL (ref 6.4–8.2)
RBC # BLD AUTO: 3.89 10^6/UL (ref 4.3–6.1)
SODIUM SERPL-SCNC: 147 MEQ/L (ref 136–145)
SODIUM SERPL-SCNC: 151 MEQ/L (ref 136–145)
SODIUM SERPL-SCNC: 155 MEQ/L (ref 136–145)
TROPONIN I SERPL-MCNC: 0.35 NG/ML (ref ?–0.1)
WBC # BLD AUTO: 30.8 10^3/UL (ref 4–10)

## 2021-07-30 PROCEDURE — 02HV33Z INSERTION OF INFUSION DEVICE INTO SUPERIOR VENA CAVA, PERCUTANEOUS APPROACH: ICD-10-PCS

## 2021-07-30 RX ADMIN — METOPROLOL TARTRATE SCH MG: 5 INJECTION INTRAVENOUS at 00:46

## 2021-07-30 RX ADMIN — IPRATROPIUM BROMIDE SCH MG: 0.5 SOLUTION RESPIRATORY (INHALATION) at 07:56

## 2021-07-30 RX ADMIN — METOPROLOL TARTRATE SCH MG: 5 INJECTION INTRAVENOUS at 00:50

## 2021-07-30 RX ADMIN — METOPROLOL TARTRATE SCH MG: 25 TABLET, FILM COATED ORAL at 15:10

## 2021-07-30 RX ADMIN — BUDESONIDE SCH MG: 0.5 INHALANT RESPIRATORY (INHALATION) at 07:56

## 2021-07-30 RX ADMIN — MEROPENEM AND SODIUM CHLORIDE SCH MLS/HR: 1 INJECTION, SOLUTION INTRAVENOUS at 17:07

## 2021-07-30 RX ADMIN — LEVALBUTEROL HYDROCHLORIDE SCH MG: 1.25 SOLUTION, CONCENTRATE RESPIRATORY (INHALATION) at 07:56

## 2021-07-30 RX ADMIN — METOPROLOL TARTRATE SCH MG: 25 TABLET, FILM COATED ORAL at 21:51

## 2021-07-30 RX ADMIN — METHYLPREDNISOLONE SODIUM SUCCINATE SCH MG: 40 INJECTION, POWDER, FOR SOLUTION INTRAMUSCULAR; INTRAVENOUS at 09:38

## 2021-07-30 RX ADMIN — BUDESONIDE SCH MG: 0.5 INHALANT RESPIRATORY (INHALATION) at 19:40

## 2021-07-30 RX ADMIN — IPRATROPIUM BROMIDE SCH MG: 0.5 SOLUTION RESPIRATORY (INHALATION) at 14:05

## 2021-07-30 RX ADMIN — SODIUM CHLORIDE SCH UNITS: 4.5 INJECTION, SOLUTION INTRAVENOUS at 06:00

## 2021-07-30 RX ADMIN — METOPROLOL TARTRATE SCH MG: 5 INJECTION INTRAVENOUS at 00:54

## 2021-07-30 RX ADMIN — LEVALBUTEROL HYDROCHLORIDE SCH MG: 1.25 SOLUTION, CONCENTRATE RESPIRATORY (INHALATION) at 02:16

## 2021-07-30 RX ADMIN — LEVALBUTEROL HYDROCHLORIDE SCH MG: 1.25 SOLUTION, CONCENTRATE RESPIRATORY (INHALATION) at 14:05

## 2021-07-30 RX ADMIN — DEXTROSE MONOHYDRATE SCH MLS/HR: 50 INJECTION, SOLUTION INTRAVENOUS at 20:21

## 2021-07-30 RX ADMIN — DEXTROSE MONOHYDRATE SCH MLS/HR: 50 INJECTION, SOLUTION INTRAVENOUS at 15:20

## 2021-07-30 RX ADMIN — LEVALBUTEROL HYDROCHLORIDE SCH MG: 1.25 SOLUTION, CONCENTRATE RESPIRATORY (INHALATION) at 19:41

## 2021-07-30 RX ADMIN — IPRATROPIUM BROMIDE SCH MG: 0.5 SOLUTION RESPIRATORY (INHALATION) at 19:40

## 2021-07-30 RX ADMIN — DEXTROSE MONOHYDRATE SCH MLS/HR: 50 INJECTION, SOLUTION INTRAVENOUS at 17:07

## 2021-07-30 RX ADMIN — METHYLPREDNISOLONE SODIUM SUCCINATE SCH MG: 40 INJECTION, POWDER, FOR SOLUTION INTRAMUSCULAR; INTRAVENOUS at 17:07

## 2021-07-30 RX ADMIN — DEXTROSE MONOHYDRATE SCH MLS/HR: 50 INJECTION, SOLUTION INTRAVENOUS at 09:38

## 2021-07-30 RX ADMIN — IPRATROPIUM BROMIDE SCH MG: 0.5 SOLUTION RESPIRATORY (INHALATION) at 02:16

## 2021-07-30 RX ADMIN — SODIUM CHLORIDE SCH UNITS: 4.5 INJECTION, SOLUTION INTRAVENOUS at 14:30

## 2021-07-30 NOTE — ECGEPIP
[facility] - ED

                                       

                                       Test Date:    2021

Pat Name:     CHACORTA NOBLES             Department:   

Patient ID:   [id number]                 Room:         -

Gender:       Male                     Technician:   HENRY

:          1955               Requested By: Edd ESTES

Order Number: [id number]     Reading MD:   Edd Contreras

                                 Measurements

Intervals                              Axis          

Rate:         158                      P:            

GA:                                    QRS:          64

QRSD:         84                       T:            254

QT:           298                                    

QTc:          483                                    

                           Interpretive Statements

Supraventricular tachycardia

Left ventricular hypertrophy with repolarization abnormality ( Sokolow-Quiroga ,

Romhilt-

Acuna )

Electronically Signed on 2021 4:34:25 EDT by Edd Contreras

## 2021-07-30 NOTE — IPNPDOC
Subjective


Date Seen


The patient was seen on 7/30/21.





Subjective


Chief Complaint/HPI


Transfer to the ICU: I was called urgently to the ICU this morning to assess Mr. Roberts. Please consider this portion of my note as the documentation for 

transfer to a higher level of care. Nursing was concerned that his BP is low 

(SBP in the 80-90s) and his HR is elevated (150-160s). They also noted that his 

skin is pale in color. Several minutes later, when I arrived on the floor his BP

was 86/52 and his heart rate was 160. An EKG was done and it showed atrial 

flutter with a 2:1 conduction. Dr. Leong, the consulting cardiologist, had also 

been contacted by the nursing staff. He recommended that we treat the 

hypotension first and wait on the tachycardia; he asked that the amiodarone be 

held. He suggested we start with a 250 cc bolus of NS and reassess. He stated 

that he would follow-up shortly on this. I will defer to him on these issues at 

this time. 





Nursing is very concerned about his vascular access. He currently has a 22 audrey 

in his R forearm, but they feel that it is a little tenuous. A PICC has already 

been ordered for him, but IR won't be able to get it in until about 10:30 (2 h 

from now). I asked them to use the peripheral that they currently have in and 

baby it a little. If we can get it to hold out for the next couple of hours that

would be best. If it fails, then I will see about consulting someone for a 

central line. 





The patient does not initially respond to verbal stimuli, but does respond to 

gentle tactile stimuli. When he does his verbal responses are appropriate. He is

asking for food. This is not a good idea at this time. He asked for liquids; 

nursing reported that he choked on water earlier in the day and a speech/bedside

swallow evaluation has been ordered. I agreed that he could try some ice chips 

to keep his mouth moist. They will continue to monitor him for possible 

aspiration.





His condition is very guarded and on the edge of unstable at this time.


General:  Reports: ROS Unobtainable





Objective


Physical Examination


General Exam:  Positive: Mild Distress (laying curled up in the hospital bed), 

Other (he appears at least 20 years older than his stated age); 


   Negative: Alert, Cooperative (mostly he perseverates on asking me if he can 

have a liquid diet)


Eye Exam:  Positive: PERRLA, Conjunctiva & lids normal; 


   Negative: Sclera icteric


ENT Exam:  Positive: Atraumatic; 


   Negative: Mucous membr. moist/pink (moderately dry)


Neck Exam:  Positive: Supple; 


   Negative: Lymphadenopathy


Chest Exam:  Positive: Rales (scant crackles at the right base), Diminished


Heart Exam:  Positive: Tachycardic, Regular Rhythm


Telemetry:  Positive: Tachycardia (150-160s on the monitor)


Abdomen Exam:  Positive: BS Hypoactive, Soft; 


   Negative: Tenderness


Male  Exam:  Negative: Normal Genital Exam (he has a moderately tight phimosis

with some scarring on the ventral side of the penis so that the urethral meatus 

can not be visualized)


Extremity Exam:  Negative: Clubbing, Edema


Neuro Exam:  Negative: Normal Speech (non-sequitur responses to questions), 

Normal Tone (contracted and slightly hypertoic at this time)


Psych Exam:  Negative: Mental status NL, Memory Intact, Oriented x 3





Assessment /Plan


Problems





(1) Sepsis


Status:  Acute


Response to Treatment:  Uncompensated


Problem Text:  Blood cultures still pending. On broad spectrum antibiotics at 

this time. He has significant and life-threatening hypotension at this time. 

Vascular access is very poor. Pressors really aren't a consideration until we 

have better access. We have 1 22 audrey IV that is tenuous. A PICC has been 

requested, but it will be at least 2 hours before it can be done. We can not 

bolus the usual 30mg/kg volume, though I'd like to consider it. For now he is 

being bolused with 250ml of hypotonic solution (to see if we can carefully bring

his sodium back into line). We will need to monitor carefully. 





(2) Pneumonia


Status:  Acute


Problem Text:  Probably aspiration related. He is currently treated with 

ceftriaxone and doxycycline. He is to be kept NPO until S/T can evaluate him and

determine if a diet is safe or not.





(3) Acute hypoxemic respiratory failure


Status:  Acute


Problem Text:  He has been receiving steroids and his oxygen saturations are 

reasonable if not great at this time. Will continue to monitor for now.





(4) Atrial flutter


Status:  Acute


Response to Treatment:  Uncontrolled


Discussed With:  Nurse


Problem Specific Plan:  Consult Specialist


Problem Text:  Currently he has an atrial flutter with 2:1 transmission. He is 

being seen and this portion of his care is being managed by cardiology. They 

have tried digoxin so far and are considering amiodarone. I appreciate cardio's 

input and will defer this portion of his care to them. 





(5) Chronic HFrEF (heart failure with reduced ejection fraction)


Status:  Chronic


Response to Treatment:  Compensated


Problem Text:  Currently he is compensated clinically, but this may only be 

because he is to dry and there isn't much to try to pump. I am worried that if 

we are not extremely careful as we rehydrate him he may go back into 

decompensated heart failure. His fluids are currently being co-managed by 

cardiology and nephrology, so I will stay out of this as to not have "too many 

cooks in the kitchen", but I am carefully monitoring his fluid status and 

evaluating for signs of fluid overload.





(6) Dehydration with hypernatremia


Response to Treatment:  Uncontrolled


Problem Text:  His sodium is 155 and this has actually come down a little from 

158. He is very volume contracted and has a significant free water deficit. We 

are working to carefully correct this while not blow his only peripheral IV and 

also not load him up so fast that his heart can't keep up and he goes back in to

heart failure. We will be monitoring very carefully. 





(7) CAD (coronary artery disease)


Status:  Chronic


Problem Text:  His recent Tpn I have been mildly elevated. This is likely 

related to the demand ischemia from sustaining a tachyarrythmia in the setting 

of severe dehydration rather than due to occlusion of a coronary vessel, 

however, we will need to continue to monitor this carefully. 





(8) COPD (chronic obstructive pulmonary disease)


Status:  Chronic


Response to Treatment:  Stable


Problem Text:  His breathing is actually pretty good right now. He was hypoxic 

when he first presented, but retention of CO2 and hypercarbia do not seem to be 

a problem at this time. Will need to continue to monitor carefully. 





(9) HTN (hypertension)


Problem Text:  He is actually dangerously hypotensive at this time. Obviously 

all his antihypertensives have been held along with all of his oral medications 

at this time. We will continue to monitor his BP carefully. 





(10) Severe muscle deconditioning


Problem Text:  He is very weak appearing and emaciated. He needs significant 

bulking and then reconditioning before he will be safe to live on his own again.










Plan/VTE


VTE Prophylaxis Ordered?:  Yes (TEDS and sequentials)





VS, I&O, 24H, Fishbone


Vital Signs/I&O





Vital Signs








  Date Time  Temp Pulse Resp B/P (MAP) Pulse Ox O2 Delivery O2 Flow Rate FiO2


 


7/30/21 06:23  160      


 


7/30/21 05:39    93/55 (68)    


 


7/30/21 05:03     99   


 


7/30/21 04:57 99.1  26     


 


7/30/21 02:25      Nasal Cannula 3.0 














I&O- Last 24 Hours up to 6 AM 


 


 7/30/21





 06:00


 


Intake Total 920 ml


 


Output Total 825 ml


 


Balance 95 ml











Laboratory Data


24H LABS


Laboratory Tests 2


7/29/21 20:46: Lactic Acid Level 2.3*H


7/29/21 20:54: 


POC pH (Misc Panel) 7.394, POC Base Excess (Misc Panel) -1.0, POC Saturated Per

cent O2 (Misc) 99H, POC pO2 (Misc Panel) 131.0H, POC pCO2 (Misc Panel) 38.5, POC

HCO3 (Misc Panel) 23.5, POC Total CO2 (Misc Panel) 25.0


7/29/21 21:43: 


Immature Granulocyte % (Auto) 0.6, Neutrophils (%) (Auto) 88.0H, Lymphocytes (%)

(Auto) 4.3L, Monocytes (%) (Auto) 6.1, Eosinophils (%) (Auto) 0.8, Basophils (%)

(Auto) 0.2, Neutrophils # (Auto) 23.2H, Lymphocytes # (Auto) 1.1L, Monocytes # 

(Auto) 1.6H, Eosinophils # (Auto) 0.2, Basophils # (Auto) 0.1, Nucleated Red 

Blood Cells % (auto) 0.0, Anion Gap 7L, Glomerular Filtration Rate 41.4L, 

Calcium Level 8.5L, Total Bilirubin 0.2, Direct Bilirubin 0.1, Aspartate Amino 

Transf (AST/SGOT) 22, Alanine Aminotransferase (ALT/SGPT) 26, Alkaline 

Phosphatase 132H, Total Creatine Kinase 119, Creatine Kinase MB 13.4H, Creatine 

Kinase MB Relative Index 11.26H, Troponin I 0.39H, NT-Pro-B-Type Natriuretic 

Peptide 07161P, Total Protein 5.9L, Albumin 2.8L, Albumin/Globulin Ratio 0.9, 

Lipase 42L


7/29/21 23:49: 


Total Creatine Kinase 106, Creatine Kinase MB 10.6H, Creatine Kinase MB Relative

Index 10.00H, Troponin I 0.35H


7/30/21 04:19: 


Anion Gap 9, Glomerular Filtration Rate 42.6L, Calcium Level 7.8L


7/30/21 04:36: Lactic Acid Followup at 4 Hours 4.3*H


CBC/BMP


Laboratory Tests


7/29/21 21:43








7/30/21 04:19








Microbiology





Microbiology


7/29/21 Blood Culture, Received


          Pending


7/29/21 Blood Culture, Received


          Pending


7/29/21 Respiratory Virus Panel (PCR) ([facility]) - Final, Complete











Tra Banks MD             Jul 30, 2021 08:30

## 2021-07-30 NOTE — REP
INDICATION:

Poor venous access.



COMPARISON:

None.



TECHNIQUE:

The procedure was performed under the direct supervision of Dr. Radford.



The risks and benefits of the procedure were explained to the patient and informed

consent was obtained.



The procedure was performed in the ICU at the bedside.



The right brachial vein was localized using ultrasound guidance.  The skin was prepped

and draped in a sterile fashion.  1 mL of 1% lidocaine was used as a local anesthetic.

Using ultrasound guidance the brachial vein was cannulated and a 0.018 guidewire was

inserted.  The needle was removed and a 5 Faroese dilator and peel-away sheath was

inserted over the guide wire.  A 5 Faroese dual lumen catheter was cut to length of 38

cm.  The dilator was removed and the catheter was inserted over the guide wire.  A

portable chest x-ray was performed and the image demonstrates the tip of the catheter

to be in the SVC..  The peel-away sheath was removed and the catheter was flushed with

heparinized saline as per Hospital protocol.  The catheter was affixed to the skin and

a sterile dressing was applied.



Estimated blood loss: Less than 1 mL





The patient tolerated the procedure well and there were no immediate complications.



FINDINGS:

None



IMPRESSION:

PICC line insertion right brachial vein with the tip ending in the SVC.



<Electronically signed by Raciel Cole > 07/30/21 1838

<Electronically signed by Emmett Radford > 07/30/21 1924

## 2021-07-30 NOTE — HPEPDOC
General


Date of Admission


21


Date of Service:  2021


Chief Complaint


The patient is a 65-year-old male admitted with a reason for visit of SOB.


Source:  Patient


Exam Limitations:  Clinical conditions


Timing/Duration:  24 hours


Severity:  Severe


Associated Symptoms:  Chest Pain





History of Present Illness


Rodney Roberts is a 65-year-old white male with significant history of COPD, h

ypothyroidism, polysubstance abuse and CHF who is well-known to service and who 

has a history of being noncompliant.  Patient admits to ER after wellness check 

from police who reportedly found him disheveled at home.  Patient hypoxic during

admission SPO2 as low as 80s on room air.  He had improved saturation during 

exam to mid 90s on 3 L nasal cannula.  Patient reports that he does not 

typically have oxygen at home.  Patient examination limited as he reports that 

he needs a breathing treatment.  Patient audibly wheezing and noticeably 

disheveled/emaciated.  Respiratory rate 24.  Patient endorses chest pain that 

began with coughing and shortness of breath in the ER.  He reports a history of 

heart surgery in .  Pt denies khan, sinus congestion, sore throat, n/v/d, 

abdominal pain, sensory changes or syncope.  He does report that it is hard for 

him to get up and move around home because he feels so weak.  Patient also 

details that he has been having trouble swallowing that he gets "choked up" 

anytime he eats or drinks.  Patient was recently treated  for acute 

respiratory failure and COPD and also back in April for pneumonia.  He reports 

that he does not know his home medications but he has been taking them.  He 

describes that he became worse today and there was a prompt for a wellness check

but he does not detail the timing of this or why.  Of note, patient with rapid 

heart rate heart rate sustaining in the 160s.  EKG questionable SVT versus sinus

tach.  Chest x-ray shows right upper lobe infiltrate and cardiomegaly.  BNP over

19,000.  Respiratory panel pending. Patient will be admitted for further 

evaluation of presenting concerns and diuresis.





Home Medications


Scheduled


Atorvastatin Calcium (Atorvastatin Calcium) 40 Mg Tablet, 40 MG PO QHS, 

(Reported)


Diphenoxylate HCl/Atropine (Diphenoxylate-Atrop 2.5-0.025) 1 Each Tablet, 1 TAB 

PO QID, (Reported)


Ferrous Sulfate (Ferrous Sulfate) 325 Mg Tablet, 325 MG PO DAILY, (Reported)


Furosemide (Furosemide) 40 Mg Tablet, 40 MG PO DAILY, (Reported)


Ipratropium Bromide (Atrovent Hfa) 12.9 Gm Hfa.aer.ad, 2 PUFF INH QID, 

(Reported)


Losartan/Hydrochlorothiazide (Losartan-Hctz 100-12.5 mg Tab) 1 Each Tablet, 1 

TAB PO DAILY, (Reported)


Ropinirole HCl (Ropinirole HCl) 0.5 Mg Tablet, 0.5 MG PO BID, (Reported)





Scheduled PRN


Albuterol Sulf (Albuterol Sulfate) 2.5 Mg/3 Ml Vial.neb, 2.5 MG INH Q6H PRN for 

SHORTNESS OF BREATH, (Reported)


Albuterol Sulfate (Albuterol Sulfate Hfa) 8.5 Gm Hfa.aer.ad, 2 PUFFS INH Q4H PRN

for SHORTNESS OF BREATH, (Reported)





Miscellaneous Medications


[Patient Comment]  , (Reported)


   MED LIST OBTAINED FROM EXT MED HX; UNABLE TO SPEAK WITH PATIENT 





Allergies


Coded Allergies:  


     amoxicillin (Verified  Allergy, Unknown, rash, 19)


     clavulanic acid (Verified  Allergy, Unknown, rash, 19)


     pregabalin (Verified  Adverse Reaction, Unknown, seizures, 19)


     warfarin (Verified  Adverse Reaction, Unknown, bleeding, 19)





Past Medical History


Medical History


COPD, CHF, hypertension, pancreatic insufficiency, pancreatic mass resection, 

chronic low back pain, hypothyroidism, polysubstance abuse, glaucoma, cataracts,

CVA, borderline hyperglycemia


Surgical History


Cataract surgery, carotid and are correct to me, left subclavian artery bypass, 

tonsillectomy, partial pancreatoduodenectomy, heart bypass 2020





Family History


Significant Family History:  Cancer (Father  of cancer and mother had 

cancer), Heart disease (Mother  of heart attack)





Social History


* Smoker:  former Smoker


Alcohol:  Denies


Drugs:  denies


Recent Travel/Sick Contacts:  Denies: Recent travel, Recent sick contacts


Difficulty during exam to obtain social setting background given the acuity of 

clinical condition.  Patient did report that he the police brought him into the 

hospital related to a wellness check.





A-FIB/CHADSVASC


A-FIB History


Current/History of A-Fib/PAF?:  No


Current PO Anticoag Therapy:  No





Review of Systems


Constitutional:  Reports: Malaise, Fatigue; 


   Denies: Chills, Fever, Night Sweats


Eyes:  Denies: Pain, Vision change


ENT:  Denies: Head Aches, Ear Pain, Dysphagia


Skin:  Denies: Rash, Lesions, Breakdown


Pulmonary:  Reports: Dyspnea, Cough, Pleuritic Chest Pain


Cardiovascular:  Reports: Chest Pain, Palpitations, Orthopnea


Gastrointestinal:  Denies: Nausea, Vomiting, Abdominal Pain, Diarrhea


Genitourinary:  Denies: Dysuria, Frequency, Incontinence, Hematuria, Retention, 

Other Symptoms


Musculoskeletal:  Denies: Neck Pain, Back Pain, Joint Pain, Muscle Pain, Spasms


Neurological:  Reports: Weakness


Psych:  Reports: Mood Normal; 


   Denies: Depression, Memory Issues





Physical Examination


General Exam:  Positive: Alert, Cooperative, Mild Distress


Eye Exam:  Positive: PERRLA, Conjunctiva & lids normal


ENT Exam:  Positive: Atraumatic


Neck Exam:  Positive: Supple; 


   Negative: thyromegaly


Chest Exam:  Positive: Rhonchi, Diminished


Heart Exam:  Positive: Tachycardic, Other (Extrasystole)


Telemetry:  Positive: Tachycardia


Abdomen Exam:  Positive: Normal bowel sounds


Extremity Exam:  Positive: Normal pulses; 


   Negative: Edema


Skin Exam:  Positive: Nl turgor and temperature; 


   Negative: Breakdown, Lesion


Neuro Exam:  Negative: Normal Speech (Hoarse)


Psych Exam:  Positive: Mental status NL, Anxiety





Vital Signs





Vital Signs








  Date Time  Temp Pulse Resp B/P (MAP) Pulse Ox O2 Delivery O2 Flow Rate FiO2


 


21 02:25   20 107/60 (76)  Nasal Cannula 3.0 


 


21 02:24  162   100   


 


21 19:56 97.7       











Laboratory Data


Labs 24H


Laboratory Tests 2


21 20:46: Lactic Acid Level 2.3*H


21 20:54: 


POC pH (Misc Panel) 7.394, POC Base Excess (Misc Panel) -1.0, POC Saturated 

Percent O2 (Misc) 99H, POC pO2 (Misc Panel) 131.0H, POC pCO2 (Misc Panel) 38.5, 

POC HCO3 (Misc Panel) 23.5, POC Total CO2 (Misc Panel) 25.0


21 21:43: 


Immature Granulocyte % (Auto) 0.6, Neutrophils (%) (Auto) 88.0H, Lymphocytes (%)

(Auto) 4.3L, Monocytes (%) (Auto) 6.1, Eosinophils (%) (Auto) 0.8, Basophils (%)

(Auto) 0.2, Neutrophils # (Auto) 23.2H, Lymphocytes # (Auto) 1.1L, Monocytes # 

(Auto) 1.6H, Eosinophils # (Auto) 0.2, Basophils # (Auto) 0.1, Nucleated Red 

Blood Cells % (auto) 0.0, Anion Gap 7L, Glomerular Filtration Rate 41.4L, 

Calcium Level 8.5L, Total Bilirubin 0.2, Direct Bilirubin 0.1, Aspartate Amino 

Transf (AST/SGOT) 22, Alanine Aminotransferase (ALT/SGPT) 26, Alkaline 

Phosphatase 132H, Total Creatine Kinase 119, Creatine Kinase MB 13.4H, Creatine 

Kinase MB Relative Index 11.26H, Troponin I 0.39H, NT-Pro-B-Type Natriuretic 

Peptide 18920S, Total Protein 5.9L, Albumin 2.8L, Albumin/Globulin Ratio 0.9, 

Lipase 42L


21 23:49: 


Total Creatine Kinase 106, Creatine Kinase MB 10.6H, Creatine Kinase MB Relative

Index 10.00H, Troponin I 0.35H


CBC/BMP


Laboratory Tests


21 21:43








Microbiology





Microbiology


21 Blood Culture, Received


          Pending


21 Blood Culture, Received


          Pending


21 Respiratory Virus Panel (PCR) ([facility]) - Final, Complete





RAD Interpretation





   STUDY:  CXR (Right upper lobe infiltrate, cardiomegaly)


   Rad Actions:  Report Reviewed





 Assessment/Plan


1. Sepsis secondary to pneumonia: 


Criteria met with tachycardia, tachypnea, leukocytosis, lactic acidosis and 

organ involvement.


-Monitor patient, monitor for signs symptoms of worsening infection.  Blood 

culture sent..  


-Coverage initiated with adjustment for QTC prolongation and patient allergy of 

amoxicillin.  


-Follow-up blood cultures and consider adjustment of antibiotics.  


-Pneumonia likely relative to aspiration given patient describes dysphagia.  We 

will keep patient n.p.o. aspiration precautions and appreciate SLP for diet scr

eening.  


*Given patient hoarseness (although likely related to cough) there is a concern 

with his history of pancreatic mass, smoking history, and familial history of 

cancer whether or not there may be underlying malignancy.  Patient may benefit 

from CT chest for postobstructive component.  Consider imaging.





2. Acute respiratory failure with hypoxia: Multifactorial component with 

pneumonia, COPD exacerbation and CHF exacerbation


- Monitor patient, telemetry, continuous pulse ox, ABG pending.  


-Oxygen for saturation greater than 92.  


-Symptom management supportive care.  


-Treat pneumonia as above.  


-IV steroids Solu-Medrol every 6 hours.  


-Empiric antibiotics as above.  Breathing treatments Atrovent and Xopenex given 

patient heart rate. 





3. Tachyarrhythmia in patient with CHF: Patient echo 2021 notes EF of 32%.,

 BNP elevated 19,000 last on file 13,000.  


-Patient does not appear overly edematous or volume overloaded however concern 

for flash pulmonary edema with tachyarrhythmia.  


-Patient given metoprolol, digoxin and amiodarone and unfortunately patient 

blood pressure does not tolerate drip at this point.  


-Cardiology consulted and recommendations appreciated.  Additional dig dose 

given.  Cardiology to see patient today.  


-Patient may require ICU transfer for assistance in pressure management pending 

clinical course.





4. Chest pain and elevated trop: Trop 0.39 to 0.35. Likely demand 2/2 above, but

pt with significant heart history. Appreciate cardiology recc. 





5.  Hypernatremia in patient with IBAN: Sodium 158; creatinine 1.7 BUN 16.  

Creatinine 0.9 last lab work.  likely cardiorenal.  However, pt does not appear 

hypervolemic. 


-Patient does not endorse GI losses but limitation with HPI given patient 

focused on getting breathing treatment.  Monitor patient for any GI losses given

recent antibiotics.  Patient did report he has been having trouble swallowing 

recently and appears emaciated; probable poor p.o. intake. Given the challenge 

of patient low EF and fluid balance appreciate nephrology input and 

recommendations.  Monitor patient, I's and O's.  A.m. labs. Will trial 

maintenance fluids. Further diuretics on hold pend nephrology recc. 





6. CAD: Continue aspirin.  Beta-blocker with recommendations of cardiology.





7. Hypertension: As per above patient soft blood pressures this admission.  Hold

any home blood pressure medication.





8. Deconditioning: Appreciate PT eval for therapeutic purposes once patient more

clinically stable.  Patient notably emaciated and frail appearance.





DVT: Heparin subcu given tachyarrhythmia consider heparin drip.


CODE STATUS: Patient at time remains full code.  He did report that he would 

want CPR but not intubation in an emergent situation.  Patient would benefit 

from further CODE STATUS discussion in a.m.  Given low EF and advancing COPD. 





Dispo planning: Pending clinical improvement





Plan / VTE


VTE Prophylaxis Ordered?:  Yes





Attending Note


Attending Note


Despite high BNP, clinically on the dry side. Stop lasix. Start fluids. High Na 

possible also from dehydration. BNP elevated could be demand from tachycardia. 

Elevated trop probably demand ischemia. Tach is either sinus tachy vs SVTs. Dr Leong consulted, appreciate his recs. He recommended Amiodarone bolus. Nephrology 

consulted for IBAN/assist with fluid status.











SANTOS RAYA NP        2021 02:43


FISH MOHAN MD              2021 07:18

## 2021-07-30 NOTE — REP
INDICATION:

santiago.



COMPARISON:

Comparison study is a CT exam from July 18, 2021..



FINDINGS:

The urinary bladder is largely empty at the time of scanning..



Renal cortical echogenicity pattern is increased bilaterally consistent with chronic

medical renal disease.  There is no evidence of hydronephrosis on either side.  No

mass lesion is observed.  There is a septated cyst in the lower pole left kidney

measuring 1.3 cm in greatest diameter..



.



The right kidney measures 7.9 x 4.8 x 4.4 cm.



Left renal dimensions are 9.3 x 4.4 x 4.9 cm.



There is mild bilateral renal cortical atrophy.



IMPRESSION:

Increased renal cortical echogenicity pattern bilaterally consistent with chronic

medical renal disease.  Small cyst left kidney.  Bilateral renal atrophy.  No

hydronephrosis seen..





<Electronically signed by Emmett Radford > 07/30/21 [id number]

## 2021-07-31 VITALS — DIASTOLIC BLOOD PRESSURE: 97 MMHG | SYSTOLIC BLOOD PRESSURE: 122 MMHG

## 2021-07-31 VITALS — DIASTOLIC BLOOD PRESSURE: 55 MMHG | SYSTOLIC BLOOD PRESSURE: 110 MMHG

## 2021-07-31 VITALS — DIASTOLIC BLOOD PRESSURE: 60 MMHG | SYSTOLIC BLOOD PRESSURE: 124 MMHG

## 2021-07-31 VITALS — SYSTOLIC BLOOD PRESSURE: 138 MMHG | DIASTOLIC BLOOD PRESSURE: 59 MMHG

## 2021-07-31 VITALS — SYSTOLIC BLOOD PRESSURE: 111 MMHG | DIASTOLIC BLOOD PRESSURE: 59 MMHG

## 2021-07-31 VITALS — SYSTOLIC BLOOD PRESSURE: 123 MMHG | DIASTOLIC BLOOD PRESSURE: 59 MMHG

## 2021-07-31 VITALS — DIASTOLIC BLOOD PRESSURE: 62 MMHG | SYSTOLIC BLOOD PRESSURE: 136 MMHG

## 2021-07-31 VITALS — SYSTOLIC BLOOD PRESSURE: 118 MMHG | DIASTOLIC BLOOD PRESSURE: 56 MMHG

## 2021-07-31 VITALS — SYSTOLIC BLOOD PRESSURE: 116 MMHG | DIASTOLIC BLOOD PRESSURE: 57 MMHG

## 2021-07-31 VITALS — DIASTOLIC BLOOD PRESSURE: 59 MMHG | SYSTOLIC BLOOD PRESSURE: 146 MMHG

## 2021-07-31 VITALS — DIASTOLIC BLOOD PRESSURE: 63 MMHG | SYSTOLIC BLOOD PRESSURE: 129 MMHG

## 2021-07-31 VITALS — DIASTOLIC BLOOD PRESSURE: 56 MMHG | SYSTOLIC BLOOD PRESSURE: 118 MMHG

## 2021-07-31 VITALS — SYSTOLIC BLOOD PRESSURE: 116 MMHG | DIASTOLIC BLOOD PRESSURE: 59 MMHG

## 2021-07-31 VITALS — SYSTOLIC BLOOD PRESSURE: 117 MMHG | DIASTOLIC BLOOD PRESSURE: 86 MMHG

## 2021-07-31 VITALS — DIASTOLIC BLOOD PRESSURE: 60 MMHG | SYSTOLIC BLOOD PRESSURE: 133 MMHG

## 2021-07-31 VITALS — DIASTOLIC BLOOD PRESSURE: 61 MMHG | SYSTOLIC BLOOD PRESSURE: 122 MMHG

## 2021-07-31 VITALS — SYSTOLIC BLOOD PRESSURE: 136 MMHG | DIASTOLIC BLOOD PRESSURE: 64 MMHG

## 2021-07-31 VITALS — SYSTOLIC BLOOD PRESSURE: 115 MMHG | DIASTOLIC BLOOD PRESSURE: 57 MMHG

## 2021-07-31 VITALS — DIASTOLIC BLOOD PRESSURE: 59 MMHG | SYSTOLIC BLOOD PRESSURE: 125 MMHG

## 2021-07-31 VITALS — SYSTOLIC BLOOD PRESSURE: 132 MMHG | DIASTOLIC BLOOD PRESSURE: 62 MMHG

## 2021-07-31 VITALS — DIASTOLIC BLOOD PRESSURE: 53 MMHG | SYSTOLIC BLOOD PRESSURE: 109 MMHG

## 2021-07-31 VITALS — SYSTOLIC BLOOD PRESSURE: 152 MMHG | DIASTOLIC BLOOD PRESSURE: 70 MMHG

## 2021-07-31 VITALS — SYSTOLIC BLOOD PRESSURE: 143 MMHG | DIASTOLIC BLOOD PRESSURE: 65 MMHG

## 2021-07-31 VITALS — SYSTOLIC BLOOD PRESSURE: 117 MMHG | DIASTOLIC BLOOD PRESSURE: 57 MMHG

## 2021-07-31 VITALS — SYSTOLIC BLOOD PRESSURE: 115 MMHG | DIASTOLIC BLOOD PRESSURE: 82 MMHG

## 2021-07-31 VITALS — SYSTOLIC BLOOD PRESSURE: 119 MMHG | DIASTOLIC BLOOD PRESSURE: 58 MMHG

## 2021-07-31 VITALS — SYSTOLIC BLOOD PRESSURE: 137 MMHG | DIASTOLIC BLOOD PRESSURE: 65 MMHG

## 2021-07-31 VITALS — DIASTOLIC BLOOD PRESSURE: 54 MMHG | SYSTOLIC BLOOD PRESSURE: 113 MMHG

## 2021-07-31 LAB
ALBUMIN SERPL BCG-MCNC: 1.9 GM/DL (ref 3.2–5.2)
ALT SERPL W P-5'-P-CCNC: 17 U/L (ref 12–78)
BASOPHILS # BLD AUTO: 0 10^3/UL (ref 0–0.2)
BASOPHILS NFR BLD AUTO: 0.1 % (ref 0–1)
BILIRUB SERPL-MCNC: 0.2 MG/DL (ref 0.2–1)
BUN SERPL-MCNC: 28 MG/DL (ref 7–18)
CALCIUM SERPL-MCNC: 7.1 MG/DL (ref 8.8–10.2)
CHLORIDE SERPL-SCNC: 112 MEQ/L (ref 98–107)
CO2 SERPL-SCNC: 27 MEQ/L (ref 21–32)
CREAT SERPL-MCNC: 1.34 MG/DL (ref 0.7–1.3)
EOSINOPHIL # BLD AUTO: 0 10^3/UL (ref 0–0.5)
EOSINOPHIL NFR BLD AUTO: 0 % (ref 0–3)
GFR SERPL CREATININE-BSD FRML MDRD: 56.8 ML/MIN/{1.73_M2} (ref 49–?)
GLUCOSE SERPL-MCNC: 154 MG/DL (ref 70–100)
HCT VFR BLD AUTO: 24.6 % (ref 42–52)
HCT VFR BLD AUTO: 30.2 % (ref 42–52)
HGB BLD-MCNC: 7.1 G/DL (ref 13.5–17.5)
HGB BLD-MCNC: 8.5 G/DL (ref 13.5–17.5)
LYMPHOCYTES # BLD AUTO: 0.4 10^3/UL (ref 1.5–5)
LYMPHOCYTES NFR BLD AUTO: 1.7 % (ref 24–44)
MAGNESIUM SERPL-MCNC: 2.6 MG/DL (ref 1.8–2.4)
MCH RBC QN AUTO: 21.5 PG (ref 27–33)
MCHC RBC AUTO-ENTMCNC: 28.9 G/DL (ref 32–36.5)
MCV RBC AUTO: 74.3 FL (ref 80–96)
MONOCYTES # BLD AUTO: 0.4 10^3/UL (ref 0–0.8)
MONOCYTES NFR BLD AUTO: 1.9 % (ref 2–8)
NEUTROPHILS # BLD AUTO: 22 10^3/UL (ref 1.5–8.5)
NEUTROPHILS NFR BLD AUTO: 95.1 % (ref 36–66)
PLATELET # BLD AUTO: 252 10^3/UL (ref 150–450)
POTASSIUM SERPL-SCNC: 4.6 MEQ/L (ref 3.5–5.1)
PROT SERPL-MCNC: 4.3 GM/DL (ref 6.4–8.2)
RBC # BLD AUTO: 3.31 10^6/UL (ref 4.3–6.1)
SODIUM SERPL-SCNC: 143 MEQ/L (ref 136–145)
WBC # BLD AUTO: 23.1 10^3/UL (ref 4–10)

## 2021-07-31 PROCEDURE — 30233N1 TRANSFUSION OF NONAUTOLOGOUS RED BLOOD CELLS INTO PERIPHERAL VEIN, PERCUTANEOUS APPROACH: ICD-10-PCS | Performed by: GENERAL PRACTICE

## 2021-07-31 RX ADMIN — METOPROLOL TARTRATE SCH MG: 25 TABLET, FILM COATED ORAL at 02:58

## 2021-07-31 RX ADMIN — DEXTROSE MONOHYDRATE SCH MG: 50 INJECTION, SOLUTION INTRAVENOUS at 01:20

## 2021-07-31 RX ADMIN — IPRATROPIUM BROMIDE SCH MG: 0.5 SOLUTION RESPIRATORY (INHALATION) at 01:49

## 2021-07-31 RX ADMIN — MEROPENEM AND SODIUM CHLORIDE SCH MLS/HR: 1 INJECTION, SOLUTION INTRAVENOUS at 16:45

## 2021-07-31 RX ADMIN — METOPROLOL TARTRATE SCH MG: 25 TABLET, FILM COATED ORAL at 21:07

## 2021-07-31 RX ADMIN — IPRATROPIUM BROMIDE SCH MG: 0.5 SOLUTION RESPIRATORY (INHALATION) at 13:27

## 2021-07-31 RX ADMIN — METOPROLOL TARTRATE SCH MG: 25 TABLET, FILM COATED ORAL at 09:52

## 2021-07-31 RX ADMIN — MEROPENEM AND SODIUM CHLORIDE SCH MLS/HR: 1 INJECTION, SOLUTION INTRAVENOUS at 03:45

## 2021-07-31 RX ADMIN — IPRATROPIUM BROMIDE SCH MG: 0.5 SOLUTION RESPIRATORY (INHALATION) at 19:49

## 2021-07-31 RX ADMIN — METOPROLOL TARTRATE SCH MG: 25 TABLET, FILM COATED ORAL at 15:15

## 2021-07-31 RX ADMIN — BUDESONIDE SCH MG: 0.5 INHALANT RESPIRATORY (INHALATION) at 19:50

## 2021-07-31 RX ADMIN — LEVALBUTEROL HYDROCHLORIDE SCH MG: 1.25 SOLUTION, CONCENTRATE RESPIRATORY (INHALATION) at 01:49

## 2021-07-31 RX ADMIN — METHYLPREDNISOLONE SODIUM SUCCINATE SCH MG: 40 INJECTION, POWDER, FOR SOLUTION INTRAMUSCULAR; INTRAVENOUS at 18:24

## 2021-07-31 RX ADMIN — METHYLPREDNISOLONE SODIUM SUCCINATE SCH MG: 40 INJECTION, POWDER, FOR SOLUTION INTRAMUSCULAR; INTRAVENOUS at 09:52

## 2021-07-31 RX ADMIN — METHYLPREDNISOLONE SODIUM SUCCINATE SCH MG: 40 INJECTION, POWDER, FOR SOLUTION INTRAMUSCULAR; INTRAVENOUS at 02:21

## 2021-07-31 RX ADMIN — IPRATROPIUM BROMIDE SCH MG: 0.5 SOLUTION RESPIRATORY (INHALATION) at 07:38

## 2021-07-31 RX ADMIN — LEVALBUTEROL HYDROCHLORIDE SCH MG: 1.25 SOLUTION, CONCENTRATE RESPIRATORY (INHALATION) at 07:38

## 2021-07-31 RX ADMIN — MAGNESIUM SULFATE IN DEXTROSE SCH MLS/HR: 10 INJECTION, SOLUTION INTRAVENOUS at 05:00

## 2021-07-31 RX ADMIN — LEVALBUTEROL HYDROCHLORIDE SCH MG: 1.25 SOLUTION, CONCENTRATE RESPIRATORY (INHALATION) at 13:27

## 2021-07-31 RX ADMIN — DEXTROSE MONOHYDRATE SCH MG: 50 INJECTION, SOLUTION INTRAVENOUS at 21:07

## 2021-07-31 RX ADMIN — CALCIUM CARBONATE (ANTACID) CHEW TAB 500 MG PRN MG: 500 CHEW TAB at 18:24

## 2021-07-31 RX ADMIN — DEXTROSE MONOHYDRATE SCH MLS/HR: 50 INJECTION, SOLUTION INTRAVENOUS at 15:16

## 2021-07-31 RX ADMIN — LEVALBUTEROL HYDROCHLORIDE SCH MG: 1.25 SOLUTION, CONCENTRATE RESPIRATORY (INHALATION) at 19:50

## 2021-07-31 RX ADMIN — MAGNESIUM SULFATE IN DEXTROSE SCH MLS/HR: 10 INJECTION, SOLUTION INTRAVENOUS at 03:45

## 2021-07-31 RX ADMIN — BUDESONIDE SCH MG: 0.5 INHALANT RESPIRATORY (INHALATION) at 07:38

## 2021-07-31 NOTE — IPN
NEPHROLOGY PROGRESS NOTE



DATE:  07/31/2021



SUBJECTIVE:  Mr. Roberts is seen this morning on his bedside in the intensive

care unit.  He is not in any acute distress.  He is repeatedly asking about

food, as he feels hungry and wants to eat.  He denies any dyspnea or chest

pain.  He was admitted with hypotension and severe tachycardia.  He was found

to be hypernatremic on admission due to dehydration and sepsis.  His acute

kidney injury has been improving.



PHYSICAL EXAMINATION: 

GENERAL:  Patient is awake and alert and without any acute distress.

VITAL SIGNS:  Temperature 98.2 degrees Fahrenheit, heart rate 78 per minute,

respiratory rate 18 per minute, blood pressure 282442 mmHg, oxygen saturation

98% on room air.

HEAD:  Atraumatic.

NECK:  Supple and without jugular venous distention (JVD) or thyroid

enlargement.

HEART SOUNDS:  Regular now.

LUNGS:  Clear to auscultation.

ABDOMEN:  Soft and nontender.  Bowel sounds are present.

EXTREMITIES:  Without any cyanosis or clubbing.  

NEUROLOGIC:  He seems to be alert, oriented and without a focal deficit.



LABORATORY DATA:  

Today's labs show sodium 143, potassium 4.6, CO2 27, BUN 28, creatinine 1.34,

calcium 7.1.



Lactic acid level has come down to 0.8.  Albumin 1.9.



Hemoglobin 7.1, hematocrit 20.6.  White cell count has come down to 23.1.



PROBLEMS:  

1.  Acute kidney injury.  Kidney function is improving nicely.  I would

recommend to not give him any diuretic.  He can probably increase his oral

intake of fluids, as he is now fully alert and likely to swallow without any

problem. 



2.  Hypernatremia.  Sodium level has corrected and patient remains on low-dose

D5W.  Once his oral intake is resumed and he has tolerated, then we can

consider to stop his intravenous (IV) fluid of D5W.  



3.  Anemia.  Patient has significant anemia and I would recommend to transfuse

him 2 units of packed red blood cells (PRBCs).  



4.  Sepsis.  Patient had severe leukocytosis and elevated lactic acid level. 

Both are now improving.  His blood cultures have been negative so far.  He has

received vancomycin and meropenem.  



5.  Nutrition.  From a renal standpoint, patient can eat a regular diet.  His

kidney function is improving and electrolytes are stable.

## 2021-07-31 NOTE — ECGEPIP
[facility]

                                       

                                       Test Date:    2021

Pat Name:     CHACORTA NOBLES             Department:   

Patient ID:   [id number]                 Room:         [place] [address]

Gender:       Male                     Technician:   antoine

:          1955               Requested By: LYRIC LEONG 

Order Number: [id number]     Reading MD:   Lyric Leong

                                 Measurements

Intervals                              Axis          

Rate:         160                      P:            -81

SD:                                    QRS:          58

QRSD:         84                       T:            157

QT:           252                                    

QTc:          411                                    

                           Interpretive Statements

*** Critical Test Result: High HR

Atrial flutter with 2:1 AV conduction

Minimal voltage criteria for LVH, may be normal variant ( Sokolow-Quiroga )

ST & T wave abnormality, consider lateral ischemia

Last tracing on 21, 19:43. No remarkable changes

Electronically Signed on 2021 9:14:42 EDT by Lyric Leong

## 2021-07-31 NOTE — REP
INDICATION:

INTERVAL RUL PNA.



COMPARISON:

Portable chest, 07/29/2021.



TECHNIQUE:

AP portable chest was performed.



FINDINGS:

There is continued peribronchial consolidation in the midlung zone on the right.

There is no new airspace consolidation or pleural effusions.  There has been interval

placement of a right PICC with the tip at the junction of the superior vena cava and

right atrium.  Status post median sternotomy.  There is a left atrial appendage clip.

The heart size appears normal on this exam.  There is calcific vascular disease of the

thoracic aorta.  There are surgical clips at the base the neck bilaterally consistent

with thyroidectomy.  There is moderate arthritis of both glenohumeral joints.  There

is a healed fracture of the right clavicle, and there are multiple healed rib

fractures on the right.



IMPRESSION:

1.  Continued peribronchial consolidation in the midlung zone on the right.

2.  Interval placement of a right PICC line.

3.  Other findings as noted unchanged.





<Electronically signed by Reyes Garcia > 07/31/21 0847

## 2021-07-31 NOTE — CR
CONSULTATION



DATE: 07/30/2021



CONSULTING PHYSICIAN:  Tra Banks MD



CONSULTING PHYSICIAN: Ashly Olivia DO



REASON FOR CONSULTATION:  Acute kidney injury with hypernatremia and

hyperkalemia and this patient was admitted with sepsis.  



HISTORY OF PRESENT ILLNESS:  Mr. Roberts is previously unknown to me.  He is a

65-year-old male, who was brought today to the emergency room after a wellness

check from police and the patient was reportedly found on the ground at home. 

History is limited to chart review and discussion with other health providers

as the patient is unable to provide much clinical history secondary to his

condition.  He has a past medical history of chronic obstructive pulmonary

disease (COPD), hypothyroidism, congestive heart failure (systolic), history of

substance abuse, coronary artery disease, dyslipidemia, anemia and other

comorbid conditions mentioned below.  In the emergency room, the patient was

found to be tachycardiac with heart rate 150s to 160s and hypoxic, requiring 2

to 3 liters of oxygen by nasal canula.  Laboratory studies revealed a white

count of 26, hemoglobin of 9.8, sodium of 159 with creatinine of 1.7 up from a

baseline creatinine of 1.0, lactic acid of 2.3, BNP of 19,000, albumin of 2.8. 

The patient was persistently hypotensive and was transferred to the ICU

overnight.  He was given a small bolus of normal saline by cardiology this

morning, along with a dose of digoxin and nephrology evaluation was requested

for help with hypernatremic renal failure.  



PAST MEDICAL HISTORY:  As mentioned above.

  1.  Chronic obstructive pulmonary disease (COPD).

  2.  Systolic congestive heart failure.

  3.  Hypertension.

  4.  Pancreatic insufficiency.

  5.  Hypothyroidism. 

  6.  Substance abuse.

  7.  Anemia.

  8.  Glaucoma.

  9.  Cataracts.

  10.History of cerebrovascular accident (CVA).

  11.Dyslipidemia. 

  12.Coronary artery disease. 



SURGICAL HISTORY:  Cataract surgery, carotid endarterectomy, left subclavian

artery bypass, tonsillectomy, partial pancreatoduodenectomy, cardiac bypass. 



FAMILY HISTORY:  Significant for heart disease in his mother. 



SOCIAL HISTORY:  He is a former smoker.  There is no reported alcohol use. 

There is no reported current drug use.  



ALLERGIES:  AMOXICILLIN, CARBOLIC ACID, PREGABALIN, WARFARIN.



HOME MEDICATIONS:  Atorvastatin 40 mg by mouth every night at bedtime, ferrous

sulfate 325 mg by mouth daily, Lasix 40 mg by mouth daily, Atrovent 2 puffs

inhaled q.i.d., losartan 100 mg daily, hydrochlorothiazide 12.5 mg by mouth

daily, ropinirole 0.5 mg by mouth twice a day.



REVIEW OF SYSTEMS:  Unable to obtain secondary to clinical condition.  



PHYSICAL EXAMINATION:  Vital signs: Temperature 98.8, pulse 153, respiratory

rate 19, blood pressure 124/61, saturating 100% on 2 liters nasal canula. 

Weight in the bed scale today is 53.5 kg. General:  The patient is seen lying

in bed in the intensive care unit. Appears older than stated age.  Appears

cachetic, chronically ill and malnourished.  Makes poor eye contact is

lethargic.  Tongue is dry.  Neck veins are not elevated. Heart sounds are

irregularly irregular.  Monitor shows atrial flutter with 2:1 conduction. There

is no peripheral edema.  Lungs show diminished breath sounds on the right upper

lobe, otherwise good air movement.  No crackle or rale.  Abdomen is soft.  She

does not grimace to palpation. Genitourinary:  There is no Luque catheter at

the time of my visit.  His bladder is not distended nor palpable.  Neurologic: 

He wiggles his toes on commands and squeezes by hand on command, but does not

speak or answer simple questions. 



LABORATORY STUDIES:  Urinalysis:  Negative protein, negative blood, negative

leukocyte esterase, negative nitrites.  Sodium 155, potassium 5.2, chloride

121, bicarbonate 25, BUN 16, creatinine 1.7.  Hemoglobin 9.4, white count 30.8.

 



MICROBIOLOGY; Blood cultures are pending.  



IMAGING:  Chest x-ray shows a right upper lobe pneumonia, but the lungs are

otherwise clear and there is no pleural effusion.  



Renal ultrasound is negative for any obstruction. 



INPATIENT MEDICATIONS: Amiodarone 150 mg IV times 1.  I ordered D5W 500 ml

bolus followed by D5W at 150 ml per hour.  He is received one dose of

doxycycline 100 mg IV times 1.  He is on meropenem 1 gm IV q 12 hours. He

received 500 ml normal saline this morning. He was on vancomycin, Eliquis 6 mg

by mouth times one dose, budesonide twice a day.  He got digoxin 0.25 mg IV

times one dose.  He also received one dose of Lasix 40 mg IV.  Xopenex

nebulizer q 6 hours, Solu-Medrol 40 mg IV q 8 hours, Solu-Medrol 125 mg IV

times one, Lopressor  25 mg by mouth q 6 hours.



PROBLEMS:

  1.  Nonoliguric acute kidney injury in this patient with sepsis and a large

      free water deficit and hypotension. The patient is clinically dry and

      will need further IV fluid.  Orders are in for D5W presently to run 150

      ml per hour and we will repeat labs this afternoon and adjust the fluids

      as needed.  His renal imaging was negative for obstruction.  His

      urinalysis is benign.  

  2.  Hypernatremia. Based upon his weight at 53 kg and his serum sodium of 155

      this morning, the patient has a 2.8 liter free water deficit.  He is

      clinically hypotensive and hypovolemic.  Bolus 500 ml of D5W and D5W to

      run at 150 ml per hour and we will reassess his fluids with repeat labs

      this afternoon 

  3.  Hyperkalemia.  Expect is potassium level will improve with hypotonic

      fluid administration.  

  4.  Sepsis. The patient had a white count of 26,000 on admission. His blood

      cultures show no growth for 24 hours times 2 sets.  His urinalysis is

      negative. His chest x-ray did show right upper lobe pneumonia.  The

      patient is on antibiotics managed by the primary team. 

  5.  History of congestive heart failure.  The patient is clinically very dry.

       He has a 2.8 liter free water deficit.  His diuretics are held.  He is

      in for hypotonic fluid administration at this time. 

  6.  Atrial flutter.  Deferred management to cardiology.  I do note his blood

      pressures have improved with fluid administration.  He needs rate control

      on medication.  Will check a magnesium level as well, as none has yet been

      drawn. 



Thank you for involving me in the care of Mr. Roberts.  I will follow along

with you.

## 2021-07-31 NOTE — ECGEPIP
[facility]

                                       

                                       Test Date:    2021

Pat Name:     CHACORTA NOBLES             Department:   

Patient ID:   [id number]                 Room:         [place] [address]

Gender:       Male                     Technician:   antoine

:          1955               Requested By: SANTOS ESTES

Order Number: [id number]     Reading MD:   Natalio Leong

                                 Measurements

Intervals                              Axis          

Rate:         76                       P:            74

WI:           154                      QRS:          31

QRSD:         90                       T:            129

QT:           378                                    

QTc:          425                                    

                           Interpretive Statements

*** Poor data quality, interpretation may be adversely affected

Normal sinus rhythm

Cannot rule out Inferior infarct , age undetermined

T wave abnormality, consider lateral ischemia

Compared to prior tracings (2) in the system, Atrial Flutter was present

Electronically Signed on 2021 10:00:10 EDT by Natalio Leong

## 2021-07-31 NOTE — IPNPDOC
Subjective


Date Seen


The patient was seen on 7/31/21.





Subjective


Chief Complaint/HPI


Mr. Roberts is doing remarkably better today. Nursing reports that his atrial 

flutter converted at about 2100 last night. He has a urinary catheter in now for

critical monitoring. I changed his antibiotics to meropenem and vancomycin as he

had recently been in for a COPD exacerbation and was on Zosyn and Levaquin at 

that time. This may be better coverage for him b/c of possible resistance 

issues. He is asking (very persistently) if he can eat.


General:  Reports: Normal Appetite


Constitutional:  Reports: Weakness; 


   Denies: Chills, Fever


Pulmonary:  Reports: Dyspnea, Cough


Cardiovascular:  Denies: Chest Pain, Palpitations, Edema


Gastrointestinal:  Reports: Diarrhea; 


   Denies: Nausea, Vomiting





Objective


Physical Examination


General Exam:  Positive: Alert, Cooperative (laying in his bed talking with the 

nurse when I enter the room), No Acute Distress


Eye Exam:  Positive: PERRLA, Conjunctiva & lids normal


ENT Exam:  Positive: Atraumatic, Mucous membr. moist/pink, Tongue Midline


Neck Exam:  Positive: Supple; 


   Negative: Lymphadenopathy


Chest Exam:  Positive: Rales (at the right base, but they are not worse and may 

be better than yesterday), Diminished


Heart Exam:  Positive: Rate Normal, Regular Rhythm, Normal S1, Normal S2


Telemetry:  Positive: No significant arrhythmia, Sinus


Abdomen Exam:  Positive: Normal bowel sounds, Soft; 


   Negative: Tenderness


Male  Exam:  Negative: Edema


Extremity Exam:  Negative: Edema


Neuro Exam:  Positive: Normal Speech (his responses are very appropriate today),

Sensation Intact


Psych Exam:  Positive: Mood NL, Oriented x 3





Assessment /Plan


Problems





(1) Sepsis


Status:  Acute


Response to Treatment:  Uncompensated


Problem Text:  Blood cultures are negative so far. He is on meropenem and 

vancomycin now. His PICC is in, but his BP is normal now. He isn't dangerously 

hypotensive. His tachycardia has also resolved. He is certainly heading the 

right way physiologically, but we will continue to need to monitor carefully. 





(2) Pneumonia


Status:  Acute


Problem Text:  S/T gave him a diet recommendation and nephrology is ok with him 

eating as am I. We need to monitor carefully to see that it doesn't seem to be 

choking or aspirating any more, but he is much more alert than he was just 

yesterday so my concern for this is also much less. 





(3) Chronic HFrEF (heart failure with reduced ejection fraction)


Status:  Chronic


Response to Treatment:  Compensated


Problem Text:  He remains compensated even during his rehydration. At this point

he is eating and drinking fine on his own. I will stop the IVF (which had 

already been reduced to 40ml/hr) and let him eat and drink on his own. 





(4) CAD (coronary artery disease)


Status:  Chronic


Problem Text:  No symptoms. Continue current regimen, monitor.





(5) COPD (chronic obstructive pulmonary disease)


Status:  Chronic


Response to Treatment:  Stable


Problem Text:  His breathing remains stable. Will need to continue to monitor 

carefully. 





(6) HTN (hypertension)


Problem Text:  His BP is better controlled today. Obviously all his 

antihypertensives have been held along with all of his oral medications at this 

time. We will continue to monitor his BP carefully. 





(7) Severe muscle deconditioning


Problem Text:  He is very weak appearing and emaciated. He needs significant 

bulking and then reconditioning before he will be safe to live on his own again.







(8) Atrial flutter


Response to Treatment:  Stable, Controlled


Discussed With:  Nurse, Patient


Problem Specific Plan:  Consult Specialist


Problem Text:  Cardiology was finally able to get his rate under control 

yesterday. I appreciate all the work that went into that.  





(9) Acute hypoxemic respiratory failure


Status:  Resolved


Problem Text:  He has been receiving steroids and his oxygen saturations are 

reasonable if not great at this time. Will continue to monitor for now.





(10) Dehydration with hypernatremia


Status:  Resolved


Response to Treatment:  Controlled


Problem Text:  His sodium is normal now and his renal fx is almost normal too. 

His lactic acid is normal for the first time this admission. I think all the 

efforts at getting him rehydrated has paid off. 








Plan/VTE


VTE Prophylaxis Ordered?:  Yes





Disposition


If things remain stable overnight, I anticipate that I can downgrade him to the 

PCU tomorrow.





VS, I&O, 24H, Fishbone


Vital Signs/I&O





Vital Signs








  Date Time  Temp Pulse Resp B/P (MAP) Pulse Ox O2 Delivery O2 Flow Rate FiO2


 


7/31/21 21:07  80  137/65    


 


7/31/21 20:00 97.6  19  100 Room Air  


 


7/30/21 18:00       2.0 














I&O- Last 24 Hours up to 6 AM 


 


 7/31/21





 06:00


 


Intake Total 2950 ml


 


Output Total 830 ml


 


Balance 2120 ml











Laboratory Data


24H LABS


Laboratory Tests 2


7/31/21 06:11: 


Anion Gap 4L, Glomerular Filtration Rate 56.8, Calcium Level 7.1L, Magnesium 

Level 2.6H, Total Bilirubin 0.2, Aspartate Amino Transf (AST/SGOT) 14, Alanine 

Aminotransferase (ALT/SGPT) 17, Alkaline Phosphatase 97, Total Protein 4.3L, 

Albumin 1.9L, Albumin/Globulin Ratio 0.8


7/31/21 06:12: 


Immature Granulocyte % (Auto) 1.2, Neutrophils (%) (Auto) 95.1H, Lymphocytes (%)

(Auto) 1.7L, Monocytes (%) (Auto) 1.9L, Eosinophils (%) (Auto) 0.0, Basophils 

(%) (Auto) 0.1, Neutrophils # (Auto) 22.0H, Lymphocytes # (Auto) 0.4L, Monocytes

# (Auto) 0.4, Eosinophils # (Auto) 0.0, Basophils # (Auto) 0.0, Nucleated Red 

Blood Cells % (auto) 0.0, Lactic Acid Level 0.8


7/31/21 14:18: Vancomycin Level Trough 9.6L


CBC/BMP


Laboratory Tests


7/31/21 06:11








7/31/21 06:12








7/31/21 18:17








Microbiology





Microbiology


7/29/21 Blood Culture - Preliminary, Resulted


          No Growth after 48 hours. All Specime...


7/29/21 Blood Culture - Preliminary, Resulted


          No Growth after 48 hours. All Specime...


7/29/21 Respiratory Virus Panel (PCR) ([facility]) - Final, Complete











Tra Banks MD             Jul 31, 2021 22:58

## 2021-08-01 VITALS — SYSTOLIC BLOOD PRESSURE: 120 MMHG | DIASTOLIC BLOOD PRESSURE: 57 MMHG

## 2021-08-01 VITALS — DIASTOLIC BLOOD PRESSURE: 59 MMHG | SYSTOLIC BLOOD PRESSURE: 125 MMHG

## 2021-08-01 VITALS — DIASTOLIC BLOOD PRESSURE: 69 MMHG | SYSTOLIC BLOOD PRESSURE: 142 MMHG

## 2021-08-01 VITALS — SYSTOLIC BLOOD PRESSURE: 124 MMHG | DIASTOLIC BLOOD PRESSURE: 58 MMHG

## 2021-08-01 VITALS — DIASTOLIC BLOOD PRESSURE: 61 MMHG | SYSTOLIC BLOOD PRESSURE: 124 MMHG

## 2021-08-01 VITALS — DIASTOLIC BLOOD PRESSURE: 54 MMHG | SYSTOLIC BLOOD PRESSURE: 112 MMHG

## 2021-08-01 VITALS — DIASTOLIC BLOOD PRESSURE: 58 MMHG | SYSTOLIC BLOOD PRESSURE: 122 MMHG

## 2021-08-01 VITALS — SYSTOLIC BLOOD PRESSURE: 142 MMHG | DIASTOLIC BLOOD PRESSURE: 67 MMHG

## 2021-08-01 VITALS — SYSTOLIC BLOOD PRESSURE: 134 MMHG | DIASTOLIC BLOOD PRESSURE: 63 MMHG

## 2021-08-01 VITALS — SYSTOLIC BLOOD PRESSURE: 113 MMHG | DIASTOLIC BLOOD PRESSURE: 56 MMHG

## 2021-08-01 VITALS — DIASTOLIC BLOOD PRESSURE: 65 MMHG | SYSTOLIC BLOOD PRESSURE: 147 MMHG

## 2021-08-01 VITALS — DIASTOLIC BLOOD PRESSURE: 66 MMHG | SYSTOLIC BLOOD PRESSURE: 131 MMHG

## 2021-08-01 LAB
BASOPHILS # BLD AUTO: 0 10^3/UL (ref 0–0.2)
BASOPHILS NFR BLD AUTO: 0.1 % (ref 0–1)
BUN SERPL-MCNC: 33 MG/DL (ref 7–18)
CALCIUM SERPL-MCNC: 7.5 MG/DL (ref 8.8–10.2)
CHLORIDE SERPL-SCNC: 110 MEQ/L (ref 98–107)
CO2 SERPL-SCNC: 23 MEQ/L (ref 21–32)
CREAT SERPL-MCNC: 1.21 MG/DL (ref 0.7–1.3)
EOSINOPHIL # BLD AUTO: 0 10^3/UL (ref 0–0.5)
EOSINOPHIL NFR BLD AUTO: 0 % (ref 0–3)
GFR SERPL CREATININE-BSD FRML MDRD: > 60 ML/MIN/{1.73_M2} (ref 49–?)
GLUCOSE SERPL-MCNC: 150 MG/DL (ref 70–100)
HCT VFR BLD AUTO: 25.8 % (ref 42–52)
HGB BLD-MCNC: 7.5 G/DL (ref 13.5–17.5)
LYMPHOCYTES # BLD AUTO: 0.4 10^3/UL (ref 1.5–5)
LYMPHOCYTES NFR BLD AUTO: 1.8 % (ref 24–44)
MCH RBC QN AUTO: 21.9 PG (ref 27–33)
MCHC RBC AUTO-ENTMCNC: 29.1 G/DL (ref 32–36.5)
MCV RBC AUTO: 75.2 FL (ref 80–96)
MONOCYTES # BLD AUTO: 0.3 10^3/UL (ref 0–0.8)
MONOCYTES NFR BLD AUTO: 1.4 % (ref 2–8)
NEUTROPHILS # BLD AUTO: 19.2 10^3/UL (ref 1.5–8.5)
NEUTROPHILS NFR BLD AUTO: 95.6 % (ref 36–66)
PLATELET # BLD AUTO: 237 10^3/UL (ref 150–450)
POTASSIUM SERPL-SCNC: 4.4 MEQ/L (ref 3.5–5.1)
RBC # BLD AUTO: 3.43 10^6/UL (ref 4.3–6.1)
SODIUM SERPL-SCNC: 141 MEQ/L (ref 136–145)
WBC # BLD AUTO: 20.1 10^3/UL (ref 4–10)

## 2021-08-01 RX ADMIN — IPRATROPIUM BROMIDE SCH MG: 0.5 SOLUTION RESPIRATORY (INHALATION) at 07:51

## 2021-08-01 RX ADMIN — LEVALBUTEROL HYDROCHLORIDE SCH MG: 1.25 SOLUTION, CONCENTRATE RESPIRATORY (INHALATION) at 02:00

## 2021-08-01 RX ADMIN — DEXTROSE MONOHYDRATE SCH MLS/HR: 50 INJECTION, SOLUTION INTRAVENOUS at 03:15

## 2021-08-01 RX ADMIN — LIDOCAINE SCH PATCH: 50 PATCH CUTANEOUS at 02:27

## 2021-08-01 RX ADMIN — METOPROLOL TARTRATE SCH MG: 25 TABLET, FILM COATED ORAL at 21:08

## 2021-08-01 RX ADMIN — IPRATROPIUM BROMIDE SCH MG: 0.5 SOLUTION RESPIRATORY (INHALATION) at 19:19

## 2021-08-01 RX ADMIN — CALCIUM CARBONATE (ANTACID) CHEW TAB 500 MG PRN MG: 500 CHEW TAB at 13:22

## 2021-08-01 RX ADMIN — ASPIRIN SCH MG: 81 TABLET ORAL at 12:57

## 2021-08-01 RX ADMIN — METHYLPREDNISOLONE SODIUM SUCCINATE SCH MG: 40 INJECTION, POWDER, FOR SOLUTION INTRAMUSCULAR; INTRAVENOUS at 09:08

## 2021-08-01 RX ADMIN — METOPROLOL TARTRATE SCH MG: 25 TABLET, FILM COATED ORAL at 03:16

## 2021-08-01 RX ADMIN — LEVALBUTEROL HYDROCHLORIDE SCH MG: 1.25 SOLUTION, CONCENTRATE RESPIRATORY (INHALATION) at 19:20

## 2021-08-01 RX ADMIN — BUDESONIDE SCH MG: 0.5 INHALANT RESPIRATORY (INHALATION) at 19:19

## 2021-08-01 RX ADMIN — ROPINIROLE HYDROCHLORIDE SCH MG: 0.25 TABLET, FILM COATED ORAL at 09:07

## 2021-08-01 RX ADMIN — CALCIUM CARBONATE (ANTACID) CHEW TAB 500 MG PRN MG: 500 CHEW TAB at 02:28

## 2021-08-01 RX ADMIN — IPRATROPIUM BROMIDE SCH MG: 0.5 SOLUTION RESPIRATORY (INHALATION) at 02:00

## 2021-08-01 RX ADMIN — METOPROLOL TARTRATE SCH MG: 25 TABLET, FILM COATED ORAL at 09:07

## 2021-08-01 RX ADMIN — MEROPENEM AND SODIUM CHLORIDE SCH MLS/HR: 1 INJECTION, SOLUTION INTRAVENOUS at 15:40

## 2021-08-01 RX ADMIN — BUDESONIDE SCH MG: 0.5 INHALANT RESPIRATORY (INHALATION) at 07:51

## 2021-08-01 RX ADMIN — DEXTROSE MONOHYDRATE SCH MLS/HR: 50 INJECTION, SOLUTION INTRAVENOUS at 15:13

## 2021-08-01 RX ADMIN — FERROUS SULFATE TAB 325 MG (65 MG ELEMENTAL FE) SCH MG: 325 (65 FE) TAB at 12:57

## 2021-08-01 RX ADMIN — DIPHENOXYLATE HYDROCHLORIDE AND ATROPINE SULFATE SCH EA: 2.5; .025 TABLET ORAL at 12:57

## 2021-08-01 RX ADMIN — DIPHENOXYLATE HYDROCHLORIDE AND ATROPINE SULFATE SCH EA: 2.5; .025 TABLET ORAL at 21:26

## 2021-08-01 RX ADMIN — MEROPENEM AND SODIUM CHLORIDE SCH MLS/HR: 1 INJECTION, SOLUTION INTRAVENOUS at 04:33

## 2021-08-01 RX ADMIN — Medication SCH: at 09:08

## 2021-08-01 RX ADMIN — DEXTROSE MONOHYDRATE SCH MG: 50 INJECTION, SOLUTION INTRAVENOUS at 21:09

## 2021-08-01 RX ADMIN — METHYLPREDNISOLONE SODIUM SUCCINATE SCH MG: 40 INJECTION, POWDER, FOR SOLUTION INTRAMUSCULAR; INTRAVENOUS at 02:28

## 2021-08-01 RX ADMIN — DIPHENOXYLATE HYDROCHLORIDE AND ATROPINE SULFATE SCH EA: 2.5; .025 TABLET ORAL at 17:39

## 2021-08-01 RX ADMIN — IPRATROPIUM BROMIDE SCH MG: 0.5 SOLUTION RESPIRATORY (INHALATION) at 13:14

## 2021-08-01 RX ADMIN — LEVALBUTEROL HYDROCHLORIDE SCH MG: 1.25 SOLUTION, CONCENTRATE RESPIRATORY (INHALATION) at 13:14

## 2021-08-01 RX ADMIN — METOPROLOL TARTRATE SCH MG: 25 TABLET, FILM COATED ORAL at 14:50

## 2021-08-01 RX ADMIN — LEVALBUTEROL HYDROCHLORIDE SCH MG: 1.25 SOLUTION, CONCENTRATE RESPIRATORY (INHALATION) at 07:51

## 2021-08-01 RX ADMIN — LIDOCAINE SCH PATCH: 50 PATCH CUTANEOUS at 21:09

## 2021-08-01 RX ADMIN — ENOXAPARIN SODIUM SCH MG: 40 INJECTION SUBCUTANEOUS at 09:08

## 2021-08-01 RX ADMIN — ROPINIROLE HYDROCHLORIDE SCH MG: 0.25 TABLET, FILM COATED ORAL at 21:08

## 2021-08-01 RX ADMIN — ROPINIROLE HYDROCHLORIDE SCH MG: 0.25 TABLET, FILM COATED ORAL at 02:28

## 2021-08-01 RX ADMIN — CALCIUM CARBONATE (ANTACID) CHEW TAB 500 MG PRN MG: 500 CHEW TAB at 17:40

## 2021-08-01 RX ADMIN — LISINOPRIL SCH MG: 2.5 TABLET ORAL at 14:50

## 2021-08-01 NOTE — IPN
NEPHROLOGY PROGRESS NOTE



DATE:  08/01/2021



SUBJECTIVE:  Mr. Roberts is seen this morning on his bedside.  He is feeling

well and not happy with his thickened liquids.  He wants to drink regular

fluids and eat a normal diet.  He denies any dyspnea, chest pain, fever or

chills.  



PHYSICAL EXAMINATION: 

Temperature 98.3 degrees Fahrenheit, heart rate 72 per minute, respiratory rate

18 per minute, blood pressure 113/56 mmHg, oxygen saturation 95% on room air.

HEAD:  Atraumatic.

NECK:  Supple and without jugular venous distention (JVD) or thyroid

enlargement.

HEART SOUNDS:  Regular.

LUNGS:  Clear to auscultation.

ABDOMEN:  Soft and nontender.  Bowel sounds are normal.

EXTREMITIES:  Without any cyanosis or clubbing.  



LABORATORY DATA:  

Today's labs show BUN 33 and creatinine is down to 1.21, sodium 141, potassium

4.4.



Hemoglobin 7.5, hematocrit 25.8, WBC 20,000.



PROBLEMS:  

1.  Acute renal failure.  Patient had acute renal failure related to sepsis. 

Kidney function is improving nicely.  His electrolytes are stable at this time.

 



2.  Hypernatremia.  His sodium level has improved and corrected.  His oral

intake has improved and intravenous (IV) fluid has already been stopped.  



3.  Anemia.  Patient does have significant anemia and likely to benefit from

transfusion.  I will defer to his primary care team.



4.  Sepsis.  Patient has improved and remains on antibiotics.  Blood cultures

have been negative so far.  



All in all, from a renal standpoint, patient is doing well and I am going to

sign off.  Please do not hesitate to call me back if you need any further

assistance.

## 2021-08-01 NOTE — IPN
PROGRESS NOTE



DATE:  07/31/2021



SUBJECTIVE:  Mr. Roberts was seen earlier this afternoon, he was lying supine

in bed, in no acute distress at rest.  He spontaneously converted to a normal

sinus rhythm.  He was admitted with sepsis and pneumonia and was found to be in

atrial flutter with 2:1 AV block. He was treated with beta blocker, digoxin and

received amiodarone.  Initially, he was hypotensive upon admission.  He was

responded to IV fluids.  He denies any chest pain, shortness of breath,

palpitations and there was no orthopnea or paroxysmal nocturnal dyspnea (PND).

No pedal edema noted.  There is no report of bleeding. 



OBJECTIVE: On physical examination, the patient is alert and oriented, in no

acute distress at rest and his vital signs when I saw him revealed a blood

pressure of 115/82 with a pulse of 80, respirations 18 and his saturation was

100% on room air. His maximum temperature was 98 degrees Fahrenheit.  He has a

positive fluid balance of 2.1 liters on 07/30/2021.  Examination of the head: 

Atraumatic.  Neck supple and no jugular venous distention (JVD) appreciated. 

The lungs did not reveal any wheezing or crackles.  Heart examination revealed

regular heart sounds without gallops.  The PMI is slightly displaced inferiorly

and laterally.  There is no rub.  Abdomen is soft and nontender.  Extremities

reveal no pedal edema.  Neurological examination grossly is negative for focal

deficit.  



LABORATORY DATA:  Complete blood count (CBC) done today revealed WBC of 23,000,

hemoglobin 7.1, hematocrit 24.6 and platelets 252,000.  Basic metabolic panel

(BMP) revealed a sodium of 143, potassium 4.6, chloride 112, Co2 27, BUN 28,

creatinine 1.34, glomerular filtration rate (GFR) 56.8 and fasting glucose 154

with a calcium of 7.1. 



Liver enzymes revealed a total bilirubin of 0.2, AST 14, ALT 17, alkaline

phosphatase is 97, total protein 4.3 and albumin 1.9.  



Serum lactic acid is now 0.8.  



ASSESSMENT: Mr. Rodney Foley seems to be stable from a cardiac point of view, now

in normal sinus rhythm.  His echocardiogram with a depressed left ventricular

ejection fraction (LVEF), formal report is pending.  Will continue with the

beta blocker.  When he is more stable, will start him on ACE inhibitor or ARB. 

In view of his anemia, I am not going to continue with the Eliquis and he will

be restarted on heparin subcutaneous.  His atrial flutter is probably related

to underlying anemia in the setting of cardiomyopathy and left ventricular

systolic dysfunction.  If he continues to have paroxysmal atrial fibrillation,

this will be revisited.  He might benefit from gastrointestinal (GI) workup in

the future.  He also will be started on a baby aspirin daily. 



It was a pleasure to participate in the care of Mr. Rodney Roberts for his

underlying cardiac condition. I will continue to monitor him along with you.

## 2021-08-01 NOTE — IPNPDOC
Subjective


Date Seen


The patient was seen on 8/1/21.





Subjective


Chief Complaint/HPI


Mr. Roberts is feeling pretty well today.  Nursing reports that he has a strong 

gastrocolic reflex and might have some sort of malabsorption syndrome.  She 

reports that shortly after he ate an omelette for breakfast he had a bowel 

movement and now came undigested pieces of egg.  When I asked him if he always 

has bowel movements right after he eats he says "I poop when I poop".


Constitutional:  Denies: Chills, Fever


Cardiovascular:  Denies: Chest Pain, Palpitations


Gastrointestinal:  Reports: Diarrhea; 


   Denies: Nausea, Vomiting, Abdominal Pain





Objective


Physical Examination


General Exam:  Positive: Alert, Cooperative (Sitting comfortably in his bed and 

easily arousable to verbal stimuli when I enter his room), No Acute Distress


Eye Exam:  Positive: PERRLA, Conjunctiva & lids normal


ENT Exam:  Positive: Atraumatic, Mucous membr. moist/pink, Tongue Midline


Neck Exam:  Positive: Supple; 


   Negative: Lymphadenopathy


Chest Exam:  Positive: Rales (His right lower lobe actually sounds a little 

worse today, but it might be because he was resting and not breathing deeply), 

Diminished


Heart Exam:  Positive: Rate Normal, Regular Rhythm, Normal S1, Normal S2


Telemetry:  Positive: No significant arrhythmia, Sinus


Abdomen Exam:  Positive: Normal bowel sounds, Soft; 


   Negative: Tenderness


Male  Exam:  Negative: Edema


Extremity Exam:  Negative: Edema


Neuro Exam:  Positive: Normal Speech, Sensation Intact


Psych Exam:  Positive: Mood NL, Oriented x 3





Assessment /Plan


Problems





(1) Sepsis


Status:  Acute


Response to Treatment:  Uncompensated


Problem Text:  Blood cultures are negative so far. He is on meropenem and 

vancomycin now. His PICC is in, but his BP is normal now. He isn't dangerously 

hypotensive. His tachycardia has also resolved. He is certainly heading the 

right way physiologically, but we will continue to need to monitor carefully.  I

feel comfortable stepping him down from the ICU today.





(2) Pneumonia


Status:  Acute


Problem Text:  Nurses are carefully following the dietary recommendations.  The 

patient's not happy with this, but he does not seem to be choking when he eats. 

One concern is that he seems to always ortega his food down.  I asked if this is 

the way he always eats and he seemed confused about the question.  I suspect 

that he may not have a lot of food available and therefore subconsciously eat as

much as he can as soon as he has access to it.  We may do better slowly giving 

him the parts of his meal so that he does not eat them all at once and aspirate.





(3) Chronic HFrEF (heart failure with reduced ejection fraction)


Status:  Chronic


Response to Treatment:  Compensated


Problem Text:  He remains compensated even during his rehydration. At this point

he is eating and drinking fine on his own.  We will continue to monitor.





(4) COPD (chronic obstructive pulmonary disease)


Status:  Chronic


Response to Treatment:  Stable


Problem Text:  His breathing remains stable even though he has a pneumonia.  I 

am stopping his IV Solu-Medrol and changing him to oral prednisone.  Will need 

to continue to monitor carefully. 





(5) Atrial flutter


Status:  Resolved


Response to Treatment:  Stable, Controlled


Discussed With:  Nurse, Patient


Problem Specific Plan:  Consult Specialist


Problem Text:  Apparently had 1 small run of SVT last night, but is otherwise 

remained in sinus rhythm.  We will continue to monitor with telemetry. 





(6) CAD (coronary artery disease)


Status:  Chronic


Problem Text:  No symptoms. Continue current regimen, monitor.





(7) HTN (hypertension)


Problem Text:  His BP is better controlled today. Obviously all his 

antihypertensives have been held along with all of his oral medications at this 

time. We will continue to monitor his BP carefully. 





(8) Severe muscle deconditioning


Problem Text:  He is very weak appearing and emaciated. He needs significant 

bulking and then reconditioning before he will be safe to live on his own again.










Plan/VTE


VTE Prophylaxis Ordered?:  Yes





VS, I&O, 24H, Fishbone


Vital Signs/I&O





Vital Signs








  Date Time  Temp Pulse Resp B/P (MAP) Pulse Ox O2 Delivery O2 Flow Rate FiO2


 


8/1/21 10:01  74 17 120/57 (78) 97 Room Air  


 


8/1/21 08:00 98.6       


 


7/30/21 18:00       2.0 














I&O- Last 24 Hours up to 6 AM 


 


 8/1/21





 05:59


 


Intake Total 2825 ml


 


Output Total 715 ml


 


Balance 2110 ml











Laboratory Data


24H LABS


Laboratory Tests 2


7/31/21 14:18: Vancomycin Level Trough 9.6L


8/1/21 05:16: 


Immature Granulocyte % (Auto) 1.1, Neutrophils (%) (Auto) 95.6H, Lymphocytes (%)

(Auto) 1.8L, Monocytes (%) (Auto) 1.4L, Eosinophils (%) (Auto) 0.0, Basophils 

(%) (Auto) 0.1, Neutrophils # (Auto) 19.2H, Lymphocytes # (Auto) 0.4L, Monocytes

# (Auto) 0.3, Eosinophils # (Auto) 0.0, Basophils # (Auto) 0.0, Nucleated Red 

Blood Cells % (auto) 0.0, Anion Gap 8, Glomerular Filtration Rate > 60.0, 

Calcium Level 7.5L


CBC/BMP


Laboratory Tests


7/31/21 18:17








8/1/21 05:16








Microbiology





Microbiology


7/29/21 Blood Culture - Preliminary, Resulted


          No Growth after 48 hours. All Specime...


7/29/21 Blood Culture - Preliminary, Resulted


          No Growth after 48 hours. All Specime...


7/29/21 Respiratory Virus Panel (PCR) ([facility]) - Final, Complete











Tra Banks MD              Aug 1, 2021 12:30

## 2021-08-02 VITALS — SYSTOLIC BLOOD PRESSURE: 133 MMHG | DIASTOLIC BLOOD PRESSURE: 61 MMHG

## 2021-08-02 VITALS — DIASTOLIC BLOOD PRESSURE: 63 MMHG | SYSTOLIC BLOOD PRESSURE: 130 MMHG

## 2021-08-02 VITALS — SYSTOLIC BLOOD PRESSURE: 121 MMHG | DIASTOLIC BLOOD PRESSURE: 58 MMHG

## 2021-08-02 VITALS — SYSTOLIC BLOOD PRESSURE: 140 MMHG | DIASTOLIC BLOOD PRESSURE: 66 MMHG

## 2021-08-02 VITALS — DIASTOLIC BLOOD PRESSURE: 55 MMHG | SYSTOLIC BLOOD PRESSURE: 106 MMHG

## 2021-08-02 VITALS — DIASTOLIC BLOOD PRESSURE: 69 MMHG | SYSTOLIC BLOOD PRESSURE: 163 MMHG

## 2021-08-02 LAB
BASOPHILS # BLD AUTO: 0 10^3/UL (ref 0–0.2)
BASOPHILS NFR BLD AUTO: 0.1 % (ref 0–1)
BUN SERPL-MCNC: 31 MG/DL (ref 7–18)
CALCIUM SERPL-MCNC: 7.8 MG/DL (ref 8.8–10.2)
CHLORIDE SERPL-SCNC: 113 MEQ/L (ref 98–107)
CO2 SERPL-SCNC: 23 MEQ/L (ref 21–32)
CREAT SERPL-MCNC: 1.15 MG/DL (ref 0.7–1.3)
EOSINOPHIL # BLD AUTO: 0 10^3/UL (ref 0–0.5)
EOSINOPHIL NFR BLD AUTO: 0.1 % (ref 0–3)
GFR SERPL CREATININE-BSD FRML MDRD: > 60 ML/MIN/{1.73_M2} (ref 49–?)
GLUCOSE SERPL-MCNC: 113 MG/DL (ref 70–100)
HCT VFR BLD AUTO: 27.9 % (ref 42–52)
HGB BLD-MCNC: 7.9 G/DL (ref 13.5–17.5)
LYMPHOCYTES # BLD AUTO: 0.8 10^3/UL (ref 1.5–5)
LYMPHOCYTES NFR BLD AUTO: 4.8 % (ref 24–44)
MCH RBC QN AUTO: 21.4 PG (ref 27–33)
MCHC RBC AUTO-ENTMCNC: 28.3 G/DL (ref 32–36.5)
MCV RBC AUTO: 75.4 FL (ref 80–96)
MONOCYTES # BLD AUTO: 0.9 10^3/UL (ref 0–0.8)
MONOCYTES NFR BLD AUTO: 5.6 % (ref 2–8)
NEUTROPHILS # BLD AUTO: 14.3 10^3/UL (ref 1.5–8.5)
NEUTROPHILS NFR BLD AUTO: 88.8 % (ref 36–66)
PLATELET # BLD AUTO: 212 10^3/UL (ref 150–450)
POTASSIUM SERPL-SCNC: 4.4 MEQ/L (ref 3.5–5.1)
RBC # BLD AUTO: 3.7 10^6/UL (ref 4.3–6.1)
SODIUM SERPL-SCNC: 141 MEQ/L (ref 136–145)
WBC # BLD AUTO: 16.1 10^3/UL (ref 4–10)

## 2021-08-02 RX ADMIN — DIPHENOXYLATE HYDROCHLORIDE AND ATROPINE SULFATE SCH EA: 2.5; .025 TABLET ORAL at 09:43

## 2021-08-02 RX ADMIN — LEVALBUTEROL HYDROCHLORIDE SCH MG: 1.25 SOLUTION, CONCENTRATE RESPIRATORY (INHALATION) at 13:28

## 2021-08-02 RX ADMIN — METOPROLOL TARTRATE SCH MG: 25 TABLET, FILM COATED ORAL at 20:37

## 2021-08-02 RX ADMIN — Medication SCH: at 09:00

## 2021-08-02 RX ADMIN — METOPROLOL TARTRATE SCH MG: 25 TABLET, FILM COATED ORAL at 15:16

## 2021-08-02 RX ADMIN — ASPIRIN SCH MG: 81 TABLET ORAL at 09:44

## 2021-08-02 RX ADMIN — ACETAMINOPHEN PRN MG: 500 TABLET ORAL at 20:41

## 2021-08-02 RX ADMIN — DEXTROSE MONOHYDRATE SCH MG: 50 INJECTION, SOLUTION INTRAVENOUS at 20:36

## 2021-08-02 RX ADMIN — ATORVASTATIN CALCIUM SCH MG: 20 TABLET, FILM COATED ORAL at 22:40

## 2021-08-02 RX ADMIN — DICLOFENAC EPOLAMINE SCH PATCH: 0.01 SYSTEM TOPICAL at 20:38

## 2021-08-02 RX ADMIN — MEROPENEM AND SODIUM CHLORIDE SCH MLS/HR: 1 INJECTION, SOLUTION INTRAVENOUS at 23:23

## 2021-08-02 RX ADMIN — METOPROLOL TARTRATE SCH MG: 25 TABLET, FILM COATED ORAL at 02:40

## 2021-08-02 RX ADMIN — ENOXAPARIN SODIUM SCH MG: 40 INJECTION SUBCUTANEOUS at 09:44

## 2021-08-02 RX ADMIN — DIPHENOXYLATE HYDROCHLORIDE AND ATROPINE SULFATE SCH EA: 2.5; .025 TABLET ORAL at 13:22

## 2021-08-02 RX ADMIN — ROPINIROLE HYDROCHLORIDE SCH MG: 0.25 TABLET, FILM COATED ORAL at 20:38

## 2021-08-02 RX ADMIN — MEROPENEM AND SODIUM CHLORIDE SCH MLS/HR: 1 INJECTION, SOLUTION INTRAVENOUS at 04:04

## 2021-08-02 RX ADMIN — METOPROLOL TARTRATE SCH MG: 25 TABLET, FILM COATED ORAL at 09:43

## 2021-08-02 RX ADMIN — DIPHENOXYLATE HYDROCHLORIDE AND ATROPINE SULFATE SCH EA: 2.5; .025 TABLET ORAL at 17:33

## 2021-08-02 RX ADMIN — IPRATROPIUM BROMIDE SCH MG: 0.5 SOLUTION RESPIRATORY (INHALATION) at 19:36

## 2021-08-02 RX ADMIN — BUDESONIDE SCH MG: 0.5 INHALANT RESPIRATORY (INHALATION) at 08:07

## 2021-08-02 RX ADMIN — IPRATROPIUM BROMIDE SCH MG: 0.5 SOLUTION RESPIRATORY (INHALATION) at 13:28

## 2021-08-02 RX ADMIN — FERROUS SULFATE TAB 325 MG (65 MG ELEMENTAL FE) SCH MG: 325 (65 FE) TAB at 09:44

## 2021-08-02 RX ADMIN — IPRATROPIUM BROMIDE SCH MG: 0.5 SOLUTION RESPIRATORY (INHALATION) at 08:08

## 2021-08-02 RX ADMIN — ROPINIROLE HYDROCHLORIDE SCH MG: 0.25 TABLET, FILM COATED ORAL at 09:43

## 2021-08-02 RX ADMIN — BUDESONIDE SCH MG: 0.5 INHALANT RESPIRATORY (INHALATION) at 19:36

## 2021-08-02 RX ADMIN — IPRATROPIUM BROMIDE SCH MG: 0.5 SOLUTION RESPIRATORY (INHALATION) at 01:42

## 2021-08-02 RX ADMIN — LEVALBUTEROL HYDROCHLORIDE SCH MG: 1.25 SOLUTION, CONCENTRATE RESPIRATORY (INHALATION) at 01:42

## 2021-08-02 RX ADMIN — DIPHENOXYLATE HYDROCHLORIDE AND ATROPINE SULFATE SCH EA: 2.5; .025 TABLET ORAL at 20:36

## 2021-08-02 RX ADMIN — LEVALBUTEROL HYDROCHLORIDE SCH MG: 1.25 SOLUTION, CONCENTRATE RESPIRATORY (INHALATION) at 19:36

## 2021-08-02 RX ADMIN — MEROPENEM AND SODIUM CHLORIDE SCH MLS/HR: 1 INJECTION, SOLUTION INTRAVENOUS at 15:20

## 2021-08-02 RX ADMIN — LIDOCAINE SCH PATCH: 50 PATCH CUTANEOUS at 20:38

## 2021-08-02 RX ADMIN — LEVALBUTEROL HYDROCHLORIDE SCH MG: 1.25 SOLUTION, CONCENTRATE RESPIRATORY (INHALATION) at 08:07

## 2021-08-02 RX ADMIN — LISINOPRIL SCH MG: 2.5 TABLET ORAL at 09:44

## 2021-08-02 NOTE — IPN
PROGRESS NOTE



DATE:  08/01/2021



Mr. Roberts was seen earlier this morning, he was in supine in bed, in no acute

distress at rest.  He continues to remain in normal sinus rhythm.  There is no

report of chest pain, shortness of breath, orthopnea, pedal edema, dizziness. 

Last night and the night before, he had a short run of ventricular tachycardia

(VT) while sleeping.  He was initially admitted with pneumonia and sepsis and

was found to be in atrial flutter with 2:1 atrioventricular (AV) block.  He was

treated with digoxin, metoprolol tartrate, and IV amiodarone.  He is currently

on short-acting metoprolol tartrate.  He has been back in sinus rhythm and for

this reason, he was not started on anticoagulation, his atrial flutter was

thought to be related to his underlying pneumonia and sepsis.  He is currently

on anticoagulation therapy with subcutaneous Lovenox.



PHYSICAL EXAMINATION:  

Patient is alert and oriented, in no acute distress at rest, and his vital

signs earlier today revealed a blood pressure of 113/56, with a pulse of 77,

respirations 18, and his maximum temperature was 98.3 degrees Fahrenheit with

an oxygen saturation of 95-97% on room air.   

Examination of the head:  Atraumatic.  

Neck is supple.  No jugular venous distention (JVD) appreciated. 

The lungs were clear bilaterally on auscultation without any wheezing or

crackles.

The heart examination reveals irregular heart sounds without gallops.  The

point of maximum impulse (PMI) is displaced inferiorly and laterally.  There is

no rub.  

Abdomen is soft and nontender.

Extremities revealed no pedal edema.

Neurologic examination is negative for focal deficit.



LABORATORY DATA:  

CBC done today revealed a WBC of 20.1, hemoglobin 7.5, hematocrit 25.8, and

platelets 237,000.



BMP revealed a sodium of 141, potassium 4.4, chloride 110, CO2 23, BUN 33,

creatinine 1.2, GFR more than 60, fasting glucose 150, and calcium 7.5.  



IMPRESSION:  

1.  Paroxysmal atrial flutter with 2:1 atrioventricular (AV) block in the

setting of pneumonia and sepsis:  Patient has been in normal sinus rhythm after

24 hours in the hospital.  Currently, on a beta blocker and I have added

aspirin, and he will continue the same.  As mentioned above, we will not be

giving him anticoagulation at this present time because his atrial flutter

seems to be in the setting of his pneumonia and also he has underlying anemia

that will need to be worked up.  However, if he continues to have episodes of

paroxysmal atrial flutter or atrial fibrillation, he will require chronic

anticoagulation therapy.



2.  Left ventricular systolic dysfunction:  On a beta blocker with metoprolol

tartrate and we will continue the same.  Tomorrow, will initiate a small dose

of an angiotensin-converting enzyme (ACE) inhibitor with lisinopril.  He is not

retaining fluid at this present time.



3.  History of hypertension, being addressed.



4.  Pneumonia, being addressed, on IV antibiotics with meropenem and

vancomycin.  He was admitted with sepsis, resolved.



5.  Coronary artery disease (CAD):  Patient could not elaborate on that this

morning.  He denies any prior history of myocardial infarction but his

echocardiogram revealed findings that may be related to a prior coronary event.



6.  Chronic obstructive pulmonary disease, stable.  No wheezing since seen in

the hospital.



7.  Status post acute kidney injury (IBAN), resolving.



It was a pleasure to participate in the care of Mr. Rodney Roberts for his

underlying cardiac condition.  He has been stable the last 48 hours from a

cardiac point of view and I will continue to monitor him as needed while in the

hospital.  Upon discharge, I will see him in the office.

## 2021-08-03 VITALS — SYSTOLIC BLOOD PRESSURE: 112 MMHG | DIASTOLIC BLOOD PRESSURE: 60 MMHG

## 2021-08-03 VITALS — DIASTOLIC BLOOD PRESSURE: 62 MMHG | SYSTOLIC BLOOD PRESSURE: 132 MMHG

## 2021-08-03 VITALS — DIASTOLIC BLOOD PRESSURE: 66 MMHG | SYSTOLIC BLOOD PRESSURE: 144 MMHG

## 2021-08-03 VITALS — DIASTOLIC BLOOD PRESSURE: 70 MMHG | SYSTOLIC BLOOD PRESSURE: 110 MMHG

## 2021-08-03 VITALS — SYSTOLIC BLOOD PRESSURE: 116 MMHG | DIASTOLIC BLOOD PRESSURE: 58 MMHG

## 2021-08-03 VITALS — SYSTOLIC BLOOD PRESSURE: 106 MMHG | DIASTOLIC BLOOD PRESSURE: 60 MMHG

## 2021-08-03 VITALS — SYSTOLIC BLOOD PRESSURE: 115 MMHG | DIASTOLIC BLOOD PRESSURE: 54 MMHG

## 2021-08-03 VITALS — SYSTOLIC BLOOD PRESSURE: 149 MMHG | DIASTOLIC BLOOD PRESSURE: 67 MMHG

## 2021-08-03 VITALS — SYSTOLIC BLOOD PRESSURE: 140 MMHG | DIASTOLIC BLOOD PRESSURE: 79 MMHG

## 2021-08-03 VITALS — DIASTOLIC BLOOD PRESSURE: 60 MMHG | SYSTOLIC BLOOD PRESSURE: 112 MMHG

## 2021-08-03 LAB
BASOPHILS # BLD AUTO: 0 10^3/UL (ref 0–0.2)
BASOPHILS NFR BLD AUTO: 0 % (ref 0–1)
BUN SERPL-MCNC: 24 MG/DL (ref 7–18)
CALCIUM SERPL-MCNC: 7.4 MG/DL (ref 8.8–10.2)
CHLORIDE SERPL-SCNC: 115 MEQ/L (ref 98–107)
CO2 SERPL-SCNC: 19 MEQ/L (ref 21–32)
CREAT SERPL-MCNC: 0.96 MG/DL (ref 0.7–1.3)
EOSINOPHIL # BLD AUTO: 0.1 10^3/UL (ref 0–0.5)
EOSINOPHIL NFR BLD AUTO: 1 % (ref 0–3)
FERRITIN SERPL-MCNC: 22 NG/ML (ref 26–388)
GFR SERPL CREATININE-BSD FRML MDRD: > 60 ML/MIN/{1.73_M2} (ref 49–?)
GLUCOSE SERPL-MCNC: 91 MG/DL (ref 70–100)
HCT VFR BLD AUTO: 26.5 % (ref 42–52)
HGB BLD-MCNC: 7.4 G/DL (ref 13.5–17.5)
IRON SATN MFR SERPL: 6.4 % (ref 19.7–50)
IRON SERPL-MCNC: 17 UG/DL (ref 65–175)
LYMPHOCYTES # BLD AUTO: 0.5 10^3/UL (ref 1.5–5)
LYMPHOCYTES NFR BLD AUTO: 6.3 % (ref 24–44)
MCH RBC QN AUTO: 21.2 PG (ref 27–33)
MCHC RBC AUTO-ENTMCNC: 27.9 G/DL (ref 32–36.5)
MCV RBC AUTO: 75.9 FL (ref 80–96)
MONOCYTES # BLD AUTO: 0.7 10^3/UL (ref 0–0.8)
MONOCYTES NFR BLD AUTO: 8 % (ref 2–8)
NEUTROPHILS # BLD AUTO: 7 10^3/UL (ref 1.5–8.5)
NEUTROPHILS NFR BLD AUTO: 84 % (ref 36–66)
PLATELET # BLD AUTO: 174 10^3/UL (ref 150–450)
POTASSIUM SERPL-SCNC: 4.2 MEQ/L (ref 3.5–5.1)
RBC # BLD AUTO: 3.49 10^6/UL (ref 4.3–6.1)
SODIUM SERPL-SCNC: 144 MEQ/L (ref 136–145)
TIBC SERPL-MCNC: 267 UG/DL (ref 250–450)
WBC # BLD AUTO: 8.4 10^3/UL (ref 4–10)

## 2021-08-03 RX ADMIN — DICLOFENAC EPOLAMINE SCH PATCH: 0.01 SYSTEM TOPICAL at 09:40

## 2021-08-03 RX ADMIN — LEVALBUTEROL HYDROCHLORIDE SCH MG: 1.25 SOLUTION, CONCENTRATE RESPIRATORY (INHALATION) at 02:14

## 2021-08-03 RX ADMIN — ASPIRIN SCH MG: 81 TABLET ORAL at 09:39

## 2021-08-03 RX ADMIN — PANCRELIPASE SCH EA: 24000; 76000; 120000 CAPSULE, DELAYED RELEASE PELLETS ORAL at 14:11

## 2021-08-03 RX ADMIN — METOPROLOL TARTRATE SCH MG: 25 TABLET, FILM COATED ORAL at 20:42

## 2021-08-03 RX ADMIN — Medication SCH: at 09:00

## 2021-08-03 RX ADMIN — DIPHENOXYLATE HYDROCHLORIDE AND ATROPINE SULFATE SCH EA: 2.5; .025 TABLET ORAL at 20:42

## 2021-08-03 RX ADMIN — LOSARTAN POTASSIUM SCH MG: 50 TABLET, FILM COATED ORAL at 09:39

## 2021-08-03 RX ADMIN — METOPROLOL TARTRATE SCH MG: 25 TABLET, FILM COATED ORAL at 09:40

## 2021-08-03 RX ADMIN — METOPROLOL TARTRATE SCH MG: 25 TABLET, FILM COATED ORAL at 03:55

## 2021-08-03 RX ADMIN — CALCIUM CARBONATE (ANTACID) CHEW TAB 500 MG PRN MG: 500 CHEW TAB at 10:34

## 2021-08-03 RX ADMIN — LEVALBUTEROL HYDROCHLORIDE SCH MG: 1.25 SOLUTION, CONCENTRATE RESPIRATORY (INHALATION) at 19:28

## 2021-08-03 RX ADMIN — ENOXAPARIN SODIUM SCH MG: 40 INJECTION SUBCUTANEOUS at 09:38

## 2021-08-03 RX ADMIN — LEVALBUTEROL HYDROCHLORIDE SCH MG: 1.25 SOLUTION, CONCENTRATE RESPIRATORY (INHALATION) at 07:37

## 2021-08-03 RX ADMIN — BUDESONIDE SCH MG: 0.5 INHALANT RESPIRATORY (INHALATION) at 07:37

## 2021-08-03 RX ADMIN — DEXTROSE MONOHYDRATE SCH MG: 50 INJECTION, SOLUTION INTRAVENOUS at 22:12

## 2021-08-03 RX ADMIN — IPRATROPIUM BROMIDE SCH MG: 0.5 SOLUTION RESPIRATORY (INHALATION) at 02:14

## 2021-08-03 RX ADMIN — MEROPENEM AND SODIUM CHLORIDE SCH MLS/HR: 1 INJECTION, SOLUTION INTRAVENOUS at 09:41

## 2021-08-03 RX ADMIN — ROPINIROLE HYDROCHLORIDE SCH MG: 0.25 TABLET, FILM COATED ORAL at 20:42

## 2021-08-03 RX ADMIN — LIDOCAINE SCH PATCH: 50 PATCH CUTANEOUS at 21:00

## 2021-08-03 RX ADMIN — DIPHENOXYLATE HYDROCHLORIDE AND ATROPINE SULFATE SCH EA: 2.5; .025 TABLET ORAL at 14:18

## 2021-08-03 RX ADMIN — MOXIFLOXACIN HYDROCHLORIDE SCH MG: 400 TABLET, FILM COATED ORAL at 14:11

## 2021-08-03 RX ADMIN — LISINOPRIL SCH MG: 2.5 TABLET ORAL at 09:40

## 2021-08-03 RX ADMIN — METOPROLOL TARTRATE SCH MG: 25 TABLET, FILM COATED ORAL at 15:42

## 2021-08-03 RX ADMIN — PANCRELIPASE SCH EA: 24000; 76000; 120000 CAPSULE, DELAYED RELEASE PELLETS ORAL at 09:39

## 2021-08-03 RX ADMIN — FERROUS SULFATE TAB 325 MG (65 MG ELEMENTAL FE) SCH MG: 325 (65 FE) TAB at 09:40

## 2021-08-03 RX ADMIN — DIPHENOXYLATE HYDROCHLORIDE AND ATROPINE SULFATE SCH EA: 2.5; .025 TABLET ORAL at 09:40

## 2021-08-03 RX ADMIN — DICLOFENAC EPOLAMINE SCH PATCH: 0.01 SYSTEM TOPICAL at 21:00

## 2021-08-03 RX ADMIN — ATORVASTATIN CALCIUM SCH MG: 20 TABLET, FILM COATED ORAL at 20:42

## 2021-08-03 RX ADMIN — ROPINIROLE HYDROCHLORIDE SCH MG: 0.25 TABLET, FILM COATED ORAL at 09:39

## 2021-08-03 RX ADMIN — IPRATROPIUM BROMIDE SCH MG: 0.5 SOLUTION RESPIRATORY (INHALATION) at 19:28

## 2021-08-03 RX ADMIN — IPRATROPIUM BROMIDE SCH MG: 0.5 SOLUTION RESPIRATORY (INHALATION) at 14:35

## 2021-08-03 RX ADMIN — LEVALBUTEROL HYDROCHLORIDE SCH MG: 1.25 SOLUTION, CONCENTRATE RESPIRATORY (INHALATION) at 14:35

## 2021-08-03 RX ADMIN — DIPHENOXYLATE HYDROCHLORIDE AND ATROPINE SULFATE SCH EA: 2.5; .025 TABLET ORAL at 16:33

## 2021-08-03 RX ADMIN — PANCRELIPASE SCH EA: 24000; 76000; 120000 CAPSULE, DELAYED RELEASE PELLETS ORAL at 18:14

## 2021-08-03 RX ADMIN — IPRATROPIUM BROMIDE SCH MG: 0.5 SOLUTION RESPIRATORY (INHALATION) at 07:37

## 2021-08-03 RX ADMIN — BUDESONIDE SCH MG: 0.5 INHALANT RESPIRATORY (INHALATION) at 19:28

## 2021-08-03 NOTE — REP
INDICATION:

follow pneumonia.



COMPARISON:

07/31/2021



TECHNIQUE:

PA and lateral



FINDINGS:

Cardiomediastinal silhouette is unchanged.  The heart is not enlarged.  Note is again

made of previous median sternotomy.  Scattered bilateral patchy opacities seen

previously have improved.  No new patchy parenchymal opacities or pleural effusions

have developed.  The pleural angles are again seen to be sharp.  There is no change in

the osseous structures.





IMPRESSION:

Improvement as described above.





<Electronically signed by Mario Negron > 08/03/21 [id number]

## 2021-08-03 NOTE — IPN
PROGRESS NOTE



DATE:  08/03/2021



SUBJECTIVE:  Patient was seen and examined at the bedside, chart has been

reviewed. Patient is anxious to go home. He says the shortness of breath has

improved. He has no complaints of palpitations, lightheadedness or dizziness.

No complaints of chest pain, pressure or tightness, afebrile, no chills. He is

tolerating his diet well. Input and output was 1590 in, output of 810, positive

4200. 



OBJECTIVE: 

VITAL SIGNS: Temperature is 97.3, pulse is 74, irregularly irregular A-fib,

respiratory rate 18, blood pressure 115/54, 97% on room air. 

GENERAL: Patient is awake, alert and oriented to person, place and time,

answering questions appropriately. 

NECK: No JVD. No thyromegaly. No cervical lymphadenopathy. 

HEART: S1 and S2 irregularly irregular, not tachycardic. Slightly displaced

point of maximal impulse laterally.

LUNGS: Clear to auscultation. No wheezing, rales or rhonchi.

ABDOMEN: Soft, nontender and nondistended. Positive bowel sounds.

EXTREMITIES: No pitting edema.



Laboratory data, imaging studies and microbiology have all been reviewed.



HOSPITAL MEDICATIONS: Cozaar, Pancrelipase, meropenem, Lipitor, Flector Patch,

Tylenol, Prednisone, Lomotil, aspirin, Lisinopril, ferrous sulfate, Lidoderm

Patch, Requip, Lovenox, Tums, Protonix, Metoprolol, Atrovent, Xopenex,

Pulmicort. 



ASSESSMENT AND PLAN: This is a 65-year-old male admitted on 07/30/2021 with

sepsis, A-fib with RVR, and hypernatremia, hyperkalemia and acute kidney

injury. Patient was found to have aspiration pneumonia, new onset, exocrine

pancreatic insufficiency with improvement in rate control of A-flutter and

resolution of acute kidney injury, electrolyte abnormalities and hypernatremia.

Patient was found to be anemic; hemoglobin of 7.4 with no signs of GI bleed and

has given consent for blood transfusion.  Active issues are as follows:



  1.  Pneumonia. Patient is currently on Meropenem with a history of allergies

      to Amoxicillin, Clavulanic Acid, Pregabalin and Warfarin, to complete a

      seven day course.

  2.  Atrial flutter with rapid ventricular response, resolved, currently rate

      controlled, only on aspirin 81 mg daily. Currently on metoprolol 25 q. 6

      hourly, managed by his cardiologist.

  3.  Pancreatitis, tolerating his diet well, on Creon two tablets with meals.

  4.  Hypertension, controlled on Losartan, Lisinopril.

  5.  Acute kidney injury with hyperkalemia, hypernatremia, resolved, managed

      by nephrologist.

  6.  COPD on rapid tapering of Prednisone 40 daily, inhalers.

  7.  Chronic pain, on Lidoderm Patch, Flector Patch.oxycodone

  8.  Anemia, check iron studies, stool Hemoccult, currently on Protonix.

      Patient will be transfused 2 units of RBCs. 

  9.  Disposition: Await PT/OT recommendations. Transfuse 2 units for now. 

MTDD

## 2021-08-04 VITALS — SYSTOLIC BLOOD PRESSURE: 125 MMHG | DIASTOLIC BLOOD PRESSURE: 66 MMHG

## 2021-08-04 VITALS — DIASTOLIC BLOOD PRESSURE: 63 MMHG | SYSTOLIC BLOOD PRESSURE: 129 MMHG

## 2021-08-04 VITALS — SYSTOLIC BLOOD PRESSURE: 128 MMHG | DIASTOLIC BLOOD PRESSURE: 63 MMHG

## 2021-08-04 LAB
BASOPHILS # BLD AUTO: 0 10^3/UL (ref 0–0.2)
BASOPHILS NFR BLD AUTO: 0 % (ref 0–1)
BUN SERPL-MCNC: 20 MG/DL (ref 7–18)
CALCIUM SERPL-MCNC: 7.5 MG/DL (ref 8.8–10.2)
CHLORIDE SERPL-SCNC: 114 MEQ/L (ref 98–107)
CO2 SERPL-SCNC: 20 MEQ/L (ref 21–32)
CREAT SERPL-MCNC: 0.9 MG/DL (ref 0.7–1.3)
EOSINOPHIL # BLD AUTO: 0.5 10^3/UL (ref 0–0.5)
EOSINOPHIL NFR BLD AUTO: 5.2 % (ref 0–3)
GFR SERPL CREATININE-BSD FRML MDRD: > 60 ML/MIN/{1.73_M2} (ref 49–?)
GLUCOSE SERPL-MCNC: 88 MG/DL (ref 70–100)
HCT VFR BLD AUTO: 34.8 % (ref 42–52)
HGB BLD-MCNC: 10.3 G/DL (ref 13.5–17.5)
LYMPHOCYTES # BLD AUTO: 0.8 10^3/UL (ref 1.5–5)
LYMPHOCYTES NFR BLD AUTO: 9.5 % (ref 24–44)
MCH RBC QN AUTO: 22.7 PG (ref 27–33)
MCHC RBC AUTO-ENTMCNC: 29.6 G/DL (ref 32–36.5)
MCV RBC AUTO: 76.8 FL (ref 80–96)
MONOCYTES # BLD AUTO: 0.9 10^3/UL (ref 0–0.8)
MONOCYTES NFR BLD AUTO: 9.7 % (ref 2–8)
NEUTROPHILS # BLD AUTO: 6.5 10^3/UL (ref 1.5–8.5)
NEUTROPHILS NFR BLD AUTO: 75 % (ref 36–66)
PLATELET # BLD AUTO: 177 10^3/UL (ref 150–450)
POTASSIUM SERPL-SCNC: 4.8 MEQ/L (ref 3.5–5.1)
RBC # BLD AUTO: 4.53 10^6/UL (ref 4.3–6.1)
SODIUM SERPL-SCNC: 141 MEQ/L (ref 136–145)
WBC # BLD AUTO: 8.7 10^3/UL (ref 4–10)

## 2021-08-04 RX ADMIN — DIPHENOXYLATE HYDROCHLORIDE AND ATROPINE SULFATE SCH EA: 2.5; .025 TABLET ORAL at 13:01

## 2021-08-04 RX ADMIN — ROPINIROLE HYDROCHLORIDE SCH MG: 0.25 TABLET, FILM COATED ORAL at 09:22

## 2021-08-04 RX ADMIN — ENOXAPARIN SODIUM SCH MG: 40 INJECTION SUBCUTANEOUS at 09:19

## 2021-08-04 RX ADMIN — METOPROLOL TARTRATE SCH MG: 25 TABLET, FILM COATED ORAL at 09:22

## 2021-08-04 RX ADMIN — LOSARTAN POTASSIUM SCH MG: 50 TABLET, FILM COATED ORAL at 09:21

## 2021-08-04 RX ADMIN — METOPROLOL TARTRATE SCH MG: 25 TABLET, FILM COATED ORAL at 02:42

## 2021-08-04 RX ADMIN — ACETAMINOPHEN PRN MG: 500 TABLET ORAL at 02:49

## 2021-08-04 RX ADMIN — LEVALBUTEROL HYDROCHLORIDE SCH MG: 1.25 SOLUTION, CONCENTRATE RESPIRATORY (INHALATION) at 08:39

## 2021-08-04 RX ADMIN — CALCIUM CARBONATE (ANTACID) CHEW TAB 500 MG PRN MG: 500 CHEW TAB at 02:42

## 2021-08-04 RX ADMIN — FERROUS SULFATE TAB 325 MG (65 MG ELEMENTAL FE) SCH MG: 325 (65 FE) TAB at 09:22

## 2021-08-04 RX ADMIN — PANCRELIPASE SCH EA: 24000; 76000; 120000 CAPSULE, DELAYED RELEASE PELLETS ORAL at 09:23

## 2021-08-04 RX ADMIN — LEVALBUTEROL HYDROCHLORIDE SCH MG: 1.25 SOLUTION, CONCENTRATE RESPIRATORY (INHALATION) at 02:46

## 2021-08-04 RX ADMIN — IPRATROPIUM BROMIDE SCH MG: 0.5 SOLUTION RESPIRATORY (INHALATION) at 02:52

## 2021-08-04 RX ADMIN — Medication SCH: at 09:00

## 2021-08-04 RX ADMIN — LISINOPRIL SCH MG: 2.5 TABLET ORAL at 09:23

## 2021-08-04 RX ADMIN — MOXIFLOXACIN HYDROCHLORIDE SCH MG: 400 TABLET, FILM COATED ORAL at 06:31

## 2021-08-04 RX ADMIN — IPRATROPIUM BROMIDE SCH MG: 0.5 SOLUTION RESPIRATORY (INHALATION) at 08:39

## 2021-08-04 RX ADMIN — BUDESONIDE SCH MG: 0.5 INHALANT RESPIRATORY (INHALATION) at 08:39

## 2021-08-04 RX ADMIN — DIPHENOXYLATE HYDROCHLORIDE AND ATROPINE SULFATE SCH EA: 2.5; .025 TABLET ORAL at 10:22

## 2021-08-04 RX ADMIN — ASPIRIN SCH MG: 81 TABLET ORAL at 09:22

## 2021-08-04 RX ADMIN — DICLOFENAC EPOLAMINE SCH PATCH: 0.01 SYSTEM TOPICAL at 09:00

## 2021-08-04 RX ADMIN — LEVALBUTEROL HYDROCHLORIDE SCH MG: 1.25 SOLUTION, CONCENTRATE RESPIRATORY (INHALATION) at 14:29

## 2021-08-04 RX ADMIN — PANCRELIPASE SCH EA: 24000; 76000; 120000 CAPSULE, DELAYED RELEASE PELLETS ORAL at 13:01

## 2021-08-04 NOTE — DSES
DISCHARGE SUMMARY



DATE OF ADMISSION:  07/30/2021

DATE OF DISCHARGE:  08/04/2021



PRIMARY DISCHARGE DIAGNOSES:

1.  Pneumonia.

2.  Exocrine pancreatic insufficiency.

3.  Congestive heart failure with reduced ejection fraction, compensated.

4.  Hypertension, controlled.

5.  COPD exacerbation.

6.  Atrial flutter with rapid ventricular response.

7.  Coronary artery disease.

8.  Sepsis secondary to pneumonia.

9.  Severe muscle deconditioning.

10.  Acute kidney injury.

11.  COPD exacerbation.



CONSULTANTS: Ashly Olivia DO, nephrologist, Natalio Leong MD, cardiologist. 



DISCHARGE MEDICATIONS:

1.  Bacid one cap with meals.

2.  Metoprolol 50 b.i.d. 

3.  Avelox 400 daily.

4.  Prilosec 20 daily.

5.  Oxycodone acetaminophen 2.5/325 one tab q.8 as needed.

6.  Creon 24,000 units two tablets with meals.

7.  Prednisone 10 mg tablets a day.

8.  Albuterol two puffs q.4 q.6 as needed.

9.  Atorvastatin 40 q.h.s.

10.  Diphenoxylate-atropine one tab q.i.d. 

11.  Ferrous sulfate 325 daily.

12.  Atrovent two puffs q.i.d.

13.  Losartan/Hydrochlorothiazide 100-12.5 one tablet daily.

14.  Ropinirole 0.5 b.i.d. 

15.  Eliquis 5 mg b.i.d. 



DISCHARGE INSTRUCTIONS: The patient is to follow up with Dr. Olivia and Dr. Leong

as outpatient.



HOSPITAL COURSE: This is a 65-year-old male admitted on 7/30/2021 with

complaints of chest pain, admitted for sepsis with white count 26.3,

tachycardic, rate of 162 with A flutter with rapid ventricular response and

hypernatremia due to decreased oral intake and acute kidney injury. The patient

was started on intravenous ceftriaxone and doxycycline, kept NPO until swallow

evaluation could be done if patient was aspirating. IV fluids were given. His A

flutter was treated with digoxin due to hypotension since the patient was

hypoxic. The patient was also treated for possible COPD exacerbation. The

patient was hypernatremic. Nephrology was consulted due to history of

congestive heart failure. He had nonoliguric acute kidney injury due to sepsis

and free water deficit and hypotension. The patient was 2.8 liters free water

deficit with admission weight of 53 kilos and serum sodium of 155 and was

treated with a bolus of D5W 500 mL and 150 mL per hour. His hyperkalemia

improved with hypertonic fluid administration. The patient was compensated from

his congestive heart failure with chest x-ray showing right upper lobe

pneumonia. For broader spectrum coverage, the patient was changed to IV

Meropenem. There was improvement in white count of 8.4 on 8/3/2021. He remained

afebrile. He was noted to have hemodilutional anemia with no overt GI bleed but

symptomatic with his hemoglobin of 7.4 and was transfused two units of RBCs on

8/3/2021 with subsequent increase of hemoglobin to 10.3. With IV fluid

hydration, the patient's sodium level improved to 141 from admission of 158.

Creatinine improved from admission of 1.76 to discharge of 0.9. His atrial

flutter was treated with metoprolol 25 q.6 hourly. Once blood pressure was well

maintained, he was then restarted back on his home medications, Losartan 100 mg

daily. His Lasix was withheld. He complained of back pain and was given a

Flector patch and Lidoderm patch with improvement. Dr. Leong was consulted for A

flutter with rapid ventricular rate which improved with metoprolol 25 q.6

hourly. He was kept on aspirin 81 mg for his paroxysmal atrial flutter with 2:1

AV block. He regained normal sinus rhythm after 24 hours of admission.

Recommendations are to place the patient on anticoagulation if he remained in

atrial flutter. He was started on low dose Eliquis 5 mg b.i.d. at hospital

discharge. 



PHYSICAL EXAMINATION ON DISCHARGE: 

Vital signs: Temperature 98.5, pulse 68, irregularly irregular, respiratory

rate 15, blood pressure 125/66, 99% on room air.

General: The patient is awake, alert, oriented, answering questions

appropriately, no distress, no JVD, thyromegaly.

Lungs: Diminished but clear, no wheezing, rales or rhonchi.

Heart: S1, S2, irregularly irregular, nontachycardic, slightly displaced PMI.

Abdomen: Soft, nontender, nondistended, positive bowel sounds x4 quadrants, no

rebound or guarding.

Extremities: No cyanosis, clubbing or pitting edema.



Laboratory data, microbiology and imaging studies have been reviewed. 



Time spent on discharge: 30 minutes.

## 2021-08-09 ENCOUNTER — HOSPITAL ENCOUNTER (INPATIENT)
Dept: HOSPITAL 53 - M ED | Age: 66
LOS: 5 days | Discharge: LEFT BEFORE DISCHARGE | DRG: 683 | End: 2021-08-14
Attending: INTERNAL MEDICINE | Admitting: FAMILY MEDICINE
Payer: MEDICARE

## 2021-08-09 VITALS — HEIGHT: 66 IN | BODY MASS INDEX: 20.59 KG/M2 | WEIGHT: 128.09 LBS

## 2021-08-09 DIAGNOSIS — I50.22: ICD-10-CM

## 2021-08-09 DIAGNOSIS — D64.9: ICD-10-CM

## 2021-08-09 DIAGNOSIS — K86.81: ICD-10-CM

## 2021-08-09 DIAGNOSIS — E23.2: ICD-10-CM

## 2021-08-09 DIAGNOSIS — Z88.0: ICD-10-CM

## 2021-08-09 DIAGNOSIS — J44.9: ICD-10-CM

## 2021-08-09 DIAGNOSIS — K86.89: ICD-10-CM

## 2021-08-09 DIAGNOSIS — E87.0: ICD-10-CM

## 2021-08-09 DIAGNOSIS — I48.92: ICD-10-CM

## 2021-08-09 DIAGNOSIS — E86.0: ICD-10-CM

## 2021-08-09 DIAGNOSIS — Z79.899: ICD-10-CM

## 2021-08-09 DIAGNOSIS — Z79.82: ICD-10-CM

## 2021-08-09 DIAGNOSIS — Z79.01: ICD-10-CM

## 2021-08-09 DIAGNOSIS — G25.81: ICD-10-CM

## 2021-08-09 DIAGNOSIS — E87.5: ICD-10-CM

## 2021-08-09 DIAGNOSIS — I25.10: ICD-10-CM

## 2021-08-09 DIAGNOSIS — H40.9: ICD-10-CM

## 2021-08-09 DIAGNOSIS — R19.7: ICD-10-CM

## 2021-08-09 DIAGNOSIS — E87.2: ICD-10-CM

## 2021-08-09 DIAGNOSIS — I11.0: ICD-10-CM

## 2021-08-09 DIAGNOSIS — Z88.8: ICD-10-CM

## 2021-08-09 DIAGNOSIS — R64: ICD-10-CM

## 2021-08-09 DIAGNOSIS — N17.9: Primary | ICD-10-CM

## 2021-08-09 DIAGNOSIS — I47.2: ICD-10-CM

## 2021-08-09 LAB
ALBUMIN SERPL BCG-MCNC: 2.6 GM/DL (ref 3.2–5.2)
ALT SERPL W P-5'-P-CCNC: 30 U/L (ref 12–78)
ANISOCYTOSIS BLD QL SMEAR: (no result)
BILIRUB CONJ SERPL-MCNC: 0.2 MG/DL (ref 0–0.2)
BILIRUB SERPL-MCNC: 0.5 MG/DL (ref 0.2–1)
BUN SERPL-MCNC: 34 MG/DL (ref 7–18)
BUN SERPL-MCNC: 36 MG/DL (ref 7–18)
BURR CELLS BLD QL SMEAR: (no result)
CALCIUM SERPL-MCNC: 8.3 MG/DL (ref 8.8–10.2)
CALCIUM SERPL-MCNC: 8.4 MG/DL (ref 8.8–10.2)
CHLORIDE SERPL-SCNC: 149 MEQ/L (ref 98–107)
CHLORIDE SERPL-SCNC: 151 MEQ/L (ref 98–107)
CK MB CFR.DF SERPL CALC: 10.43
CK MB SERPL-MCNC: 4.8 NG/ML (ref ?–3.6)
CK SERPL-CCNC: 46 U/L (ref 39–308)
CO2 SERPL-SCNC: 15 MEQ/L (ref 21–32)
CO2 SERPL-SCNC: 17 MEQ/L (ref 21–32)
CREAT SERPL-MCNC: 2 MG/DL (ref 0.7–1.3)
CREAT SERPL-MCNC: 2.03 MG/DL (ref 0.7–1.3)
EOSINOPHIL NFR BLD MANUAL: 2 % (ref 0–3)
GFR SERPL CREATININE-BSD FRML MDRD: 35.2 ML/MIN/{1.73_M2} (ref 49–?)
GFR SERPL CREATININE-BSD FRML MDRD: 35.8 ML/MIN/{1.73_M2} (ref 49–?)
GLUCOSE SERPL-MCNC: 124 MG/DL (ref 70–100)
GLUCOSE SERPL-MCNC: 90 MG/DL (ref 70–100)
HCT VFR BLD AUTO: 43.7 % (ref 42–52)
HGB BLD-MCNC: 12.2 G/DL (ref 13.5–17.5)
LYMPHOCYTES NFR BLD MANUAL: 4 % (ref 16–44)
MCH RBC QN AUTO: 23.2 PG (ref 27–33)
MCHC RBC AUTO-ENTMCNC: 27.9 G/DL (ref 32–36.5)
MCV RBC AUTO: 83.2 FL (ref 80–96)
NEUTROPHILS NFR BLD MANUAL: 77 % (ref 28–66)
OVALOCYTES BLD QL SMEAR: (no result)
PLATELET # BLD AUTO: 186 10^3/UL (ref 150–450)
PLATELET BLD QL SMEAR: NORMAL
POTASSIUM SERPL-SCNC: 5.4 MEQ/L (ref 3.5–5.1)
POTASSIUM SERPL-SCNC: 6 MEQ/L (ref 3.5–5.1)
PROT SERPL-MCNC: 5.9 GM/DL (ref 6.4–8.2)
RBC # BLD AUTO: 5.25 10^6/UL (ref 4.3–6.1)
RSV RNA NPH QL NAA+PROBE: NEGATIVE
SODIUM SERPL-SCNC: 170 MEQ/L (ref 136–145)
SODIUM SERPL-SCNC: 171 MEQ/L (ref 136–145)
TROPONIN I SERPL-MCNC: 0.02 NG/ML (ref ?–0.1)
WBC # BLD AUTO: 11.5 10^3/UL (ref 4–10)

## 2021-08-09 NOTE — REP
INDICATION:

CHEST PAIN.



COMPARISON:

Comparison chest x-ray August 3, 2021.



TECHNIQUE:

Portable upright AP chest radiograph.



FINDINGS:

EKG monitoring electrodes are seen.  Median sternotomy wires are in place.  There is a

fiducial marker in the right perihilar region.  No infiltrate, mass, or adenopathy is

apparent.  There are multiple old rib fractures bilaterally.  Pulmonary vasculature is

somewhat cephalized.  Granulomatous lymph node calcifications are seen as before..



IMPRESSION:

No acute cardiopulmonary disease.  Chronic changes as above..





<Electronically signed by Emmett Radford > 08/09/21 1940

## 2021-08-10 VITALS — DIASTOLIC BLOOD PRESSURE: 72 MMHG | SYSTOLIC BLOOD PRESSURE: 154 MMHG

## 2021-08-10 VITALS — SYSTOLIC BLOOD PRESSURE: 140 MMHG | DIASTOLIC BLOOD PRESSURE: 60 MMHG

## 2021-08-10 VITALS — DIASTOLIC BLOOD PRESSURE: 63 MMHG | SYSTOLIC BLOOD PRESSURE: 145 MMHG

## 2021-08-10 VITALS — DIASTOLIC BLOOD PRESSURE: 72 MMHG | SYSTOLIC BLOOD PRESSURE: 160 MMHG

## 2021-08-10 LAB
ALBUMIN SERPL BCG-MCNC: 2.1 GM/DL (ref 3.2–5.2)
BUN SERPL-MCNC: 33 MG/DL (ref 7–18)
BUN SERPL-MCNC: 35 MG/DL (ref 7–18)
BUN SERPL-MCNC: 35 MG/DL (ref 7–18)
BUN SERPL-MCNC: 36 MG/DL (ref 7–18)
CALCIUM SERPL-MCNC: 7.3 MG/DL (ref 8.8–10.2)
CALCIUM SERPL-MCNC: 7.8 MG/DL (ref 8.8–10.2)
CHLORIDE SERPL-SCNC: 132 MEQ/L (ref 98–107)
CHLORIDE SERPL-SCNC: 137 MEQ/L (ref 98–107)
CHLORIDE SERPL-SCNC: 146 MEQ/L (ref 98–107)
CHLORIDE SERPL-SCNC: 151 MEQ/L (ref 98–107)
CO2 SERPL-SCNC: 14 MEQ/L (ref 21–32)
CO2 SERPL-SCNC: 15 MEQ/L (ref 21–32)
CO2 SERPL-SCNC: 15 MEQ/L (ref 21–32)
CO2 SERPL-SCNC: 17 MEQ/L (ref 21–32)
CREAT SERPL-MCNC: 1.52 MG/DL (ref 0.7–1.3)
CREAT SERPL-MCNC: 1.58 MG/DL (ref 0.7–1.3)
CREAT SERPL-MCNC: 1.75 MG/DL (ref 0.7–1.3)
CREAT SERPL-MCNC: 1.88 MG/DL (ref 0.7–1.3)
GFR SERPL CREATININE-BSD FRML MDRD: 38.4 ML/MIN/{1.73_M2} (ref 49–?)
GFR SERPL CREATININE-BSD FRML MDRD: 41.7 ML/MIN/{1.73_M2} (ref 49–?)
GFR SERPL CREATININE-BSD FRML MDRD: 46.9 ML/MIN/{1.73_M2} (ref 49–?)
GFR SERPL CREATININE-BSD FRML MDRD: 49.1 ML/MIN/{1.73_M2} (ref 49–?)
GLUCOSE SERPL-MCNC: 151 MG/DL (ref 70–100)
GLUCOSE SERPL-MCNC: 162 MG/DL (ref 70–100)
GLUCOSE SERPL-MCNC: 204 MG/DL (ref 70–100)
GLUCOSE SERPL-MCNC: 97 MG/DL (ref 70–100)
MAGNESIUM SERPL-MCNC: 2.1 MG/DL (ref 1.8–2.4)
PHOSPHATE SERPL-MCNC: 3.5 MG/DL (ref 2.5–4.9)
POTASSIUM SERPL-SCNC: 4.8 MEQ/L (ref 3.5–5.1)
POTASSIUM SERPL-SCNC: 5.4 MEQ/L (ref 3.5–5.1)
POTASSIUM SERPL-SCNC: 5.4 MEQ/L (ref 3.5–5.1)
POTASSIUM SERPL-SCNC: 5.5 MEQ/L (ref 3.5–5.1)
SODIUM SERPL-SCNC: 154 MEQ/L (ref 136–145)
SODIUM SERPL-SCNC: 158 MEQ/L (ref 136–145)
SODIUM SERPL-SCNC: 166 MEQ/L (ref 136–145)
SODIUM SERPL-SCNC: 173 MEQ/L (ref 136–145)
TROPONIN I SERPL-MCNC: 0.02 NG/ML (ref ?–0.1)

## 2021-08-10 RX ADMIN — APIXABAN SCH MG: 5 TABLET, FILM COATED ORAL at 20:12

## 2021-08-10 RX ADMIN — PANCRELIPASE SCH EA: 24000; 76000; 120000 CAPSULE, DELAYED RELEASE PELLETS ORAL at 11:49

## 2021-08-10 RX ADMIN — ROPINIROLE HYDROCHLORIDE SCH MG: 0.25 TABLET, FILM COATED ORAL at 08:21

## 2021-08-10 RX ADMIN — ATORVASTATIN CALCIUM SCH MG: 20 TABLET, FILM COATED ORAL at 20:12

## 2021-08-10 RX ADMIN — SODIUM CITRATE AND CITRIC ACID MONOHYDRATE SCH ML: 500; 334 SOLUTION ORAL at 20:12

## 2021-08-10 RX ADMIN — APIXABAN SCH MG: 5 TABLET, FILM COATED ORAL at 08:22

## 2021-08-10 RX ADMIN — DEXTROSE MONOHYDRATE SCH MLS/HR: 50 INJECTION, SOLUTION INTRAVENOUS at 12:42

## 2021-08-10 RX ADMIN — SODIUM CITRATE AND CITRIC ACID MONOHYDRATE SCH ML: 500; 334 SOLUTION ORAL at 14:53

## 2021-08-10 RX ADMIN — METOPROLOL TARTRATE SCH MG: 50 TABLET, FILM COATED ORAL at 20:12

## 2021-08-10 RX ADMIN — DEXTROSE MONOHYDRATE SCH MLS/HR: 50 INJECTION, SOLUTION INTRAVENOUS at 20:17

## 2021-08-10 RX ADMIN — ASPIRIN SCH MG: 81 TABLET ORAL at 08:21

## 2021-08-10 RX ADMIN — METOPROLOL TARTRATE SCH MG: 50 TABLET, FILM COATED ORAL at 08:22

## 2021-08-10 RX ADMIN — PANCRELIPASE SCH EA: 24000; 76000; 120000 CAPSULE, DELAYED RELEASE PELLETS ORAL at 18:19

## 2021-08-10 RX ADMIN — PROBIOTIC PRODUCT - TAB SCH EA: TAB at 18:19

## 2021-08-10 RX ADMIN — ROPINIROLE HYDROCHLORIDE SCH MG: 0.25 TABLET, FILM COATED ORAL at 20:12

## 2021-08-10 RX ADMIN — PANCRELIPASE SCH EA: 24000; 76000; 120000 CAPSULE, DELAYED RELEASE PELLETS ORAL at 08:21

## 2021-08-10 RX ADMIN — OMEPRAZOLE SCH MG: 20 CAPSULE, DELAYED RELEASE ORAL at 08:22

## 2021-08-10 RX ADMIN — DEXTROSE MONOHYDRATE SCH MLS/HR: 50 INJECTION, SOLUTION INTRAVENOUS at 01:30

## 2021-08-10 RX ADMIN — IPRATROPIUM BROMIDE SCH PUFF: 17 AEROSOL, METERED RESPIRATORY (INHALATION) at 19:36

## 2021-08-10 RX ADMIN — IPRATROPIUM BROMIDE SCH PUFF: 17 AEROSOL, METERED RESPIRATORY (INHALATION) at 11:08

## 2021-08-10 RX ADMIN — IPRATROPIUM BROMIDE SCH PUFF: 17 AEROSOL, METERED RESPIRATORY (INHALATION) at 14:38

## 2021-08-10 RX ADMIN — IPRATROPIUM BROMIDE SCH PUFF: 17 AEROSOL, METERED RESPIRATORY (INHALATION) at 07:30

## 2021-08-10 RX ADMIN — PROBIOTIC PRODUCT - TAB SCH EA: TAB at 11:49

## 2021-08-10 RX ADMIN — DEXTROSE MONOHYDRATE SCH MLS/HR: 50 INJECTION, SOLUTION INTRAVENOUS at 05:39

## 2021-08-10 RX ADMIN — FERROUS SULFATE TAB 325 MG (65 MG ELEMENTAL FE) SCH MG: 325 (65 FE) TAB at 08:22

## 2021-08-10 RX ADMIN — PROBIOTIC PRODUCT - TAB SCH EA: TAB at 08:22

## 2021-08-10 NOTE — CR.PDOC
General


Date of Consultation:  Aug 10, 2021


Referring Provider:  SAI BONE MD


Primary Care Physician


elif balbuena


Attending Physician:  SAI BONE MD





Consultation


REASON FOR CONSULTATION/CHIEF COMPLAINT: [bilateral lower extremity PVD]. 





HISTORY OF PRESENT ILLNESS: [65 year old man admitted for severe dehydration in 

the setting of severe diarrhea. He is not hypotensive. longstanding vasculopath.

 





ALLERGIES: Please see below.





HOME MEDICATIONS: Please see below.





PAST MEDICAL HISTORY:


Carotid Artery Stenosis, Arthritis, Asthma, COPD, Hypercholesterolemia, Thyroid 

Disease, Lacunar Infarcts, Myocardial Infarction, Anti E





PAST SURGICAL HISTORY: 


   Left subclavian to distal internal carotid artery bypass - (2/24/2016) 

Propaten


Exploratory Surgery - (10/2/2015) left neck wound, implant vancomycin and 

tobramycin beads, wound vac placement


Subclavian Carotid Bypass - (7/21/2015) right subclavian to left internal 

carotid; Hemashield


Subclavian Carotid Bypass - (9/29/2014) left 


Tonsillectomy, Gastrojejunostomy, Pancreatic Necrosectomy And Small Bowel 

Resection


Catarct surgery - (2017)


Endoscopy - (2/2018)





FAMILY HISTORY:


Unable to obtain at this time as he is confused.       





SOCIAL HISTORY:


Prior smoker. 





REVIEW OF SYSTEMS:


Not obtained due to mental status. 





PHYSICAL EXAMINATION:


VITAL SIGNS: Please see below.


GENERAL APPEARANCE: [Quite emaciated, temporal wasting noted].


HEENT: [Temporal wasting, poor Jugular venous distension. appears to have a 

thready pulse].


ABDOMEN: soft, midline incision, has a midline ventral hernia which is reducible

and mildly tender, midline chest incision. 


EXTREMITIES: warm feet to the mid calves then he has red feet bilaterally. 








LABORATORY DATA: Please see below.





ASSESSMENT/PLAN: 


1. chronic PVD , I would recommend gently hydrating the patient. Once he is 

mentally appropriate and the sodium has been corrected we can better understand 

the true severity of his chronic PVD. Looking back at his CT scans he certainly 

has severe occlusive disease at multiple levels (iliacs, femorals etc). It is 

unlikely that he will have acute limb threat and he certainly is not at threat 

at this time. We can follow him as an outpatient to repair this. Cont 

asa/eliquis. I discussed this with Dr. Sai Bone





Vital Signs/I&O





Vital Signs








  Date Time  Temp Pulse Resp B/P (MAP) Pulse Ox O2 Delivery O2 Flow Rate FiO2


 


8/10/21 02:45  84 16 126/51 (76) 100 Room Air  


 


8/9/21 17:30       4.0 


 


8/9/21 17:26 99.5       














I&O- Last 24 Hours up to 6 AM 


 


 8/10/21





 06:00


 


Intake Total 1000 ml


 


Balance 1000 ml











Laboratory Data


Labs 24H


Laboratory Tests 2


8/9/21 19:25: 


Neutrophils (%) (Auto) , Nucleated Red Blood Cells % (auto) 0.0, Neutrophils 

77H, Band Neutrophils 17H, Lymphocytes (Manual) 4L, Eosinophils (Manual) 2, 

Anisocytosis 4+, Ovalocytes 1+, Mariola Cells 2+, Platelet Estimate NORMAL, Anion 

Gap 4L, Glomerular Filtration Rate 35.8L, Calcium Level 8.4L, Total Bilirubin 

0.5, Direct Bilirubin 0.2, Aspartate Amino Transf (AST/SGOT) 22, Alanine 

Aminotransferase (ALT/SGPT) 30, Alkaline Phosphatase 138H, NT-Pro-B-Type 

Natriuretic Peptide 3806H, Total Protein 5.9L, Albumin 2.6L, Albumin/Globulin 

Ratio 0.8


8/9/21 19:28: POC Troponin I (Misc) 0.12H


8/9/21 20:20: 


Coronavirus (COVID-19)(PCR) NEGATIVE, Influenza Type A (RT-PCR) NEGATIVE, 

Influenza Type B (RT-PCR) NEGATIVE, Respiratory Syncytial Virus (PCR) NEGATIVE


8/9/21 21:49: 


Anion Gap 5L, Glomerular Filtration Rate 35.2L, Calcium Level 8.3L, NT-

Pro-B-Type Natriuretic Peptide 3142H, Total Creatine Kinase 46, Creatine Kinase 

MB 4.8H, Creatine Kinase MB Relative Index 10.43H, Troponin I 0.02


8/10/21 00:01: 


Anion Gap 5L, Glomerular Filtration Rate 38.4L, Calcium Level 7.8L, Troponin I 

0.02


CBC/BMP


Laboratory Tests


8/9/21 19:25








8/9/21 21:49








8/10/21 00:01








Microbiology





Microbiology


8/9/21 Blood Culture, Received


         Pending


8/9/21 Blood Culture, Received


         Pending





Allergies


Coded Allergies:  


     amoxicillin (Verified  Allergy, Unknown, rash, 9/30/19)


     clavulanic acid (Verified  Allergy, Unknown, rash, 9/30/19)


     pregabalin (Verified  Adverse Reaction, Unknown, seizures, 9/30/19)


     warfarin (Verified  Adverse Reaction, Unknown, bleeding, 9/30/19)





Home Medications


Scheduled


Apixaban (Eliquis) 5 Mg Tablet, 5 MG PO BID, (Reported)


Aspirin (Aspirin EC) 81 Mg Tablet.dr, 81 MG PO DAILY, (Reported)


Atorvastatin Calcium (Atorvastatin Calcium) 40 Mg Tablet, 40 MG PO QHS, 

(Reported)


Ferrous Sulfate (Ferrous Sulfate) 325 Mg Tablet, 325 MG PO DAILY, (Reported)


Ipratropium Bromide (Atrovent Hfa) 12.9 Gm Hfa.aer.ad, 2 PUFF INH QID, (R

eported)


L.acidoph/L.bulg/B.bif/S.therm (Bacid Caplet) 1 Each Tablet, 1 TAB PO WM, 

(Reported)


Losartan/Hydrochlorothiazide (Losartan-Hctz 100-12.5 mg Tab) 1 Each Tablet, 1 

TAB PO DAILY, (Reported)


Metoprolol Tartrate (Metoprolol Tartrate) 50 Mg Tablet, 50 MG PO BID, (Reported)


Moxifloxacin HCl (Moxifloxacin HCl) 400 Mg Tablet, 400 MG PO DAILY for 5 Days, 

(Reported)


Omeprazole (Omeprazole) 20 Mg Capsule.dr, 20 MG PO DAILY, (Reported)


Pancreatic Enzymes (Creon Dr 24,000 Units Capsule) 1 Each Capsule.dr, 2 CAP PO 

WM, (Reported)


Prednisone (Prednisone) 10 Mg Tablet, 10 MG PO TAPER, (Reported)


   40MG FOR 3 DAYS, 30MG FOR 3 DAYS, 20MG FOR 3 DAYS, 10MG FOR 3 DAYS 


Ropinirole HCl (Ropinirole HCl) 0.5 Mg Tablet, 0.5 MG PO BID, (Reported)





Scheduled PRN


Albuterol Sulf (Albuterol Sulfate) 2.5 Mg/3 Ml Vial.neb, 2.5 MG INH Q6H PRN for 

SHORTNESS OF BREATH, (Reported)


Albuterol Sulfate (Albuterol Sulfate Hfa) 8.5 Gm Hfa.aer.ad, 2 PUFFS INH Q4H PRN

for SHORTNESS OF BREATH, (Reported)


Diphenoxylate HCl/Atropine (Diphenoxylate-Atrop 2.5-0.025) 1 Each Tablet, 1 TAB 

PO QID PRN for DIARRHEA, (Reported)


Oxycodone HCl/Acetaminophen (Oxycodon-Acetaminophen 2.5-325) 1 Each Tablet, 1 

TAB PO Q8HP PRN for pain for 5 Days, #15





Miscellaneous Medications


[Patient Comment]  , (Reported)


   PATIENT UNABLE TO ANSWER. MED REC COMPLETED VIA EXTERNAL MED HISTORY, 

DISCHARGE PAPERWORK FROM 8/4/21 [facility] CALL TO PHARMACY. 











Carlos Haji MD          Aug 10, 2021 03:35

## 2021-08-10 NOTE — REPVR
PROCEDURE INFORMATION: 

Exam: CT Abdomen And Pelvis Without Contrast 

Exam date and time: 8/9/2021 11:01 PM 

Age: 65 years old 

Clinical indication: Nausea; Abdominal pain; Generalized; Additional info: 

Abdominal pain, nause a 



TECHNIQUE: 

Imaging protocol: Computed tomography of the abdomen and pelvis without 

contrast. 

Radiation optimization: All CT scans at this facility use at least one of these 

dose optimization techniques: automated exposure control; mA and/or kV 

adjustment per patient size (includes targeted exams where dose is matched to 

clinical indication); or iterative reconstruction. 



COMPARISON: 

CT ABD/PEL W/IV ORAL CONTRAS 7/18/2021 3:16 PM 



FINDINGS: 

Liver: Unremarkable. No mass. 

Gallbladder and bile ducts: Normal. No calcified stones. No ductal dilation. 

Pancreas: Normal. No ductal dilation. 

Spleen: Normal. No splenomegaly. 

Adrenal glands: Normal. No mass. 

Kidneys and ureters: Kidneys are atrophic. No calculi or hydronephrosis. 

Stomach and bowel: Changes of prior bariatric surgery are noted. No bowel 

obstruction. Large amount of fecal material throughout the colon. No acute 

inflammatory changes. 

Appendix: No evidence of appendicitis. 



Intraperitoneal space: No free air. No significant fluid collection. 

Vasculature: Advanced aortoiliac atherosclerotic disease. 

Lymph nodes: Unremarkable. No enlarged lymph nodes. 

Urinary bladder: Unremarkable as visualized. 

Reproductive: Unremarkable as visualized. 

Bones/joints: Unremarkable. No acute fracture. 

Soft tissues: Unremarkable. 



IMPRESSION: 

1. Constipation. 

2. No bowel obstruction or acute findings. 



Electronically signed by: Mayur Tabor On 08/10/2021  00:48:19 AM

## 2021-08-10 NOTE — CR
CONSULTATION

DATE: 08/10/2021



REQUESTING PHYSICIAN: Dr. Sai Bone



CONSULTING PHYSICIAN: Dr. Jasso



REASON FOR CONSULTATION:  Management of acute renal failure and hypernatremia.



CHIEF COMPLAINT: Patient presented to the hospital last night in the emergency

room with progressive weakness. 



HISTORY OF PRESENT ILLNESS:  Rodney Roberts is a 65-year-old male with past

medical history of acute renal failure and hypernatremia in the past as well,

history of congestive heart failure, polysubstance abuse, multiple other

comorbidities as mentioned below. Recently was treated for sepsis secondary to

bacterial pneumonia. He was just discharged from the hospital on 08/04/2021. He

was brought back to the emergency room by emergency medical services (EMS) for

failure to thrive, reduced oral intake, and confusion. He was having copious

watery diarrhea for the past 2-3 days as well. Patient was found to have acute

renal failure with a creatinine of 2. He was hypernatremic with a sodium of

170. Case was discussed with myself by the admitting physician. Decision was

made to give the patient normal saline followed by D5W. I saw and evaluated the

patient today morning at the bedside. Patient is feeling slightly better today

as compared with yesterday, and his sodium level has improved to 166; however,

he has persistent normal anion gap metabolic acidosis. 



MEDICAL HISTORY:

1. Chronic cachexia.

2. Pneumonia.

3. Chronic pancreatic insufficiency.

4. Congestive heart failure with reduced ejection fraction. 

5. Hypertension. 

6. Chronic obstructive pulmonary disease (COPD).

7. Atrial flutter with rapid ventricular response.

8. Coronary artery disease. 

9. Severe muscle wasting.

10. Hypertension.

11. Pancreatic mass in the past.

12. Polysubstance abuse.

13. Hypothyroidism. 

14. Glaucoma.



SURGICAL HISTORY:

1. Cataract surgery.

2. Carotid endarterectomy.

3. Left carotid artery bypass.

4. Tonsillectomy.

5. Partial pancreatoduodenectomy.

6. Coronary artery bypass graft (CABG) in September 2020. 



ALLERGIES: He is allergic to AMOXICILLIN, CLAVULANIC ACID, PREGABALIN, and

WARFARIN.



FAMILY HISTORY:  No significant family history of end-stage renal disease.



SOCIAL HISTORY:  Patient lives at home. He is a former smoker. Denies any

illicit drug abuse or alcohol abuse. He reports that he lives alone.



REVIEW OF SYSTEMS: 

CONSTITUTIONAL: Reports feeling weak and tired.

EYES: He denies any blurry vision or double vision.

ENT: He denies any dysphagia or odynophagia.

CARDIOVASCULAR: Denies any chest pain or palpitations.

RESPIRATORY: He denies any shortness of breath.

GASTROINTESTINAL: Reports diarrhea for about 2 days before arrival.

GENITOURINARY: Denies any dysuria or hematuria.

MUSCULOSKELETAL: Reports some muscle weakness.

SKIN: Denies any rashes or ulcers.

HEMATOLOGICAL/ONCOLOGICAL: Denies any easy bleeding or bruising.

CENTRAL NERVOUS SYSTEM: Denies any strokes or seizures.

All other review of systems is negative. 



PHYSICAL EXAMINATION: 

GENERAL: Patient is awake, alert, oriented times three, lying in bed, weak and

cachectic, chronically malnourished.

VITAL SIGNS: Temperature is 97.1 degrees Fahrenheit, blood pressure 145/63,

pulse is 73, respiratory rate of 20, saturating 99% on room air.

HEAD AND NECK: Extraocular muscles intact. Pupils equally round and reactive to

light. Mucous membranes are moist. Neck is supple. He has a left carotid bypass

graft, which is palpable in the neck. 

CARDIOVASCULAR: S1, S2, regular rate. No edema of the bilateral lower

extremities.  RESPIRATORY: Mildly decreased breath sounds at the bases. Mild

inspiratory crackles at the bases. 

ABDOMEN: Soft. Positive bowel sounds.  Nontender.  No organomegaly.

MUSCULOSKELETAL: Chronic muscle wasting. No clubbing or cyanosis.

CENTRAL NERVOUS SYSTEM: No focal deficit. Power is 5/5 in all extremities. 



LABORATORY REVIEW: 

CBC showed a WBC of 11.5, hemoglobin 12.2, platelets are 186. 



BMP on arrival showed sodium 170, potassium 6, chloride 151, bicarbonate 15,

BUN 23, creatinine 2, calcium 8.4. BNP was 3806. Repeat BMP done today before

noon showed sodium 158, potassium 4.8, chloride 137, bicarbonate 14, BUN 35,

creatinine 1.5, calcium 7.8.



IMAGING:

CT scan of the abdomen and pelvis was done, which showed no constipation. No

bowel obstruction or acute findings.  



CURRENT INPATIENT MEDICATIONS: Patient is getting intravenous (IV) fluid at 150

mL an hour. I have decreased the rate to 125 mL an hour. He is on Tylenol as

needed, Mylanta, Eliquis 5 mg by mouth twice a day, aspirin 81 mg by mouth

daily, Lipitor 40 mg every night. I have started the patient on Bicitra 30 mL

by mouth twice a day. Iron tablet 325 mg by mouth daily, Atrovent, Bacid with

meals, metoprolol tartrate 50 mg by mouth twice a day, milk of magnesia,

omeprazole 20 mg by mouth daily, pancreatic enzymes. One dose of Veltassa was

given today morning. He is on Requip.



ASSESSMENT AND PLAN: 

1. Severe acute hypernatremia. It is secondary to decreased oral intake,

diarrhea, and inability to take care of himself at home. Patient was initially

given normal saline. Later on he was started on D5W. Sodium level is adequately

improving. He has 5.5 liters of free water deficit. He will be given around 2.5

liters initially in the first 24 hours, and rest of the fluid will be given

over the next 48 hours. 



2. Hyperkalemia. It is secondary to dehydration, acute renal failure. Potassium

level is getting better with IV fluid hydration.



3. Hyperchloremic metabolic acidosis. Patient was having diarrhea. It is not

clear whether it is the dehydration that is causing acidosis or whether patient

has renal tubular acidosis. I have ordered the urine electrolytes as well, and

I have started the patient on Bicitra 30 mL by mouth twice a day. 



4. Acute renal failure. Patient has nonoliguric renal failure secondary to

dehydration. Creatinine level is improving with IV fluid hydration. 



5. Atrial fibrillation/ atrial flutter. Patient is currently on Eliquis and

metoprolol. I am going to decrease the Eliquis dose, because patient's

creatinine is high and his base only 50 kg.



Thank you for involving me in the care of this patient. I shall be happy to

follow the patient along with you tomorrow morning.

## 2021-08-10 NOTE — IPNPDOC
Text Note


Date of Service


The patient was seen on 8/10/21.





NOTE


Subjective:


Patient is a 65 year old  male with a PMHx of COPD, HFrEF, A. flutter 

(on Eliquis), CAD, HTN, CVA, Hypothyroidism, Pancreatic insufficiency (2/2 

pancreatic mass resection), Chronic low back pain, Polysubstance abuse, who 

presented to the ER with weakness, decreased oral intake and confusion. 





Patient was recently admitted for bacterial pneumonia, decompensated diastolic 

CHF, atrial flutter with RVR and IBAN. Patient was ultimately discharged on 

8/4/21.





Patient reported significant watery diarrhea over the last 2-3 days. Upon 

arrival to emergency room, patient was found to have hypernatremia and acute 

kidney injury. She was admitted to the hospital service for further evaluation 

and treatment. Nephrology was called on consultation.





Patient was seen and examined at the bedside. . Patient denies any chest pain, 

shortness breath, palpitations, nausea, vomiting or abdominal pain. Patient had 

a bowel movement this morning with formed stool was not diarrhea. Patient 

continues to make urine.





Objective:


Vitals (See below)


General: Lying in bed, appears comfortable, AAOx3


HEENT: NC, AT


CVS: +S1S2


Lungs: Fair air entry b/l, -w/r/r


Abdomen: Soft, ND, NT


Extremities: - Edema, - Calf tenderness





Imaging:


CT abdomen / pelvis w/o contrast 8/9:


1. Constipation. 


2. No bowel obstruction or acute findings. 





CXR 8/10:


No acute cardiopulmonary disease.  Chronic changes as above.





Assessment and plan:


Acute renal failure 2/2 dehydration from poor PO intake, diarrhea


- Cr 2.0; Cr continues to improve 


- Patient has refused Luque catheter on admission


- c/w IV fluids as per nephrology


- Will avoid nephrotoxic medications


- Nephrology on consultation; we appreciate their input





Hypernatremia - likely 2/2 dehydration from diarrhea, poor PO intake; possibly 

2/2 diabetes insipidus 


- Sodium has been improving


- Nephrology on consultation





Hyperkalemia


- Improving 





s/p Diarrhea


- BM this AM was formed stool. 





Non-sustained V tach 


- possibly 2/2 electrolyte abnromalities


- Will c/w Telemetry 


- EKG reviewed


- c/w metoprolol 





Chronic A flutter


- c/w full anticoagulation with eliquis


- c/w rate control with metoprolol 





Hx of HFrEF


- No evidence of fluid overload


- last echo 7/2021, LVEF 30-35%, moderate-severe hypokinesis of LV


- Will avoid nephrotoxic medications





Suspected cyanotic LEs, suspected chronic PAD


- Clinically patient does not appear to have any cyanotic toes this morning


- Patient was evaluated by vascular surgery overnight, Dr. Haji


- c/w ASA, eliquis





CAD


- c/w ASA, Atorvastatin 





Pancreatic insufficiency


- Hx of resected pancreatic mass


- c/w supplementation 





RLS


- c/w Ropinirole 





GERD


- c/w Omeprazole 





DVT prophylaxis


- c/w Eliquis 





Disposition:


- Awaiting clinical improvement





VS,Carmenza, I+O


VS, Carmenza I+O


Laboratory Tests


8/9/21 19:25








8/9/21 21:49








8/10/21 00:01








8/10/21 05:04








8/10/21 11:07











Vital Signs








  Date Time  Temp Pulse Resp B/P (MAP) Pulse Ox O2 Delivery O2 Flow Rate FiO2


 


8/10/21 11:30 97.1 73 20 145/63 (90) 99 Room Air  


 


8/9/21 17:30       4.0 














I&O- Last 24 Hours up to 6 AM 


 


 8/10/21





 06:00


 


Intake Total 1000 ml


 


Balance 1000 ml

















SUNNY BOWENS MD                Aug 10, 2021 13:16

## 2021-08-11 VITALS — SYSTOLIC BLOOD PRESSURE: 128 MMHG | DIASTOLIC BLOOD PRESSURE: 61 MMHG

## 2021-08-11 VITALS — SYSTOLIC BLOOD PRESSURE: 121 MMHG | DIASTOLIC BLOOD PRESSURE: 57 MMHG

## 2021-08-11 VITALS — DIASTOLIC BLOOD PRESSURE: 55 MMHG | SYSTOLIC BLOOD PRESSURE: 112 MMHG

## 2021-08-11 VITALS — SYSTOLIC BLOOD PRESSURE: 128 MMHG | DIASTOLIC BLOOD PRESSURE: 62 MMHG

## 2021-08-11 VITALS — DIASTOLIC BLOOD PRESSURE: 57 MMHG | SYSTOLIC BLOOD PRESSURE: 118 MMHG

## 2021-08-11 VITALS — DIASTOLIC BLOOD PRESSURE: 54 MMHG | SYSTOLIC BLOOD PRESSURE: 102 MMHG

## 2021-08-11 LAB
ALBUMIN SERPL BCG-MCNC: 1.8 GM/DL (ref 3.2–5.2)
ANISOCYTOSIS BLD QL SMEAR: (no result)
BUN SERPL-MCNC: 31 MG/DL (ref 7–18)
C DIFF TOX GENS STL QL NAA+PROBE: NEGATIVE
CALCIUM SERPL-MCNC: 7.7 MG/DL (ref 8.8–10.2)
CHLORIDE SERPL-SCNC: 124 MEQ/L (ref 98–107)
CHLORIDE UR-SCNC: 139 MEQ/L
CO2 SERPL-SCNC: 19 MEQ/L (ref 21–32)
CREAT SERPL-MCNC: 1.3 MG/DL (ref 0.7–1.3)
CREAT UR-MCNC: 89 MG/DL
GFR SERPL CREATININE-BSD FRML MDRD: 58.8 ML/MIN/{1.73_M2} (ref 49–?)
GLUCOSE SERPL-MCNC: 93 MG/DL (ref 70–100)
HCT VFR BLD AUTO: 33.7 % (ref 42–52)
HGB BLD-MCNC: 9.7 G/DL (ref 13.5–17.5)
LYMPHOCYTES NFR BLD MANUAL: 8 % (ref 16–44)
MACROCYTES BLD QL SMEAR: (no result)
MAGNESIUM SERPL-MCNC: 1.6 MG/DL (ref 1.8–2.4)
MCH RBC QN AUTO: 23.4 PG (ref 27–33)
MCHC RBC AUTO-ENTMCNC: 28.8 G/DL (ref 32–36.5)
MCV RBC AUTO: 81.4 FL (ref 80–96)
MONOCYTES NFR BLD MANUAL: 5 % (ref 0–5)
NEUTROPHILS NFR BLD MANUAL: 83 % (ref 28–66)
NRBC BLD MANUAL-RTO: 2 % (ref 0–0)
PHOSPHATE SERPL-MCNC: 1.9 MG/DL (ref 2.5–4.9)
PLATELET # BLD AUTO: 164 10^3/UL (ref 150–450)
PLATELET BLD QL SMEAR: NORMAL
POIKILOCYTOSIS BLD QL SMEAR: (no result)
POTASSIUM 24H UR-SCNC: 55.1 MEQ/L
POTASSIUM SERPL-SCNC: 4.3 MEQ/L (ref 3.5–5.1)
RBC # BLD AUTO: 4.14 10^6/UL (ref 4.3–6.1)
SODIUM SERPL-SCNC: 148 MEQ/L (ref 136–145)
SODIUM UR-SCNC: 88 MEQ/L
WBC # BLD AUTO: 12.6 10^3/UL (ref 4–10)

## 2021-08-11 RX ADMIN — MAGNESIUM SULFATE IN DEXTROSE SCH MLS/HR: 10 INJECTION, SOLUTION INTRAVENOUS at 07:12

## 2021-08-11 RX ADMIN — APIXABAN SCH MG: 5 TABLET, FILM COATED ORAL at 09:29

## 2021-08-11 RX ADMIN — PROBIOTIC PRODUCT - TAB SCH EA: TAB at 12:46

## 2021-08-11 RX ADMIN — PANCRELIPASE SCH EA: 24000; 76000; 120000 CAPSULE, DELAYED RELEASE PELLETS ORAL at 12:46

## 2021-08-11 RX ADMIN — PROBIOTIC PRODUCT - TAB SCH EA: TAB at 17:50

## 2021-08-11 RX ADMIN — PANCRELIPASE SCH EA: 24000; 76000; 120000 CAPSULE, DELAYED RELEASE PELLETS ORAL at 09:44

## 2021-08-11 RX ADMIN — PROBIOTIC PRODUCT - TAB SCH EA: TAB at 09:29

## 2021-08-11 RX ADMIN — PANCRELIPASE SCH EA: 24000; 76000; 120000 CAPSULE, DELAYED RELEASE PELLETS ORAL at 17:50

## 2021-08-11 RX ADMIN — IPRATROPIUM BROMIDE SCH PUFF: 17 AEROSOL, METERED RESPIRATORY (INHALATION) at 20:00

## 2021-08-11 RX ADMIN — APIXABAN SCH MG: 2.5 TABLET, FILM COATED ORAL at 20:30

## 2021-08-11 RX ADMIN — IPRATROPIUM BROMIDE SCH PUFF: 17 AEROSOL, METERED RESPIRATORY (INHALATION) at 07:37

## 2021-08-11 RX ADMIN — ASPIRIN SCH MG: 81 TABLET ORAL at 09:29

## 2021-08-11 RX ADMIN — ROPINIROLE HYDROCHLORIDE SCH MG: 0.25 TABLET, FILM COATED ORAL at 09:29

## 2021-08-11 RX ADMIN — SODIUM CITRATE AND CITRIC ACID MONOHYDRATE SCH ML: 500; 334 SOLUTION ORAL at 09:40

## 2021-08-11 RX ADMIN — ATORVASTATIN CALCIUM SCH MG: 20 TABLET, FILM COATED ORAL at 20:30

## 2021-08-11 RX ADMIN — FERROUS SULFATE TAB 325 MG (65 MG ELEMENTAL FE) SCH MG: 325 (65 FE) TAB at 09:30

## 2021-08-11 RX ADMIN — METOPROLOL TARTRATE SCH MG: 50 TABLET, FILM COATED ORAL at 20:30

## 2021-08-11 RX ADMIN — METOPROLOL TARTRATE SCH MG: 50 TABLET, FILM COATED ORAL at 09:30

## 2021-08-11 RX ADMIN — MAGNESIUM SULFATE IN DEXTROSE SCH MLS/HR: 10 INJECTION, SOLUTION INTRAVENOUS at 09:29

## 2021-08-11 RX ADMIN — IPRATROPIUM BROMIDE SCH PUFF: 17 AEROSOL, METERED RESPIRATORY (INHALATION) at 11:24

## 2021-08-11 RX ADMIN — ALBUTEROL SULFATE PRN MG: 2.5 SOLUTION RESPIRATORY (INHALATION) at 07:11

## 2021-08-11 RX ADMIN — ROPINIROLE HYDROCHLORIDE SCH MG: 0.25 TABLET, FILM COATED ORAL at 20:30

## 2021-08-11 RX ADMIN — ALBUTEROL SULFATE PRN MG: 2.5 SOLUTION RESPIRATORY (INHALATION) at 21:36

## 2021-08-11 RX ADMIN — DEXTROSE MONOHYDRATE SCH MLS/HR: 50 INJECTION, SOLUTION INTRAVENOUS at 09:47

## 2021-08-11 RX ADMIN — DEXTROSE MONOHYDRATE SCH MLS/HR: 50 INJECTION, SOLUTION INTRAVENOUS at 20:30

## 2021-08-11 RX ADMIN — IPRATROPIUM BROMIDE SCH PUFF: 17 AEROSOL, METERED RESPIRATORY (INHALATION) at 15:11

## 2021-08-11 RX ADMIN — SODIUM CITRATE AND CITRIC ACID MONOHYDRATE SCH ML: 500; 334 SOLUTION ORAL at 21:36

## 2021-08-11 RX ADMIN — OMEPRAZOLE SCH MG: 20 CAPSULE, DELAYED RELEASE ORAL at 09:29

## 2021-08-11 NOTE — IPNPDOC
Text Note


Date of Service


The patient was seen on 8/11/21.





NOTE


Subjective:


Patient is a 65 year old  male with a PMHx of COPD, HFrEF, A. flutter 

(on Eliquis), CAD, HTN, CVA, Hypothyroidism, Pancreatic insufficiency (2/2 

pancreatic mass resection), Chronic low back pain, Polysubstance abuse, who 

presented to the ER with weakness, decreased oral intake and confusion. 





Patient was recently admitted for bacterial pneumonia, decompensated diastolic 

CHF, atrial flutter with RVR and IBAN. Patient was ultimately discharged on 

8/4/21.





Patient reported significant watery diarrhea over the last 2-3 days. Upon 

arrival to ER, patient was found to have hypernatremia and acute kidney injury. 

She was admitted to the hospital service for further evaluation and treatment. 

Nephrology was called on consultation.





Patient was seen and examined at the bedside. Patient denies any nausea, 

vomiting, diarrhea, or urinary discomfort. Patient has been able to urinate 

overnight. However, bladder scans did continue to reveal urinary retention for 

which patient has refused again Luque catheter placement. He denies any chest 

pain, shortness of breath or palpitations.





Objective:


Vitals (See below)


General: Patient is sitting up in bed, appears to be comfortable, watching 

television, is awake and alert, oriented 3


HEENT: Normocephalic and atraumatic


CVS: +S1S2


Lungs: Fair air entry b/l, there does not appear to be any evidence of wheezing,

crackles or rhonchi


Abdomen: Soft, there does not appear to be any distention. Mild tenderness of 

suprapubic region


Extremities: Lower extremities are without any edema





Imaging:


CT abdomen / pelvis w/o contrast 8/9:


1. Constipation. 


2. No bowel obstruction or acute findings. 





CXR 8/10:


No acute cardiopulmonary disease.  Chronic changes as above.





Assessment and plan:


Acute renal failure 2/2 dehydration from poor PO intake, diarrhea


- Baseline Cr of 1.0


- Cr 2.0 on admission; creatinine continues to trend down 


- Patient again has refused Luque catheter 


- c/w IV fluids as per nephrology


- Will avoid nephrotoxic medications


- Nephrology on consultation; we appreciate their input





Hypernatremia - likely 2/2 dehydration from diarrhea, poor PO intake; possibly 

2/2 diabetes insipidus 


- Sodium continues to improve 


- Nephrology on consultation





s/p Hyperkalemia


- Improving 





s/p Diarrhea


- BM this AM was formed stool. 





Normocytic anemia


- Hg trended down


- No evidence of bleeding


- Will follow trend





Leukocytosis


- ROS is negative; diarrhea has resolved


- Hemodynamically stable / Afebrile


- Will check PCT


- Blood cultures 8/9: No growht at 24 hours


- UA negative; Urine culture 8/11: Pending 


- Will hold off on antibiotics at this time 





Non-sustained V tach 


- possibly 2/2 electrolyte abnormalities


- Will c/w Telemetry 


- EKG reviewed


- c/w metoprolol 





Chronic A flutter


- c/w rate control with metoprolol 


- c/w full anticoagulation with eliquis





Hx of HFrEF


- No evidence of fluid overload


- last echo 7/2021, LVEF 30-35%, moderate-severe hypokinesis of LV


- Will avoid nephrotoxic medications





Suspected cyanotic LEs, suspected chronic PAD


- Clinically patient does not appear to have any cyanotic toes this morning


- Patient was evaluated by vascular surgery on admission; Dr. Haji; 

appreciate their input 


- c/w ASA, eliquis





CAD


- c/w ASA, Atorvastatin 





Pancreatic insufficiency


- Hx of resected pancreatic mass


- c/w supplementation 





RLS


- c/w Ropinirole 





GERD


- c/w Omeprazole 





DVT prophylaxis


- c/w Eliquis 





Disposition:


- Awaiting clinical improvement





Carmenza HERMAN I+O


VSCarmenza I+O


Laboratory Tests


8/10/21 11:07








8/10/21 17:46








8/11/21 04:51











Vital Signs








  Date Time  Temp Pulse Resp B/P (MAP) Pulse Ox O2 Delivery O2 Flow Rate FiO2


 


8/11/21 09:30  61  112/55    


 


8/11/21 07:56 98.7  22  98 Room Air  


 


8/9/21 17:30       4.0 














I&O- Last 24 Hours up to 6 AM 


 


 8/11/21





 05:59


 


Intake Total 4535 ml


 


Output Total 775 ml


 


Balance 3760 ml

















SUNNY BOWENS MD                Aug 11, 2021 10:11

## 2021-08-11 NOTE — ECGEPIP
[facility]

                                       

                                       Test Date:    2021-08-10

Pat Name:     CHACORTA NOBLES             Department:   

Patient ID:   [id number]                 Room:         [place] [address]

Gender:       Male                     Technician:   OMAIRA

:          1955               Requested By: ANTOINE CHAPMAN 

Order Number: [id number]     Reading MD:   Mendez Kang

                                 Measurements

Intervals                              Axis          

Rate:         59                       P:            72

CT:           150                      QRS:          25

QRSD:         90                       T:            132

QT:           430                                    

QTc:          425                                    

                           Interpretive Statements

normal sinus rhythm at 60 bpm.

Frequent isolated unifocal PVCs

Low limb voltage with minuscule inferior Q waves; body habitus versus

pulmonary d

disease but could not rule out prior IWMI.

Prominent precordial voltage in asymmetrical lateral ST/T wave abnormalities

c

consistent with left ventricular hypertrophy.

Slower rate with slightly less prominent repolarization abnormality from

previous d

day.

Electronically Signed on 2021 8:27:15 EDT by Mendez Kang

## 2021-08-11 NOTE — IPN
PROGRESS NOTE



DATE:  08/11/2021



SUBJECTIVE: The patient is seen and examined at the bedside today morning. He

continues to be on IV fluid hydration. He is feeling much better today as

compared with yesterday. He was started on Bicitra yesterday which he is

tolerating well. His hypernatremia is getting better. 



OBJECTIVE: 

VITAL SIGNS: Temperature is 98.7 degrees Fahrenheit, blood pressure is 128/61,

pulse is 59, respiratory rate of 20, saturating 98% on room air.

INTAKE AND OUTPUT: Urine output recorded as 500 ml since overnight. Weight on

the bed scale was 50.6 kg yesterday.

GENERAL: Patient is awake, alert and oriented x2, weak and cachectic,

chronically malnourished. 

HEAD AND NECK:  Extraocular muscles intact. Pupils equally round and reactive

to light. Mucous membranes are moist. Neck is supple. No significant JVD.

CARDIOVASCULAR: S1 and S2, regular rate, no edema of the bilateral lower

extremities.

RESPIRATORY: Mildly decreased breath sounds at the bases with inspiratory

crackles.

ABDOMEN: Soft, positive bowel sounds, nontender, no organomegaly. 

MUSCULOSKELETAL: No clubbing or cyanosis. Pulses are 2+. 

CNS: No focal deficit. Power is 5/5 in all extremities.



LABORATORY DATA: CBC showed a WBC of 12.6, hemoglobin 9.7, platelets are

164,000. Urine electrolytes done today morning showed a creatinine of 89,

sodium of 88, potassium 55.1, chloride is 139. Urine anion gap calculated today

is slightly more than 4 which points towards distal RTA. BMP showed a sodium of

148, potassium 4.3, chloride 124, bicarbonate 19, BUN is 31, creatinine is 1.3,

it was 1.5 yesterday. Phosphorus 1.9, magnesium 1.6. 



CURRENT INPATIENT MEDICATIONS: The patient's medications were all reviewed by

myself. He continues to be on Bicitra 30 ml p.o. twice a day. He was given two

runs of IV magnesium. I have decreased his D5W to 75 ml/hr now. No other

significant change in the medications today as compared with yesterday. 



ASSESSMENT AND PLAN:

  1.  Acute renal failure. Patient was in non-oliguric renal failure, it was

      secondary to dehydration and volume depletion. Creatinine is improving

      with IV fluid hydration.

  2.  Hypernatremia. Patient is getting D5W, sodium level is slightly

      improving, D5W rate has been decreased today.

  3.  Hyperkalemia, it is improving with dextrose containing IV fluids and

      improvement in the renal function. Potassium level is within the

      acceptable range.

  4.  Hyperchloremic metabolic acidosis, the patient has a positive urine anion

      gap, there is a likelihood the patient has distal RTA. Continue Bicitra

      30 ml p.o. twice a day. 

  5.  Atrial fibrillation, patient is on Eliquis and metoprolol. Continue

      current dose at this time.

## 2021-08-11 NOTE — ECGEPIP
[facility] - ED

                                       

                                       Test Date:    2021

Pat Name:     CHACORTA NOBLES             Department:   

Patient ID:   [id number]                 Room:         [place] [address]

Gender:       Male                     Technician:   VIJAYA

:          1955               Requested By: RAUDEL MOODY

Order Number: [id number]     Reading MD:   Edd Contreras

                                 Measurements

Intervals                              Axis          

Rate:         93                       P:            82

HI:           130                      QRS:          57

QRSD:         78                       T:            103

QT:           366                                    

QTc:          455                                    

                           Interpretive Statements

Sinus rhythm with premature atrial complexes

T wave abnormality, consider anterolateral ischemia

SIMILAR TO 21

Electronically Signed on 2021 6:46:35 EDT by Edd Contreras

## 2021-08-12 VITALS — SYSTOLIC BLOOD PRESSURE: 120 MMHG | DIASTOLIC BLOOD PRESSURE: 56 MMHG

## 2021-08-12 VITALS — DIASTOLIC BLOOD PRESSURE: 57 MMHG | SYSTOLIC BLOOD PRESSURE: 119 MMHG

## 2021-08-12 VITALS — DIASTOLIC BLOOD PRESSURE: 59 MMHG | SYSTOLIC BLOOD PRESSURE: 118 MMHG

## 2021-08-12 VITALS — DIASTOLIC BLOOD PRESSURE: 54 MMHG | SYSTOLIC BLOOD PRESSURE: 118 MMHG

## 2021-08-12 VITALS — SYSTOLIC BLOOD PRESSURE: 128 MMHG | DIASTOLIC BLOOD PRESSURE: 60 MMHG

## 2021-08-12 VITALS — DIASTOLIC BLOOD PRESSURE: 51 MMHG | SYSTOLIC BLOOD PRESSURE: 100 MMHG

## 2021-08-12 LAB
ANISOCYTOSIS BLD QL SMEAR: (no result)
BASOPHILS NFR BLD MANUAL: 1 % (ref 0–1)
BUN SERPL-MCNC: 26 MG/DL (ref 7–18)
CALCIUM SERPL-MCNC: 7 MG/DL (ref 8.8–10.2)
CHLORIDE SERPL-SCNC: 116 MEQ/L (ref 98–107)
CO2 SERPL-SCNC: 20 MEQ/L (ref 21–32)
CREAT SERPL-MCNC: 0.99 MG/DL (ref 0.7–1.3)
EOSINOPHIL NFR BLD MANUAL: 7 % (ref 0–3)
GFR SERPL CREATININE-BSD FRML MDRD: > 60 ML/MIN/{1.73_M2} (ref 49–?)
GLUCOSE SERPL-MCNC: 98 MG/DL (ref 70–100)
HCT VFR BLD AUTO: 31.9 % (ref 42–52)
HGB BLD-MCNC: 9.4 G/DL (ref 13.5–17.5)
LYMPHOCYTES NFR BLD MANUAL: 14 % (ref 16–44)
MAGNESIUM SERPL-MCNC: 1.9 MG/DL (ref 1.8–2.4)
MCH RBC QN AUTO: 23.3 PG (ref 27–33)
MCHC RBC AUTO-ENTMCNC: 29.5 G/DL (ref 32–36.5)
MCV RBC AUTO: 79 FL (ref 80–96)
MICROCYTES BLD QL SMEAR: (no result)
MONOCYTES NFR BLD MANUAL: 3 % (ref 0–5)
NEUTROPHILS NFR BLD MANUAL: 71 % (ref 28–66)
OVALOCYTES BLD QL SMEAR: (no result)
PHOSPHATE SERPL-MCNC: 1.8 MG/DL (ref 2.5–4.9)
PLATELET # BLD AUTO: 161 10^3/UL (ref 150–450)
PLATELET BLD QL SMEAR: NORMAL
POIKILOCYTOSIS BLD QL SMEAR: (no result)
POTASSIUM SERPL-SCNC: 3.9 MEQ/L (ref 3.5–5.1)
RBC # BLD AUTO: 4.04 10^6/UL (ref 4.3–6.1)
SODIUM SERPL-SCNC: 143 MEQ/L (ref 136–145)
VARIANT LYMPHS NFR BLD MANUAL: 4 % (ref 0–5)
WBC # BLD AUTO: 9.7 10^3/UL (ref 4–10)

## 2021-08-12 RX ADMIN — PROBIOTIC PRODUCT - TAB SCH EA: TAB at 13:15

## 2021-08-12 RX ADMIN — ALBUTEROL SULFATE PRN MG: 2.5 SOLUTION RESPIRATORY (INHALATION) at 02:44

## 2021-08-12 RX ADMIN — APIXABAN SCH MG: 2.5 TABLET, FILM COATED ORAL at 20:30

## 2021-08-12 RX ADMIN — SODIUM BICARBONATE SCH MG: 325 TABLET ORAL at 17:06

## 2021-08-12 RX ADMIN — IPRATROPIUM BROMIDE SCH PUFF: 17 AEROSOL, METERED RESPIRATORY (INHALATION) at 20:21

## 2021-08-12 RX ADMIN — POTASSIUM & SODIUM PHOSPHATES POWDER PACK 280-160-250 MG SCH PKT: 280-160-250 PACK at 13:15

## 2021-08-12 RX ADMIN — FERROUS SULFATE TAB 325 MG (65 MG ELEMENTAL FE) SCH MG: 325 (65 FE) TAB at 09:03

## 2021-08-12 RX ADMIN — ROPINIROLE HYDROCHLORIDE SCH MG: 0.25 TABLET, FILM COATED ORAL at 09:03

## 2021-08-12 RX ADMIN — SODIUM BICARBONATE SCH MG: 325 TABLET ORAL at 13:00

## 2021-08-12 RX ADMIN — SODIUM BICARBONATE SCH MG: 325 TABLET ORAL at 20:30

## 2021-08-12 RX ADMIN — SODIUM CITRATE AND CITRIC ACID MONOHYDRATE SCH ML: 500; 334 SOLUTION ORAL at 09:00

## 2021-08-12 RX ADMIN — IPRATROPIUM BROMIDE SCH PUFF: 17 AEROSOL, METERED RESPIRATORY (INHALATION) at 10:27

## 2021-08-12 RX ADMIN — PSYLLIUM HUSK SCH PKT: 3.4 POWDER ORAL at 20:30

## 2021-08-12 RX ADMIN — IPRATROPIUM BROMIDE SCH PUFF: 17 AEROSOL, METERED RESPIRATORY (INHALATION) at 14:28

## 2021-08-12 RX ADMIN — ROPINIROLE HYDROCHLORIDE SCH MG: 0.25 TABLET, FILM COATED ORAL at 20:29

## 2021-08-12 RX ADMIN — ATORVASTATIN CALCIUM SCH MG: 20 TABLET, FILM COATED ORAL at 20:30

## 2021-08-12 RX ADMIN — PANCRELIPASE SCH EA: 24000; 76000; 120000 CAPSULE, DELAYED RELEASE PELLETS ORAL at 09:00

## 2021-08-12 RX ADMIN — METOPROLOL TARTRATE SCH MG: 50 TABLET, FILM COATED ORAL at 09:00

## 2021-08-12 RX ADMIN — ASPIRIN SCH MG: 81 TABLET ORAL at 09:00

## 2021-08-12 RX ADMIN — POTASSIUM CHLORIDE AND DEXTROSE MONOHYDRATE SCH MLS/HR: 150; 5 INJECTION, SOLUTION INTRAVENOUS at 13:15

## 2021-08-12 RX ADMIN — PROBIOTIC PRODUCT - TAB SCH EA: TAB at 17:06

## 2021-08-12 RX ADMIN — PSYLLIUM HUSK SCH PKT: 3.4 POWDER ORAL at 13:15

## 2021-08-12 RX ADMIN — PROBIOTIC PRODUCT - TAB SCH EA: TAB at 09:00

## 2021-08-12 RX ADMIN — IPRATROPIUM BROMIDE SCH PUFF: 17 AEROSOL, METERED RESPIRATORY (INHALATION) at 07:25

## 2021-08-12 RX ADMIN — PANCRELIPASE SCH EA: 24000; 76000; 120000 CAPSULE, DELAYED RELEASE PELLETS ORAL at 13:15

## 2021-08-12 RX ADMIN — PANCRELIPASE SCH EA: 24000; 76000; 120000 CAPSULE, DELAYED RELEASE PELLETS ORAL at 17:06

## 2021-08-12 RX ADMIN — METOPROLOL TARTRATE SCH MG: 25 TABLET, FILM COATED ORAL at 20:30

## 2021-08-12 RX ADMIN — APIXABAN SCH MG: 2.5 TABLET, FILM COATED ORAL at 09:03

## 2021-08-12 RX ADMIN — OMEPRAZOLE SCH MG: 20 CAPSULE, DELAYED RELEASE ORAL at 09:00

## 2021-08-12 RX ADMIN — POTASSIUM & SODIUM PHOSPHATES POWDER PACK 280-160-250 MG SCH PKT: 280-160-250 PACK at 17:06

## 2021-08-12 NOTE — IPNPDOC
Text Note


Date of Service


The patient was seen on 8/12/21.





NOTE


Subjective:


Patient is a 65 year old  male with a PMHx of COPD, HFrEF, A. flutter 

(on Eliquis), CAD, HTN, CVA, Hypothyroidism, Pancreatic insufficiency (2/2 

pancreatic mass resection), Chronic low back pain, Polysubstance abuse, who 

presented to the ER with weakness, decreased oral intake and confusion. 





Patient was recently admitted for bacterial pneumonia, decompensated diastolic 

CHF, atrial flutter with RVR and IBAN. Patient was ultimately discharged on 

8/4/21.





Patient reported significant watery diarrhea over the last 2-3 days. Upon 

arrival to ER, patient was found to have hypernatremia and acute kidney injury. 

She was admitted to the hospital service for further evaluation and treatment. 

Nephrology was called on consultation.





Patient was seen and examined at the bedside. Patient is seen sitting up in bed 

watching television. Denies any nausea, vomiting, abdominal pain, reported that 

he had diarrhea yesterday, however, has not yet had a bowel movement this 

morning. Denies any chest pain, shortness breath or palpitations.





Objective:


Vitals (See below)


General: Sitting up in bed watching television appears to be comfortable without

any acute distress, is awake, alert, oriented 3


HEENT: AT, NC


CVS: +S1S2


Lungs: Auscultation does not reveal any wheezing, crackles or rhonchi and air 

entry appears to be fair bilaterally


Abdomen: Abdominal exam, this morning did not reveal any distention or 

tenderness, remains soft


Extremities: No edema





Imaging:


CT abdomen / pelvis w/o contrast 8/9:


1. Constipation. 


2. No bowel obstruction or acute findings. 





CXR 8/10:


No acute cardiopulmonary disease.  Chronic changes as above.





Assessment and plan:


Acute renal failure 2/2 dehydration from poor PO intake, diarrhea


- Baseline Cr of 1.0


- Creatinine has trended down to baseline


- Has refused Luque catheter 


- Will avoid nephrotoxic medications


- c/w IV fluids as per nephrology


- Nephrology on consultation; we appreciate their input





NAG metabolic acidosis 


- Improving currently 


- c/w Citric acid / Sodium citrate  





s/p Hypernatremia - likely 2/2 dehydration from diarrhea, poor PO intake; 

possibly 2/2 diabetes insipidus 





s/p Hyperkalemia





s/p Diarrhea


- BM this AM was formed stool


- C. diff negative


- DC Stool softeners


- Will start stool bulking agents 





Normocytic anemia


- Hg trended down


- No evidence of bleeding


- Will follow trend





s/p Leukocytosis


- ROS is negative


- Hemodynamically stable / Afebrile


- PCT 0.39


- Blood cultures 8/9: No growth at 48 hours


- UA negative; Urine culture 8/11: Negative 


- Will continue to hold off on antibiotics at this time 





Non-sustained V tach - likely 2/2 electrolyte abnormalities


- EKG reviewed


- c/w Telemetry 


- c/w metoprolol 





Chronic A flutter


- c/w rate control with metoprolol; dose reduced 


- c/w full anticoagulation with eliquis





Hx of HFrEF


- No evidence of fluid overload


- last echo 7/2021, LVEF 30-35%, moderate-severe hypokinesis of LV


- Will avoid nephrotoxic medications





Suspected cyanotic LEs, suspected chronic PAD


- Clinically patient does not appear to have any cyanotic toes this morning


- Patient was evaluated by vascular surgery on admission; Dr. Haji; 

appreciate their input 


- c/w ASA, eliquis





CAD


- c/w ASA, Atorvastatin 





Pancreatic insufficiency


- Hx of resected pancreatic mass


- c/w supplementation 





RLS


- c/w Ropinirole 





GERD


- c/w Omeprazole 





DVT prophylaxis


- c/w Eliquis 





Disposition:


- Awaiting clinical improvement


- Anticipate DC home in 24-48 hours





VS,Fishbone, I+O


VS, Fishbone, I+O


Laboratory Tests


8/12/21 06:47











Vital Signs








  Date Time  Temp Pulse Resp B/P (MAP) Pulse Ox O2 Delivery O2 Flow Rate FiO2


 


8/12/21 09:00  66  100/51    


 


8/12/21 08:00 97.3  22  97 Nasal Cannula 2.0 














I&O- Last 24 Hours up to 6 AM 


 


 8/12/21





 06:00


 


Intake Total 2060 ml


 


Output Total 625 ml


 


Balance 1435 ml

















SUNNY BOWENS MD                Aug 12, 2021 11:31

## 2021-08-12 NOTE — IPN
PROGRESS NOTE



DATE:  08/12/2021



SUBJECTIVE: Patient was seen and examined at the bedside today morning. He

continues to be on IV fluid hydration. His hypernatremia has resolved now. He

still has metabolic acidosis. He was started on Bicitra but I was told by the

nursing staff that the patient is starting to have diarrhea now and his C. Diff

was checked which was negative. Patient himself denies any active complaints.



OBJECTIVE: 

VITAL SIGNS: Temperature is 97.3 degrees Fahrenheit, blood pressure is 119/57,

pulse is 63, respiratory rate is 22, saturating 97% on nasal cannula at 2

liters.

INTAKE AND OUTPUT: Urine output recorded as 350 ml and weight on the bed scale

is 57.6 kg. 

GENERAL: Patient is awake, alert and oriented x2, laying in bed, chronically

weak and cachectic. 

HEAD AND NECK: Extraocular muscles are intact. Pupils are equally round and

reactive to light. Mucous membranes are moist. Neck is supple. Left sided

carotid graft is noted. He has an old midline sternotomy scar as well. 

RESPIRATORY: Chest is clear to auscultation bilaterally. Mildly decreased

breath sounds at the bases. 

ABDOMEN: Soft, positive bowel sounds, nontender. No organomegaly. 

MUSCULOSKELETAL: No clubbing or cyanosis. Pulses are 2+. 

CNS: No focal deficit at this time.



LABORATORY DATA:  CBC showed a WBC of 9.7, hemoglobin 9.4, platelets are

161,000. BMP showed a sodium of 143, potassium is 3.9, chloride 116,

bicarbonate is 20, BUN is 26, creatinine is 0.9. Calcium is 7, phosphorus is

1.8. Magnesium is 1.9. 



CURRENT INPATIENT MEDICATIONS: Patient's medications were all reviewed by

myself. Metoprolol has been changed to 25 mg p.o. twice a day. He has been

started on sodium bicarbonate 325 mg p.o. four times a day. I have stopped his

Bicitra now and he was started on Neutra-Phos packets one packet with meal. I

have changed his IV fluid to KCl 20 mEq and D5W at 75 ccs/hr. 



ASSESSMENT AND PLAN:

  1.  Acute renal failure. Patient is getting IV fluid, renal function

      continues to improve, creatinine is trending down.

  2.  Hypernatremia. Patient is getting D5 but I have added the KCl in D5W now.

      Hypernatremia has resolved now.

  3.  Hyperchloremic metabolic acidosis. Patient continues to be on IV D5W

      hyperchloremia is improving, however he still has persistent metabolic

      acidosis. He was started on Bicitra and it looks like he is having

      diarrhea with that. I have started him on oral sodium bicarbonate now. 

  4.  Atrial fibrillation, heart rate is controlled with metoprolol. Eliquis

      was decreased because of low body mass.

## 2021-08-13 VITALS — SYSTOLIC BLOOD PRESSURE: 127 MMHG | DIASTOLIC BLOOD PRESSURE: 64 MMHG

## 2021-08-13 VITALS — DIASTOLIC BLOOD PRESSURE: 50 MMHG | SYSTOLIC BLOOD PRESSURE: 99 MMHG

## 2021-08-13 VITALS — SYSTOLIC BLOOD PRESSURE: 141 MMHG | DIASTOLIC BLOOD PRESSURE: 66 MMHG

## 2021-08-13 VITALS — DIASTOLIC BLOOD PRESSURE: 64 MMHG | SYSTOLIC BLOOD PRESSURE: 146 MMHG

## 2021-08-13 VITALS — DIASTOLIC BLOOD PRESSURE: 66 MMHG | SYSTOLIC BLOOD PRESSURE: 141 MMHG

## 2021-08-13 VITALS — DIASTOLIC BLOOD PRESSURE: 62 MMHG | SYSTOLIC BLOOD PRESSURE: 131 MMHG

## 2021-08-13 VITALS — DIASTOLIC BLOOD PRESSURE: 61 MMHG | SYSTOLIC BLOOD PRESSURE: 132 MMHG

## 2021-08-13 LAB
ANISOCYTOSIS BLD QL SMEAR: (no result)
BUN SERPL-MCNC: 18 MG/DL (ref 7–18)
CALCIUM SERPL-MCNC: 6.7 MG/DL (ref 8.8–10.2)
CHLORIDE SERPL-SCNC: 117 MEQ/L (ref 98–107)
CO2 SERPL-SCNC: 21 MEQ/L (ref 21–32)
CREAT SERPL-MCNC: 0.88 MG/DL (ref 0.7–1.3)
EOSINOPHIL NFR BLD MANUAL: 9 % (ref 0–3)
GFR SERPL CREATININE-BSD FRML MDRD: > 60 ML/MIN/{1.73_M2} (ref 49–?)
GLUCOSE SERPL-MCNC: 101 MG/DL (ref 70–100)
HCT VFR BLD AUTO: 32.9 % (ref 42–52)
HGB BLD-MCNC: 9.8 G/DL (ref 13.5–17.5)
HYPOCHROMIA BLD QL SMEAR: (no result)
LYMPHOCYTES NFR BLD MANUAL: 13 % (ref 16–44)
MAGNESIUM SERPL-MCNC: 1.7 MG/DL (ref 1.8–2.4)
MCH RBC QN AUTO: 23.4 PG (ref 27–33)
MCHC RBC AUTO-ENTMCNC: 29.8 G/DL (ref 32–36.5)
MCV RBC AUTO: 78.5 FL (ref 80–96)
MICROCYTES BLD QL SMEAR: (no result)
MONOCYTES NFR BLD MANUAL: 3 % (ref 0–5)
NEUTROPHILS NFR BLD MANUAL: 73 % (ref 28–66)
OVALOCYTES BLD QL SMEAR: (no result)
PHOSPHATE SERPL-MCNC: 2.1 MG/DL (ref 2.5–4.9)
PLATELET # BLD AUTO: 127 10^3/UL (ref 150–450)
PLATELET BLD QL SMEAR: (no result)
POIKILOCYTOSIS BLD QL SMEAR: (no result)
POTASSIUM SERPL-SCNC: 4.2 MEQ/L (ref 3.5–5.1)
RBC # BLD AUTO: 4.19 10^6/UL (ref 4.3–6.1)
SODIUM SERPL-SCNC: 145 MEQ/L (ref 136–145)
WBC # BLD AUTO: 7.7 10^3/UL (ref 4–10)

## 2021-08-13 RX ADMIN — APIXABAN SCH MG: 2.5 TABLET, FILM COATED ORAL at 09:31

## 2021-08-13 RX ADMIN — IPRATROPIUM BROMIDE SCH PUFF: 17 AEROSOL, METERED RESPIRATORY (INHALATION) at 10:54

## 2021-08-13 RX ADMIN — SODIUM BICARBONATE SCH MG: 325 TABLET ORAL at 12:23

## 2021-08-13 RX ADMIN — PROBIOTIC PRODUCT - TAB SCH EA: TAB at 18:29

## 2021-08-13 RX ADMIN — PSYLLIUM HUSK SCH PKT: 3.4 POWDER ORAL at 09:00

## 2021-08-13 RX ADMIN — POTASSIUM & SODIUM PHOSPHATES POWDER PACK 280-160-250 MG SCH PKT: 280-160-250 PACK at 09:30

## 2021-08-13 RX ADMIN — LOPERAMIDE HYDROCHLORIDE PRN MG: 2 TABLET, FILM COATED ORAL at 04:42

## 2021-08-13 RX ADMIN — IPRATROPIUM BROMIDE SCH PUFF: 17 AEROSOL, METERED RESPIRATORY (INHALATION) at 19:54

## 2021-08-13 RX ADMIN — SODIUM BICARBONATE SCH MG: 325 TABLET ORAL at 18:28

## 2021-08-13 RX ADMIN — LOPERAMIDE HYDROCHLORIDE PRN MG: 2 TABLET, FILM COATED ORAL at 20:23

## 2021-08-13 RX ADMIN — SODIUM BICARBONATE SCH MG: 325 TABLET ORAL at 09:30

## 2021-08-13 RX ADMIN — METOPROLOL TARTRATE SCH MG: 25 TABLET, FILM COATED ORAL at 20:24

## 2021-08-13 RX ADMIN — PANCRELIPASE SCH EA: 24000; 76000; 120000 CAPSULE, DELAYED RELEASE PELLETS ORAL at 18:28

## 2021-08-13 RX ADMIN — ASPIRIN SCH MG: 81 TABLET ORAL at 09:29

## 2021-08-13 RX ADMIN — POTASSIUM & SODIUM PHOSPHATES POWDER PACK 280-160-250 MG SCH PKT: 280-160-250 PACK at 10:00

## 2021-08-13 RX ADMIN — PANCRELIPASE SCH EA: 24000; 76000; 120000 CAPSULE, DELAYED RELEASE PELLETS ORAL at 12:23

## 2021-08-13 RX ADMIN — POTASSIUM CHLORIDE AND DEXTROSE MONOHYDRATE SCH MLS/HR: 150; 5 INJECTION, SOLUTION INTRAVENOUS at 01:20

## 2021-08-13 RX ADMIN — ALBUTEROL SULFATE PRN MG: 2.5 SOLUTION RESPIRATORY (INHALATION) at 10:54

## 2021-08-13 RX ADMIN — PROBIOTIC PRODUCT - TAB SCH EA: TAB at 12:23

## 2021-08-13 RX ADMIN — FERROUS SULFATE TAB 325 MG (65 MG ELEMENTAL FE) SCH MG: 325 (65 FE) TAB at 09:30

## 2021-08-13 RX ADMIN — ROPINIROLE HYDROCHLORIDE SCH MG: 0.25 TABLET, FILM COATED ORAL at 20:23

## 2021-08-13 RX ADMIN — IPRATROPIUM BROMIDE SCH PUFF: 17 AEROSOL, METERED RESPIRATORY (INHALATION) at 07:22

## 2021-08-13 RX ADMIN — SODIUM BICARBONATE SCH MG: 325 TABLET ORAL at 20:24

## 2021-08-13 RX ADMIN — POTASSIUM CHLORIDE AND DEXTROSE MONOHYDRATE SCH MLS/HR: 150; 5 INJECTION, SOLUTION INTRAVENOUS at 15:24

## 2021-08-13 RX ADMIN — ALBUTEROL SULFATE PRN MG: 2.5 SOLUTION RESPIRATORY (INHALATION) at 15:09

## 2021-08-13 RX ADMIN — IPRATROPIUM BROMIDE SCH PUFF: 17 AEROSOL, METERED RESPIRATORY (INHALATION) at 15:09

## 2021-08-13 RX ADMIN — ATORVASTATIN CALCIUM SCH MG: 20 TABLET, FILM COATED ORAL at 20:24

## 2021-08-13 RX ADMIN — PSYLLIUM HUSK SCH PKT: 3.4 POWDER ORAL at 20:24

## 2021-08-13 RX ADMIN — OMEPRAZOLE SCH MG: 20 CAPSULE, DELAYED RELEASE ORAL at 09:30

## 2021-08-13 RX ADMIN — PROBIOTIC PRODUCT - TAB SCH EA: TAB at 09:29

## 2021-08-13 RX ADMIN — APIXABAN SCH MG: 2.5 TABLET, FILM COATED ORAL at 20:23

## 2021-08-13 RX ADMIN — PANCRELIPASE SCH EA: 24000; 76000; 120000 CAPSULE, DELAYED RELEASE PELLETS ORAL at 09:30

## 2021-08-13 RX ADMIN — METOPROLOL TARTRATE SCH MG: 25 TABLET, FILM COATED ORAL at 09:30

## 2021-08-13 RX ADMIN — ROPINIROLE HYDROCHLORIDE SCH MG: 0.25 TABLET, FILM COATED ORAL at 09:30

## 2021-08-13 NOTE — IPNPDOC
Text Note


Date of Service


The patient was seen on 8/13/21.





NOTE


Subjective:


Patient is a 65 year old  male with a PMHx of COPD, HFrEF, A. flutter 

(on Eliquis), CAD, HTN, CVA, Hypothyroidism, Pancreatic insufficiency (2/2 

pancreatic mass resection), Chronic low back pain, Polysubstance abuse, who 

presented to the ER with weakness, decreased oral intake and confusion. 





Patient was recently admitted for bacterial pneumonia, decompensated diastolic 

CHF, atrial flutter with RVR and IBAN. Patient was ultimately discharged on 

8/4/21.





Patient reported significant watery diarrhea over the last 2-3 days. Upon 

arrival to ER, patient was found to have hypernatremia and acute kidney injury. 

She was admitted to the hospital service for further evaluation and treatment. 

Nephrology was called on consultation.





Patient was seen and examined at the bedside. Patient reports that he is an 

uneventful night had reported diarrhea yesterday. Currently denies any nausea, 

vomiting, abdominal discomfort. Has not had any bowel movements this morning. 

Denies chest pain, shortness breath, palpitations.





Objective:


Vitals (See below)


General: Patient is sitting up in bed, appears to be comfortable without any 

acute distress, is oriented to person, place, time


HEENT: Normocephalic and atraumatic


CVS: +S1S2


Lungs: There appears to be fair air entry bilaterally without auscultated 

wheezing, crackles or rhonchi


Abdomen: Soft without distention or tenderness


Extremities: Lower extremities are free of edema





Imaging:


CT abdomen / pelvis w/o contrast 8/9:


1. Constipation. 


2. No bowel obstruction or acute findings. 





CXR 8/10:


No acute cardiopulmonary disease.  Chronic changes as above.





Assessment and plan:


Acute renal failure 2/2 dehydration from poor PO intake, diarrhea


- Cr has improved better than baseline 


- Has refused Luque catheter 


- Will continue to avoid nephrotoxic medications


- c/w IV fluids as per nephrology


- Nephrology on consultation; we appreciate their input





NAG metabolic acidosis 


- Bicarbonate improving 


- c/w Citric acid / Sodium citrate  





s/p Hypernatremia - likely 2/2 dehydration from diarrhea, poor PO intake; 

possibly 2/2 diabetes insipidus 





s/p Hyperkalemia





s/p Diarrhea


- BM this AM was formed stool


- C. diff / GI panel negative


- s/p Stool softeners


- c/w bulking agents 





Normocytic anemia


- Hg trended down


- No evidence of bleeding


- Will follow trend





s/p Leukocytosis


- ROS is negative


- Hemodynamically stable


- Fever of 100.6 noted this afternoon 


- PCT 0.39


- Blood cultures 8/9: No growth at 48 hours


- UA negative; Urine culture 8/11: Negative 


- Will continue to hold off on antibiotics at this time 





Non-sustained V tach - likely 2/2 electrolyte abnormalities


- EKG reviewed


- c/w Telemetry 


- c/w metoprolol 





Chronic A flutter


- c/w rate control with metoprolol; dose reduced 


- c/w full anticoagulation with eliquis





Hx of HFrEF


- No evidence of fluid overload


- ECHO 7/2021, LVEF 30-35%, moderate-severe hypokinesis of LV


- Will avoid nephrotoxic medications





Suspected cyanotic LEs - likely 2/2 Chronic PAD


- Patient was evaluated by vascular surgery on admission; Dr. Haji; 

appreciate their input 


- c/w ASA, Eliquis





CAD


- c/w ASA, Atorvastatin 





Pancreatic insufficiency


- Hx of resected pancreatic mass


- c/w supplementation 





RLS


- c/w Ropinirole 





GERD


- c/w Omeprazole 





DVT prophylaxis


- c/w Eliquis 





Disposition:


- Awaiting clinical improvement


- Anticipate DC home in 24-48 hours





VS,Carmenza, I+O


VSCarmenza, I+O


Laboratory Tests


8/13/21 07:02











Vital Signs








  Date Time  Temp Pulse Resp B/P (MAP) Pulse Ox O2 Delivery O2 Flow Rate FiO2


 


8/13/21 12:00 100.6 67 22 131/62 (85) 96 Room Air  


 


8/13/21 04:00       2.0 














I&O- Last 24 Hours up to 6 AM 


 


 8/13/21





 06:00


 


Intake Total 1065 ml


 


Balance 1065 ml

















SUNNY BOWENS MD                Aug 13, 2021 14:06

## 2021-08-14 ENCOUNTER — HOSPITAL ENCOUNTER (INPATIENT)
Dept: HOSPITAL 53 - M ED | Age: 66
LOS: 7 days | Discharge: HOME HEALTH | DRG: 391 | End: 2021-08-21
Attending: INTERNAL MEDICINE | Admitting: INTERNAL MEDICINE
Payer: MEDICARE

## 2021-08-14 VITALS — DIASTOLIC BLOOD PRESSURE: 79 MMHG | SYSTOLIC BLOOD PRESSURE: 164 MMHG

## 2021-08-14 VITALS — DIASTOLIC BLOOD PRESSURE: 66 MMHG | SYSTOLIC BLOOD PRESSURE: 136 MMHG

## 2021-08-14 VITALS — HEIGHT: 78 IN | BODY MASS INDEX: 12.09 KG/M2 | WEIGHT: 104.5 LBS

## 2021-08-14 DIAGNOSIS — Z79.899: ICD-10-CM

## 2021-08-14 DIAGNOSIS — J18.9: ICD-10-CM

## 2021-08-14 DIAGNOSIS — I25.10: ICD-10-CM

## 2021-08-14 DIAGNOSIS — J44.0: ICD-10-CM

## 2021-08-14 DIAGNOSIS — E87.0: ICD-10-CM

## 2021-08-14 DIAGNOSIS — K52.9: Primary | ICD-10-CM

## 2021-08-14 DIAGNOSIS — K21.00: ICD-10-CM

## 2021-08-14 DIAGNOSIS — Z20.822: ICD-10-CM

## 2021-08-14 DIAGNOSIS — Z79.01: ICD-10-CM

## 2021-08-14 DIAGNOSIS — I48.0: ICD-10-CM

## 2021-08-14 DIAGNOSIS — N17.9: ICD-10-CM

## 2021-08-14 DIAGNOSIS — E87.2: ICD-10-CM

## 2021-08-14 DIAGNOSIS — D64.9: ICD-10-CM

## 2021-08-14 DIAGNOSIS — M54.5: ICD-10-CM

## 2021-08-14 DIAGNOSIS — I11.0: ICD-10-CM

## 2021-08-14 DIAGNOSIS — G25.81: ICD-10-CM

## 2021-08-14 DIAGNOSIS — Z88.8: ICD-10-CM

## 2021-08-14 DIAGNOSIS — E83.42: ICD-10-CM

## 2021-08-14 DIAGNOSIS — Z79.82: ICD-10-CM

## 2021-08-14 DIAGNOSIS — K86.89: ICD-10-CM

## 2021-08-14 DIAGNOSIS — Z88.0: ICD-10-CM

## 2021-08-14 DIAGNOSIS — I73.9: ICD-10-CM

## 2021-08-14 DIAGNOSIS — I50.32: ICD-10-CM

## 2021-08-14 DIAGNOSIS — E03.9: ICD-10-CM

## 2021-08-14 LAB
ALBUMIN SERPL BCG-MCNC: 2.5 GM/DL (ref 3.2–5.2)
ALT SERPL W P-5'-P-CCNC: 39 U/L (ref 12–78)
BASOPHILS # BLD AUTO: 0 10^3/UL (ref 0–0.2)
BASOPHILS # BLD AUTO: 0 10^3/UL (ref 0–0.2)
BASOPHILS NFR BLD AUTO: 0.1 % (ref 0–1)
BASOPHILS NFR BLD AUTO: 0.3 % (ref 0–1)
BILIRUB CONJ SERPL-MCNC: 0.1 MG/DL (ref 0–0.2)
BILIRUB SERPL-MCNC: 0.3 MG/DL (ref 0.2–1)
BUN SERPL-MCNC: 12 MG/DL (ref 7–18)
BUN SERPL-MCNC: 13 MG/DL (ref 7–18)
CALCIUM SERPL-MCNC: 7.2 MG/DL (ref 8.8–10.2)
CALCIUM SERPL-MCNC: 7.3 MG/DL (ref 8.8–10.2)
CHLORIDE SERPL-SCNC: 117 MEQ/L (ref 98–107)
CHLORIDE SERPL-SCNC: 118 MEQ/L (ref 98–107)
CK MB CFR.DF SERPL CALC: 7.67
CK MB SERPL-MCNC: 5.6 NG/ML (ref ?–3.6)
CK SERPL-CCNC: 73 U/L (ref 39–308)
CO2 SERPL-SCNC: 19 MEQ/L (ref 21–32)
CO2 SERPL-SCNC: 20 MEQ/L (ref 21–32)
CREAT SERPL-MCNC: 0.82 MG/DL (ref 0.7–1.3)
CREAT SERPL-MCNC: 1 MG/DL (ref 0.7–1.3)
EOSINOPHIL # BLD AUTO: 0.5 10^3/UL (ref 0–0.5)
EOSINOPHIL # BLD AUTO: 0.5 10^3/UL (ref 0–0.5)
EOSINOPHIL NFR BLD AUTO: 6.4 % (ref 0–3)
EOSINOPHIL NFR BLD AUTO: 7.5 % (ref 0–3)
GFR SERPL CREATININE-BSD FRML MDRD: > 60 ML/MIN/{1.73_M2} (ref 49–?)
GFR SERPL CREATININE-BSD FRML MDRD: > 60 ML/MIN/{1.73_M2} (ref 49–?)
GLUCOSE SERPL-MCNC: 62 MG/DL (ref 70–100)
GLUCOSE SERPL-MCNC: 76 MG/DL (ref 70–100)
HCT VFR BLD AUTO: 33.9 % (ref 42–52)
HCT VFR BLD AUTO: 40.4 % (ref 42–52)
HGB BLD-MCNC: 11.7 G/DL (ref 13.5–17.5)
HGB BLD-MCNC: 9.9 G/DL (ref 13.5–17.5)
LYMPHOCYTES # BLD AUTO: 0.8 10^3/UL (ref 1.5–5)
LYMPHOCYTES # BLD AUTO: 0.9 10^3/UL (ref 1.5–5)
LYMPHOCYTES NFR BLD AUTO: 11.5 % (ref 24–44)
LYMPHOCYTES NFR BLD AUTO: 11.7 % (ref 24–44)
MAGNESIUM SERPL-MCNC: 1.6 MG/DL (ref 1.8–2.4)
MAGNESIUM SERPL-MCNC: 2 MG/DL (ref 1.8–2.4)
MCH RBC QN AUTO: 23.2 PG (ref 27–33)
MCH RBC QN AUTO: 23.3 PG (ref 27–33)
MCHC RBC AUTO-ENTMCNC: 29 G/DL (ref 32–36.5)
MCHC RBC AUTO-ENTMCNC: 29.2 G/DL (ref 32–36.5)
MCV RBC AUTO: 79.8 FL (ref 80–96)
MCV RBC AUTO: 80.2 FL (ref 80–96)
MONOCYTES # BLD AUTO: 0.6 10^3/UL (ref 0–0.8)
MONOCYTES # BLD AUTO: 0.6 10^3/UL (ref 0–0.8)
MONOCYTES NFR BLD AUTO: 7.9 % (ref 2–8)
MONOCYTES NFR BLD AUTO: 8.4 % (ref 2–8)
NEUTROPHILS # BLD AUTO: 5 10^3/UL (ref 1.5–8.5)
NEUTROPHILS # BLD AUTO: 5.5 10^3/UL (ref 1.5–8.5)
NEUTROPHILS NFR BLD AUTO: 72.2 % (ref 36–66)
NEUTROPHILS NFR BLD AUTO: 73 % (ref 36–66)
NT-PRO BNP: 7991 PG/ML (ref ?–125)
PHOSPHATE SERPL-MCNC: 2.9 MG/DL (ref 2.5–4.9)
PLATELET # BLD AUTO: 134 10^3/UL (ref 150–450)
PLATELET # BLD AUTO: 154 10^3/UL (ref 150–450)
POTASSIUM SERPL-SCNC: 4.4 MEQ/L (ref 3.5–5.1)
POTASSIUM SERPL-SCNC: 4.6 MEQ/L (ref 3.5–5.1)
PROT SERPL-MCNC: 6 GM/DL (ref 6.4–8.2)
RBC # BLD AUTO: 4.25 10^6/UL (ref 4.3–6.1)
RBC # BLD AUTO: 5.04 10^6/UL (ref 4.3–6.1)
RSV RNA NPH QL NAA+PROBE: NEGATIVE
SODIUM SERPL-SCNC: 143 MEQ/L (ref 136–145)
SODIUM SERPL-SCNC: 147 MEQ/L (ref 136–145)
TROPONIN I SERPL-MCNC: 0.02 NG/ML (ref ?–0.1)
WBC # BLD AUTO: 6.9 10^3/UL (ref 4–10)
WBC # BLD AUTO: 7.5 10^3/UL (ref 4–10)

## 2021-08-14 RX ADMIN — ASPIRIN SCH MG: 81 TABLET ORAL at 08:47

## 2021-08-14 RX ADMIN — ROPINIROLE HYDROCHLORIDE SCH MG: 0.25 TABLET, FILM COATED ORAL at 08:47

## 2021-08-14 RX ADMIN — METOPROLOL TARTRATE SCH MG: 25 TABLET, FILM COATED ORAL at 08:48

## 2021-08-14 RX ADMIN — MAGNESIUM SULFATE IN DEXTROSE SCH MLS/HR: 10 INJECTION, SOLUTION INTRAVENOUS at 08:49

## 2021-08-14 RX ADMIN — IPRATROPIUM BROMIDE AND ALBUTEROL SCH PUFF: 20; 100 SPRAY, METERED RESPIRATORY (INHALATION) at 19:23

## 2021-08-14 RX ADMIN — ALBUTEROL SULFATE PRN MG: 2.5 SOLUTION RESPIRATORY (INHALATION) at 00:47

## 2021-08-14 RX ADMIN — IPRATROPIUM BROMIDE AND ALBUTEROL SCH PUFF: 20; 100 SPRAY, METERED RESPIRATORY (INHALATION) at 19:58

## 2021-08-14 RX ADMIN — ALBUTEROL SULFATE PRN MG: 2.5 SOLUTION RESPIRATORY (INHALATION) at 10:56

## 2021-08-14 RX ADMIN — IPRATROPIUM BROMIDE SCH PUFF: 17 AEROSOL, METERED RESPIRATORY (INHALATION) at 07:43

## 2021-08-14 RX ADMIN — LOPERAMIDE HYDROCHLORIDE PRN MG: 2 TABLET, FILM COATED ORAL at 08:48

## 2021-08-14 RX ADMIN — MAGNESIUM SULFATE IN DEXTROSE SCH MLS/HR: 10 INJECTION, SOLUTION INTRAVENOUS at 09:00

## 2021-08-14 RX ADMIN — APIXABAN SCH MG: 2.5 TABLET, FILM COATED ORAL at 08:48

## 2021-08-14 RX ADMIN — PROBIOTIC PRODUCT - TAB SCH EA: TAB at 08:47

## 2021-08-14 RX ADMIN — SODIUM BICARBONATE SCH MG: 325 TABLET ORAL at 08:48

## 2021-08-14 RX ADMIN — OMEPRAZOLE SCH MG: 20 CAPSULE, DELAYED RELEASE ORAL at 08:48

## 2021-08-14 RX ADMIN — PANCRELIPASE SCH EA: 24000; 76000; 120000 CAPSULE, DELAYED RELEASE PELLETS ORAL at 08:47

## 2021-08-14 RX ADMIN — IPRATROPIUM BROMIDE AND ALBUTEROL SCH PUFF: 20; 100 SPRAY, METERED RESPIRATORY (INHALATION) at 18:48

## 2021-08-14 RX ADMIN — PSYLLIUM HUSK SCH PKT: 3.4 POWDER ORAL at 08:47

## 2021-08-14 NOTE — REPVR
PROCEDURE INFORMATION: 

Exam: XR Chest 

Exam date and time: 8/14/2021 6:49 PM 

Age: 65 years old 

Clinical indication: Cough and dyspnea; Additional info: Dyspnea/cough 



TECHNIQUE: 

Imaging protocol: XR of the chest. 

Views: 1 view. 



COMPARISON: 

CR PORTABLE CHEST X-RAY 8/9/2021 7:23 PM 



FINDINGS: 

Tubes, catheters and devices: Sternal wires. 



Lungs: 4 mm nodule in the right lung base unchanged. Small opacification in the 

right lung base which may represent infiltrates/atelectasis. 

Pleural spaces: Unremarkable. No pleural effusion. No pneumothorax. 

Heart/Mediastinum: Unremarkable. No cardiomegaly. 

Bones/joints: Unremarkable. 



IMPRESSION: 

Small opacification at the right lung base likely representing infiltrates.

Nodule at the right lung base measuring 4 mm unchanged.  



Electronically signed by: Aman Powell On 08/14/2021  19:02:28 PM

## 2021-08-14 NOTE — IPN
NEPHROLOGY PROGRESS NOTE



DATE:  08/13/2021



SUBJECTIVE: The patient was seen and examined at the bedside today morning. He

continues to be on IV fluid hydration. His electrolytes are significantly

getting better. Acidosis is also resolving. However I was told that the patient

continues to have diarrhea. 



OBJECTIVE: 

VITAL SIGNS: Temperature is 99.4 degrees Fahrenheit, blood pressure 141/66,

pulse is 72, respiratory rate of 20, saturating 93% on room air. 

INTAKE AND OUTPUT: Urine output recorded is 350 mL yesterday. He is having some

incontinent voids as well. 

Weight in the bed scale is 58.1 kg.



PHYSICAL EXAMINATION: 

GENERAL APPEARANCE: The patient is awake, alert, oriented x2, weak and

cachectic, laying in bed.

HEAD AND NECK:  Extraocular muscles intact. Pupils are equally round and

reactive to light. Mucous membranes are moist. Neck is supple. There is no

jugular venous distention. 

CARDIOVASCULAR: S1, S2, midline sternotomy scar is noted. 

EXTREMITIES:  No edema of the lower extremities. 

RESPIRATORY: Chest is clear to auscultation bilaterally.  

ABDOMEN: Soft, positive bowel sounds, nontender. 

MUSCULOSKELETAL: No clubbing, no cyanosis.  

CNS: The patient follows commands and moves extremities.  



LAB REVIEW: CBC showed a WBC of 7.7, hemoglobin 9.8, platelet count 127.



BMP showed sodium of 145, potassium 4.2, chloride 117, bicarbonate is 21. BUN

18, creatinine is 0.8, calcium 6.7, phosphorous is 2.1, magnesium 1.7.



CURRENT INPATIENT MEDICATIONS: The patient's medications were all reviewed by

myself. I have decreased the IV fluid rate to 60 mL an hour. The patient is

going to get magnesium sulfate today. He was also given a dose of calcium

gluconate by myself and I have stopped his oral Neutra-Phos and given the

patient dose of sodium phos 20 millimole IV times one dose. 





ASSESSMENT AND PLAN: 

1.  Acute renal failure  it was secondary to dehydration. Renal function is

    improving with improvement in the volume status. Continue gentle IV fluid

    hydration. IV fluids are being slowly weaned off.



2.  Hypochloremic metabolic acidosis  it is slowly getting better. This patient

    is on oral bicarbonate. Continue the bicarbonate at this time.



3.  Hypophosphatemia  stopping the oral Neutra-Phos of diarrhea. The patient

    will be given IV sodium phos as mentioned above.



4.  Hypomagnesemia - The patient was given IV magnesium. 



5.  Hypocalcemia - The patient was given IV calcium gluconate.

## 2021-08-14 NOTE — DS.PDOC
Discharge Summary


General


Date of Admission


Aug 10, 2021 at 02:33


Date of Discharge


8/14/2021





Discharge Summary


PROCEDURES PERFORMED DURING STAY: 


[None].





ADMITTING DIAGNOSES / DISCHARGE DIAGNOSES:


Acute renal failure 2/2 dehydration from poor PO intake, diarrhea


NAG metabolic acidosis 


s/p Hypernatremia - likely 2/2 dehydration from diarrhea, poor PO intake; 

possibly 2/2 diabetes insipidus 


s/p Hyperkalemia


Diarrhea


Normocytic anemia


s/p Leukocytosis


Non-sustained V tach - likely 2/2 electrolyte abnormalities


Chronic A flutter


Hx of HFrEF


Suspected cyanotic LEs - likely 2/2 Chronic PAD


CAD


Pancreatic insufficiency


RLS


GERD


DVT prophylaxis





COMPLICATIONS/CHIEF COMPLAINT: 


Acute Renal Failure.





HISTORY OF PRESENT ILLNESS: 


Patient is a 65 year old  male with a PMHx of COPD, HFrEF, A. flutter 

(on Eliquis), CAD, HTN, CVA, Hypothyroidism, Pancreatic insufficiency (2/2 

pancreatic mass resection), Chronic low back pain, Polysubstance abuse, who 

presented to the ER with weakness, decreased oral intake and confusion. 





Patient was recently admitted for bacterial pneumonia, decompensated diastolic 

CHF, atrial flutter with RVR and IBAN. Patient was ultimately discharged on 

8/4/21.





Patient reported significant watery diarrhea over the last 2-3 days. Upon 

arrival to ER, patient was found to have hypernatremia and acute kidney injury. 

She was admitted to the hospital service for further evaluation and treatment. 

Nephrology was called on consultation.





Patient was seen and examined at the bedside. Reports that he wants to leave 

today, despite receiving IV fluids and continued monitoring. Patient was advised

 that his medical condition can worsen, can become disability and/or possible 

death. Patient has verbalized understanding of risks and has signed out AMA. 





HOSPITAL COURSE: 


Acute renal failure 2/2 dehydration from poor PO intake, diarrhea


- Cr has returned to baseline 


- Has refused Luque catheter 


- Advised to continue to avoid nephrotoxic medications (ie: NSAIDs)


- c/w IV fluids as per nephrology


- Patient was advised to remain inpatient however has decided to sign out AMA; 

advised risks and benefits - signed AMA paperwork 


- Nephrology on consultation; we appreciate their input





NAG metabolic acidosis 


- Bicarbonate improving 


- c/w Citric acid / Sodium citrate  


- Will have outpatient follow up with Nephrology 


- Patient was advised to remain inpatient however has decided to sign out AMA; 

advised risks and benefits - signed AMA paperwork 





s/p Hypernatremia - likely 2/2 dehydration from diarrhea, poor PO intake; 

possibly 2/2 diabetes insipidus 





s/p Hyperkalemia





Diarrhea


- Continues to experience loose stools 


- C. diff / GI panel negative


- s/p Stool softeners


- c/w bulking agents 


- Patient was advised to remain inpatient however has decided to sign out AMA; 

advised risks and benefits - signed AMA paperwork 





Normocytic anemia


- Hg trended down


- No evidence of bleeding


- Will follow trend





s/p Leukocytosis


- ROS is negative


- Hemodynamically stable


- Single episode of fever; no further fevers noted 


- PCT 0.39


- Blood cultures 8/9: No growth at 728 hours


- UA negative; Urine culture 8/11: Negative 


- Will continue to hold off on antibiotics at this time 


- Patient was advised to remain inpatient however has decided to sign out AMA; 

advised risks and benefits - signed AMA paperwork 


- Will have follow up with PCP upon discharge 





Non-sustained V tach - likely 2/2 electrolyte abnormalities


- EKG reviewed


- c/w Telemetry 


- c/w metoprolol 





Chronic A flutter


- c/w rate control with metoprolol; dose has been increased back to baseline 


- c/w full anticoagulation with eliquis





Hx of HFrEF


- No evidence of fluid overload


- ECHO 7/2021, LVEF 30-35%, moderate-severe hypokinesis of LV


- Will avoid nephrotoxic medications





Suspected cyanotic LEs - likely 2/2 Chronic PAD


- Patient was evaluated by vascular surgery on admission; Dr. Haji; 

appreciate their input 


- c/w ASA, Eliquis





CAD


- c/w ASA, Atorvastatin 





Pancreatic insufficiency


- Hx of resected pancreatic mass


- c/w Creon 





RLS


- c/w Ropinirole 





GERD


- c/w Omeprazole 





DVT prophylaxis


- c/w Eliquis 





DISCHARGE MEDICATIONS: 


Please see below.


 


ALLERGIES: 


Please see below.





PHYSICAL EXAMINATION ON DISCHARGE:


Vitals (See below)


General: Lying in bed watching television, appears comfortable, Awake/Alert, 

orientedx3


HEENT: NC, AT


CVS: +S1S2


Lungs: Fair air entry b/l, no wheezing / rhonchi / rales 


Abdomen: Remains soft, without distension / tenderness 


Extremities: No evidence of edema, - Calf tenderness





LABORATORY DATA: 


Please see below.





IMAGING: 


CT abdomen / pelvis w/o contrast 8/9:


1. Constipation. 


2. No bowel obstruction or acute findings. 





CXR 8/10:


No acute cardiopulmonary disease.  Chronic changes as above.





ACTIVITY: 


[As tolerated].





DISCHARGE PLAN: 


Follow up with PCP and Nephrology within 7 days


Remain compliant with treatment plan and medications


Return to the ER if you experience any problems





DISPOSITION:


Against medical advice 





DISCHARGE CONDITION: 


[Stable].





TIME SPENT ON DISCHARGE: 


35 minutes.





Vital Signs/I&Os





Vital Signs








  Date Time  Temp Pulse Resp B/P (MAP) Pulse Ox O2 Delivery O2 Flow Rate FiO2


 


8/14/21 08:00 97.3 82 21 164/79 (107) 97 Room Air  


 


8/13/21 04:00       2.0 














I&O- Last 24 Hours up to 6 AM 


 


 8/14/21





 06:00


 


Intake Total 2690 ml


 


Balance 2690 ml











Laboratory Data


Labs 24H


Laboratory Tests 2


8/14/21 04:29: 


Immature Granulocyte % (Auto) 0.4, Neutrophils (%) (Auto) 73.0H, Lymphocytes (%)

 (Auto) 11.5L, Monocytes (%) (Auto) 8.4H, Eosinophils (%) (Auto) 6.4H, Basophils

 (%) (Auto) 0.3, Neutrophils # (Auto) 5.5, Lymphocytes # (Auto) 0.9L, Monocytes 

# (Auto) 0.6, Eosinophils # (Auto) 0.5, Basophils # (Auto) 0.0, Nucleated Red 

Blood Cells % (auto) 0.3H, Anion Gap 7L, Glomerular Filtration Rate > 60.0, 

Calcium Level 7.2L, Phosphorus Level 2.9#, Magnesium Level 1.6L


CBC/BMP


Laboratory Tests


8/14/21 04:29











Microbiology





Microbiology


8/12/21 Gastrointestinal Tract Panel (PCR) - Final, Complete


          


8/11/21 Urine Culture - Final, Complete


          


8/9/21 Blood Culture - Preliminary, Resulted


         No Growth after 72 hours. All specime...


8/9/21 Blood Culture - Preliminary, Resulted


         No Growth after 72 hours. All specime...





Discharge Medications


Scheduled


Apixaban (Eliquis) 5 Mg Tablet, 5 MG PO BID, (Reported)


Aspirin (Aspirin EC) 81 Mg Tablet.dr, 81 MG PO DAILY, (Reported)


Atorvastatin Calcium (Atorvastatin Calcium) 40 Mg Tablet, 40 MG PO QHS, 

(Reported)


Ferrous Sulfate (Ferrous Sulfate) 325 Mg Tablet, 325 MG PO DAILY, (Reported)


Ipratropium Bromide (Atrovent Hfa) 12.9 Gm Hfa.aer.ad, 2 PUFF INH QID, 

(Reported)


L.acidoph/L.bulg/B.bif/S.therm (Bacid Caplet) 1 Each Tablet, 1 TAB PO WM, 

(Reported)


Metoprolol Tartrate (Metoprolol Tartrate) 50 Mg Tablet, 50 MG PO BID, (Reported)


Omeprazole (Omeprazole) 20 Mg Capsule.dr, 20 MG PO DAILY, (Reported)


Pancreatic Enzymes (Creon Dr 24,000 Units Capsule) 1 Each Capsule.dr, 2 CAP PO 

WM, (Reported)


Prednisone (Prednisone) 10 Mg Tablet, 10 MG PO TAPER, (Reported)


   40MG FOR 3 DAYS, 30MG FOR 3 DAYS, 20MG FOR 3 DAYS, 10MG FOR 3 DAYS 


Psyllium Husk/Aspartame (Metamucil Fiber Singles Packet) 3.4 Gm Powd.pack, 1 PKT

 PO BID


Ropinirole HCl (Ropinirole HCl) 0.5 Mg Tablet, 0.5 MG PO BID, (Reported)


Sodium Bicarbonate (Sodium Bicarbonate) 325 Mg Tablet, 650 MG PO BID





Scheduled PRN


Albuterol Sulf (Albuterol Sulfate) 2.5 Mg/3 Ml Vial.neb, 2.5 MG INH Q6H PRN for 

SHORTNESS OF BREATH, (Reported)


Albuterol Sulfate (Albuterol Sulfate Hfa) 8.5 Gm Hfa.aer.ad, 2 PUFFS INH Q4H PRN

 for SHORTNESS OF BREATH, (Reported)


Diphenoxylate HCl/Atropine (Diphenoxylate-Atrop 2.5-0.025) 1 Each Tablet, 1 TAB 

PO QID PRN for DIARRHEA, (Reported)


Oxycodone HCl/Acetaminophen (Oxycodon-Acetaminophen 2.5-325) 1 Each Tablet, 1 

TAB PO Q8HP PRN for pain





Miscellaneous Medications


[Patient Comment]  , (Reported)


   PATIENT UNABLE TO ANSWER. MED REC COMPLETED VIA EXTERNAL MED HISTORY, 

DISCHARGE PAPERWORK FROM 8/4/21 AMD CALL TO PHARMACY. 





Allergies


Coded Allergies:  


     amoxicillin (Verified  Allergy, Unknown, rash, 9/30/19)


     clavulanic acid (Verified  Allergy, Unknown, rash, 9/30/19)


     pregabalin (Verified  Adverse Reaction, Unknown, seizures, 9/30/19)


     warfarin (Verified  Adverse Reaction, Unknown, bleeding, 9/30/19)











SUNNY BOWENS MD                Aug 14, 2021 11:23

## 2021-08-15 VITALS — OXYGEN SATURATION: 100 %

## 2021-08-15 VITALS — SYSTOLIC BLOOD PRESSURE: 146 MMHG | DIASTOLIC BLOOD PRESSURE: 73 MMHG

## 2021-08-15 VITALS — OXYGEN SATURATION: 100 % | SYSTOLIC BLOOD PRESSURE: 116 MMHG | DIASTOLIC BLOOD PRESSURE: 58 MMHG

## 2021-08-15 VITALS — DIASTOLIC BLOOD PRESSURE: 67 MMHG | SYSTOLIC BLOOD PRESSURE: 145 MMHG

## 2021-08-15 VITALS — DIASTOLIC BLOOD PRESSURE: 77 MMHG | SYSTOLIC BLOOD PRESSURE: 162 MMHG

## 2021-08-15 VITALS — SYSTOLIC BLOOD PRESSURE: 129 MMHG | DIASTOLIC BLOOD PRESSURE: 62 MMHG

## 2021-08-15 VITALS — DIASTOLIC BLOOD PRESSURE: 58 MMHG | SYSTOLIC BLOOD PRESSURE: 120 MMHG

## 2021-08-15 VITALS — OXYGEN SATURATION: 99 %

## 2021-08-15 LAB
BASOPHILS # BLD AUTO: 0 10^3/UL (ref 0–0.2)
BASOPHILS NFR BLD AUTO: 0.3 % (ref 0–1)
BUN SERPL-MCNC: 11 MG/DL (ref 7–18)
CALCIUM SERPL-MCNC: 7.3 MG/DL (ref 8.8–10.2)
CHLORIDE SERPL-SCNC: 120 MEQ/L (ref 98–107)
CO2 SERPL-SCNC: 17 MEQ/L (ref 21–32)
CREAT SERPL-MCNC: 0.95 MG/DL (ref 0.7–1.3)
EOSINOPHIL # BLD AUTO: 0.5 10^3/UL (ref 0–0.5)
EOSINOPHIL NFR BLD AUTO: 8.2 % (ref 0–3)
EST. AVERAGE GLUCOSE BLD GHB EST-MCNC: 123 MG/DL (ref 60–110)
GFR SERPL CREATININE-BSD FRML MDRD: > 60 ML/MIN/{1.73_M2} (ref 49–?)
GLUCOSE SERPL-MCNC: 66 MG/DL (ref 70–100)
HCT VFR BLD AUTO: 37.4 % (ref 42–52)
HGB BLD-MCNC: 10.6 G/DL (ref 13.5–17.5)
LYMPHOCYTES # BLD AUTO: 0.7 10^3/UL (ref 1.5–5)
LYMPHOCYTES NFR BLD AUTO: 10.8 % (ref 24–44)
MCH RBC QN AUTO: 23.3 PG (ref 27–33)
MCHC RBC AUTO-ENTMCNC: 28.3 G/DL (ref 32–36.5)
MCV RBC AUTO: 82.2 FL (ref 80–96)
MONOCYTES # BLD AUTO: 0.3 10^3/UL (ref 0–0.8)
MONOCYTES NFR BLD AUTO: 5.4 % (ref 2–8)
NEUTROPHILS # BLD AUTO: 4.7 10^3/UL (ref 1.5–8.5)
NEUTROPHILS NFR BLD AUTO: 74.7 % (ref 36–66)
PLATELET # BLD AUTO: 149 10^3/UL (ref 150–450)
POTASSIUM SERPL-SCNC: 4.4 MEQ/L (ref 3.5–5.1)
RBC # BLD AUTO: 4.55 10^6/UL (ref 4.3–6.1)
SODIUM SERPL-SCNC: 147 MEQ/L (ref 136–145)
WBC # BLD AUTO: 6.3 10^3/UL (ref 4–10)

## 2021-08-15 RX ADMIN — INSULIN LISPRO SCH UNITS: 100 INJECTION, SOLUTION INTRAVENOUS; SUBCUTANEOUS at 17:18

## 2021-08-15 RX ADMIN — IPRATROPIUM BROMIDE AND ALBUTEROL SULFATE PRN ML: .5; 3 SOLUTION RESPIRATORY (INHALATION) at 21:11

## 2021-08-15 RX ADMIN — ASPIRIN SCH MG: 81 TABLET ORAL at 10:24

## 2021-08-15 RX ADMIN — INSULIN LISPRO SCH UNITS: 100 INJECTION, SOLUTION INTRAVENOUS; SUBCUTANEOUS at 21:00

## 2021-08-15 RX ADMIN — PANCRELIPASE SCH EA: 24000; 76000; 120000 CAPSULE, DELAYED RELEASE PELLETS ORAL at 10:23

## 2021-08-15 RX ADMIN — ATORVASTATIN CALCIUM SCH MG: 20 TABLET, FILM COATED ORAL at 02:39

## 2021-08-15 RX ADMIN — IPRATROPIUM BROMIDE AND ALBUTEROL SULFATE PRN ML: .5; 3 SOLUTION RESPIRATORY (INHALATION) at 07:34

## 2021-08-15 RX ADMIN — METOPROLOL TARTRATE SCH MG: 25 TABLET, FILM COATED ORAL at 02:39

## 2021-08-15 RX ADMIN — IPRATROPIUM BROMIDE SCH PUFF: 17 AEROSOL, METERED RESPIRATORY (INHALATION) at 20:00

## 2021-08-15 RX ADMIN — LEVOFLOXACIN SCH MLS/HR: 5 INJECTION, SOLUTION INTRAVENOUS at 02:25

## 2021-08-15 RX ADMIN — SODIUM BICARBONATE SCH MG: 325 TABLET ORAL at 21:29

## 2021-08-15 RX ADMIN — IPRATROPIUM BROMIDE SCH PUFF: 17 AEROSOL, METERED RESPIRATORY (INHALATION) at 12:00

## 2021-08-15 RX ADMIN — ATORVASTATIN CALCIUM SCH MG: 20 TABLET, FILM COATED ORAL at 21:29

## 2021-08-15 RX ADMIN — IPRATROPIUM BROMIDE AND ALBUTEROL SULFATE PRN ML: .5; 3 SOLUTION RESPIRATORY (INHALATION) at 02:06

## 2021-08-15 RX ADMIN — PSYLLIUM HUSK SCH PKT: 3.4 POWDER ORAL at 10:25

## 2021-08-15 RX ADMIN — IPRATROPIUM BROMIDE AND ALBUTEROL SULFATE PRN ML: .5; 3 SOLUTION RESPIRATORY (INHALATION) at 14:47

## 2021-08-15 RX ADMIN — IPRATROPIUM BROMIDE SCH PUFF: 17 AEROSOL, METERED RESPIRATORY (INHALATION) at 07:36

## 2021-08-15 RX ADMIN — INSULIN LISPRO SCH UNITS: 100 INJECTION, SOLUTION INTRAVENOUS; SUBCUTANEOUS at 12:00

## 2021-08-15 RX ADMIN — METOPROLOL TARTRATE SCH MG: 25 TABLET, FILM COATED ORAL at 21:29

## 2021-08-15 RX ADMIN — FERROUS SULFATE TAB 325 MG (65 MG ELEMENTAL FE) SCH MG: 325 (65 FE) TAB at 10:24

## 2021-08-15 RX ADMIN — PROBIOTIC PRODUCT - TAB SCH EA: TAB at 10:24

## 2021-08-15 RX ADMIN — PANCRELIPASE SCH EA: 24000; 76000; 120000 CAPSULE, DELAYED RELEASE PELLETS ORAL at 18:06

## 2021-08-15 RX ADMIN — APIXABAN SCH MG: 2.5 TABLET, FILM COATED ORAL at 21:29

## 2021-08-15 RX ADMIN — ROPINIROLE HYDROCHLORIDE SCH MG: 0.25 TABLET, FILM COATED ORAL at 10:24

## 2021-08-15 RX ADMIN — PSYLLIUM HUSK SCH PKT: 3.4 POWDER ORAL at 21:29

## 2021-08-15 RX ADMIN — SODIUM BICARBONATE SCH MG: 325 TABLET ORAL at 02:38

## 2021-08-15 RX ADMIN — APIXABAN SCH MG: 2.5 TABLET, FILM COATED ORAL at 10:24

## 2021-08-15 RX ADMIN — ROPINIROLE HYDROCHLORIDE SCH MG: 0.25 TABLET, FILM COATED ORAL at 21:28

## 2021-08-15 RX ADMIN — INSULIN LISPRO SCH UNITS: 100 INJECTION, SOLUTION INTRAVENOUS; SUBCUTANEOUS at 07:30

## 2021-08-15 RX ADMIN — SODIUM BICARBONATE SCH MG: 325 TABLET ORAL at 10:23

## 2021-08-15 RX ADMIN — PANCRELIPASE SCH EA: 24000; 76000; 120000 CAPSULE, DELAYED RELEASE PELLETS ORAL at 12:11

## 2021-08-15 RX ADMIN — OMEPRAZOLE SCH MG: 20 CAPSULE, DELAYED RELEASE ORAL at 10:24

## 2021-08-15 RX ADMIN — APIXABAN SCH MG: 2.5 TABLET, FILM COATED ORAL at 02:39

## 2021-08-15 RX ADMIN — ROPINIROLE HYDROCHLORIDE SCH MG: 0.25 TABLET, FILM COATED ORAL at 02:38

## 2021-08-15 RX ADMIN — PSYLLIUM HUSK SCH PKT: 3.4 POWDER ORAL at 02:39

## 2021-08-15 RX ADMIN — METOPROLOL TARTRATE SCH MG: 25 TABLET, FILM COATED ORAL at 10:25

## 2021-08-15 NOTE — ECGEPIP
[facility] - ED

                                       

                                       Test Date:    2021

Pat Name:     CHACORTA NOBLES             Department:   

Patient ID:   [id number]                 Room:         [place] [id number]

Gender:       Male                     Technician:   VIJAYA

:          1955               Requested By: Ashely Varner 

Order Number: [id number]     Reading MD:   Edd Contreras

                                 Measurements

Intervals                              Axis          

Rate:         82                       P:            57

NV:           144                      QRS:          46

QRSD:         88                       T:            102

QT:           388                                    

QTc:          453                                    

                           Interpretive Statements

Normal sinus rhythm

Nonspecific ST and T wave abnormality

SIMILAR TO 8/10/21

Electronically Signed on 8- 8:41:53 EDT by Edd Contreras

## 2021-08-15 NOTE — HPEPDOC
General


Date of Admission


8/15/2021


Date of Service:  Aug 15, 2021


Chief Complaint


 Diah/Cough.


Source:  Patient





History of Present Illness


Rodney Roberts is a 65-year-old male with past medical history of COPD, HFrEF, A. 

flutter (on Eliquis), CAD, HTN, CVA, Hypothyroidism, Pancreatic insufficiency 

(2/2 pancreatic mass resection), Chronic low back pain, Polysubstance abuse, who

presented to the ER with shortness of breath, weakness and diarrhea.  





Patient is a poor historian.  Per chart review, patient was recently admitted 

for bacterial pneumonia, decompensated diastolic CHF, atrial flutter with RVR 

and IBAN. Patient was ultimately discharged on 8/4/21. Patient reported 

significant watery diarrhea and was readmitted on 8/10/2021.  At that time, he 

was found to have hypernatremia and acute kidney injury.  He was admitted for 

further evaluation and nephrology consult.  


On 8/14/2021 patient left AMA as he reported "wanting to be home" only to return

after family friend encouraged him to come back to the hospital today.  





Patient vocalizes now increased diarrhea, weakness and shortness of breath since

leaving the hospital and reports that now he would like to be admitted for 

further work-up.  He also verbalizes interest in rehab if needed.  Upon arrival 

to the ED patient SPO2 78% on room air-improvement 100% on 2 L nasal cannula.





Home Medications


Scheduled


Apixaban (Eliquis) 2.5 Mg Tablet, 2.5 MG PO BID, (Reported)


Aspirin (Aspirin EC) 81 Mg Tablet.dr, 81 MG PO DAILY, (Reported)


Atorvastatin Calcium (Atorvastatin Calcium) 40 Mg Tablet, 40 MG PO QHS, 

(Reported)


Cholestyramine (Cholestyramine Packet) 4 Gm Powd.pack, 4 GM PO BID


Ferrous Sulfate (Ferrous Sulfate) 325 Mg Tablet, 325 MG PO DAILY, (Reported)


Ipratropium Bromide (Atrovent Hfa) 12.9 Gm Hfa.aer.ad, 2 PUFF INH QID, 

(Reported)


L.acidoph/L.bulg/B.bif/S.therm (Bacid Caplet) 1 Each Tablet, 1 TAB PO WM, 

(Reported)


Magnesium Oxide (Magnesium Oxide) 400 Mg Tablet, 800 MG PO BID


Metoprolol Tartrate (Metoprolol Tartrate) 25 Mg Tablet, 25 MG PO BID, (Reported)


Pancreatic Enzymes (Creon Dr 24,000 Units Capsule) 1 Each Capsule.dr, 2 CAP PO 

WM, (Reported)


Pantoprazole Sodium (Pantoprazole Sodium) 40 Mg Tablet.dr, 40 MG PO BID


Ropinirole HCl (Ropinirole HCl) 0.5 Mg Tablet, 0.5 MG PO BID, (Reported)


Sodium Bicarbonate (Sodium Bicarbonate) 325 Mg Tablet, 650 MG PO BID


Sucralfate (Sucralfate) 1 Gm Tablet, 1 GRAM PO QID





Scheduled PRN


Albuterol Sulf (Albuterol Sulfate) 2.5 Mg/3 Ml Vial.neb, 2.5 MG INH Q6H PRN for 

SHORTNESS OF BREATH, (Reported)


Albuterol Sulfate (Albuterol Sulfate Hfa) 8.5 Gm Hfa.aer.ad, 2 PUFFS INH Q4H PRN

for SHORTNESS OF BREATH, (Reported)


Diphenoxylate HCl/Atropine (Diphenoxylate-Atrop 2.5-0.025) 1 Each Tablet, 1 TAB 

PO QID PRN for DIARRHEA, (Reported)


Oxycodone HCl/Acetaminophen (Oxycodon-Acetaminophen 2.5-325) 1 Each Tablet, 1 

TAB PO Q8H PRN for PAIN LEVEL 6-10, (Reported)





Miscellaneous Medications


[Patient Comment]  , (Reported)


   MED REC COMPLETED VIA DISCHARGE 8/14 MORNING 





Allergies


Coded Allergies:  


     amoxicillin (Verified  Allergy, Unknown, rash, 9/30/19)


     clavulanic acid (Verified  Allergy, Unknown, rash, 9/30/19)


     pregabalin (Verified  Adverse Reaction, Unknown, seizures, 9/30/19)


     warfarin (Verified  Adverse Reaction, Unknown, bleeding, 9/30/19)





Past Medical History


Medical History


COPD, HFrEF, A. flutter (on Eliquis), CAD, HTN, CVA, Hypothyroidism, Pancreatic 

insufficiency, Chronic low back pain, Polysubstance abuse, anemia


Surgical History


Pancreatic insufficiency (2/2 pancreatic mass resection)





Family History


Significant Family History:  No pertinent family hx





Social History


* Smoker:  Denies


Alcohol:  Denies


Drugs:  denies


Recent Travel/Sick Contacts:  Denies: Recent travel, Recent sick contacts


Psychosocial History:  No pertinent psych hx


Patient reports he lives alone at home.  Gets around without assistive devices.





A-FIB/CHADSVASC


A-FIB History


Current/History of A-Fib/PAF?:  Yes


Current PO Anticoag Therapy:  Yes





Review of Systems


Constitutional:  Reports: Chills, Weakness, Fatigue; 


   Denies: Fever, Night Sweats


Eyes:  Denies: Pain, Vision change


ENT:  Denies: Head Aches, Ear Pain, Dysphagia


Skin:  Denies: Rash, Lesions, Breakdown


Pulmonary:  Reports: Dyspnea, Cough


Cardiovascular:  Denies: Chest Pain, Palpitations, Orthopnea, Paroxysmal Noc. 

Dyspnea, Lt Headedness


Gastrointestinal:  Reports: Diarrhea; 


   Denies: Nausea, Vomiting, Abdominal Pain


Genitourinary:  Denies: Dysuria, Frequency, Incontinence, Retention


Hematologic:  Denies: Bruising, Bleeding Excessively


Musculoskeletal:  Denies: Neck Pain, Back Pain, Joint Pain, Muscle Pain, Spasms


Neurological:  Reports: Weakness; 


   Denies: Numbness, Change in speech, Confusion


Psych:  Reports: Mood Normal, Memory Issues; 


   Denies: Depression





Physical Examination


General Exam:  Positive: Alert, No Acute Distress, Other (thin, frail appearance

)


Eye Exam:  Positive: PERRLA, Conjunctiva & lids normal, EOMI; 


   Negative: Sclera icteric


ENT Exam:  Positive: Atraumatic, Mucous membr. moist/pink, Pharynx Normal


Neck Exam:  Positive: Supple; 


   Negative: JVD, thyromegaly


Chest Exam:  Positive: Clear to auscultation, Normal air movement


Heart Exam:  Positive: Rate Normal, Normal S1, Normal S2, Other (extrasystole); 


   Negative: Murmurs, Rubs


Telemetry:  Positive: PVCs


Abdomen Exam:  Positive: Normal bowel sounds, Soft; 


   Negative: Tenderness, Hepatospenomegaly


Extremity Exam:  Positive: Normal pulses; 


   Negative: Clubbing, Cyanosis, Edema


Skin Exam:  Positive: Nl turgor and temperature; 


   Negative: Breakdown, Lesion


Neuro Exam:  Positive: Normal Gait, Normal Speech, Cranial Nerves 3-12 NL, 

Reflexes 2+


Psych Exam:  Positive: Mental status NL, Mood NL, Oriented x 3, Other (poor 

historian )





Vital Signs





Vital Signs








  Date Time  Temp Pulse Resp B/P (MAP) Pulse Ox O2 Delivery O2 Flow Rate FiO2


 


8/14/21 23:32  86 16 166/76 (106) 97 Nasal Cannula 2.0 


 


8/14/21 18:01 97.9       











Laboratory Data


Labs 24H


Laboratory Tests 2


8/14/21 18:24: 


Immature Granulocyte % (Auto) 0.6, Neutrophils (%) (Auto) 72.2H, Lymphocytes (%)

(Auto) 11.7L, Monocytes (%) (Auto) 7.9, Eosinophils (%) (Auto) 7.5H, Basophils 

(%) (Auto) 0.1, Neutrophils # (Auto) 5.0, Lymphocytes # (Auto) 0.8L, Monocytes #

(Auto) 0.6, Eosinophils # (Auto) 0.5, Basophils # (Auto) 0.0, Nucleated Red 

Blood Cells % (auto) 0.0, Lactic Acid Level 2.3*H


8/14/21 18:56: 


POC pH (Misc Panel) 7.302L, POC Base Excess (Misc Panel) -9.0L, POC Saturated 

Percent O2 (Misc) 99H, POC pO2 (Misc Panel) 131.0H, POC pCO2 (Misc Panel) 34.8L,

POC HCO3 (Misc Panel) 17.2L, POC Total CO2 (Misc Panel) 18.0L


8/14/21 19:06: 


Coronavirus (COVID-19)(PCR) NEGATIVE, Influenza Type A (RT-PCR) NEGATIVE, 

Influenza Type B (RT-PCR) NEGATIVE, Respiratory Syncytial Virus (PCR) NEGATIVE


8/14/21 21:39: 


Anion Gap 9, Glomerular Filtration Rate > 60.0, Lactic Acid Followup at 4 Hours 

3.6*H, Calcium Level 7.3L, Magnesium Level 2.0, Total Bilirubin 0.3, Direct 

Bilirubin 0.1, Aspartate Amino Transf (AST/SGOT) 26, Alanine Aminotransferase 

(ALT/SGPT) 39, Alkaline Phosphatase 157H, Total Creatine Kinase 73, Creatine 

Kinase MB 5.6H, Creatine Kinase MB Relative Index 7.67H, Troponin I 0.02, 

NT-Pro-B-Type Natriuretic Peptide 7991H, Total Protein 6.0L, Albumin 2.5L, 

Albumin/Globulin Ratio 0.7, Thyroid Stimulating Hormone (TSH) 8.270H


8/14/21 23:38: 


POC pH (Misc Panel) 7.276L, POC Base Excess (Misc Panel) -11.0L, POC Saturated 

Percent O2 (Misc) 98, POC pO2 (Misc Panel) 120.0H, POC pCO2 (Misc Panel) 32.9L, 

POC HCO3 (Misc Panel) 15.3L, POC Total CO2 (Misc Panel) 16.0L


CBC/BMP


Laboratory Tests


8/14/21 18:24








8/14/21 21:39








Microbiology





Microbiology


8/14/21 Blood Culture, Received


          Pending


8/14/21 Blood Culture, Received


          Pending





 Assessment/Plan


1. Acute respiratory failure with hypoxia 2/2 PNA: In pt with COPD. 


Patient was status post leukocytosis without bacteremia recently.  Chest x-ray 

this admission shows small right lower lobe infiltratethis is a change compared

to the previous chest x-ray done on the ninth.


Monitor pt, continuous pulse ox. 


Scheduled and PRN breathing treatments


Oxygen for saturation greater than 93%.


Empiric coverage for HCAP.  Check pro-Trae/lactic, consider de-escalation 

accordingly. 


A.m. lab


Wean O2 as able.





2. Diarrhea:


   Continue IV fluids gently.  GI panel sent. Continue bulking agents metamucil.

 Consider differentials.  Consider GI consult for possible colonoscopy.





3. NAG metabolic acidosis:


Sodium bicarb continued 650 mg p.o. twice daily per last nephrology note.  

Likely reconsult nephrology in a.m. for continued feedback since patient has 

readmitted





4. Mild Hypernatremia: Na 147. In setting of continued GI losses with diarrhea. 

Cr 1.00 at baseline. 





5. Normocytic anemia: hgb 11.7, improved. No evidence of bleeding.  Continue to 

trend.  





6. Non-sustained V tach: None during this admission.  Continue with telemetry 

and metoprolol.





7. Paroxysmal A. fib: Patient rate controlled.  Continue to monitor/telemetry.  

Continue metoprolol.  Patient is on Eliquis.  Chronic A flutter





8. Hx of HFrEF: No evidence of fluid overload. ECHO 7/2021, LVEF 30-35%, 

moderate-severe hypokinesis of LV. Avoid nephrotoxic medications and monitor 

fluid balance.





9. PAD: Patient was evaluated by vascular surgery on August 11 with 

recommendations to continue aspirin and Eliquis.





10. CAD: Continue aspirin and atorvastatin.





11. Pancreatic insufficiency: Hx of resected pancreatic mass-we will continue 

Creon.





12. RLS: Continue Ropinirole 





13. GERD: Continue omeprazole





14. Deconditioning in frail elder: Fall precaution.  Appreciate physical 

therapy. 





15. Sarcopenia





DVT: Eliquis


CODE STATUS: Full code


Disposition: Patient reports that he is agreeable to rehab if needed.





Plan / VTE


VTE Prophylaxis Ordered?:  Yes











SANTOS RAYA NP        Aug 15, 2021 00:32


RAFA BLAND MD                Aug 24, 2021 04:46

## 2021-08-15 NOTE — IPNPDOC
Text Note


Date of Service


The patient was seen on 8/15/21.





NOTE


Synopsis:


65 year old M with COPD, HFrEF, A. flutter (on Eliquis), CAD, HTN, CVA, 

Hypothyroidism, Pancreatic insufficiency (2/2 pancreatic mass resection), 

Chronic low back pain, polysubstance abuse, who originally presented to the ER 

with weakness, decreased oral intake and confusion, after discharge for 

bacterial pneumonia, decompensated diastolic CHF, atrial flutter with RVR and 

IBAN but represented reporting significant watery diarrhea and was found 

hypernatremic and acute kidney injury, acidosis and dehydration but signed out 

AMA before medical optimization, and now returned with the same, with mild 

hyperNa and acidosis and newly noted RLL likely infiltrate.





Subjective:


-No acute events, on fluids


-On 2L NC





Objective:


Vitals: see below


General: NAD


HEENT: NCAT, EOMI, MMM


CVS: RRR, no m/r/g


Lungs: CTAB without auscultated wheezing, crackles or rhonchi


Abdomen: Normoactive bowel sounds, soft, NTND


Extremities: WWP, no LE edema


Neuro: CN3-12 intact, moving all extremities spontaneously with full strength 

and tone.





Imaging:


CXR 8/14:


RLL likely infiltrate





Assessment:


65 year old M with COPD, HFrEF, A. flutter (on Eliquis), CAD, HTN, CVA, 

Hypothyroidism, Pancreatic insufficiency (2/2 pancreatic mass resection), 

Chronic low back pain, polysubstance abuse, who originally presented to the ER 

with weakness, decreased oral intake and confusion, after discharge for 

bacterial pneumonia, decompensated diastolic CHF, atrial flutter with RVR and 

IBAN but represented reporting significant watery diarrhea and was found 

hypernatremic and acute kidney injury, acidosis and dehydration but signed out 

AMA before medical optimization, and now returned with the same, with mild 

hyperNa and acidosis and newly noted RLL likely infiltrate.





Dehydration from poor PO intake, diarrhea


- Cr at baseline 


- Continue to avoid nephrotoxic medications


- c/w IV fluids with 1/2NS


- Will consult nephrology that was on consult before AMA yesterday





Metabolic acidosis w/ lactic acidosis


-s/p IVF boluses, continue 1/2NS 


-Continue bicarbonate


-Nephrology consult





Hypernatremia - 2/2 dehydration from diarrhea, poor PO intake


-IVF as noted above


-Daily BMP





Diarrhea


-C. diff / GI panel recently negative


-c/w bulking agents 





Normocytic anemia, with severe BETY


-No evidence of bleeding


-daily CBC


-continue daily ferrous sulfate





Potential PNA


-was empirically placed on vanc/levaquin, will continue for now


-MRSA PCR





Chronic A flutter


- c/w rate control with metoprolol


- c/w full anticoagulation with eliquis





Hx of HFrEF


- No evidence of fluid overload


- ECHO 7/2021, LVEF 30-35%, moderate-severe hypokinesis of LV





Chronic PAD


- c/w ASA, Eliquis





CAD


- c/w ASA, Atorvastatin 





Pancreatic insufficiency


- Hx of resected pancreatic mass


- c/w supplementation 





RLS


- c/w Ropinirole 





GERD


- c/w Omeprazole 





DVT prophylaxis


- c/w Eliquis 





Diarrhea: still ongoing


-GI panel recently was negative, new one pending


-will consult GI


-continue bulking agents and loperamide after GI panel results





Disposition:


- Awaiting clinical improvement





VS,Carmenza I+O


VS, Carmenza I+O


Laboratory Tests


8/14/21 18:24








8/14/21 21:39








8/15/21 04:21











Vital Signs








  Date Time  Temp Pulse Resp B/P (MAP) Pulse Ox O2 Delivery O2 Flow Rate FiO2


 


8/15/21 08:00 98.0 82 18 146/73 (97) 100 Nasal Cannula 2.0 














I&O- Last 24 Hours up to 6 AM 


 


 8/15/21





 06:00


 


Intake Total 1000 ml


 


Balance 1000 ml

















CHAVA PACHECO MD   Aug 15, 2021 08:56

## 2021-08-16 VITALS — DIASTOLIC BLOOD PRESSURE: 62 MMHG | SYSTOLIC BLOOD PRESSURE: 127 MMHG

## 2021-08-16 VITALS — DIASTOLIC BLOOD PRESSURE: 54 MMHG | SYSTOLIC BLOOD PRESSURE: 125 MMHG

## 2021-08-16 VITALS — SYSTOLIC BLOOD PRESSURE: 137 MMHG | DIASTOLIC BLOOD PRESSURE: 63 MMHG

## 2021-08-16 VITALS — SYSTOLIC BLOOD PRESSURE: 147 MMHG | DIASTOLIC BLOOD PRESSURE: 65 MMHG

## 2021-08-16 VITALS — SYSTOLIC BLOOD PRESSURE: 119 MMHG | DIASTOLIC BLOOD PRESSURE: 64 MMHG

## 2021-08-16 LAB
BASOPHILS # BLD AUTO: 0 10^3/UL (ref 0–0.2)
BASOPHILS NFR BLD AUTO: 0.1 % (ref 0–1)
BUN SERPL-MCNC: 9 MG/DL (ref 7–18)
CALCIUM SERPL-MCNC: 6.6 MG/DL (ref 8.8–10.2)
CHLORIDE SERPL-SCNC: 116 MEQ/L (ref 98–107)
CO2 SERPL-SCNC: 15 MEQ/L (ref 21–32)
CREAT SERPL-MCNC: 0.85 MG/DL (ref 0.7–1.3)
EOSINOPHIL # BLD AUTO: 0.3 10^3/UL (ref 0–0.5)
EOSINOPHIL NFR BLD AUTO: 3.4 % (ref 0–3)
GFR SERPL CREATININE-BSD FRML MDRD: > 60 ML/MIN/{1.73_M2} (ref 49–?)
GLUCOSE SERPL-MCNC: 79 MG/DL (ref 70–100)
HCT VFR BLD AUTO: 31.2 % (ref 42–52)
HGB BLD-MCNC: 9.2 G/DL (ref 13.5–17.5)
LYMPHOCYTES # BLD AUTO: 1 10^3/UL (ref 1.5–5)
LYMPHOCYTES NFR BLD AUTO: 12.2 % (ref 24–44)
MAGNESIUM SERPL-MCNC: 1.2 MG/DL (ref 1.8–2.4)
MCH RBC QN AUTO: 23.5 PG (ref 27–33)
MCHC RBC AUTO-ENTMCNC: 29.5 G/DL (ref 32–36.5)
MCV RBC AUTO: 79.8 FL (ref 80–96)
MONOCYTES # BLD AUTO: 0.5 10^3/UL (ref 0–0.8)
MONOCYTES NFR BLD AUTO: 6 % (ref 2–8)
NEUTROPHILS # BLD AUTO: 6.3 10^3/UL (ref 1.5–8.5)
NEUTROPHILS NFR BLD AUTO: 77.8 % (ref 36–66)
PHOSPHATE SERPL-MCNC: 2.9 MG/DL (ref 2.5–4.9)
PLATELET # BLD AUTO: 156 10^3/UL (ref 150–450)
POTASSIUM SERPL-SCNC: 4.5 MEQ/L (ref 3.5–5.1)
RBC # BLD AUTO: 3.91 10^6/UL (ref 4.3–6.1)
SODIUM SERPL-SCNC: 140 MEQ/L (ref 136–145)
WBC # BLD AUTO: 8.1 10^3/UL (ref 4–10)

## 2021-08-16 RX ADMIN — ROPINIROLE HYDROCHLORIDE SCH MG: 0.25 TABLET, FILM COATED ORAL at 08:39

## 2021-08-16 RX ADMIN — SODIUM BICARBONATE SCH MG: 325 TABLET ORAL at 08:19

## 2021-08-16 RX ADMIN — INSULIN LISPRO SCH UNITS: 100 INJECTION, SOLUTION INTRAVENOUS; SUBCUTANEOUS at 22:08

## 2021-08-16 RX ADMIN — IPRATROPIUM BROMIDE AND ALBUTEROL SULFATE PRN ML: .5; 3 SOLUTION RESPIRATORY (INHALATION) at 15:48

## 2021-08-16 RX ADMIN — IPRATROPIUM BROMIDE SCH PUFF: 17 AEROSOL, METERED RESPIRATORY (INHALATION) at 07:58

## 2021-08-16 RX ADMIN — OMEPRAZOLE SCH MG: 20 CAPSULE, DELAYED RELEASE ORAL at 08:20

## 2021-08-16 RX ADMIN — PANCRELIPASE SCH EA: 24000; 76000; 120000 CAPSULE, DELAYED RELEASE PELLETS ORAL at 17:21

## 2021-08-16 RX ADMIN — IPRATROPIUM BROMIDE AND ALBUTEROL SULFATE PRN ML: .5; 3 SOLUTION RESPIRATORY (INHALATION) at 06:37

## 2021-08-16 RX ADMIN — PSYLLIUM HUSK SCH PKT: 3.4 POWDER ORAL at 08:18

## 2021-08-16 RX ADMIN — INSULIN LISPRO SCH UNITS: 100 INJECTION, SOLUTION INTRAVENOUS; SUBCUTANEOUS at 07:26

## 2021-08-16 RX ADMIN — WATER SCH MLS/HR: 100 INJECTION, SOLUTION INTRAVENOUS at 15:35

## 2021-08-16 RX ADMIN — IPRATROPIUM BROMIDE SCH PUFF: 17 AEROSOL, METERED RESPIRATORY (INHALATION) at 11:36

## 2021-08-16 RX ADMIN — SODIUM BICARBONATE SCH MG: 325 TABLET ORAL at 22:01

## 2021-08-16 RX ADMIN — INSULIN LISPRO SCH UNITS: 100 INJECTION, SOLUTION INTRAVENOUS; SUBCUTANEOUS at 11:59

## 2021-08-16 RX ADMIN — MAGNESIUM SULFATE IN DEXTROSE SCH MLS/HR: 10 INJECTION, SOLUTION INTRAVENOUS at 12:46

## 2021-08-16 RX ADMIN — IPRATROPIUM BROMIDE SCH PUFF: 17 AEROSOL, METERED RESPIRATORY (INHALATION) at 19:40

## 2021-08-16 RX ADMIN — IPRATROPIUM BROMIDE SCH PUFF: 17 AEROSOL, METERED RESPIRATORY (INHALATION) at 15:48

## 2021-08-16 RX ADMIN — LEVOFLOXACIN SCH MLS/HR: 5 INJECTION, SOLUTION INTRAVENOUS at 01:22

## 2021-08-16 RX ADMIN — INSULIN LISPRO SCH UNITS: 100 INJECTION, SOLUTION INTRAVENOUS; SUBCUTANEOUS at 17:23

## 2021-08-16 RX ADMIN — PANCRELIPASE SCH EA: 24000; 76000; 120000 CAPSULE, DELAYED RELEASE PELLETS ORAL at 11:58

## 2021-08-16 RX ADMIN — ROPINIROLE HYDROCHLORIDE SCH MG: 0.25 TABLET, FILM COATED ORAL at 22:00

## 2021-08-16 RX ADMIN — FERROUS SULFATE TAB 325 MG (65 MG ELEMENTAL FE) SCH MG: 325 (65 FE) TAB at 08:19

## 2021-08-16 RX ADMIN — PROBIOTIC PRODUCT - TAB SCH EA: TAB at 08:19

## 2021-08-16 RX ADMIN — ACETAMINOPHEN PRN MG: 325 TABLET ORAL at 12:50

## 2021-08-16 RX ADMIN — WATER SCH MLS/HR: 100 INJECTION, SOLUTION INTRAVENOUS at 08:26

## 2021-08-16 RX ADMIN — IPRATROPIUM BROMIDE AND ALBUTEROL SULFATE PRN ML: .5; 3 SOLUTION RESPIRATORY (INHALATION) at 11:35

## 2021-08-16 RX ADMIN — WATER SCH MLS/HR: 100 INJECTION, SOLUTION INTRAVENOUS at 01:22

## 2021-08-16 RX ADMIN — METOPROLOL TARTRATE SCH MG: 25 TABLET, FILM COATED ORAL at 08:20

## 2021-08-16 RX ADMIN — ASPIRIN SCH MG: 81 TABLET ORAL at 08:19

## 2021-08-16 RX ADMIN — ATORVASTATIN CALCIUM SCH MG: 20 TABLET, FILM COATED ORAL at 22:01

## 2021-08-16 RX ADMIN — APIXABAN SCH MG: 2.5 TABLET, FILM COATED ORAL at 08:20

## 2021-08-16 RX ADMIN — MAGNESIUM SULFATE IN DEXTROSE SCH MLS/HR: 10 INJECTION, SOLUTION INTRAVENOUS at 11:12

## 2021-08-16 RX ADMIN — PSYLLIUM HUSK SCH PKT: 3.4 POWDER ORAL at 22:01

## 2021-08-16 RX ADMIN — PANCRELIPASE SCH EA: 24000; 76000; 120000 CAPSULE, DELAYED RELEASE PELLETS ORAL at 08:19

## 2021-08-16 RX ADMIN — METOPROLOL TARTRATE SCH MG: 25 TABLET, FILM COATED ORAL at 22:00

## 2021-08-16 RX ADMIN — APIXABAN SCH MG: 2.5 TABLET, FILM COATED ORAL at 22:01

## 2021-08-16 RX ADMIN — IPRATROPIUM BROMIDE AND ALBUTEROL SULFATE PRN ML: .5; 3 SOLUTION RESPIRATORY (INHALATION) at 19:39

## 2021-08-16 NOTE — IPNPDOC
Text Note


Date of Service


The patient was seen on 8/16/21.





NOTE


Subjective:


-No acute events, on fluids


-On 2L NC


-8 episodes of diarrhea in the last 24h





Objective:


Vitals: see below


General: NAD


HEENT: NCAT, EOMI, MMM


CVS: RRR, no m/r/g


Lungs: CTAB without auscultated wheezing, crackles or rhonchi


Abdomen: Normoactive bowel sounds, soft, NTND


Extremities: WWP, no LE edema


Neuro: CN3-12 intact, moving all extremities spontaneously with full strength 

and tone.





Imaging:


CXR 8/14:


RLL likely infiltrate





Assessment:


65 year old M with COPD, HFrEF, A. flutter (on Eliquis), CAD, HTN, CVA, 

Hypothyroidism, Pancreatic insufficiency (2/2 pancreatic mass resection), 

Chronic low back pain, polysubstance abuse, who originally presented to the ER 

with weakness, decreased oral intake and confusion, after discharge for 

bacterial pneumonia, decompensated diastolic CHF, atrial flutter with RVR and 

IBAN but represented reporting significant watery diarrhea and was found 

hypernatremic and acute kidney injury, acidosis and dehydration but signed out 

AMA before medical optimization, and now returned with the same, with mild 

hyperNa and acidosis and newly noted RLL likely infiltrate.





Dehydration from poor PO intake, diarrhea


- Cr at baseline 


- Continue to avoid nephrotoxic medications


- Nephrology consulted, on bicarb gtt 





Metabolic acidosis w/ lactic acidosis


-s/p IVF boluses


-Nephrology consulted, on bicarb gtt





Hypernatremia - 2/2 dehydration from diarrhea, poor PO intake


-s/p IVF as noted above


-Daily BMP





Diarrhea: still persisting


-C. diff / GI panel recently negative


-c/w bulking agents 


-surgery consulted given repeated negative infectious workup for potential scope





Normocytic anemia, with severe BETY


-No evidence of bleeding


-daily CBC


-continue daily ferrous sulfate





Potential PNA


-was empirically placed on levaquin, will continue for now


-MRSA PCR negative





Chronic A flutter


- c/w rate control with metoprolol


- c/w full anticoagulation with eliquis





Hx of HFrEF


- No evidence of fluid overload


- ECHO 7/2021, LVEF 30-35%, moderate-severe hypokinesis of LV





Chronic PAD


- c/w ASA, Eliquis





CAD


- c/w ASA, Atorvastatin 





Pancreatic insufficiency


- Hx of resected pancreatic mass


- c/w supplementation 





RLS


- c/w Ropinirole 





GERD


- c/w Omeprazole 





DVT prophylaxis


- c/w Eliquis 





Disposition:


- Awaiting clinical improvement





VS,Carmenza, I+O


VSCarmenza I+O


Laboratory Tests


8/16/21 04:47











Vital Signs








  Date Time  Temp Pulse Resp B/P (MAP) Pulse Ox O2 Delivery O2 Flow Rate FiO2


 


8/16/21 08:20  87  119/64    


 


8/16/21 07:59 98.0  18  100 Nasal Cannula 2.0 














I&O- Last 24 Hours up to 6 AM 


 


 8/16/21





 05:59


 


Intake Total 1860 ml


 


Balance 1860 ml

















CHAVA PACHECO MD   Aug 16, 2021 10:31

## 2021-08-16 NOTE — CR
NEPHROLOGY CONSULTATION

DATE: 08/16/2021



REQUESTING PHYSICIAN: Dr. Cait Caro



CONSULTING PHYSICIAN: Dr. Ashly Olivia



REASON FOR CONSULTATION:  Hypernatremia and metabolic acidosis.



HISTORY OF PRESENT ILLNESS:  Mr. Rodney Roberts is known to me from a previous

hospitalization in July of 2021. He is a 65-year-old male with a past medical

history of chronic obstructive pulmonary disease, heart failure with reduced

ejection fraction, atrial flutter, on Eliquis anticoagulation, coronary artery

disease, hypertension, hypothyroidism, pancreatic insufficiency and other

comorbid conditions mentioned below. The patient has recently had multiple

admissions for hypernatremia in the setting of diarrhea. I reviewed his prior

labs. His first admission for hypernatremia was on July 29th (serum sodium

158). He was then re-admitted on August 9th with hypernatremia (serum sodium

170). He did leave against medical advice from the hospital yesterday, and he

presented back to the hospital within 24 hours. Upon arrival to the Emergency

Room, he was noted to be hypoxic and chest x-ray showed a new right lung base

infiltrate. His labs on admission were significant for serum sodium of 147 and

serum bicarbonate of 20, and he did have a mild lactic acidosis as well. The

patient was started on IV fluids (bicarbonate drip) and his lactic acidosis did

resolve and his hypernatremia resolved as well. Nephrology evaluation was

requested in view of electrolyte abnormalities and non anion gap metabolic

acidosis. 



PAST MEDICAL HISTORY:  The patient's past medical history is significant for:

1.  Systolic congestive heart failure (echocardiogram July 20, 2021 with left

    ventricular ejection fraction 30%). 

2.  Moderate mitral regurgitation.

3.  Mild tricuspid regurgitation.

4.  Chronic obstructive pulmonary disease. 

5.  Atrial flutter  on Eliquis. 

6.  Coronary artery disease. 

7.  Hypertension.

8.  CVA. 

9.  Hypothyroidism. 

10.  Pancreatic insufficiency.

11.  Chronic low back pain. 

12.  Polysubstance abuse.

13.  Anemia.

14.  Glaucoma.

15.  Dyslipidemia. 



SURGICAL HISTORY:  The patient's past surgical history is significant for:

1.  History of pancreatic mass resection. 

2.  Cataract surgery. 

3.  Carotid endarterectomy.

4.  Left subclavian artery bypass.

5.  Tonsillectomy.

6.  Partial pancreatoduodenectomy.

7.  Cardiac bypass.



FAMILY HISTORY:  The patient's past surgical history is significant for heart

disease in his mother.



SOCIAL HISTORY:  The patient is a former smoker. There is no reported alcohol

use. There is no reported drug use.



ALLERGIES:  

1.  Amoxicillin. 

2.  Carbolic Acid.

3.  Pregabalin.

4.  Warfarin.



MEDICATIONS:  Home medications reviewed. They include:

1.  Albuterol p.r.n. 

2.  Eliquis 2.5 mg p.o. twice daily. 

3.  Aspirin 81 mg p.o. daily.

4.  Atorvastatin 40 mg p.o. q. h.s.

5.  Lomotil p.r.n. for diarrhea. 

6.  Ferrous Sulfate 325 mg p.o. daily.

7.  Bacid with meals.

8.  Metoprolol 25 mg p.o. twice daily. 

9.  Omeprazole 20 mg p.o. daily.

10.  Oxycodone p.r.n. 

11.  Creon 2 caps p.o. with meals.

12.  Prednisone taper.

13.  Ropinirole 0.5 mg p.o. twice daily. 

14.  Sodium Bicarbonate 325 mg p.o. four times daily.



REVIEW OF SYSTEMS: 

Constitutional: He reports weakness and fatigue. 

Eyes: He denies visual changes or blurring.

ENT:  He denies epistaxis or rhinorrhea.   

Cardiac: Has systolic congestive heart failure. Denies edema. Also has a

history of atrial flutter. 

Respiratory: He reports chronic obstructive pulmonary disease and inhaler use

and shortness of breath. 

Gastrointestinal: He reports diarrhea which has been going on for about a

month. 

Genitourinary: He denies any dysuria or hematuria.  

Musculoskeletal: He reports muscle wasting and weakness.

Skin: He denies any new rashes or pruritus.

Hematological/Oncological: He reports anticoagulant use and reports anemia. 

Endocrine: He denies a history of diabetes. Reports hypothyroidism.

Neurologic: He denies seizures or syncope or headaches.



The remainder review of systems is negative or as per HPI.



PHYSICAL EXAMINATION:

VITAL SIGNS: Temperature is 98.1, pulse 69, respiratory rate 20, blood pressure

125/54, saturating 100% on 2 liters nasal cannula. 

INTAKE AND OUTPUT: Intake yesterday was 2.5 liters.

Weight in the bed scale today is not reported.

GENERAL APPEARANCE: The patient is seen lying in bed cachectic and frail male

with bitemporal wasting. 

HEENT: The extraocular muscles are intact. Tongue is moist.

NECK: Supple. Jugular veins were not elevated. 

HEART: Regular. S1, S2. There was a systolic murmur.

LUNGS: Fairly clear breath sounds. No wheeze or crackles. His ribs are

prominent. There is muscle wasting in the intercostal areas.

ABDOMEN: Soft and nontender. 

EXTREMITIES: No edema. There is prominent muscle wasting.

NEUROLOGIC: He is cooperative with the physical exam and answers simple

questions appropriately. 



IMAGING: Chest x-ray done August 14th shows a right lower lobe infiltrate. 



LABORATORY STUDIES: Sodium 140, potassium 4.5, bicarbonate 15, anion gap of 9,

creatinine 0.8, BUN 9, magnesium 1.2, phosphorous 2.9, albumin was 2.5,

hemoglobin 9.2, platelet count 156.



Blood cultures no growth for 24 hours times 2 sets. 



INPATIENT MEDICATIONS: The patient received 2 bags of sodium bicarbonate drip

(D5W with 100 mEq of sodium bicarbonate) and this ran at 100 mL per hour and he

is now off of IV fluids. He received 2 grams of magnesium sulfate. He received

Levofloxacin 750 mg IV daily, Tylenol p.r.n., Eliquis 2.5 mg p.o. twice daily,

Aspirin 81 mg p.o. daily, Atorvastatin 40 mg p.o. q. h.s., ferrous sulfate 325

mg p.o. daily, insulin, Metoprolol 25 mg p.o. twice daily, Bacid p.o. daily,

Prilosec 20 mg p.o. daily, Creon 2 capsules p.o. with meals, Prednisone 10 mg

p.o. daily, Metamucil one packet p.o. twice daily, Requip 0.5 mg p.o. twice

daily, sodium bicarbonate 650 mg p.o. twice daily.   



PROBLEMS:  

1.  Recurrent hypernatremia  I reviewed his prior labs. The first time he was

    admitted with hypernatremia was on July 29th (sodium level 158). He was then

    again admitted for hypernatremia 10 days later on August 9th (sodium 170).

    The patient left against medical advice on his last admission and presented

    back to the hospital within 24 hours with serum sodium of 147 yesterday. He

    complains of a one month history of frequent watery diarrhea. He has

    received hypotonic sodium bicarbonate containing fluids. His sodium level

    has currently normalized. However, given that this is happening on a

    repeated basis with him, he is going to get a further workup. Primary Team

    is in discussion with Gastroenterology for a workup of his diarrhea. He does

    appear to be quite cachectic on exam although I do not know what his BMI

    is.  



2.  Protein calorie malnutrition - The patient comes into the hospital with

    recurrent diarrhea. He also has a significantly low albumin of 2.5. He is

    malnourished appearing on exam. He may have decreased oral intake. GI is

    going to be seeing him for further workup of diarrhea, and I wonder if he

    would need any sort of tube feeding, etc., given his recurrent hypernatremia

    and dehydration.



3.  Non anion gap metabolic acidosis  it is secondary to diarrhea and he is

    receiving both oral and IV bicarbonate supplementation.   



4.  Hypomagnesemia - The patient is receiving magnesium supplementation. I

    would avoid oral magnesium because it can have a diarrheal effect. 



5.  History of pancreatic insufficiency - The patient is on Creon chronically.

    I am not sure if his pancreatic insufficiency is also predisposing him to

    diarrhea. This will be addressed by Gastroenterology. 



6.  Anemia - hemoglobin is 9.2 on the latest labs and he had a transferrin

    saturation of only 6% 2 weeks ago with an iron level of only 17. We will

    repeat his iron studies. 



7.  Systolic congestive heart failure  echocardiogram done recently showed left

    ventricular ejection fraction of about 30%. There is no need for any

    diuretic at this present time. He has received 2 liters of hypotonic fluid

    and his IV fluids have now been discontinued. His sodium level has

    normalized. He is taking oral intake. We will keep an eye on his fluid

    status.

## 2021-08-17 VITALS — DIASTOLIC BLOOD PRESSURE: 57 MMHG | SYSTOLIC BLOOD PRESSURE: 107 MMHG

## 2021-08-17 VITALS — DIASTOLIC BLOOD PRESSURE: 58 MMHG | SYSTOLIC BLOOD PRESSURE: 124 MMHG

## 2021-08-17 VITALS — SYSTOLIC BLOOD PRESSURE: 86 MMHG | DIASTOLIC BLOOD PRESSURE: 42 MMHG

## 2021-08-17 VITALS — DIASTOLIC BLOOD PRESSURE: 60 MMHG | SYSTOLIC BLOOD PRESSURE: 129 MMHG

## 2021-08-17 VITALS — SYSTOLIC BLOOD PRESSURE: 141 MMHG | DIASTOLIC BLOOD PRESSURE: 65 MMHG

## 2021-08-17 VITALS — SYSTOLIC BLOOD PRESSURE: 107 MMHG | DIASTOLIC BLOOD PRESSURE: 50 MMHG

## 2021-08-17 LAB
BASOPHILS # BLD AUTO: 0 10^3/UL (ref 0–0.2)
BASOPHILS NFR BLD AUTO: 0.3 % (ref 0–1)
BUN SERPL-MCNC: 10 MG/DL (ref 7–18)
CALCIUM SERPL-MCNC: 6.6 MG/DL (ref 8.8–10.2)
CHLORIDE SERPL-SCNC: 110 MEQ/L (ref 98–107)
CO2 SERPL-SCNC: 23 MEQ/L (ref 21–32)
CREAT SERPL-MCNC: 0.95 MG/DL (ref 0.7–1.3)
EOSINOPHIL # BLD AUTO: 0.2 10^3/UL (ref 0–0.5)
EOSINOPHIL NFR BLD AUTO: 2.1 % (ref 0–3)
GFR SERPL CREATININE-BSD FRML MDRD: > 60 ML/MIN/{1.73_M2} (ref 49–?)
GLUCOSE SERPL-MCNC: 76 MG/DL (ref 70–100)
HCT VFR BLD AUTO: 30.1 % (ref 42–52)
HGB BLD-MCNC: 9.1 G/DL (ref 13.5–17.5)
LYMPHOCYTES # BLD AUTO: 0.9 10^3/UL (ref 1.5–5)
LYMPHOCYTES NFR BLD AUTO: 13.4 % (ref 24–44)
MCH RBC QN AUTO: 23.9 PG (ref 27–33)
MCHC RBC AUTO-ENTMCNC: 30.2 G/DL (ref 32–36.5)
MCV RBC AUTO: 79.2 FL (ref 80–96)
MONOCYTES # BLD AUTO: 0.5 10^3/UL (ref 0–0.8)
MONOCYTES NFR BLD AUTO: 6.4 % (ref 2–8)
NEUTROPHILS # BLD AUTO: 5.4 10^3/UL (ref 1.5–8.5)
NEUTROPHILS NFR BLD AUTO: 77.4 % (ref 36–66)
PLATELET # BLD AUTO: 141 10^3/UL (ref 150–450)
POTASSIUM SERPL-SCNC: 4.3 MEQ/L (ref 3.5–5.1)
RBC # BLD AUTO: 3.8 10^6/UL (ref 4.3–6.1)
SODIUM SERPL-SCNC: 139 MEQ/L (ref 136–145)
WBC # BLD AUTO: 7 10^3/UL (ref 4–10)

## 2021-08-17 RX ADMIN — APIXABAN SCH MG: 2.5 TABLET, FILM COATED ORAL at 20:50

## 2021-08-17 RX ADMIN — IPRATROPIUM BROMIDE SCH PUFF: 17 AEROSOL, METERED RESPIRATORY (INHALATION) at 07:25

## 2021-08-17 RX ADMIN — ATORVASTATIN CALCIUM SCH MG: 20 TABLET, FILM COATED ORAL at 20:50

## 2021-08-17 RX ADMIN — PROBIOTIC PRODUCT - TAB SCH EA: TAB at 09:25

## 2021-08-17 RX ADMIN — IPRATROPIUM BROMIDE SCH PUFF: 17 AEROSOL, METERED RESPIRATORY (INHALATION) at 11:03

## 2021-08-17 RX ADMIN — IPRATROPIUM BROMIDE SCH PUFF: 17 AEROSOL, METERED RESPIRATORY (INHALATION) at 15:17

## 2021-08-17 RX ADMIN — INSULIN LISPRO SCH UNITS: 100 INJECTION, SOLUTION INTRAVENOUS; SUBCUTANEOUS at 07:30

## 2021-08-17 RX ADMIN — ACETAMINOPHEN PRN MG: 325 TABLET ORAL at 04:24

## 2021-08-17 RX ADMIN — DIPHENOXYLATE HYDROCHLORIDE AND ATROPINE SULFATE SCH EA: 2.5; .025 TABLET ORAL at 10:28

## 2021-08-17 RX ADMIN — SUCRALFATE SCH GM: 1 SUSPENSION ORAL at 20:50

## 2021-08-17 RX ADMIN — ACETAMINOPHEN PRN MG: 325 TABLET ORAL at 20:53

## 2021-08-17 RX ADMIN — INSULIN LISPRO SCH UNITS: 100 INJECTION, SOLUTION INTRAVENOUS; SUBCUTANEOUS at 20:55

## 2021-08-17 RX ADMIN — ASPIRIN SCH MG: 81 TABLET ORAL at 09:25

## 2021-08-17 RX ADMIN — SUCRALFATE SCH GM: 1 SUSPENSION ORAL at 17:31

## 2021-08-17 RX ADMIN — APIXABAN SCH MG: 2.5 TABLET, FILM COATED ORAL at 09:25

## 2021-08-17 RX ADMIN — SUCRALFATE SCH GM: 1 SUSPENSION ORAL at 12:16

## 2021-08-17 RX ADMIN — IPRATROPIUM BROMIDE AND ALBUTEROL SULFATE PRN ML: .5; 3 SOLUTION RESPIRATORY (INHALATION) at 20:48

## 2021-08-17 RX ADMIN — METOPROLOL TARTRATE SCH MG: 25 TABLET, FILM COATED ORAL at 09:00

## 2021-08-17 RX ADMIN — DIPHENOXYLATE HYDROCHLORIDE AND ATROPINE SULFATE SCH EA: 2.5; .025 TABLET ORAL at 20:51

## 2021-08-17 RX ADMIN — ROPINIROLE HYDROCHLORIDE SCH MG: 0.25 TABLET, FILM COATED ORAL at 09:25

## 2021-08-17 RX ADMIN — OMEPRAZOLE SCH MG: 20 CAPSULE, DELAYED RELEASE ORAL at 09:25

## 2021-08-17 RX ADMIN — IPRATROPIUM BROMIDE AND ALBUTEROL SULFATE PRN ML: .5; 3 SOLUTION RESPIRATORY (INHALATION) at 07:25

## 2021-08-17 RX ADMIN — ACETAMINOPHEN PRN MG: 325 TABLET ORAL at 12:17

## 2021-08-17 RX ADMIN — PANCRELIPASE SCH EA: 24000; 76000; 120000 CAPSULE, DELAYED RELEASE PELLETS ORAL at 17:31

## 2021-08-17 RX ADMIN — SUCRALFATE SCH GM: 1 SUSPENSION ORAL at 09:24

## 2021-08-17 RX ADMIN — IPRATROPIUM BROMIDE AND ALBUTEROL SULFATE PRN ML: .5; 3 SOLUTION RESPIRATORY (INHALATION) at 11:02

## 2021-08-17 RX ADMIN — LEVOFLOXACIN SCH MLS/HR: 5 INJECTION, SOLUTION INTRAVENOUS at 02:33

## 2021-08-17 RX ADMIN — IPRATROPIUM BROMIDE AND ALBUTEROL SULFATE PRN ML: .5; 3 SOLUTION RESPIRATORY (INHALATION) at 01:00

## 2021-08-17 RX ADMIN — IPRATROPIUM BROMIDE AND ALBUTEROL SULFATE PRN ML: .5; 3 SOLUTION RESPIRATORY (INHALATION) at 15:17

## 2021-08-17 RX ADMIN — ROPINIROLE HYDROCHLORIDE SCH MG: 0.25 TABLET, FILM COATED ORAL at 20:52

## 2021-08-17 RX ADMIN — PANCRELIPASE SCH EA: 24000; 76000; 120000 CAPSULE, DELAYED RELEASE PELLETS ORAL at 09:25

## 2021-08-17 RX ADMIN — INSULIN LISPRO SCH UNITS: 100 INJECTION, SOLUTION INTRAVENOUS; SUBCUTANEOUS at 17:33

## 2021-08-17 RX ADMIN — METOPROLOL TARTRATE SCH MG: 25 TABLET, FILM COATED ORAL at 20:52

## 2021-08-17 RX ADMIN — SODIUM BICARBONATE SCH MG: 325 TABLET ORAL at 20:53

## 2021-08-17 RX ADMIN — IPRATROPIUM BROMIDE SCH PUFF: 17 AEROSOL, METERED RESPIRATORY (INHALATION) at 20:00

## 2021-08-17 RX ADMIN — SODIUM BICARBONATE SCH MG: 325 TABLET ORAL at 09:24

## 2021-08-17 RX ADMIN — FERROUS SULFATE TAB 325 MG (65 MG ELEMENTAL FE) SCH MG: 325 (65 FE) TAB at 09:25

## 2021-08-17 RX ADMIN — INSULIN LISPRO SCH UNITS: 100 INJECTION, SOLUTION INTRAVENOUS; SUBCUTANEOUS at 12:00

## 2021-08-17 RX ADMIN — PANCRELIPASE SCH EA: 24000; 76000; 120000 CAPSULE, DELAYED RELEASE PELLETS ORAL at 12:18

## 2021-08-17 NOTE — CR.PDOC
General


Date of Consultation:  Aug 17, 2021





Consultation


General surgery.  Dr Gosselin





Reason for consultation.  Diarrhea


HISTORY OF PRESENT ILLNESS: The patient is a 65-year-old male admitted 8/15/2021

reporting persistent watery diarrhea.  The patient was placed on IV fluids.  GI 

panel 8/12/2021 negative.  C. difficile negative 8/11/2021.  The patient was 

placed on Bacid, the patient was also started on Lomotil and so far today the 

patient has had 2 bowel movements recorded.





ALLERGIES: Please see below.





HOME MEDICATIONS: Please see below.





PAST MEDICAL HISTORY:


COPD


CHF


Atrial flutter, on Eliquis


CAD


Hypertension


CVA


Hypothyroid


Pancreatic insufficiency


Chronic low back pain


History of polysubstance use


Anemia


History of noncompliance





PAST SURGICAL HISTORY: 


EGD 11/19/2020 as per Dr. Mcgowan with reflux esophagitis, gastro jejunostomy 

with erythema and ulceration


Partial pancreatoduodenectomy, pancreatic insufficiency


CABG September 2020


Left subclavian artery bypass


Cataract extraction


Tonsillectomy





FAMILY HISTORY:


Father with history of cancer, mother with history of CAD/MI





SOCIAL HISTORY:


Non-smoker


Denies any alcohol use





REVIEW OF SYSTEMS:


As noted in HPI otherwise 10 point review of systems unremarkable.





PHYSICAL EXAMINATION:


VITAL SIGNS: Please see below.


GENERAL APPEARANCE: No acute distress, resting in bed


HEENT: MMM


RESPIRATORY: CTAB


CARDIOVASCULAR:RRR


ABDOMEN: Soft, NTND








LABORATORY DATA: Please see below.





ASSESSMENT/PLAN: 


Diarrhea. 


The patient is reviewed with Dr. Gosselin.


Would recommend to continue with Lomotil 2 tablets twice daily. 


The patient has had 2 bowel movements documented so far today, would recommend 

to continue with symptomatic treatment until the patient's symptoms stabilize.





Reflux esophagitis. 


EGD 11/19/2020 as per Dr. Mcgowan with reflux esophagitis, gastro jejunostomy 

with erythema and ulceration.


Continue with Prilosec 20 mg daily.


Carafate added, continue 1 g AC at bedtime





Vital Signs/I&O





Vital Signs








  Date Time  Temp Pulse Resp B/P (MAP) Pulse Ox O2 Delivery O2 Flow Rate FiO2


 


8/17/21 08:00 97.3 71 17 107/50 (69) 100 Nasal Cannula 2.0 














I&O- Last 24 Hours up to 6 AM 


 


 8/17/21





 05:59


 


Intake Total 4250 ml


 


Output Total 2200 ml


 


Balance 2050 ml











Laboratory Data


Labs 24H


Laboratory Tests 2


8/16/21 11:36: Bedside Glucose (Misc Panel) 144H


8/16/21 16:32: Bedside Glucose (Misc Panel) 129H


8/16/21 22:08: Bedside Glucose (Misc Panel) 150H


8/17/21 04:46: 


Immature Granulocyte % (Auto) 0.4, Neutrophils (%) (Auto) 77.4H, Lymphocytes (%)

(Auto) 13.4L, Monocytes (%) (Auto) 6.4, Eosinophils (%) (Auto) 2.1, Basophils 

(%) (Auto) 0.3, Neutrophils # (Auto) 5.4, Lymphocytes # (Auto) 0.9L, Monocytes #

(Auto) 0.5, Eosinophils # (Auto) 0.2, Basophils # (Auto) 0.0, Nucleated Red 

Blood Cells % (auto) 0.0, Anion Gap 6L, Glomerular Filtration Rate > 60.0, 

Lactic Acid Level 1.8, Calcium Level 6.6L


CBC/BMP


Laboratory Tests


8/17/21 04:46








Microbiology





Microbiology


8/14/21 Blood Culture - Preliminary, Resulted


          No Growth after 48 hours. All Specime...


8/14/21 Blood Culture - Preliminary, Resulted


          No Growth after 48 hours. All Specime...





Allergies


Coded Allergies:  


     amoxicillin (Verified  Allergy, Unknown, rash, 9/30/19)


     clavulanic acid (Verified  Allergy, Unknown, rash, 9/30/19)


     pregabalin (Verified  Adverse Reaction, Unknown, seizures, 9/30/19)


     warfarin (Verified  Adverse Reaction, Unknown, bleeding, 9/30/19)





Home Medications


Scheduled


Apixaban (Eliquis) 2.5 Mg Tablet, 2.5 MG PO BID, (Reported)


Aspirin (Aspirin EC) 81 Mg Tablet.dr, 81 MG PO DAILY, (Reported)


Atorvastatin Calcium (Atorvastatin Calcium) 40 Mg Tablet, 40 MG PO QHS, 

(Reported)


Ferrous Sulfate (Ferrous Sulfate) 325 Mg Tablet, 325 MG PO DAILY, (Reported)


Ipratropium Bromide (Atrovent Hfa) 12.9 Gm Hfa.aer.ad, 2 PUFF INH QID, 

(Reported)


L.acidoph/L.bulg/B.bif/S.therm (Bacid Caplet) 1 Each Tablet, 1 TAB PO WM, 

(Reported)


Metoprolol Tartrate (Metoprolol Tartrate) 25 Mg Tablet, 25 MG PO BID, (Reported)


Omeprazole (Omeprazole) 20 Mg Capsule.dr, 20 MG PO DAILY, (Reported)


Pancreatic Enzymes (Creon Dr 24,000 Units Capsule) 1 Each Capsule.dr, 2 CAP PO 

WM, (Reported)


Prednisone (Prednisone) 10 Mg Tablet, 10 MG PO TAPER, (Reported)


   40MG FOR 3 DAYS, 30MG FOR 3 DAYS, 20MG FOR 3 DAYS, 10MG FOR 3 DAYS 


Psyllium Husk/Aspartame (Metamucil Fiber Singles Packet) 3.4 Gm Powd.pack, 1 PKT

PO BID for 30 Days, #60


Ropinirole HCl (Ropinirole HCl) 0.5 Mg Tablet, 0.5 MG PO BID, (Reported)


Sodium Bicarbonate (Sodium Bicarbonate) 325 Mg Tablet, 325 MG PO QID, (Reported)





Scheduled PRN


Albuterol Sulf (Albuterol Sulfate) 2.5 Mg/3 Ml Vial.neb, 2.5 MG INH Q6H PRN for 

SHORTNESS OF BREATH, (Reported)


Albuterol Sulfate (Albuterol Sulfate Hfa) 8.5 Gm Hfa.aer.ad, 2 PUFFS INH Q4H PRN

for SHORTNESS OF BREATH, (Reported)


Diphenoxylate HCl/Atropine (Diphenoxylate-Atrop 2.5-0.025) 1 Each Tablet, 1 TAB 

PO QID PRN for DIARRHEA, (Reported)


Oxycodone HCl/Acetaminophen (Oxycodon-Acetaminophen 2.5-325) 1 Each Tablet, 1 

TAB PO Q8H PRN for PAIN LEVEL 6-10, (Reported)





Miscellaneous Medications


[Patient Comment]  , (Reported)


   MED REC COMPLETED VIA DISCHARGE 8/14 MORNING 











Vira Sosa              Aug 17, 2021 09:20

## 2021-08-17 NOTE — REP
INDICATION:

pain, recent trauma r/o fx.



COMPARISON:

None.



TECHNIQUE:

Four views



FINDINGS:

Increased quantum mottle decreases the bony detail.  There is no evidence of a

fracture.  There is a marginal erosion seen involving the medial aspect of the head of

the 1st metatarsal.





IMPRESSION:

Limitations and chronic changes as described above.





<Electronically signed by Mario Negron > 08/17/21 [id number]

## 2021-08-17 NOTE — IPNPDOC
Text Note


Date of Service


The patient was seen on 8/17/21.





NOTE


Subjective:


-No acute events, on fluids


-On 2L NC


-7 episodes of diarrhea yesterday, 2 thus far this AM





Objective:


Vitals: see below


General: NAD


HEENT: NCAT, EOMI, MMM


CVS: RRR, no m/r/g


Lungs: CTAB without auscultated wheezing, crackles or rhonchi


Abdomen: Normoactive bowel sounds, soft, NTND


Extremities: WWP, no LE edema


Neuro: CN3-12 intact, moving all extremities spontaneously with full strength 

and tone.





Labs: reviewed





Imaging:


CXR 8/14:


RLL likely infiltrate





Assessment:


65 year old M with COPD, HFrEF, A. flutter (on Eliquis), CAD, HTN, CVA, 

Hypothyroidism, Pancreatic insufficiency (2/2 pancreatic mass resection), 

Chronic low back pain, polysubstance abuse, who originally presented to the ER 

with weakness, decreased oral intake and confusion, after discharge for 

bacterial pneumonia, decompensated diastolic CHF, atrial flutter with RVR and 

IBAN but represented reporting significant watery diarrhea and was found 

hypernatremic and acute kidney injury, acidosis and dehydration but signed out 

AMA before medical optimization, and now returned with the same, with mild 

hyperNa and acidosis and newly noted RLL likely infiltrate.





Dehydration from poor PO intake, diarrhea


- Cr at baseline 


- Continue to avoid nephrotoxic medications


- Nephrology consulted, s/p bicarb gtt 





Metabolic acidosis w/ lactic acidosis: resolved


-s/p IVF boluses


-Nephrology consulted, on bicarb gtt





Hypernatremia - 2/2 dehydration from diarrhea, poor PO intake


-s/p IVF as noted above


-Daily BMP





Diarrhea: still persisting


-C. diff / GI panel recently negative


-c/w bulking agents 


-surgery consulted given repeated negative infectious workup for potential scope


-Surgery started him on Lomotil


-On PPI and sucralfate





Normocytic anemia, with severe BETY


-No evidence of bleeding


-daily CBC


-continue daily ferrous sulfate





Potential PNA


-was empirically placed on levaquin, will continue for now


-MRSA PCR negative





Chronic A flutter


- c/w rate control with metoprolol


- c/w full anticoagulation with eliquis





Hx of HFrEF


- No evidence of fluid overload


- ECHO 7/2021, LVEF 30-35%, moderate-severe hypokinesis of LV





Chronic PAD


- c/w ASA, Eliquis





CAD


- c/w ASA, Atorvastatin 





Pancreatic insufficiency


- Hx of resected pancreatic mass


- c/w supplementation 





RLS


- c/w Ropinirole 





GERD


- c/w Omeprazole 





DVT prophylaxis


- c/w Eliquis 





Disposition:


- Awaiting clinical improvement





VS,Fishbone, I+O


VS, Fishbone, I+O


Laboratory Tests


8/17/21 04:46











Vital Signs








  Date Time  Temp Pulse Resp B/P (MAP) Pulse Ox O2 Delivery O2 Flow Rate FiO2


 


8/17/21 09:00  71  107/50    


 


8/17/21 08:00 97.3  17  100 Nasal Cannula 2.0 














I&O- Last 24 Hours up to 6 AM 


 


 8/17/21





 06:00


 


Intake Total 4250 ml


 


Output Total 2200 ml


 


Balance 2050 ml

















CHAVA PACHECO MD   Aug 17, 2021 09:53

## 2021-08-18 VITALS — DIASTOLIC BLOOD PRESSURE: 66 MMHG | SYSTOLIC BLOOD PRESSURE: 150 MMHG

## 2021-08-18 VITALS — DIASTOLIC BLOOD PRESSURE: 66 MMHG | SYSTOLIC BLOOD PRESSURE: 156 MMHG

## 2021-08-18 VITALS — SYSTOLIC BLOOD PRESSURE: 131 MMHG | DIASTOLIC BLOOD PRESSURE: 60 MMHG

## 2021-08-18 VITALS — SYSTOLIC BLOOD PRESSURE: 97 MMHG | DIASTOLIC BLOOD PRESSURE: 46 MMHG

## 2021-08-18 VITALS — DIASTOLIC BLOOD PRESSURE: 63 MMHG | SYSTOLIC BLOOD PRESSURE: 136 MMHG

## 2021-08-18 LAB
BASOPHILS # BLD AUTO: 0 10^3/UL (ref 0–0.2)
BASOPHILS NFR BLD AUTO: 0.1 % (ref 0–1)
BUN SERPL-MCNC: 9 MG/DL (ref 7–18)
CALCIUM SERPL-MCNC: 6.9 MG/DL (ref 8.8–10.2)
CHLORIDE SERPL-SCNC: 113 MEQ/L (ref 98–107)
CO2 SERPL-SCNC: 20 MEQ/L (ref 21–32)
CREAT SERPL-MCNC: 0.82 MG/DL (ref 0.7–1.3)
EOSINOPHIL # BLD AUTO: 0.1 10^3/UL (ref 0–0.5)
EOSINOPHIL NFR BLD AUTO: 1.6 % (ref 0–3)
GFR SERPL CREATININE-BSD FRML MDRD: > 60 ML/MIN/{1.73_M2} (ref 49–?)
GLUCOSE SERPL-MCNC: 77 MG/DL (ref 70–100)
HCT VFR BLD AUTO: 28.6 % (ref 42–52)
HGB BLD-MCNC: 8.5 G/DL (ref 13.5–17.5)
LYMPHOCYTES # BLD AUTO: 0.7 10^3/UL (ref 1.5–5)
LYMPHOCYTES NFR BLD AUTO: 8 % (ref 24–44)
MAGNESIUM SERPL-MCNC: 1.3 MG/DL (ref 1.8–2.4)
MCH RBC QN AUTO: 23.8 PG (ref 27–33)
MCHC RBC AUTO-ENTMCNC: 29.7 G/DL (ref 32–36.5)
MCV RBC AUTO: 80.1 FL (ref 80–96)
MONOCYTES # BLD AUTO: 0.5 10^3/UL (ref 0–0.8)
MONOCYTES NFR BLD AUTO: 5.5 % (ref 2–8)
NEUTROPHILS # BLD AUTO: 7.1 10^3/UL (ref 1.5–8.5)
NEUTROPHILS NFR BLD AUTO: 84.4 % (ref 36–66)
PLATELET # BLD AUTO: 141 10^3/UL (ref 150–450)
POTASSIUM SERPL-SCNC: 4.2 MEQ/L (ref 3.5–5.1)
RBC # BLD AUTO: 3.57 10^6/UL (ref 4.3–6.1)
SODIUM SERPL-SCNC: 139 MEQ/L (ref 136–145)
WBC # BLD AUTO: 8.4 10^3/UL (ref 4–10)

## 2021-08-18 RX ADMIN — APIXABAN SCH MG: 2.5 TABLET, FILM COATED ORAL at 20:26

## 2021-08-18 RX ADMIN — IPRATROPIUM BROMIDE AND ALBUTEROL SULFATE PRN ML: .5; 3 SOLUTION RESPIRATORY (INHALATION) at 20:06

## 2021-08-18 RX ADMIN — INSULIN LISPRO SCH UNITS: 100 INJECTION, SOLUTION INTRAVENOUS; SUBCUTANEOUS at 11:41

## 2021-08-18 RX ADMIN — CHOLESTYRAMINE SCH GM: 4 POWDER, FOR SUSPENSION ORAL at 11:41

## 2021-08-18 RX ADMIN — CHOLESTYRAMINE SCH GM: 4 POWDER, FOR SUSPENSION ORAL at 20:25

## 2021-08-18 RX ADMIN — INSULIN LISPRO SCH UNITS: 100 INJECTION, SOLUTION INTRAVENOUS; SUBCUTANEOUS at 20:26

## 2021-08-18 RX ADMIN — ATORVASTATIN CALCIUM SCH MG: 20 TABLET, FILM COATED ORAL at 20:25

## 2021-08-18 RX ADMIN — MAGNESIUM SULFATE IN DEXTROSE SCH MLS/HR: 10 INJECTION, SOLUTION INTRAVENOUS at 09:17

## 2021-08-18 RX ADMIN — ACETAMINOPHEN PRN MG: 325 TABLET ORAL at 20:34

## 2021-08-18 RX ADMIN — DIPHENOXYLATE HYDROCHLORIDE AND ATROPINE SULFATE SCH EA: 2.5; .025 TABLET ORAL at 20:25

## 2021-08-18 RX ADMIN — IPRATROPIUM BROMIDE AND ALBUTEROL SULFATE PRN ML: .5; 3 SOLUTION RESPIRATORY (INHALATION) at 11:19

## 2021-08-18 RX ADMIN — SUCRALFATE SCH GM: 1 SUSPENSION ORAL at 11:41

## 2021-08-18 RX ADMIN — KETOROLAC TROMETHAMINE PRN MG: 30 INJECTION, SOLUTION INTRAMUSCULAR at 06:42

## 2021-08-18 RX ADMIN — SUCRALFATE SCH GM: 1 SUSPENSION ORAL at 19:02

## 2021-08-18 RX ADMIN — METOPROLOL TARTRATE SCH MG: 25 TABLET, FILM COATED ORAL at 09:00

## 2021-08-18 RX ADMIN — MAGNESIUM SULFATE IN DEXTROSE SCH MLS/HR: 10 INJECTION, SOLUTION INTRAVENOUS at 11:40

## 2021-08-18 RX ADMIN — IPRATROPIUM BROMIDE AND ALBUTEROL SULFATE PRN ML: .5; 3 SOLUTION RESPIRATORY (INHALATION) at 01:50

## 2021-08-18 RX ADMIN — ASPIRIN SCH MG: 81 TABLET ORAL at 09:24

## 2021-08-18 RX ADMIN — METOPROLOL TARTRATE SCH MG: 25 TABLET, FILM COATED ORAL at 20:26

## 2021-08-18 RX ADMIN — IPRATROPIUM BROMIDE SCH PUFF: 17 AEROSOL, METERED RESPIRATORY (INHALATION) at 11:19

## 2021-08-18 RX ADMIN — SODIUM BICARBONATE SCH MG: 325 TABLET ORAL at 20:25

## 2021-08-18 RX ADMIN — PANCRELIPASE SCH EA: 24000; 76000; 120000 CAPSULE, DELAYED RELEASE PELLETS ORAL at 11:41

## 2021-08-18 RX ADMIN — APIXABAN SCH MG: 2.5 TABLET, FILM COATED ORAL at 09:24

## 2021-08-18 RX ADMIN — IPRATROPIUM BROMIDE SCH PUFF: 17 AEROSOL, METERED RESPIRATORY (INHALATION) at 08:00

## 2021-08-18 RX ADMIN — FERROUS SULFATE TAB 325 MG (65 MG ELEMENTAL FE) SCH MG: 325 (65 FE) TAB at 09:24

## 2021-08-18 RX ADMIN — IPRATROPIUM BROMIDE AND ALBUTEROL SULFATE PRN ML: .5; 3 SOLUTION RESPIRATORY (INHALATION) at 07:11

## 2021-08-18 RX ADMIN — SODIUM BICARBONATE SCH MG: 325 TABLET ORAL at 09:24

## 2021-08-18 RX ADMIN — SUCRALFATE SCH GM: 1 SUSPENSION ORAL at 09:25

## 2021-08-18 RX ADMIN — ROPINIROLE HYDROCHLORIDE SCH MG: 0.25 TABLET, FILM COATED ORAL at 09:24

## 2021-08-18 RX ADMIN — IPRATROPIUM BROMIDE SCH PUFF: 17 AEROSOL, METERED RESPIRATORY (INHALATION) at 20:00

## 2021-08-18 RX ADMIN — PANCRELIPASE SCH EA: 24000; 76000; 120000 CAPSULE, DELAYED RELEASE PELLETS ORAL at 19:02

## 2021-08-18 RX ADMIN — IPRATROPIUM BROMIDE AND ALBUTEROL SULFATE PRN ML: .5; 3 SOLUTION RESPIRATORY (INHALATION) at 15:27

## 2021-08-18 RX ADMIN — IPRATROPIUM BROMIDE SCH PUFF: 17 AEROSOL, METERED RESPIRATORY (INHALATION) at 15:28

## 2021-08-18 RX ADMIN — SUCRALFATE SCH GM: 1 SUSPENSION ORAL at 20:25

## 2021-08-18 RX ADMIN — SODIUM CHLORIDE, PRESERVATIVE FREE SCH ML: 5 INJECTION INTRAVENOUS at 20:26

## 2021-08-18 RX ADMIN — ROPINIROLE HYDROCHLORIDE SCH MG: 0.25 TABLET, FILM COATED ORAL at 20:25

## 2021-08-18 RX ADMIN — DIPHENOXYLATE HYDROCHLORIDE AND ATROPINE SULFATE SCH EA: 2.5; .025 TABLET ORAL at 09:17

## 2021-08-18 RX ADMIN — PROBIOTIC PRODUCT - TAB SCH EA: TAB at 09:24

## 2021-08-18 RX ADMIN — INSULIN LISPRO SCH UNITS: 100 INJECTION, SOLUTION INTRAVENOUS; SUBCUTANEOUS at 09:15

## 2021-08-18 RX ADMIN — INSULIN LISPRO SCH UNITS: 100 INJECTION, SOLUTION INTRAVENOUS; SUBCUTANEOUS at 17:30

## 2021-08-18 RX ADMIN — PANCRELIPASE SCH EA: 24000; 76000; 120000 CAPSULE, DELAYED RELEASE PELLETS ORAL at 09:17

## 2021-08-18 RX ADMIN — ACETAMINOPHEN PRN MG: 325 TABLET ORAL at 05:24

## 2021-08-18 RX ADMIN — OMEPRAZOLE SCH MG: 20 CAPSULE, DELAYED RELEASE ORAL at 09:24

## 2021-08-18 NOTE — IPNPDOC
Text Note


Date of Service


The patient was seen on 8/18/21.





NOTE


Subjective:


-No acute events


-On 2L NC


-6 episodes of diarrhea yesterday, 2 thus far this AM despite beginning Lomotil





Objective:


Vitals: see below


General: NAD


HEENT: NCAT, EOMI, MMM


CVS: RRR, no m/r/g


Lungs: CTAB without auscultated wheezing, crackles or rhonchi


Abdomen: Normoactive bowel sounds, soft, NTND


Extremities: WWP, no LE edema


Neuro: CN3-12 intact, moving all extremities spontaneously with full strength 

and tone.





Labs: reviewed


Mag 1.2


bicarb 23





Imaging:


CXR 8/14:


RLL likely infiltrate





Assessment:


65 year old M with COPD, HFrEF, A. flutter (on Eliquis), CAD, HTN, CVA, 

Hypothyroidism, Pancreatic insufficiency (2/2 pancreatic mass resection), 

Chronic low back pain, polysubstance abuse, who originally presented to the ER 

with weakness, decreased oral intake and confusion, after discharge for 

bacterial pneumonia, decompensated diastolic CHF, atrial flutter with RVR and 

IBAN but represented reporting significant watery diarrhea and was found 

hypernatremic and acute kidney injury, acidosis and dehydration but signed out 

AMA before medical optimization, and now returned with the same, with mild 

hyperNa and acidosis and newly noted RLL likely infiltrate.





Dehydration from poor PO intake, diarrhea


- Cr at baseline 


- Continue to avoid nephrotoxic medications


- Nephrology consulted, s/p bicarb gtt on BID 650mg bicarb per nephrology to 

reduce when bicarb stably wnl





Metabolic acidosis w/ lactic acidosis: resolved


-s/p IVF boluses


-Nephrology consulted, on bicarb





Hypernatremia - 2/2 dehydration from diarrhea, poor PO intake


-s/p IVF as noted above


-Daily BMP





Diarrhea: still persisting


-C. diff / GI panel recently negative


-c/w bulking agents and Lomotil


-surgery consulted given repeated negative infectious workup, started on lomotil

and monitoring


-On PPI and sucralfate





Normocytic anemia, with severe BETY


-No evidence of bleeding


-daily CBC


-continue daily ferrous sulfate





Potential PNA


-was empirically placed on levaquin, s/p 3d, will stop


-MRSA PCR negative





Chronic A flutter


- c/w rate control with metoprolol


- c/w full anticoagulation with eliquis





Hx of HFrEF


- No evidence of fluid overload


- ECHO 7/2021, LVEF 30-35%, moderate-severe hypokinesis of LV





Chronic PAD


- c/w ASA, Eliquis





CAD


- c/w ASA, Atorvastatin 





Pancreatic insufficiency


- Hx of resected pancreatic mass


- c/w supplementation 





RLS


- c/w Ropinirole 





GERD


- c/w Omeprazole 





DVT prophylaxis


- c/w Eliquis 





Hypomagnesemia: i/s/o GI losses from diarrhea


-replete PRN





Disposition:


- Awaiting clinical improvement





VS,Carmenza, I+O


VS, Fishbone, I+O


Laboratory Tests


8/18/21 05:15











Vital Signs








  Date Time  Temp Pulse Resp B/P (MAP) Pulse Ox O2 Delivery O2 Flow Rate FiO2


 


8/18/21 04:00 99.0 104 20 136/63 (87) 100 Nasal Cannula 2.0 














I&O- Last 24 Hours up to 6 AM 


 


 8/18/21





 06:00


 


Intake Total 2070 ml


 


Output Total 1000 ml


 


Balance 1070 ml

















CHAVA PACHECO MD   Aug 18, 2021 08:27

## 2021-08-19 VITALS — SYSTOLIC BLOOD PRESSURE: 157 MMHG | DIASTOLIC BLOOD PRESSURE: 70 MMHG

## 2021-08-19 VITALS — SYSTOLIC BLOOD PRESSURE: 128 MMHG | DIASTOLIC BLOOD PRESSURE: 56 MMHG

## 2021-08-19 VITALS — DIASTOLIC BLOOD PRESSURE: 68 MMHG | SYSTOLIC BLOOD PRESSURE: 112 MMHG

## 2021-08-19 VITALS — DIASTOLIC BLOOD PRESSURE: 69 MMHG | SYSTOLIC BLOOD PRESSURE: 112 MMHG

## 2021-08-19 VITALS — DIASTOLIC BLOOD PRESSURE: 67 MMHG | SYSTOLIC BLOOD PRESSURE: 123 MMHG

## 2021-08-19 VITALS — SYSTOLIC BLOOD PRESSURE: 136 MMHG | DIASTOLIC BLOOD PRESSURE: 62 MMHG

## 2021-08-19 VITALS — DIASTOLIC BLOOD PRESSURE: 62 MMHG | SYSTOLIC BLOOD PRESSURE: 117 MMHG

## 2021-08-19 VITALS — SYSTOLIC BLOOD PRESSURE: 132 MMHG | DIASTOLIC BLOOD PRESSURE: 69 MMHG

## 2021-08-19 VITALS — SYSTOLIC BLOOD PRESSURE: 138 MMHG | DIASTOLIC BLOOD PRESSURE: 65 MMHG

## 2021-08-19 VITALS — SYSTOLIC BLOOD PRESSURE: 145 MMHG | DIASTOLIC BLOOD PRESSURE: 63 MMHG

## 2021-08-19 LAB
BASOPHILS # BLD AUTO: 0 10^3/UL (ref 0–0.2)
BASOPHILS NFR BLD AUTO: 0.1 % (ref 0–1)
BUN SERPL-MCNC: 11 MG/DL (ref 7–18)
CALCIUM SERPL-MCNC: 7.3 MG/DL (ref 8.8–10.2)
CHLORIDE SERPL-SCNC: 114 MEQ/L (ref 98–107)
CO2 SERPL-SCNC: 20 MEQ/L (ref 21–32)
CREAT SERPL-MCNC: 0.86 MG/DL (ref 0.7–1.3)
EOSINOPHIL # BLD AUTO: 0.1 10^3/UL (ref 0–0.5)
EOSINOPHIL NFR BLD AUTO: 1.3 % (ref 0–3)
GFR SERPL CREATININE-BSD FRML MDRD: > 60 ML/MIN/{1.73_M2} (ref 49–?)
GLUCOSE SERPL-MCNC: 102 MG/DL (ref 70–100)
HCT VFR BLD AUTO: 25.5 % (ref 42–52)
HCT VFR BLD AUTO: 26.4 % (ref 42–52)
HGB BLD-MCNC: 7.7 G/DL (ref 13.5–17.5)
HGB BLD-MCNC: 7.9 G/DL (ref 13.5–17.5)
LYMPHOCYTES # BLD AUTO: 0.9 10^3/UL (ref 1.5–5)
LYMPHOCYTES NFR BLD AUTO: 10.4 % (ref 24–44)
MAGNESIUM SERPL-MCNC: 1.6 MG/DL (ref 1.8–2.4)
MCH RBC QN AUTO: 23.7 PG (ref 27–33)
MCH RBC QN AUTO: 24.1 PG (ref 27–33)
MCHC RBC AUTO-ENTMCNC: 29.9 G/DL (ref 32–36.5)
MCHC RBC AUTO-ENTMCNC: 30.2 G/DL (ref 32–36.5)
MCV RBC AUTO: 79.3 FL (ref 80–96)
MCV RBC AUTO: 79.7 FL (ref 80–96)
MONOCYTES # BLD AUTO: 0.5 10^3/UL (ref 0–0.8)
MONOCYTES NFR BLD AUTO: 6.5 % (ref 2–8)
NEUTROPHILS # BLD AUTO: 6.8 10^3/UL (ref 1.5–8.5)
NEUTROPHILS NFR BLD AUTO: 81.5 % (ref 36–66)
PLATELET # BLD AUTO: 129 10^3/UL (ref 150–450)
PLATELET # BLD AUTO: 140 10^3/UL (ref 150–450)
POTASSIUM SERPL-SCNC: 4.5 MEQ/L (ref 3.5–5.1)
RBC # BLD AUTO: 3.2 10^6/UL (ref 4.3–6.1)
RBC # BLD AUTO: 3.33 10^6/UL (ref 4.3–6.1)
SODIUM SERPL-SCNC: 139 MEQ/L (ref 136–145)
WBC # BLD AUTO: 7.3 10^3/UL (ref 4–10)
WBC # BLD AUTO: 8.4 10^3/UL (ref 4–10)

## 2021-08-19 PROCEDURE — 30233N1 TRANSFUSION OF NONAUTOLOGOUS RED BLOOD CELLS INTO PERIPHERAL VEIN, PERCUTANEOUS APPROACH: ICD-10-PCS

## 2021-08-19 RX ADMIN — PROBIOTIC PRODUCT - TAB SCH EA: TAB at 10:22

## 2021-08-19 RX ADMIN — SODIUM BICARBONATE SCH MG: 325 TABLET ORAL at 10:22

## 2021-08-19 RX ADMIN — IPRATROPIUM BROMIDE AND ALBUTEROL SULFATE PRN ML: .5; 3 SOLUTION RESPIRATORY (INHALATION) at 07:30

## 2021-08-19 RX ADMIN — IPRATROPIUM BROMIDE AND ALBUTEROL SULFATE PRN ML: .5; 3 SOLUTION RESPIRATORY (INHALATION) at 11:09

## 2021-08-19 RX ADMIN — IPRATROPIUM BROMIDE SCH PUFF: 17 AEROSOL, METERED RESPIRATORY (INHALATION) at 20:03

## 2021-08-19 RX ADMIN — PANTOPRAZOLE SODIUM SCH MG: 40 TABLET, DELAYED RELEASE ORAL at 10:23

## 2021-08-19 RX ADMIN — SUCRALFATE SCH GM: 1 SUSPENSION ORAL at 10:21

## 2021-08-19 RX ADMIN — PANCRELIPASE SCH EA: 24000; 76000; 120000 CAPSULE, DELAYED RELEASE PELLETS ORAL at 10:22

## 2021-08-19 RX ADMIN — SUCRALFATE SCH GM: 1 SUSPENSION ORAL at 21:17

## 2021-08-19 RX ADMIN — INSULIN LISPRO SCH UNITS: 100 INJECTION, SOLUTION INTRAVENOUS; SUBCUTANEOUS at 17:30

## 2021-08-19 RX ADMIN — CHOLESTYRAMINE SCH GM: 4 POWDER, FOR SUSPENSION ORAL at 21:15

## 2021-08-19 RX ADMIN — SUCRALFATE SCH GM: 1 SUSPENSION ORAL at 13:03

## 2021-08-19 RX ADMIN — SODIUM CHLORIDE SCH ML: 9 INJECTION, SOLUTION INTRAMUSCULAR; INTRAVENOUS; SUBCUTANEOUS at 18:00

## 2021-08-19 RX ADMIN — DIPHENOXYLATE HYDROCHLORIDE AND ATROPINE SULFATE SCH EA: 2.5; .025 TABLET ORAL at 21:16

## 2021-08-19 RX ADMIN — PANCRELIPASE SCH EA: 24000; 76000; 120000 CAPSULE, DELAYED RELEASE PELLETS ORAL at 18:31

## 2021-08-19 RX ADMIN — METOPROLOL TARTRATE SCH MG: 25 TABLET, FILM COATED ORAL at 21:16

## 2021-08-19 RX ADMIN — PANCRELIPASE SCH EA: 24000; 76000; 120000 CAPSULE, DELAYED RELEASE PELLETS ORAL at 13:03

## 2021-08-19 RX ADMIN — ROPINIROLE HYDROCHLORIDE SCH MG: 0.25 TABLET, FILM COATED ORAL at 10:21

## 2021-08-19 RX ADMIN — IPRATROPIUM BROMIDE SCH PUFF: 17 AEROSOL, METERED RESPIRATORY (INHALATION) at 16:00

## 2021-08-19 RX ADMIN — APIXABAN SCH MG: 2.5 TABLET, FILM COATED ORAL at 10:22

## 2021-08-19 RX ADMIN — INSULIN LISPRO SCH UNITS: 100 INJECTION, SOLUTION INTRAVENOUS; SUBCUTANEOUS at 20:26

## 2021-08-19 RX ADMIN — ATORVASTATIN CALCIUM SCH MG: 20 TABLET, FILM COATED ORAL at 21:16

## 2021-08-19 RX ADMIN — ROPINIROLE HYDROCHLORIDE SCH MG: 0.25 TABLET, FILM COATED ORAL at 21:16

## 2021-08-19 RX ADMIN — KETOROLAC TROMETHAMINE PRN MG: 30 INJECTION, SOLUTION INTRAMUSCULAR at 05:29

## 2021-08-19 RX ADMIN — FERROUS SULFATE TAB 325 MG (65 MG ELEMENTAL FE) SCH MG: 325 (65 FE) TAB at 10:22

## 2021-08-19 RX ADMIN — DIPHENOXYLATE HYDROCHLORIDE AND ATROPINE SULFATE SCH EA: 2.5; .025 TABLET ORAL at 10:25

## 2021-08-19 RX ADMIN — SODIUM CHLORIDE, PRESERVATIVE FREE SCH ML: 5 INJECTION INTRAVENOUS at 13:04

## 2021-08-19 RX ADMIN — INSULIN LISPRO SCH UNITS: 100 INJECTION, SOLUTION INTRAVENOUS; SUBCUTANEOUS at 07:30

## 2021-08-19 RX ADMIN — IPRATROPIUM BROMIDE AND ALBUTEROL SULFATE PRN ML: .5; 3 SOLUTION RESPIRATORY (INHALATION) at 15:17

## 2021-08-19 RX ADMIN — IPRATROPIUM BROMIDE AND ALBUTEROL SULFATE PRN ML: .5; 3 SOLUTION RESPIRATORY (INHALATION) at 00:51

## 2021-08-19 RX ADMIN — SODIUM CHLORIDE, PRESERVATIVE FREE SCH ML: 5 INJECTION INTRAVENOUS at 05:44

## 2021-08-19 RX ADMIN — INSULIN LISPRO SCH UNITS: 100 INJECTION, SOLUTION INTRAVENOUS; SUBCUTANEOUS at 12:00

## 2021-08-19 RX ADMIN — IPRATROPIUM BROMIDE SCH PUFF: 17 AEROSOL, METERED RESPIRATORY (INHALATION) at 11:10

## 2021-08-19 RX ADMIN — IPRATROPIUM BROMIDE SCH PUFF: 17 AEROSOL, METERED RESPIRATORY (INHALATION) at 08:00

## 2021-08-19 RX ADMIN — SODIUM BICARBONATE SCH MG: 325 TABLET ORAL at 21:16

## 2021-08-19 RX ADMIN — Medication SCH UNITS: at 18:00

## 2021-08-19 RX ADMIN — METOPROLOL TARTRATE SCH MG: 25 TABLET, FILM COATED ORAL at 10:21

## 2021-08-19 RX ADMIN — SODIUM CHLORIDE, PRESERVATIVE FREE SCH ML: 5 INJECTION INTRAVENOUS at 22:00

## 2021-08-19 RX ADMIN — ASPIRIN SCH MG: 81 TABLET ORAL at 10:21

## 2021-08-19 RX ADMIN — APIXABAN SCH MG: 2.5 TABLET, FILM COATED ORAL at 10:41

## 2021-08-19 RX ADMIN — CHOLESTYRAMINE SCH GM: 4 POWDER, FOR SUSPENSION ORAL at 10:22

## 2021-08-19 RX ADMIN — SUCRALFATE SCH GM: 1 SUSPENSION ORAL at 18:31

## 2021-08-19 RX ADMIN — IPRATROPIUM BROMIDE AND ALBUTEROL SULFATE PRN ML: .5; 3 SOLUTION RESPIRATORY (INHALATION) at 06:03

## 2021-08-19 RX ADMIN — PANTOPRAZOLE SODIUM SCH MG: 40 TABLET, DELAYED RELEASE ORAL at 21:16

## 2021-08-19 NOTE — IPNPDOC
Text Note


Date of Service


The patient was seen on 8/19/21.





NOTE


General surgery.  Dr Gosselin. 





65-year-old male admitted 8/15/2021 reporting persistent watery diarrhea.   GI 

panel 8/12/2021 negative.  C. difficile negative 8/11/2021. 


On Bacid, Lomotil, Questran.


This morning, the patient states diarrhea is improved.  The patient states 

frequency has decreased, he has had 2 bowel movements so far today however 8 

were documented yesterday.  





Afebrile.  VSS


MMM


Abdomen is soft, nontender, nondistended


No edema





WBC 8.4, hemoglobin 7.9, platelets 140





Assessment/plan


Diarrhea. 


The patient is reviewed by Dr. Gosselin. 


Plan to continue with symptomatic treatment until the patient's symptoms 

stabilize.  The patient is reporting improvement this morning.


Continue with Lomotil 2 tablets twice daily


Continue Questran 4 g twice daily. 


Monitor.





Reflux esophagitis


EGD 11/19/2020 as per Dr. Mcgowan with reflux esophagitis, gastro jejunostomy 

with erythema and ulceration.


Continue with Prilosec 20 mg daily.


Carafate 1 g AC/HS





VS,Fishbone, I+O


VS, Fishbone, I+O


Laboratory Tests


8/19/21 05:08











Vital Signs








  Date Time  Temp Pulse Resp B/P (MAP) Pulse Ox O2 Delivery O2 Flow Rate FiO2


 


8/19/21 04:00       2.0 


 


8/19/21 04:00 97.9 79 18 128/56 (80) 96 Nasal Cannula  














I&O- Last 24 Hours up to 6 AM 


 


 8/19/21





 05:59


 


Intake Total 3180 ml


 


Balance 3180 ml

















Vira Sosa              Aug 19, 2021 08:12

## 2021-08-19 NOTE — IPNPDOC
Text Note


Date of Service


The patient was seen on 8/19/21.





NOTE


Subjective:


-No acute events


-On 2L NC


-8 episodes of diarrhea yesterday, 4 thus far this AM despite cholestyramine and

Lomotil





Objective:


Vitals: see below


General: NAD, thin


HEENT: NCAT, EOMI, MMM


CVS: RRR, no m/r/g


Lungs: CTAB without auscultated wheezing, crackles or rhonchi


Abdomen: Normoactive bowel sounds, soft, NTND


Extremities: WWP, no LE edema


Neuro: CN3-12 intact, moving all extremities spontaneously with full strength 

and tone.





Labs: reviewed, pending AM labs





Imaging:


CXR 8/14:


RLL likely infiltrate





Assessment:


65 year old M with COPD, HFrEF, A. flutter (on Eliquis), CAD, HTN, CVA, 

Hypothyroidism, Pancreatic insufficiency (2/2 pancreatic mass resection), 

Chronic low back pain, polysubstance abuse, who originally presented to the ER 

with weakness, decreased oral intake and confusion, after discharge for 

bacterial pneumonia, decompensated diastolic CHF, atrial flutter with RVR and 

IBAN but represented reporting significant watery diarrhea and was found 

hypernatremic and acute kidney injury, acidosis and dehydration but signed out 

AMA before medical optimization, and now returned with the same, with mild 

hyperNa and acidosis and newly noted RLL likely infiltrate.





Dehydration from poor PO intake, diarrhea


- Cr at baseline 


- Continue to avoid nephrotoxic medications


- Nephrology consulted, s/p bicarb gtt on BID 650mg bicarb per nephrology to r

educe to 1/2 dose when bicarb stably wnl





Metabolic acidosis w/ lactic acidosis: resolved


-s/p IVF boluses


-Nephrology consulted, on PO bicarb





Hypernatremia - 2/2 dehydration from diarrhea, poor PO intake


-s/p IVF as noted above


-Daily BMP





Diarrhea: still persisting


-C. diff / GI panel recently negative


-c/w cholestyramine and Lomotil per surgery


-surgery consulted given repeated negative infectious workup, started on lomotil

and cholestyramine, reporting improvement but with 8 episodes in the last 24h, 

will continue to monitor, appreciate recs


-On PPI and sucralfate





Acute on chronic Normocytic anemia, with severe BETY


-daily CBC


-continue daily ferrous sulfate


-will give 2 u of blood today for hgb <8





Potential PNA


-was empirically placed on levaquin, s/p 3d, dc'd stop


-MRSA PCR negative





Chronic A flutter


- c/w rate control with metoprolol


- c/w full anticoagulation with eliquis





Hx of HFrEF


- No evidence of fluid overload


- ECHO 7/2021, LVEF 30-35%, moderate-severe hypokinesis of LV





Chronic PAD


- c/w ASA, Eliquis





CAD


- c/w ASA, Atorvastatin 





Pancreatic insufficiency


- Hx of resected pancreatic mass


- c/w supplementation 





RLS


- c/w Ropinirole 





GERD


- DC Omeprazole and increase to protonix 40 BID, sucralfate





DVT prophylaxis


- c/w Eliquis 





Hypomagnesemia: i/s/o GI losses from diarrhea


-replete PRN





Disposition:


- Awaiting clinical improvement





VS,Carmenza, I+O


VSCarmenza I+O


Laboratory Tests


8/19/21 05:08











Vital Signs








  Date Time  Temp Pulse Resp B/P (MAP) Pulse Ox O2 Delivery O2 Flow Rate FiO2


 


8/19/21 08:00 98.2 82 18 136/62 (86) 98 Nasal Cannula 2.0 














I&O- Last 24 Hours up to 6 AM 


 


 8/19/21





 06:00


 


Intake Total 3180 ml


 


Balance 3180 ml

















CHAVA PACHECO MD   Aug 19, 2021 09:15

## 2021-08-20 VITALS — SYSTOLIC BLOOD PRESSURE: 132 MMHG | DIASTOLIC BLOOD PRESSURE: 66 MMHG

## 2021-08-20 VITALS — DIASTOLIC BLOOD PRESSURE: 60 MMHG | SYSTOLIC BLOOD PRESSURE: 130 MMHG

## 2021-08-20 VITALS — DIASTOLIC BLOOD PRESSURE: 66 MMHG | SYSTOLIC BLOOD PRESSURE: 134 MMHG

## 2021-08-20 VITALS — DIASTOLIC BLOOD PRESSURE: 65 MMHG | SYSTOLIC BLOOD PRESSURE: 138 MMHG

## 2021-08-20 VITALS — DIASTOLIC BLOOD PRESSURE: 68 MMHG | SYSTOLIC BLOOD PRESSURE: 134 MMHG

## 2021-08-20 VITALS — DIASTOLIC BLOOD PRESSURE: 83 MMHG | SYSTOLIC BLOOD PRESSURE: 125 MMHG

## 2021-08-20 LAB
BASOPHILS # BLD AUTO: 0 10^3/UL (ref 0–0.2)
BASOPHILS NFR BLD AUTO: 0.2 % (ref 0–1)
BUN SERPL-MCNC: 11 MG/DL (ref 7–18)
CALCIUM SERPL-MCNC: 7 MG/DL (ref 8.8–10.2)
CHLORIDE SERPL-SCNC: 113 MEQ/L (ref 98–107)
CO2 SERPL-SCNC: 20 MEQ/L (ref 21–32)
CREAT SERPL-MCNC: 0.87 MG/DL (ref 0.7–1.3)
EOSINOPHIL # BLD AUTO: 0.2 10^3/UL (ref 0–0.5)
EOSINOPHIL NFR BLD AUTO: 3.7 % (ref 0–3)
GFR SERPL CREATININE-BSD FRML MDRD: > 60 ML/MIN/{1.73_M2} (ref 49–?)
GLUCOSE SERPL-MCNC: 77 MG/DL (ref 70–100)
HCT VFR BLD AUTO: 34.9 % (ref 42–52)
HGB BLD-MCNC: 10.8 G/DL (ref 13.5–17.5)
LYMPHOCYTES # BLD AUTO: 0.8 10^3/UL (ref 1.5–5)
LYMPHOCYTES NFR BLD AUTO: 13.5 % (ref 24–44)
MAGNESIUM SERPL-MCNC: 1.5 MG/DL (ref 1.8–2.4)
MCH RBC QN AUTO: 25.4 PG (ref 27–33)
MCHC RBC AUTO-ENTMCNC: 30.9 G/DL (ref 32–36.5)
MCV RBC AUTO: 82.1 FL (ref 80–96)
MONOCYTES # BLD AUTO: 0.4 10^3/UL (ref 0–0.8)
MONOCYTES NFR BLD AUTO: 7.3 % (ref 2–8)
NEUTROPHILS # BLD AUTO: 4.4 10^3/UL (ref 1.5–8.5)
NEUTROPHILS NFR BLD AUTO: 74.8 % (ref 36–66)
PLATELET # BLD AUTO: 140 10^3/UL (ref 150–450)
POTASSIUM SERPL-SCNC: 4.6 MEQ/L (ref 3.5–5.1)
RBC # BLD AUTO: 4.25 10^6/UL (ref 4.3–6.1)
SODIUM SERPL-SCNC: 138 MEQ/L (ref 136–145)
WBC # BLD AUTO: 5.9 10^3/UL (ref 4–10)

## 2021-08-20 RX ADMIN — SUCRALFATE SCH GM: 1 SUSPENSION ORAL at 17:44

## 2021-08-20 RX ADMIN — SODIUM BICARBONATE SCH MG: 325 TABLET ORAL at 21:30

## 2021-08-20 RX ADMIN — CHOLESTYRAMINE SCH GM: 4 POWDER, FOR SUSPENSION ORAL at 09:26

## 2021-08-20 RX ADMIN — PANCRELIPASE SCH EA: 24000; 76000; 120000 CAPSULE, DELAYED RELEASE PELLETS ORAL at 17:44

## 2021-08-20 RX ADMIN — SODIUM CHLORIDE SCH ML: 9 INJECTION, SOLUTION INTRAMUSCULAR; INTRAVENOUS; SUBCUTANEOUS at 05:12

## 2021-08-20 RX ADMIN — IPRATROPIUM BROMIDE AND ALBUTEROL SULFATE PRN ML: .5; 3 SOLUTION RESPIRATORY (INHALATION) at 11:11

## 2021-08-20 RX ADMIN — IPRATROPIUM BROMIDE AND ALBUTEROL SULFATE PRN ML: .5; 3 SOLUTION RESPIRATORY (INHALATION) at 07:47

## 2021-08-20 RX ADMIN — PANTOPRAZOLE SODIUM SCH MG: 40 TABLET, DELAYED RELEASE ORAL at 21:31

## 2021-08-20 RX ADMIN — CHOLESTYRAMINE SCH GM: 4 POWDER, FOR SUSPENSION ORAL at 21:33

## 2021-08-20 RX ADMIN — FERROUS SULFATE TAB 325 MG (65 MG ELEMENTAL FE) SCH MG: 325 (65 FE) TAB at 09:26

## 2021-08-20 RX ADMIN — SODIUM BICARBONATE SCH MG: 325 TABLET ORAL at 09:26

## 2021-08-20 RX ADMIN — IPRATROPIUM BROMIDE AND ALBUTEROL SULFATE PRN ML: .5; 3 SOLUTION RESPIRATORY (INHALATION) at 19:43

## 2021-08-20 RX ADMIN — DIPHENOXYLATE HYDROCHLORIDE AND ATROPINE SULFATE SCH EA: 2.5; .025 TABLET ORAL at 09:26

## 2021-08-20 RX ADMIN — SUCRALFATE SCH GM: 1 SUSPENSION ORAL at 12:49

## 2021-08-20 RX ADMIN — MAGNESIUM SULFATE IN DEXTROSE SCH MLS/HR: 10 INJECTION, SOLUTION INTRAVENOUS at 10:48

## 2021-08-20 RX ADMIN — PROBIOTIC PRODUCT - TAB SCH EA: TAB at 09:26

## 2021-08-20 RX ADMIN — PANCRELIPASE SCH EA: 24000; 76000; 120000 CAPSULE, DELAYED RELEASE PELLETS ORAL at 12:50

## 2021-08-20 RX ADMIN — ROPINIROLE HYDROCHLORIDE SCH MG: 0.25 TABLET, FILM COATED ORAL at 09:26

## 2021-08-20 RX ADMIN — DIPHENOXYLATE HYDROCHLORIDE AND ATROPINE SULFATE SCH EA: 2.5; .025 TABLET ORAL at 21:31

## 2021-08-20 RX ADMIN — MAGNESIUM SULFATE IN DEXTROSE SCH MLS/HR: 10 INJECTION, SOLUTION INTRAVENOUS at 12:49

## 2021-08-20 RX ADMIN — PANTOPRAZOLE SODIUM SCH MG: 40 TABLET, DELAYED RELEASE ORAL at 09:26

## 2021-08-20 RX ADMIN — INSULIN LISPRO SCH UNITS: 100 INJECTION, SOLUTION INTRAVENOUS; SUBCUTANEOUS at 07:30

## 2021-08-20 RX ADMIN — SODIUM CHLORIDE, PRESERVATIVE FREE SCH ML: 5 INJECTION INTRAVENOUS at 05:13

## 2021-08-20 RX ADMIN — Medication SCH UNITS: at 05:12

## 2021-08-20 RX ADMIN — IPRATROPIUM BROMIDE SCH PUFF: 17 AEROSOL, METERED RESPIRATORY (INHALATION) at 15:03

## 2021-08-20 RX ADMIN — SODIUM CHLORIDE SCH ML: 9 INJECTION, SOLUTION INTRAMUSCULAR; INTRAVENOUS; SUBCUTANEOUS at 17:45

## 2021-08-20 RX ADMIN — SODIUM CHLORIDE, PRESERVATIVE FREE SCH ML: 5 INJECTION INTRAVENOUS at 21:33

## 2021-08-20 RX ADMIN — ASPIRIN SCH MG: 81 TABLET ORAL at 09:26

## 2021-08-20 RX ADMIN — SUCRALFATE SCH GM: 1 SUSPENSION ORAL at 09:26

## 2021-08-20 RX ADMIN — Medication SCH UNITS: at 17:45

## 2021-08-20 RX ADMIN — METOPROLOL TARTRATE SCH MG: 25 TABLET, FILM COATED ORAL at 09:31

## 2021-08-20 RX ADMIN — SODIUM CHLORIDE, PRESERVATIVE FREE SCH ML: 5 INJECTION INTRAVENOUS at 13:53

## 2021-08-20 RX ADMIN — IPRATROPIUM BROMIDE SCH PUFF: 17 AEROSOL, METERED RESPIRATORY (INHALATION) at 19:44

## 2021-08-20 RX ADMIN — KETOROLAC TROMETHAMINE PRN MG: 30 INJECTION, SOLUTION INTRAMUSCULAR at 10:49

## 2021-08-20 RX ADMIN — ATORVASTATIN CALCIUM SCH MG: 20 TABLET, FILM COATED ORAL at 21:29

## 2021-08-20 RX ADMIN — INSULIN LISPRO SCH UNITS: 100 INJECTION, SOLUTION INTRAVENOUS; SUBCUTANEOUS at 21:00

## 2021-08-20 RX ADMIN — METOPROLOL TARTRATE SCH MG: 25 TABLET, FILM COATED ORAL at 21:32

## 2021-08-20 RX ADMIN — INSULIN LISPRO SCH UNITS: 100 INJECTION, SOLUTION INTRAVENOUS; SUBCUTANEOUS at 12:49

## 2021-08-20 RX ADMIN — PANCRELIPASE SCH EA: 24000; 76000; 120000 CAPSULE, DELAYED RELEASE PELLETS ORAL at 09:26

## 2021-08-20 RX ADMIN — IPRATROPIUM BROMIDE SCH PUFF: 17 AEROSOL, METERED RESPIRATORY (INHALATION) at 11:11

## 2021-08-20 RX ADMIN — IPRATROPIUM BROMIDE AND ALBUTEROL SULFATE PRN ML: .5; 3 SOLUTION RESPIRATORY (INHALATION) at 00:51

## 2021-08-20 RX ADMIN — INSULIN LISPRO SCH UNITS: 100 INJECTION, SOLUTION INTRAVENOUS; SUBCUTANEOUS at 17:06

## 2021-08-20 RX ADMIN — SUCRALFATE SCH GM: 1 SUSPENSION ORAL at 21:30

## 2021-08-20 RX ADMIN — IPRATROPIUM BROMIDE AND ALBUTEROL SULFATE PRN ML: .5; 3 SOLUTION RESPIRATORY (INHALATION) at 15:03

## 2021-08-20 RX ADMIN — IPRATROPIUM BROMIDE AND ALBUTEROL SULFATE PRN ML: .5; 3 SOLUTION RESPIRATORY (INHALATION) at 05:14

## 2021-08-20 RX ADMIN — IPRATROPIUM BROMIDE SCH PUFF: 17 AEROSOL, METERED RESPIRATORY (INHALATION) at 07:47

## 2021-08-20 RX ADMIN — ROPINIROLE HYDROCHLORIDE SCH MG: 0.25 TABLET, FILM COATED ORAL at 21:30

## 2021-08-20 NOTE — IPNPDOC
Text Note


Date of Service


The patient was seen on 8/20/21.





NOTE


Subjective:


-No acute events


-On 2L NC


-9 episodes of diarrhea yesterday, 0 thus far today





Objective:


Vitals: see below


General: NAD, thin


HEENT: NCAT, EOMI, MMM


CVS: RRR, no m/r/g


Lungs: CTAB without auscultated wheezing, crackles or rhonchi


Abdomen: Normoactive bowel sounds, soft, NTND


Extremities: WWP, no LE edema


Neuro: CN3-12 intact, moving all extremities spontaneously with full strength 

and tone.





Labs: reviewed.





Imaging:


CXR 8/14:


RLL likely infiltrate





Assessment:


65 year old M with COPD, HFrEF, A. flutter (on Eliquis), CAD, HTN, CVA, 

Hypothyroidism, Pancreatic insufficiency (2/2 pancreatic mass resection), 

Chronic low back pain, polysubstance abuse, who originally presented to the ER 

with weakness, decreased oral intake and confusion, after discharge for 

bacterial pneumonia, decompensated diastolic CHF, atrial flutter with RVR and 

IBAN but represented reporting significant watery diarrhea and was found 

hypernatremic and acute kidney injury, acidosis and dehydration but signed out 

AMA before medical optimization, and now returned with the same, with mild 

hyperNa and acidosis and newly noted RLL likely infiltrate.





Diarrhea: still persisting


-C. diff / GI panel recently negative


-c/w cholestyramine and Lomotil per surgery


-surgery consulted given repeated negative infectious workup, started on lomotil

and cholestyramine, will continue to monitor, appreciate recs


-On PPI and sucralfate





Dehydration from poor PO intake, diarrhea


- Cr at baseline 


- Continue to avoid nephrotoxic medications


- Nephrology was consulted, s/p bicarb gtt on BID 650mg bicarb per nephrology to

reduce to 1/2 dose when bicarb stably wnl





Metabolic acidosis w/ lactic acidosis: resolved


-s/p IVF boluses


-Nephrology consulted, on PO bicarb





Hypernatremia - 2/2 dehydration from diarrhea, poor PO intake


-s/p IVF as noted above


-Daily BMP





Acute on chronic Normocytic anemia, with severe BETY


-daily CBC


-continue daily ferrous sulfate


-will give 2 u of blood today for hgb <8





Potential PNA


-was empirically placed on levaquin, s/p 3d, dc'd stop


-MRSA PCR negative





Chronic A flutter


- c/w rate control with metoprolol


- c/w full anticoagulation with eliquis





Hx of HFrEF


- No evidence of fluid overload


- ECHO 7/2021, LVEF 30-35%, moderate-severe hypokinesis of LV





Chronic PAD


- c/w ASA, Eliquis





CAD


- c/w ASA, Atorvastatin 





Pancreatic insufficiency


- Hx of resected pancreatic mass


- c/w supplementation 





RLS


- c/w Ropinirole 





GERD


- DC Omeprazole and increase to protonix 40 BID, sucralfate





DVT prophylaxis


- c/w Eliquis 





Hypomagnesemia: i/s/o GI losses from diarrhea


-replete PRN


-to start 800mg Mag Ox tomorrow after IV repletion today





Disposition:


- Awaiting clinical improvement


-PT/OT





VS,Carmenza, I+O


VS, Carmenza I+O


Laboratory Tests


8/19/21 09:28








8/20/21 06:19











Vital Signs








  Date Time  Temp Pulse Resp B/P (MAP) Pulse Ox O2 Delivery O2 Flow Rate FiO2


 


8/20/21 06:00 97.9 82 19 134/68 (90) 98 Nasal Cannula 2.0 














I&O- Last 24 Hours up to 6 AM 


 


 8/20/21





 06:00


 


Intake Total 3060 ml


 


Output Total 0 ml


 


Balance 3060 ml

















CHAVA PACHECO MD   Aug 20, 2021 09:29

## 2021-08-21 VITALS — SYSTOLIC BLOOD PRESSURE: 127 MMHG | DIASTOLIC BLOOD PRESSURE: 57 MMHG

## 2021-08-21 VITALS — SYSTOLIC BLOOD PRESSURE: 119 MMHG | DIASTOLIC BLOOD PRESSURE: 58 MMHG

## 2021-08-21 VITALS — DIASTOLIC BLOOD PRESSURE: 62 MMHG | SYSTOLIC BLOOD PRESSURE: 129 MMHG

## 2021-08-21 LAB
BASOPHILS # BLD AUTO: 0 10^3/UL (ref 0–0.2)
BASOPHILS NFR BLD AUTO: 0.4 % (ref 0–1)
BUN SERPL-MCNC: 12 MG/DL (ref 7–18)
CALCIUM SERPL-MCNC: 7 MG/DL (ref 8.8–10.2)
CHLORIDE SERPL-SCNC: 111 MEQ/L (ref 98–107)
CO2 SERPL-SCNC: 21 MEQ/L (ref 21–32)
CREAT SERPL-MCNC: 0.79 MG/DL (ref 0.7–1.3)
EOSINOPHIL # BLD AUTO: 0.2 10^3/UL (ref 0–0.5)
EOSINOPHIL NFR BLD AUTO: 4.2 % (ref 0–3)
GFR SERPL CREATININE-BSD FRML MDRD: > 60 ML/MIN/{1.73_M2} (ref 49–?)
GLUCOSE SERPL-MCNC: 73 MG/DL (ref 70–100)
HCT VFR BLD AUTO: 32.8 % (ref 42–52)
HGB BLD-MCNC: 10.4 G/DL (ref 13.5–17.5)
LYMPHOCYTES # BLD AUTO: 0.7 10^3/UL (ref 1.5–5)
LYMPHOCYTES NFR BLD AUTO: 14.6 % (ref 24–44)
MAGNESIUM SERPL-MCNC: 1.7 MG/DL (ref 1.8–2.4)
MCH RBC QN AUTO: 25.6 PG (ref 27–33)
MCHC RBC AUTO-ENTMCNC: 31.7 G/DL (ref 32–36.5)
MCV RBC AUTO: 80.8 FL (ref 80–96)
MONOCYTES # BLD AUTO: 0.5 10^3/UL (ref 0–0.8)
MONOCYTES NFR BLD AUTO: 10.8 % (ref 2–8)
NEUTROPHILS # BLD AUTO: 3.4 10^3/UL (ref 1.5–8.5)
NEUTROPHILS NFR BLD AUTO: 69.6 % (ref 36–66)
PLATELET # BLD AUTO: 152 10^3/UL (ref 150–450)
POTASSIUM SERPL-SCNC: 4.4 MEQ/L (ref 3.5–5.1)
RBC # BLD AUTO: 4.06 10^6/UL (ref 4.3–6.1)
SODIUM SERPL-SCNC: 138 MEQ/L (ref 136–145)
WBC # BLD AUTO: 4.8 10^3/UL (ref 4–10)

## 2021-08-21 RX ADMIN — SODIUM BICARBONATE SCH MG: 325 TABLET ORAL at 08:22

## 2021-08-21 RX ADMIN — ROPINIROLE HYDROCHLORIDE SCH MG: 0.25 TABLET, FILM COATED ORAL at 08:21

## 2021-08-21 RX ADMIN — PROBIOTIC PRODUCT - TAB SCH EA: TAB at 08:22

## 2021-08-21 RX ADMIN — IPRATROPIUM BROMIDE SCH PUFF: 17 AEROSOL, METERED RESPIRATORY (INHALATION) at 11:28

## 2021-08-21 RX ADMIN — METOPROLOL TARTRATE SCH MG: 25 TABLET, FILM COATED ORAL at 08:25

## 2021-08-21 RX ADMIN — KETOROLAC TROMETHAMINE PRN MG: 30 INJECTION, SOLUTION INTRAMUSCULAR at 08:26

## 2021-08-21 RX ADMIN — INSULIN LISPRO SCH UNITS: 100 INJECTION, SOLUTION INTRAVENOUS; SUBCUTANEOUS at 13:01

## 2021-08-21 RX ADMIN — IPRATROPIUM BROMIDE AND ALBUTEROL SULFATE PRN ML: .5; 3 SOLUTION RESPIRATORY (INHALATION) at 11:28

## 2021-08-21 RX ADMIN — IPRATROPIUM BROMIDE SCH PUFF: 17 AEROSOL, METERED RESPIRATORY (INHALATION) at 15:20

## 2021-08-21 RX ADMIN — INSULIN LISPRO SCH UNITS: 100 INJECTION, SOLUTION INTRAVENOUS; SUBCUTANEOUS at 09:40

## 2021-08-21 RX ADMIN — INSULIN LISPRO SCH UNITS: 100 INJECTION, SOLUTION INTRAVENOUS; SUBCUTANEOUS at 16:49

## 2021-08-21 RX ADMIN — PANCRELIPASE SCH EA: 24000; 76000; 120000 CAPSULE, DELAYED RELEASE PELLETS ORAL at 08:21

## 2021-08-21 RX ADMIN — PANTOPRAZOLE SODIUM SCH MG: 40 TABLET, DELAYED RELEASE ORAL at 08:22

## 2021-08-21 RX ADMIN — SODIUM CHLORIDE SCH ML: 9 INJECTION, SOLUTION INTRAMUSCULAR; INTRAVENOUS; SUBCUTANEOUS at 05:58

## 2021-08-21 RX ADMIN — IPRATROPIUM BROMIDE AND ALBUTEROL SULFATE PRN ML: .5; 3 SOLUTION RESPIRATORY (INHALATION) at 07:49

## 2021-08-21 RX ADMIN — FERROUS SULFATE TAB 325 MG (65 MG ELEMENTAL FE) SCH MG: 325 (65 FE) TAB at 08:23

## 2021-08-21 RX ADMIN — DIPHENOXYLATE HYDROCHLORIDE AND ATROPINE SULFATE SCH EA: 2.5; .025 TABLET ORAL at 08:21

## 2021-08-21 RX ADMIN — SUCRALFATE SCH GM: 1 SUSPENSION ORAL at 08:21

## 2021-08-21 RX ADMIN — IPRATROPIUM BROMIDE AND ALBUTEROL SULFATE PRN ML: .5; 3 SOLUTION RESPIRATORY (INHALATION) at 15:20

## 2021-08-21 RX ADMIN — Medication SCH UNITS: at 05:57

## 2021-08-21 RX ADMIN — IPRATROPIUM BROMIDE SCH PUFF: 17 AEROSOL, METERED RESPIRATORY (INHALATION) at 07:49

## 2021-08-21 RX ADMIN — SUCRALFATE SCH GM: 1 SUSPENSION ORAL at 13:02

## 2021-08-21 RX ADMIN — SODIUM CHLORIDE, PRESERVATIVE FREE SCH ML: 5 INJECTION INTRAVENOUS at 05:58

## 2021-08-21 RX ADMIN — PANCRELIPASE SCH EA: 24000; 76000; 120000 CAPSULE, DELAYED RELEASE PELLETS ORAL at 13:02

## 2021-08-21 RX ADMIN — IPRATROPIUM BROMIDE AND ALBUTEROL SULFATE PRN ML: .5; 3 SOLUTION RESPIRATORY (INHALATION) at 03:01

## 2021-08-21 RX ADMIN — CHOLESTYRAMINE SCH GM: 4 POWDER, FOR SUSPENSION ORAL at 08:21

## 2021-08-21 RX ADMIN — ASPIRIN SCH MG: 81 TABLET ORAL at 08:21

## 2021-08-21 RX ADMIN — SODIUM CHLORIDE, PRESERVATIVE FREE SCH ML: 5 INJECTION INTRAVENOUS at 14:15

## 2021-08-21 NOTE — IPN
PROGRESS NOTE



DATE:  08/21/2021



SUBJECTIVE: The patient has been started on some Lomotil and has had decreasing

bowel movements. He states that things are much better now since being on the

Lomotil. However I anticipate his major issue is probably gastric emptying

issue/gastric dumping as his major issue, more so than truly a colitis or a

colonic issue and it may be reasonable once the nutritionists are available on

Monday to have them discuss a diet that would be appropriate for a dumping

syndrome, but at this point continuing him on his current medications including

the Lomotil is very warranted. In addition, we may add some Colestipol to his

current Questran but from my standpoint, and given his improvement and

decreased output, I would like to avoid over-shooting and causing diarrhea in

this individual. We will see how he does with the current plan.

## 2021-08-21 NOTE — DS.PDOC
Discharge Summary


General


Date of Admission


Aug 15, 2021 at 00:23


Date of Discharge


8/21/2021


Attending Physician:  CHAVA PACHECO MD


Specialist/Consultants Involve:  Gosselin Jr,Leo J





Discharge Summary


PROCEDURES PERFORMED DURING STAY: None





ADMITTING DIAGNOSES: 


Diarrhea





DISCHARGE DIAGNOSES:


Hypernatremia


NAGMA


Non-infectious persistent diarrhea


Hypomagnesemia


Acute on chronic anemia


COPD


chronic HFrEF


History of transient A. flutter and was placed on Eliquis that I have now 

discontinued i/s/o resolution and acute on chronic anemia requiring transfusions


CAD


HTN


Hypothyroidism


Pancreatic insufficiency (2/2 pancreatic mass resection)


Chronic low back pain


History of polysubstance abuse





COMPLICATIONS/CHIEF COMPLAINT: Acute Respiratory Failure With Hypoxemia.





HISTORY OF PRESENT ILLNESS: 


65-year-old M with past medical history of COPD, HFrEF, transient A. flutter and

was placed on Eliquis, CAD, HTN, CVA, Hypothyroidism, Pancreatic insufficiency 

(2/2 pancreatic mass resection), Chronic low back pain, Polysubstance abuse, who

presented to the ER with shortness of breath, weakness and diarrhea.  





HOSPITAL COURSE:


In the ED, he was a poor historian and per chart review he had recently been 

admitted for bacterial pneumonia, decompensated diastolic CHF, atrial flutter 

with RVR i/s/o sepsis and IBAN and was ultimately discharged on 8/4/21. Patient 

reported significant watery diarrhea and was readmitted on 8/10/2021 and at that

time, he was found to have hypernatremia and acute kidney injury and was 

admitted for hydration, diarrhea workup and management and nephrology 

consultation. On 8/14/2021 patient left [facility] as he reported "wanting to be home" 

only to return after family friend encouraged him to come back to the hospital 

thus this encounter.  





On his return, he reported increased diarrhea, weakness and shortness of breath 

since leaving the hospital and reported that now he would like to be admitted 

for further work-up.  He also verbalized interest in rehab if needed.  Upon 

arrival to the ED patient SPO2 78% on room air-improvement 100% on 2 L nasal 

cannula.  Chest x-ray shows right lower lobe infiltrate. Of note, initial lab 

work patient with nonanion gap metabolic acidosis and he was admitted for RLL 

PNA, hypernatremia, NAGMA, persistent diarrhea and hypoxemic respiratory 

failure. He was treated with levaquin for the RLL PNA with resolution of 

hypoxemia, was hydrated with IVF, treated with bicarb for the acidosis and GI 

panel was negative. Surgery was consulted for potential scope given the non-

infectious persistent diarrhea and started him on cholestyramine, continued the 

diphenoxylate/atropine, while also having him on BID PPI and sucralfate. his 

Metamucil was held. His symptoms eventually improved and diarrhea has much 

improved with an average 3 BMs per day which was a great improvement from the 

>10 at admission. He also had significant persistent hypomagnesemia for which he

has now been started on 800mg BID repletion.





DISCHARGE MEDICATIONS: Please see below.


 


ALLERGIES: Please see below.





PHYSICAL EXAMINATION ON DISCHARGE:


VITAL SIGNS: Please see below.


General: NAD, thin


HEENT: NCAT, EOMI, MMM


CVS: RRR, no m/r/g


Lungs: CTAB without auscultated wheezing, crackles or rhonchi


Abdomen: Normoactive bowel sounds, soft, NTND


Extremities: WWP, no LE edema


Neuro: CN3-12 intact, moving all extremities spontaneously with full strength 

and tone.





LABORATORY DATA: Please see below.





IMAGING: 





CXR 8/14:


RLL likely infiltrate





PROGNOSIS: Good





ACTIVITY: As tolerated





DIET: regular





DISCHARGE PLAN: Home with services





DISPOSITION: Home with services





DISCHARGE INSTRUCTIONS:


Home with services. Please take meds as prescribed and present to PCP for follow

up. 





ITEMS TO FOLLOWUP ON ON OUTPATIENT:


Diarrhea 





DISCHARGE CONDITION: Stable





TIME SPENT ON DISCHARGE: 55 minutes.





Vital Signs/I&Os





Vital Signs








  Date Time  Temp Pulse Resp B/P (MAP) Pulse Ox O2 Delivery O2 Flow Rate FiO2


 


8/21/21 08:25  82  127/57    


 


8/21/21 06:24 97.5  18  96 Room Air  


 


8/20/21 06:00       2.0 














I&O- Last 24 Hours up to 6 AM 


 


 8/21/21





 06:00


 


Intake Total 3515 ml


 


Output Total 0 ml


 


Balance 3515 ml











Laboratory Data


Labs 24H


Laboratory Tests 2


8/20/21 16:50: Bedside Glucose (Misc Panel) 98


8/20/21 20:15: Bedside Glucose (Misc Panel) 101


8/21/21 08:04: 


Anion Gap 6L, Glomerular Filtration Rate > 60.0, Calcium Level 7.0L, Magnesium 

Level 1.7L


8/21/21 08:05: 


Immature Granulocyte % (Auto) 0.4, Neutrophils (%) (Auto) 69.6H, Lymphocytes (%)

(Auto) 14.6L, Monocytes (%) (Auto) 10.8H, Eosinophils (%) (Auto) 4.2H, Basophils

(%) (Auto) 0.4, Neutrophils # (Auto) 3.4, Lymphocytes # (Auto) 0.7L, Monocytes #

(Auto) 0.5, Eosinophils # (Auto) 0.2, Basophils # (Auto) 0.0, Nucleated Red 

Blood Cells % (auto) 0.0


8/21/21 11:17: Bedside Glucose (Misc Panel) 98


CBC/BMP


Laboratory Tests


8/21/21 08:04








8/21/21 08:05








FSBS





Laboratory Tests








Test


 8/20/21


16:50 8/20/21


20:15 8/21/21


11:17 Range/Units


 


 


Bedside Glucose (Misc Panel) 98 101 98   MG/DL











Microbiology





Microbiology


8/14/21 Blood Culture - Final, Complete


          NO GROWTH AFTER 5 DAYS


8/14/21 Blood Culture - Final, Complete


          NO GROWTH AFTER 5 DAYS





Discharge Medications


Scheduled


Apixaban (Eliquis) 2.5 Mg Tablet, 2.5 MG PO BID, (Reported)


Aspirin (Aspirin EC) 81 Mg Tablet.dr, 81 MG PO DAILY, (Reported)


Atorvastatin Calcium (Atorvastatin Calcium) 40 Mg Tablet, 40 MG PO QHS, 

(Reported)


Cholestyramine (Cholestyramine Packet) 4 Gm Powd.pack, 4 GM PO BID


Ferrous Sulfate (Ferrous Sulfate) 325 Mg Tablet, 325 MG PO DAILY, (Reported)


Ipratropium Bromide (Atrovent Hfa) 12.9 Gm Hfa.aer.ad, 2 PUFF INH QID, 

(Reported)


L.acidoph/L.bulg/B.bif/S.therm (Bacid Caplet) 1 Each Tablet, 1 TAB PO WM, 

(Reported)


Magnesium Oxide (Magnesium Oxide) 400 Mg Tablet, 800 MG PO BID


Metoprolol Tartrate (Metoprolol Tartrate) 25 Mg Tablet, 25 MG PO BID, (Reported)


Omeprazole (Omeprazole) 20 Mg Capsule.dr, 20 MG PO DAILY, (Reported)


Pancreatic Enzymes (Creon Dr 24,000 Units Capsule) 1 Each Capsule.dr, 2 CAP PO 

WM, (Reported)


Pantoprazole Sodium (Pantoprazole Sodium) 40 Mg Tablet.dr, 40 MG PO BID


Ropinirole HCl (Ropinirole HCl) 0.5 Mg Tablet, 0.5 MG PO BID, (Reported)


Sodium Bicarbonate (Sodium Bicarbonate) 325 Mg Tablet, 650 MG PO BID


Sucralfate (Sucralfate) 1 Gm Tablet, 1 GRAM PO QID





Scheduled PRN


Albuterol Sulf (Albuterol Sulfate) 2.5 Mg/3 Ml Vial.neb, 2.5 MG INH Q6H PRN for 

SHORTNESS OF BREATH, (Reported)


Albuterol Sulfate (Albuterol Sulfate Hfa) 8.5 Gm Hfa.aer.ad, 2 PUFFS INH Q4H PRN

for SHORTNESS OF BREATH, (Reported)


Diphenoxylate HCl/Atropine (Diphenoxylate-Atrop 2.5-0.025) 1 Each Tablet, 1 TAB 

PO QID PRN for DIARRHEA, (Reported)


Oxycodone HCl/Acetaminophen (Oxycodon-Acetaminophen 2.5-325) 1 Each Tablet, 1 

TAB PO Q8H PRN for PAIN LEVEL 6-10, (Reported)





Miscellaneous Medications


[Patient Comment]  , (Reported)


   MED REC COMPLETED VIA DISCHARGE 8/14 MORNING 





Allergies


Coded Allergies:  


     amoxicillin (Verified  Allergy, Unknown, rash, 9/30/19)


     clavulanic acid (Verified  Allergy, Unknown, rash, 9/30/19)


     pregabalin (Verified  Adverse Reaction, Unknown, seizures, 9/30/19)


     warfarin (Verified  Adverse Reaction, Unknown, bleeding, 9/30/19)











CHAVA PACHECO MD   Aug 21, 2021 13:09

## 2021-08-25 ENCOUNTER — HOSPITAL ENCOUNTER (EMERGENCY)
Dept: HOSPITAL 53 - M ED | Age: 66
LOS: 1 days | Discharge: HOME | End: 2021-08-26
Payer: MEDICARE

## 2021-08-25 VITALS — HEIGHT: 66 IN | WEIGHT: 134.48 LBS | BODY MASS INDEX: 21.61 KG/M2

## 2021-08-25 DIAGNOSIS — F17.210: ICD-10-CM

## 2021-08-25 DIAGNOSIS — J44.9: ICD-10-CM
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
## 2021-08-25 DIAGNOSIS — Z79.899: ICD-10-CM

## 2021-08-25 DIAGNOSIS — F11.10: ICD-10-CM

## 2021-08-25 DIAGNOSIS — I25.10: ICD-10-CM
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
## 2021-08-25 DIAGNOSIS — Z79.01: ICD-10-CM

## 2021-08-25 DIAGNOSIS — Z88.0: ICD-10-CM

## 2021-08-25 DIAGNOSIS — S29.9XXA: Primary | ICD-10-CM

## 2021-08-25 DIAGNOSIS — Z88.8: ICD-10-CM

## 2021-08-25 LAB
ALBUMIN SERPL BCG-MCNC: 1.8 GM/DL (ref 3.2–5.2)
ALT SERPL W P-5'-P-CCNC: 28 U/L (ref 12–78)
APTT BLD: 35.9 SECONDS (ref 25.9–37)
BASOPHILS # BLD AUTO: 0 10^3/UL (ref 0–0.2)
BASOPHILS NFR BLD AUTO: 0.3 % (ref 0–1)
BILIRUB CONJ SERPL-MCNC: < 0.1 MG/DL (ref 0–0.2)
BILIRUB SERPL-MCNC: 0.3 MG/DL (ref 0.2–1)
BUN SERPL-MCNC: 19 MG/DL (ref 7–18)
CALCIUM SERPL-MCNC: 7.3 MG/DL (ref 8.8–10.2)
CHLORIDE SERPL-SCNC: 115 MEQ/L (ref 98–107)
CO2 SERPL-SCNC: 21 MEQ/L (ref 21–32)
CREAT SERPL-MCNC: 1.18 MG/DL (ref 0.7–1.3)
EOSINOPHIL # BLD AUTO: 0.2 10^3/UL (ref 0–0.5)
EOSINOPHIL NFR BLD AUTO: 2.9 % (ref 0–3)
GFR SERPL CREATININE-BSD FRML MDRD: > 60 ML/MIN/{1.73_M2} (ref 49–?)
GLUCOSE SERPL-MCNC: 71 MG/DL (ref 70–100)
HCT VFR BLD AUTO: 40.6 % (ref 42–52)
HGB BLD-MCNC: 12.3 G/DL (ref 13.5–17.5)
INR PPP: 1.21
LIPASE SERPL-CCNC: 41 U/L (ref 73–393)
LYMPHOCYTES # BLD AUTO: 0.8 10^3/UL (ref 1.5–5)
LYMPHOCYTES NFR BLD AUTO: 12.4 % (ref 24–44)
MCH RBC QN AUTO: 25.5 PG (ref 27–33)
MCHC RBC AUTO-ENTMCNC: 30.3 G/DL (ref 32–36.5)
MCV RBC AUTO: 84.1 FL (ref 80–96)
MONOCYTES # BLD AUTO: 0.9 10^3/UL (ref 0–0.8)
MONOCYTES NFR BLD AUTO: 13.5 % (ref 2–8)
NEUTROPHILS # BLD AUTO: 4.7 10^3/UL (ref 1.5–8.5)
NEUTROPHILS NFR BLD AUTO: 70.4 % (ref 36–66)
PLATELET # BLD AUTO: 176 10^3/UL (ref 150–450)
POTASSIUM SERPL-SCNC: (no result) MEQ/L (ref 3.5–5.1)
PROT SERPL-MCNC: 5.3 GM/DL (ref 6.4–8.2)
PROTHROMBIN TIME: 15.7 SECONDS (ref 12.7–14.5)
RBC # BLD AUTO: 4.83 10^6/UL (ref 4.3–6.1)
RSV RNA NPH QL NAA+PROBE: NEGATIVE
SODIUM SERPL-SCNC: 142 MEQ/L (ref 136–145)
WBC # BLD AUTO: 6.6 10^3/UL (ref 4–10)

## 2021-08-25 PROCEDURE — 72125 CT NECK SPINE W/O DYE: CPT

## 2021-08-25 PROCEDURE — 96374 THER/PROPH/DIAG INJ IV PUSH: CPT

## 2021-08-25 PROCEDURE — 99285 EMERGENCY DEPT VISIT HI MDM: CPT

## 2021-08-25 PROCEDURE — 84132 ASSAY OF SERUM POTASSIUM: CPT

## 2021-08-25 PROCEDURE — 74177 CT ABD & PELVIS W/CONTRAST: CPT

## 2021-08-25 PROCEDURE — 85610 PROTHROMBIN TIME: CPT

## 2021-08-25 PROCEDURE — 87631 RESP VIRUS 3-5 TARGETS: CPT

## 2021-08-25 PROCEDURE — 85730 THROMBOPLASTIN TIME PARTIAL: CPT

## 2021-08-25 PROCEDURE — 71260 CT THORAX DX C+: CPT

## 2021-08-25 PROCEDURE — 83690 ASSAY OF LIPASE: CPT

## 2021-08-25 PROCEDURE — 74176 CT ABD & PELVIS W/O CONTRAST: CPT

## 2021-08-25 PROCEDURE — 94640 AIRWAY INHALATION TREATMENT: CPT

## 2021-08-25 PROCEDURE — 80048 BASIC METABOLIC PNL TOTAL CA: CPT

## 2021-08-25 PROCEDURE — 70450 CT HEAD/BRAIN W/O DYE: CPT

## 2021-08-25 PROCEDURE — 36415 COLL VENOUS BLD VENIPUNCTURE: CPT

## 2021-08-25 PROCEDURE — 96361 HYDRATE IV INFUSION ADD-ON: CPT

## 2021-08-25 PROCEDURE — 80076 HEPATIC FUNCTION PANEL: CPT

## 2021-08-25 PROCEDURE — 85025 COMPLETE CBC W/AUTO DIFF WBC: CPT

## 2021-08-25 NOTE — REP
INDICATION:

Poor access.



COMPARISON:

None.



TECHNIQUE:

The procedure was performed under the direct supervision of Dr. Orozco.



The risks and benefits of the procedure were explained to the patient and informed

consent was obtained.



The right brachial vein was localized using ultrasound guidance.  The skin was prepped

and draped in a sterile fashion.  1 mL of 1% lidocaine was used as a local anesthetic.

Using ultrasound guidance the brachial vein was cannulated and a 0.018 guidewire was

inserted.  The needle was removed and a 5.5 Ivorian dilator and peel-away sheath was

inserted over the guidewire.  A 5.5 Ivorian dual lumen catheter was left a length of

16.5 cm.  The dilator was removed and the catheter was inserted over the guidewire.

The peel-away sheath was removed and the catheter was flushed with heparinized saline

as per hospital protocol.  The catheter was affixed to the skin and a sterile dressing

was applied.



The patient tolerated the procedure well and there were no immediate complications.



FINDINGS:

None



IMPRESSION:

Midline catheter insertion right brachial vein.



<Electronically signed by Raciel Cole > 08/19/21 [id number]

<Electronically signed by Leonel Orozco > 08/19/21 [id number]

## 2021-08-25 NOTE — REPVR
PROCEDURE INFORMATION: 

Exam: CT Cervical Spine Without Contrast 

Exam date and time: 8/25/2021 10:43 PM 

Age: 65 years old 

Clinical indication: Injury or trauma; Fall; Blunt trauma; Additional info: 

Trauma, posterior 



TECHNIQUE: 

Imaging protocol: Computed tomography images of the cervical spine without 

contrast. 

Radiation optimization: All CT scans at this facility use at least one of these 

dose optimization techniques: automated exposure control; mA and/or kV 

adjustment per patient size (includes targeted exams where dose is matched to 

clinical indication); or iterative reconstruction. 



COMPARISON: 

US Duplex,carotid (complete) 8/21/2014 1:52 PM 



FINDINGS: 

Limitations: Anterior cortex of anterior arch of C1 is not included on this 

scan and cannot be evaluated. 



Bones/joints: There is no fracture of the vertebra. The vertebral bodies 

maintain their height. There is no fracture of the posterior elements. There is 

mild retrolisthesis at C4-C5 and C5-C6 and anterolisthesis at C7-T1. Alignment 

is otherwise normal. 

Discs/Spinal canal/Neural foramina: There is multilevel disc space narrowing. 

There are posterior osteophytes and disc bulges. This results in severe central 

stenosis at C5-C6. There is moderate central stenosis at C4-C5. There is 

foraminal stenosis most severe on the left at C4-C5, bilaterally at at C5-C6 

and on the left at C6-C7. 



Lungs: There is centrilobular emphysema in the lung apices. 

Soft tissues: Unremarkable. 



IMPRESSION: 

1. No fracture of the cervical spine. 

2. Degenerative findings with spinal stenosis most severe at C5-C6. Details 

above. 



Electronically signed by: Valdez Moore On 08/25/2021  23:56:49 PM

## 2021-08-25 NOTE — REPVR
PROCEDURE INFORMATION: 

Exam: CT Abdomen And Pelvis With Contrast 

Exam date and time: 8/25/2021 10:43 PM 

Age: 65 years old 

Clinical indication: Injury or trauma; Fall; Blunt; Generalized; Additional 

info: Trauma, posterior 



TECHNIQUE: 

Imaging protocol: Computed tomography of the abdomen and pelvis with contrast. 

Radiation optimization: All CT scans at this facility use at least one of these 

dose optimization techniques: automated exposure control; mA and/or kV 

adjustment per patient size (includes targeted exams where dose is matched to 

clinical indication); or iterative reconstruction. 

Contrast material: ISOVUE 370; Contrast volume: 100 ml; Contrast route: 

INTRAVENOUS (IV);  



COMPARISON: 

CT ABD PELVIS W/O CONTRAST 8/9/2021 11:08 PM 



FINDINGS: 

Lungs: Clear appearing lung bases. 

Liver: There is uniform enhancement of the liver with no evidence of liver 

laceration. 

Gallbladder and bile ducts: Normal appearing gallbladder. 

Pancreas: Normal size pancreas. 

Spleen: There is enhancement of the spleen with no evidence of splenic 

laceration. 

Adrenal glands: Normal sized adrenal glands. 

Kidneys and ureters: There is enhancement of the kidneys with no evidence of 

laceration. 

Stomach and bowel: There are surgical clips at the anterior aspect of the 

stomach. 

Appendix:  Not identified.

Intraperitoneal space: There is no evidence of pneumoperitoneum. There is 

moderate free fluid in the left side of the abdomen and left paracolic space. 

The fluid extends into a left inguinal hernia.  There  is an anterior abdominal 

wall hernia above the umbilicus with protrusion of bowel. There is evidence of 

edema through the mesentery. 

Vasculature: There is calcification of the aorta consistent with 

atherosclerotic changes. There is calcification and significant stenosis of the 

SMA. There is calcification and stenosis of the renal arteries. There is 

opacification of the aorta and also prominent calcification consistent with 

severe atherosclerotic changes. There is prominent calcification at the origin 

of the iliac arteries and stenosis. 

Lymph nodes: Unremarkable. No enlarged lymph nodes. 

Urinary bladder: Normal urinary bladder. Normal appearing urinary bladder. 

Reproductive: There is a cyst at the right seminal vesicle. 

Bones/joints: There is a large posterior disc protrusion L3-L4. There are old 

healed rib fractures bilaterally. No acute fracture is seen. 

Other findings: The fluid within the abdomen is new since the CT of 08/09/2021. 

 The examination is very difficult to interpret because of lack of subcutaneous 

fat and intra-abdominal as the patient is ematiated. 



IMPRESSION: 

1. There has been development of a moderate amount of free fluid in the upper 

and left abdomen following the left paracolic space and into the left pelvis 

where there is fluid entering the left inguinal canal hernia. 

2. To exclude any possibility of bowel perforation recommend repeating CT scan 

after oral contrast and waiting 2-1/2 to 3 hours. The other concern would be 

ischemic change of bowel. Severe atherosclerotic changes of the aorta and SMA. 

3. Anterior abdominal wall hernia with protrusion of the margin of bowel also 

developing since 08/09/2021. 



Electronically signed by: Yasmany Mooney On 08/25/2021  23:23:46 PM

## 2021-08-25 NOTE — REPVR
PROCEDURE INFORMATION: 

Exam: CT Head Without Contrast 

Exam date and time: 8/25/2021 10:43 PM 

Age: 65 years old 

Clinical indication: Injury or trauma; Fall; Blunt trauma (contusions or 

hematomas); Additional info: Trauma, posterior 



TECHNIQUE: 

Imaging protocol: Computed tomography of the head without contrast. 

Radiation optimization: All CT scans at this facility use at least one of these 

dose optimization techniques: automated exposure control; mA and/or kV 

adjustment per patient size (includes targeted exams where dose is matched to 

clinical indication); or iterative reconstruction. 



COMPARISON: 

MRI-Brain without Contrast 8/21/2014 2:34 PM 



FINDINGS: 

Brain: The there is volume loss. There is white matter lucency consistent with 

chronic microvascular disease. There are small old no left frontal and parietal 

cortical infarcts. No acute infarct is identified. There is no hemorrhage or 

extra-axial collection. There is no mass. 

Cerebral ventricles: There is no hydrocephalus. 

Paranasal sinuses: Visualized sinuses are unremarkable. No fluid levels. 

Mastoid air cells: Visualized mastoid air cells are well aerated. 

Bones/joints: Unremarkable. No acute fracture. 

Soft tissues: Unremarkable. 



IMPRESSION: 

1. There is chronic microvascular disease and small old infarcts. 

2. No acute intracranial injury or lesion 



Electronically signed by: Valdez Moore On 08/25/2021  23:50:55 PM

## 2021-08-25 NOTE — REPVR
PROCEDURE INFORMATION: 

Exam: CT Chest With Contrast; Diagnostic 

Exam date and time: 8/25/2021 10:43 PM 

Age: 65 years old 

Clinical indication: Injury or trauma; Fall; Blunt trauma (contusions or 

hematomas); Additional info: Trauma, posterior 



TECHNIQUE: 

Imaging protocol: Diagnostic computed tomography of the chest with contrast. 

Radiation optimization: All CT scans at this facility use at least one of these 

dose optimization techniques: automated exposure control; mA and/or kV 

adjustment per patient size (includes targeted exams where dose is matched to 

clinical indication); or iterative reconstruction. 

Contrast material: ISOVUE 370; Contrast volume: 100 ml; Contrast route: 

INTRAVENOUS (IV);  



COMPARISON: 

CT Chest with contrast 10/23/2017 11:26 AM 



FINDINGS: 

Lungs: There is a 5 mm density left mid lung field and not seen on the previous 

CT scan of the chest probably a noncalcified granuloma. However, to exclude any 

possibility of significant lesion recommend follow-up CT in 6 months. Prominent 

calcified granuloma left lung unchanged. 3 cm wedge-shaped area stranding 

density posterior aspect of the left lower lobe probably scarring calcified 

granulomas on the right. Multiple calcified lymph nodes as well. 

Pleural spaces: Unremarkable. No pneumothorax. No pleural effusion. 

Heart: The heart is top-normal in size with no pericardial effusion. 

Mediastinal space: There is severe thickening of the entire esophagus which 

could be secondary to an infiltrative process or ischemic change. 

Pulmonary arteries: There is opacification of the pulmonary arteries with no 

evidence of pulmonary embolus. 

Aorta: There is opacification of the aorta which appears intact. 

Lymph nodes:  Calcified lymph nodes within the mediastinum and right and left 

hilum.



Bones/joints: Old healed rib fractures are noted bilaterally. The vertebra 

appear in alignment. Postoperative changes noted of the sternum. There is 

fragmentation of the midsternum but appearing chronic. 





IMPRESSION: 

1. 5 mm density left lung and 3 cm wedge-shaped area of density right lung. 

Recommend follow-up CT in 6 months to document stability as these areas are new 

since 2017. 

2. Severe diffuse thickening of the entire esophagus which may be secondary to 

a diffuse infiltrative process, esophagitis or ischemic change. 



Electronically signed by: Yasmany Mooney On 08/25/2021  23:39:31 PM

## 2021-08-26 VITALS — SYSTOLIC BLOOD PRESSURE: 137 MMHG | DIASTOLIC BLOOD PRESSURE: 64 MMHG

## 2021-08-26 RX ADMIN — DIATRIZOATE MEGLUMINE AND DIATRIZOATE SODIUM SCH ML: 600; 100 SOLUTION ORAL; RECTAL at 02:00

## 2021-08-26 RX ADMIN — DIATRIZOATE MEGLUMINE AND DIATRIZOATE SODIUM SCH ML: 600; 100 SOLUTION ORAL; RECTAL at 01:21

## 2021-08-26 NOTE — REPVR
PROCEDURE INFORMATION: 

Exam: CT Abdomen And Pelvis Without Contrast 

Exam date and time: 8/26/2021 3:25 AM 

Age: 65 years old 

Clinical indication: Other: Intra-abdominal fluid post fall, perf v ischemia v 

cirrhosis 



TECHNIQUE: 

Imaging protocol: Computed tomography of the abdomen and pelvis without 

contrast. 

Radiation optimization: All CT scans at this facility use at least one of these 

dose optimization techniques: automated exposure control; mA and/or kV 

adjustment per patient size (includes targeted exams where dose is matched to 

clinical indication); or iterative reconstruction. 



COMPARISON: 

CT ABD/PEL W/IV CONTRAST ONLY 8/25/2021 10:23 PM 



FINDINGS: 

Lungs: There is severe emphysematous lung changes with some bronchial 

thickening in the lung bases with linear atelectatic changes suggestive of an 

element of bronchitis. Few small nodules seen in the lingula and left lower 

lobe on axial image 10 the largest measuring 4-5 mm. There is 3-4 mm right 

lower lobe lung nodule on axial image 4 which was not seen on studies of 2018. 

Heart: There is calcifications of the mitral annulus. 



Liver: Normal. No mass. 

Gallbladder and bile ducts: The CHD is slightly prominent with some dilatation 

of the left biliary tree on axial images 21-25. 

Pancreas: There is pancreatic ductal dilatation. The pancreatic head is 

prominent. 

Spleen: Normal. No splenomegaly. 

Adrenal glands: Normal. No mass. 

Kidneys and ureters: There is 1.6 cm left renal cyst in addition to few too 

small to characterize hypodensities. 

Stomach and bowel: There is significant thickening of multiple small and large 

bowel loops. There are some dilated small bowel loops measuring up to 3.3 

centimetres. There is moderate colonic stool burden. 

Appendix: No evidence of appendicitis. 



Intraperitoneal space: There is diffuse mesenteric edema. Ascites is seen 

around the liver and spleen and along the paracolic gutters. There is anterior 

abdominal hernia containing omental fat. 

Vasculature: There is severe calcification of the aorta and iliac arteries. 

Bulky calcified plaque is seen in the distal thoracic aorta. 

Lymph nodes: Unremarkable. No enlarged lymph nodes. 

Urinary bladder: Unremarkable as visualized. 

Reproductive: Unremarkable as visualized. 

Bones/joints: There is L5-S1 disc degenerative changes with central posterior 

disc bulge. 

Soft tissues: There is diffuse subcutaneous edema. There is a small right and 

moderate size left inguinal hernias. Fluid is seen in the left inguinal canal. 



IMPRESSION: 

1. Cirrhotic liver with third-spacing manifested by diffuse subcutaneous and 

mesenteric edema in addition to small abdominal ascites some of which seen in a 

moderate size left inguinal hernia. 

2. Significantly thickened small and large bowel loops could be secondary to 

3rd spacing however underlying element of enterocolitis cannot be excluded. 

3. No extravasation of oral contrast or free air seen to suggest bowel 

perforation. 

4. Moderate colonic stool burden. 

5. Dilated small bowel loops up to 3.3 cm could be secondary to an element of 

ileus as some of the oral contrast is seen in the colon. An element of partial 

obstruction cannot be completely excluded. 

6. Prominent pancreatic head coupled with pancreatic ductal dilatation 

particularly in the mid body and tail. Underlying occult pancreatic malignancy 

should be excluded with MRI of the pancreas with MRCP. 

7. Nonspecific mildly dilated left biliary tree. Underlying subtle periductal 

cholangiocarcinoma cannot be excluded. MRI of the abdomen with contrast with 

MRCP is suggested. 

8. 1.6 cm left renal cyst in addition to few too small to characterize 

hypodensities. These can be further evaluated during the above suggested MRIs. 

9. Severe emphysematous lung changes with element of bronchitis. Few scattered 

basilar nodules measuring up to 4-5 mm.

- For patients at low risk (minimal or absent history of smoking and of other 

known risk factors), no routine follow-up is indicated. 

- For patients at high risk (history of smoking or of other known risk 

factors), consider optional CT Chest at 12 months. 

- If the patient has history of malignancy than Fleischner Society 

recommendations cannot be followed and follow-up CT scan should be considered 

in 3 months. (Reference: Viktoria) 



COMMENTS: 

Consistent with the American College of Radiology's Incidental Findings 

Committee white paper (J Am Serge Radiol 2018): Any incidental renal lesion less 

than 1 cm or classified as too small to characterize, or any incidental cystic 

renal lesion characterized as simple-appearing, is likely benign. No follow-up 

imaging is recommended for these lesions per consensus recommendations based on 

imaging criteria. 



REFERENCES: 

Viktoria ELLISON, et al. Guidelines for Management of Incidental Pulmonary Nodules 

Detected on CT Images: From the Fleischner Society 2017. Radiology. 

2017;284(1):228-243. 



Electronically signed by: Dimitri Sheikh On 08/26/2021  05:31:01 AM

## 2021-09-04 ENCOUNTER — HOSPITAL ENCOUNTER (INPATIENT)
Dept: HOSPITAL 53 - M ED | Age: 66
LOS: 5 days | Discharge: HOME HEALTH | DRG: 391 | End: 2021-09-09
Attending: INTERNAL MEDICINE | Admitting: STUDENT IN AN ORGANIZED HEALTH CARE EDUCATION/TRAINING PROGRAM
Payer: MEDICARE

## 2021-09-04 VITALS — DIASTOLIC BLOOD PRESSURE: 85 MMHG | SYSTOLIC BLOOD PRESSURE: 135 MMHG

## 2021-09-04 VITALS — SYSTOLIC BLOOD PRESSURE: 121 MMHG | DIASTOLIC BLOOD PRESSURE: 68 MMHG

## 2021-09-04 VITALS — WEIGHT: 132.5 LBS | HEIGHT: 66 IN | BODY MASS INDEX: 21.29 KG/M2

## 2021-09-04 VITALS — SYSTOLIC BLOOD PRESSURE: 134 MMHG | DIASTOLIC BLOOD PRESSURE: 81 MMHG

## 2021-09-04 DIAGNOSIS — Z79.82: ICD-10-CM

## 2021-09-04 DIAGNOSIS — E03.9: ICD-10-CM

## 2021-09-04 DIAGNOSIS — F15.10: ICD-10-CM

## 2021-09-04 DIAGNOSIS — Z88.8: ICD-10-CM

## 2021-09-04 DIAGNOSIS — M54.5: ICD-10-CM

## 2021-09-04 DIAGNOSIS — J44.9: ICD-10-CM

## 2021-09-04 DIAGNOSIS — E83.42: ICD-10-CM

## 2021-09-04 DIAGNOSIS — Z86.73: ICD-10-CM

## 2021-09-04 DIAGNOSIS — J44.1: ICD-10-CM

## 2021-09-04 DIAGNOSIS — I27.20: ICD-10-CM

## 2021-09-04 DIAGNOSIS — N18.30: ICD-10-CM

## 2021-09-04 DIAGNOSIS — I35.1: ICD-10-CM

## 2021-09-04 DIAGNOSIS — Z79.01: ICD-10-CM

## 2021-09-04 DIAGNOSIS — K91.1: Primary | ICD-10-CM

## 2021-09-04 DIAGNOSIS — I48.0: ICD-10-CM

## 2021-09-04 DIAGNOSIS — H40.9: ICD-10-CM

## 2021-09-04 DIAGNOSIS — E46: ICD-10-CM

## 2021-09-04 DIAGNOSIS — I50.33: ICD-10-CM

## 2021-09-04 DIAGNOSIS — I25.10: ICD-10-CM

## 2021-09-04 DIAGNOSIS — D50.9: ICD-10-CM

## 2021-09-04 DIAGNOSIS — R62.7: ICD-10-CM

## 2021-09-04 DIAGNOSIS — E11.22: ICD-10-CM

## 2021-09-04 DIAGNOSIS — E87.2: ICD-10-CM

## 2021-09-04 DIAGNOSIS — Z87.891: ICD-10-CM

## 2021-09-04 DIAGNOSIS — Z88.0: ICD-10-CM

## 2021-09-04 DIAGNOSIS — K52.9: ICD-10-CM

## 2021-09-04 DIAGNOSIS — K21.00: ICD-10-CM

## 2021-09-04 DIAGNOSIS — I48.92: ICD-10-CM

## 2021-09-04 DIAGNOSIS — Z20.822: ICD-10-CM

## 2021-09-04 DIAGNOSIS — D64.9: ICD-10-CM

## 2021-09-04 DIAGNOSIS — F12.10: ICD-10-CM

## 2021-09-04 DIAGNOSIS — Z79.899: ICD-10-CM

## 2021-09-04 DIAGNOSIS — K86.89: ICD-10-CM

## 2021-09-04 LAB
ALBUMIN SERPL BCG-MCNC: 1.8 GM/DL (ref 3.2–5.2)
ALT SERPL W P-5'-P-CCNC: 17 U/L (ref 12–78)
AMPHETAMINES UR QL SCN: POSITIVE
APTT BLD: 24.5 SECONDS (ref 25.9–37)
BARBITURATES UR QL SCN: NEGATIVE
BASE EXCESS BLDA CALC-SCNC: -4.5 MMOL/L (ref -2–2)
BASOPHILS # BLD AUTO: 0 10^3/UL (ref 0–0.2)
BASOPHILS NFR BLD AUTO: 0.2 % (ref 0–1)
BENZODIAZ UR QL SCN: NEGATIVE
BILIRUB CONJ SERPL-MCNC: < 0.1 MG/DL (ref 0–0.2)
BILIRUB SERPL-MCNC: 0.2 MG/DL (ref 0.2–1)
BUN SERPL-MCNC: 17 MG/DL (ref 7–18)
BUN SERPL-MCNC: 18 MG/DL (ref 7–18)
BUN SERPL-MCNC: 18 MG/DL (ref 7–18)
BZE UR QL SCN: NEGATIVE
CALCIUM SERPL-MCNC: 7.5 MG/DL (ref 8.8–10.2)
CALCIUM SERPL-MCNC: 7.9 MG/DL (ref 8.8–10.2)
CALCIUM SERPL-MCNC: 8.3 MG/DL (ref 8.8–10.2)
CANNABINOIDS UR QL SCN: NEGATIVE
CHLORIDE SERPL-SCNC: 106 MEQ/L (ref 98–107)
CHLORIDE SERPL-SCNC: 109 MEQ/L (ref 98–107)
CHLORIDE SERPL-SCNC: 109 MEQ/L (ref 98–107)
CK MB CFR.DF SERPL CALC: 12.33
CK MB CFR.DF SERPL CALC: 14.06
CK MB CFR.DF SERPL CALC: 4.18
CK MB CFR.DF SERPL CALC: 9.55
CK MB SERPL-MCNC: 4.1 NG/ML (ref ?–3.6)
CK MB SERPL-MCNC: 4.2 NG/ML (ref ?–3.6)
CK MB SERPL-MCNC: 4.5 NG/ML (ref ?–3.6)
CK MB SERPL-MCNC: 5.3 NG/ML (ref ?–3.6)
CK SERPL-CCNC: 32 U/L (ref 39–308)
CK SERPL-CCNC: 43 U/L (ref 39–308)
CK SERPL-CCNC: 44 U/L (ref 39–308)
CK SERPL-CCNC: 98 U/L (ref 39–308)
CO2 BLDA CALC-SCNC: 21.5 MEQ/L (ref 23–31)
CO2 SERPL-SCNC: 22 MEQ/L (ref 21–32)
CO2 SERPL-SCNC: 25 MEQ/L (ref 21–32)
CO2 SERPL-SCNC: 27 MEQ/L (ref 21–32)
CREAT SERPL-MCNC: 0.86 MG/DL (ref 0.7–1.3)
CREAT SERPL-MCNC: 1.05 MG/DL (ref 0.7–1.3)
CREAT SERPL-MCNC: 1.15 MG/DL (ref 0.7–1.3)
CRP SERPL-MCNC: 11.3 MG/DL (ref 0–0.3)
CRP SERPL-MCNC: 14.1 MG/DL (ref 0–0.3)
EOSINOPHIL # BLD AUTO: 0.1 10^3/UL (ref 0–0.5)
EOSINOPHIL NFR BLD AUTO: 0.3 % (ref 0–3)
ERYTHROCYTE [SEDIMENTATION RATE] IN BLOOD BY WESTERGREN METHOD: 16 MM/HR (ref 0–20)
GFR SERPL CREATININE-BSD FRML MDRD: > 60 ML/MIN/{1.73_M2} (ref 49–?)
GLUCOSE SERPL-MCNC: 130 MG/DL (ref 70–100)
GLUCOSE SERPL-MCNC: 85 MG/DL (ref 70–100)
GLUCOSE SERPL-MCNC: 95 MG/DL (ref 70–100)
HCO3 BLDA-SCNC: 20.3 MEQ/L (ref 22–26)
HCO3 STD BLDA-SCNC: 20.8 MEQ/L (ref 22–26)
HCT VFR BLD AUTO: 34.6 % (ref 42–52)
HCT VFR BLD AUTO: 41.4 % (ref 42–52)
HGB BLD-MCNC: 10.7 G/DL (ref 13.5–17.5)
HGB BLD-MCNC: 12.7 G/DL (ref 13.5–17.5)
INR PPP: 1.19
LIPASE SERPL-CCNC: 84 U/L (ref 73–393)
LYMPHOCYTES # BLD AUTO: 1.2 10^3/UL (ref 1.5–5)
LYMPHOCYTES NFR BLD AUTO: 6.9 % (ref 24–44)
MCH RBC QN AUTO: 25.6 PG (ref 27–33)
MCH RBC QN AUTO: 25.6 PG (ref 27–33)
MCHC RBC AUTO-ENTMCNC: 30.7 G/DL (ref 32–36.5)
MCHC RBC AUTO-ENTMCNC: 30.9 G/DL (ref 32–36.5)
MCV RBC AUTO: 82.8 FL (ref 80–96)
MCV RBC AUTO: 83.5 FL (ref 80–96)
METHADONE UR QL SCN: NEGATIVE
MONOCYTES # BLD AUTO: 1.1 10^3/UL (ref 0–0.8)
MONOCYTES NFR BLD AUTO: 6.1 % (ref 2–8)
MYOGLOBIN SERPL-MCNC: 62 NG/ML (ref 16–116)
NEUTROPHILS # BLD AUTO: 14.9 10^3/UL (ref 1.5–8.5)
NEUTROPHILS NFR BLD AUTO: 86 % (ref 36–66)
NT-PRO BNP: 6493 PG/ML (ref ?–125)
NT-PRO BNP: 8681 PG/ML (ref ?–125)
OPIATES UR QL SCN: NEGATIVE
PCO2 BLDA: 36.5 MMHG (ref 35–45)
PCP UR QL SCN: NEGATIVE
PH BLDA: 7.36 UNITS (ref 7.35–7.45)
PLATELET # BLD AUTO: 243 10^3/UL (ref 150–450)
PLATELET # BLD AUTO: 340 10^3/UL (ref 150–450)
PO2 BLDA: 153.9 MMHG (ref 75–100)
POTASSIUM SERPL-SCNC: 4.4 MEQ/L (ref 3.5–5.1)
POTASSIUM SERPL-SCNC: 4.5 MEQ/L (ref 3.5–5.1)
POTASSIUM SERPL-SCNC: 4.7 MEQ/L (ref 3.5–5.1)
PROT SERPL-MCNC: 5.1 GM/DL (ref 6.4–8.2)
PROTHROMBIN TIME: 15.5 SECONDS (ref 12.7–14.5)
RBC # BLD AUTO: 4.18 10^6/UL (ref 4.3–6.1)
RBC # BLD AUTO: 4.96 10^6/UL (ref 4.3–6.1)
RHEUMATOID FACT SERPL-ACNC: < 10 IU/ML (ref ?–15)
RSV RNA NPH QL NAA+PROBE: NEGATIVE
SAO2 % BLDA: 99.1 % (ref 95–99)
SODIUM SERPL-SCNC: 136 MEQ/L (ref 136–145)
SODIUM SERPL-SCNC: 140 MEQ/L (ref 136–145)
SODIUM SERPL-SCNC: 141 MEQ/L (ref 136–145)
T4 FREE SERPL-MCNC: 0.83 NG/DL (ref 0.76–1.46)
TROPONIN I SERPL-MCNC: 0.02 NG/ML (ref ?–0.1)
TROPONIN I SERPL-MCNC: 0.02 NG/ML (ref ?–0.1)
TROPONIN I SERPL-MCNC: < 0.02 NG/ML (ref ?–0.1)
TROPONIN I SERPL-MCNC: < 0.02 NG/ML (ref ?–0.1)
TSH SERPL DL<=0.005 MIU/L-ACNC: 8.48 UIU/ML (ref 0.36–3.74)
WBC # BLD AUTO: 13.3 10^3/UL (ref 4–10)
WBC # BLD AUTO: 17.3 10^3/UL (ref 4–10)

## 2021-09-04 RX ADMIN — LEVALBUTEROL HYDROCHLORIDE SCH MG: 1.25 SOLUTION, CONCENTRATE RESPIRATORY (INHALATION) at 16:00

## 2021-09-04 RX ADMIN — SUCRALFATE SCH GM: 1 SUSPENSION ORAL at 12:49

## 2021-09-04 RX ADMIN — SUCRALFATE SCH GM: 1 SUSPENSION ORAL at 19:58

## 2021-09-04 RX ADMIN — DIPHENOXYLATE HYDROCHLORIDE AND ATROPINE SULFATE SCH EA: 2.5; .025 TABLET ORAL at 17:58

## 2021-09-04 RX ADMIN — METOPROLOL TARTRATE SCH MG: 50 TABLET, FILM COATED ORAL at 10:18

## 2021-09-04 RX ADMIN — SUCRALFATE SCH GM: 1 SUSPENSION ORAL at 07:30

## 2021-09-04 RX ADMIN — SODIUM BICARBONATE SCH MG: 325 TABLET ORAL at 20:00

## 2021-09-04 RX ADMIN — CHOLESTYRAMINE SCH GM: 4 POWDER, FOR SUSPENSION ORAL at 22:07

## 2021-09-04 RX ADMIN — CHOLESTYRAMINE SCH GM: 4 POWDER, FOR SUSPENSION ORAL at 16:11

## 2021-09-04 RX ADMIN — DEXTROSE MONOHYDRATE SCH MG: 50 INJECTION, SOLUTION INTRAVENOUS at 19:58

## 2021-09-04 RX ADMIN — DEXTROSE MONOHYDRATE SCH MG: 50 INJECTION, SOLUTION INTRAVENOUS at 10:19

## 2021-09-04 RX ADMIN — DIPHENOXYLATE HYDROCHLORIDE AND ATROPINE SULFATE SCH EA: 2.5; .025 TABLET ORAL at 20:00

## 2021-09-04 RX ADMIN — PANCRELIPASE SCH EA: 24000; 76000; 120000 CAPSULE, DELAYED RELEASE PELLETS ORAL at 08:00

## 2021-09-04 RX ADMIN — LEVALBUTEROL HYDROCHLORIDE SCH MG: 1.25 SOLUTION, CONCENTRATE RESPIRATORY (INHALATION) at 19:30

## 2021-09-04 RX ADMIN — PANCRELIPASE SCH EA: 24000; 76000; 120000 CAPSULE, DELAYED RELEASE PELLETS ORAL at 17:58

## 2021-09-04 RX ADMIN — LEVALBUTEROL HYDROCHLORIDE SCH MG: 1.25 SOLUTION, CONCENTRATE RESPIRATORY (INHALATION) at 23:25

## 2021-09-04 RX ADMIN — SUCRALFATE SCH GM: 1 SUSPENSION ORAL at 17:58

## 2021-09-04 RX ADMIN — LEVALBUTEROL HYDROCHLORIDE SCH MG: 1.25 SOLUTION, CONCENTRATE RESPIRATORY (INHALATION) at 15:26

## 2021-09-04 RX ADMIN — APIXABAN SCH MG: 5 TABLET, FILM COATED ORAL at 19:58

## 2021-09-04 RX ADMIN — PANCRELIPASE SCH EA: 24000; 76000; 120000 CAPSULE, DELAYED RELEASE PELLETS ORAL at 12:54

## 2021-09-04 RX ADMIN — ATORVASTATIN CALCIUM SCH MG: 20 TABLET, FILM COATED ORAL at 19:58

## 2021-09-04 RX ADMIN — APIXABAN SCH MG: 5 TABLET, FILM COATED ORAL at 10:18

## 2021-09-04 RX ADMIN — METOPROLOL TARTRATE SCH MG: 50 TABLET, FILM COATED ORAL at 19:59

## 2021-09-04 RX ADMIN — SODIUM BICARBONATE SCH MG: 325 TABLET ORAL at 10:23

## 2021-09-04 NOTE — REPVR
PROCEDURE INFORMATION: 

Exam: CTA Chest With Contrast 

Exam date and time: 9/4/2021 3:37 AM 

Age: 65 years old 

Clinical indication: Other: Chest pain radiates to back 



TECHNIQUE: 

Imaging protocol: Computed tomographic angiography of the chest with contrast. 

3D rendering (Not supervised by radiologist): MIP and/or 3D reconstructed 

images were created by the technologist. 

Radiation optimization: All CT scans at this facility use at least one of these 

dose optimization techniques: automated exposure control; mA and/or kV 

adjustment per patient size (includes targeted exams where dose is matched to 

clinical indication); or iterative reconstruction. 

Contrast material: ISOVUE 370; Contrast volume: 75 ml; Contrast route: 

INTRAVENOUS (IV);  



COMPARISON: 

CT Chest with contrast 8/25/2021 10:23 PM 



FINDINGS: 

Pulmonary arteries: The main pulmonary artery measures 18 mm. No pulmonary 

embolism is identified. 

Aorta: The ascending thoracic aorta measures 27 mm. No gross or obvious aortic 

dissection is identified, particularly distal to the mid arch. Artifact and 

image degradation precludes detailed evaluation of the ascending thoracic 

aorta. 



Lungs: Mild diffuse bullous change with interstitial coarsening and minimal 

scattered fibro-atelectatic change. There is a minimal density in the lateral 

left upper lobe which is unchanged from a prior study measuring 4 mm. 

Pleural spaces: Unremarkable. No pneumothorax. No pleural effusion. 

Heart: Unremarkable. No cardiomegaly. No pericardial effusion. 

Mediastinal space: Esophageal wall thickening. 

Lymph nodes: Calcified right hilar nodes. 



Spleen: Splenic calcifications. 

Bones/joints: Status post sternotomy. Healing fracture of the left 11th rib 

posteriorly and old fractures of the left 9th and 10th ribs posterolaterally. 

Old fractures of the right 8th-10th ribs posteriorly. Prominent degenerative 

change of the shoulders. 

Soft tissues: Calcified granulomata are noted. 



IMPRESSION: 

1. Esophageal wall thickening which may reflect esophagitis. 

2. Mild bullous changes with coarse interstitium and minimal scattered 

fibro-atelectatic change. Pulmonary densities are similar to the prior study. 

3. Old granulomatous disease of the chest and spleen. 

4. Old or healing fractures bilaterally. 

5. Otherwise negative CTA chest. No pulmonary embolism is identified. No gross 

or obvious aortic dissection is identified, particularly distal to the mid 

arch. Artifact and image degradation precludes detailed evaluation of the 

ascending thoracic aorta. 



Electronically signed by: Kev Lucas On 09/04/2021  03:59:07 AM

## 2021-09-04 NOTE — HPEPDOC
General


Date of Admission


September 4, 2021


Date of Service:  Sep 4, 2021


Chief Complaint


The patient is a 65-year-old male admitted with a reason for visit of Chest 

Pain.





History of Present Illness


Mr. Roberts is a 65-year-old male with anion gap metabolic acidosis chronically 

on bicarb and paroxysmal atrial fibrillation who is here for intractable chest 

pain and throat pain.  The chest pain started 18 hours ago.  It is a sharp chest

pain that comes and goes.  Is worse with breathing and activity.  It is also 

tender to touch.  Otherwise he tells me that he has throat pain and it hurts to 

swallow.  He told me that both of these started at the same time.  He does not 

remember what he was doing when this first started.  He has tried Tylenol for 

the pain which did not help.  Otherwise, work-up was significant for a white 

blood count of 17.3 and a pH of 7.  CT angio chest demonstrates esophagitis and 

is negative for PE.  Patient will be admitted for atypical chest pain and 

esophagitis





Home Medications


Scheduled


Apixaban (Eliquis) 5 Mg Tablet, 5 MG PO BID, (Reported)


   used external history 


Aspirin (Aspirin EC) 81 Mg Tablet.dr, 81 MG PO DAILY, (Reported)


Atorvastatin Calcium (Atorvastatin Calcium) 40 Mg Tablet, 40 MG PO QHS, 

(Reported)


   used external history and previous discharge 


Cholestyramine (Cholestyramine Packet) 4 Gm Powd.pack, 4 GM PO BID, (Reported)


   used external history and previous discharge 


Ferrous Sulfate (Ferrous Sulfate) 325 Mg Tablet, 325 MG PO DAILY, (Reported)


Ipratropium Bromide (Atrovent Hfa) 12.9 Gm Hfa.aer.ad, 2 PUFF INH QID, (Repo

rted)


   used external history and previous discharge 


L.acidoph/L.bulg/B.bif/S.therm (Bacid Caplet) 1 Each Tablet, 1 TAB PO WM, 

(Reported)


Magnesium Oxide (Magnesium Oxide) 400 Mg Tablet, 800 MG PO BID


Metoprolol Tartrate (Metoprolol Tartrate) 50 Mg Tablet, 50 MG PO BID, (Reported)


   used external history 


Pancreatic Enzymes (Creon Dr 24,000 Units Capsule) 1 Each Capsule.dr, 2 CAP PO 

WM, (Reported)


   used external history and previous discharge information 


Pantoprazole Sodium (Pantoprazole Sodium) 40 Mg Tablet.dr, 40 MG PO BID, 

(Reported)


   used external history and previous discharge 


Ropinirole HCl (Ropinirole HCl) 0.5 Mg Tablet, 0.5 MG PO BID, (Reported)


   used external history and previous discharge 


Sodium Bicarbonate (Sodium Bicarbonate) 325 Mg Tablet, 650 MG PO BID


Sucralfate (Sucralfate) 1 Gm Tablet, 1 GM PO QID, (Reported)


   used external history and previous discharge 





Scheduled PRN


Albuterol Sulf (Albuterol Sulfate) 2.5 Mg/3 Ml Vial.neb, 2.5 MG INH Q6H PRN for 

SHORTNESS OF BREATH, (Reported)


Albuterol Sulfate (Proair Hfa) 8.5 Gm Hfa.aer.ad, 2 PUFF INH Q4HP PRN for SH

ORTNESS OF BREATH, (Reported)


   used external history and previous discharge 


Diphenoxylate HCl/Atropine (Diphenoxylate-Atrop 2.5-0.025) 1 Each Tablet, 1 TAB 

PO QID PRN for DIARRHEA, (Reported)





Allergies


Coded Allergies:  


     amoxicillin (Verified  Allergy, Mild, rash, 8/25/21)


     clavulanic acid (Verified  Allergy, Mild, rash, 8/25/21)


     pregabalin (Verified  Adverse Reaction, Intermediate, seizures, 8/25/21)


     warfarin (Verified  Adverse Reaction, Intermediate, bleeding, 8/25/21)





Past Medical History


Medical History


1.  COPD


2.  Heart failure with reduced ejection fraction


3.  Atrial flutter on Eliquis


4.  CAD


5.  Hypertension


6.  CVA


7.  Hypothyroidism


8.  Pancreatic insufficiency


9.  Chronic low back pain


10.  Polysubstance abuse


11.  Anemia


Surgical History


1.  Pancreatic insufficiency secondary to pancreatic mass resection





Social History


* Smoker:  Denies


Alcohol:  Denies


Drugs:  denies





A-FIB/CHADSVASC


A-FIB History


Current/History of A-Fib/PAF?:  Yes


Current PO Anticoag Therapy:  Yes





Review of Systems


Constitutional:  Reports: Chills


Eyes:  Denies: Vision change


ENT:  Reports: Other Symptoms (Burning throat)


Skin:  Denies: Rash


Pulmonary:  Reports: Pleuritic Chest Pain


Cardiovascular:  Reports: Chest Pain


Gastrointestinal:  Reports: Diarrhea (Watery); 


   Denies: Abdominal Pain


Genitourinary:  Denies: Dysuria


Hematologic:  Denies: Bruising


Neurological:  Denies: Numbness


Psych:  Reports: Anxiety, Depression





Physical Examination


General Exam:  Positive: Alert, Cooperative, Mild Distress


Eye Exam:  Positive: EOMI; 


   Negative: Sclera icteric


Neck Exam:  Positive: Supple


Chest Exam:  Positive: Clear to auscultation


Heart Exam:  Positive: Tachycardic, Regular Rhythm


Abdomen Exam:  Positive: Normal bowel sounds, Soft; 


   Negative: Tenderness


Extremity Exam:  Positive: Edema (Bilateral)


Neuro Exam:  Positive: Normal Speech, Cranial Nerves 3-12 NL


Psych Exam:  Positive: Anxiety





Vital Signs





Vital Signs








  Date Time  Temp Pulse Resp B/P (MAP) Pulse Ox O2 Delivery O2 Flow Rate FiO2


 


9/4/21 05:45    124/68 (86)    


 


9/4/21 05:34  98 17  100 Room Air  


 


9/4/21 00:37 98.0       











Laboratory Data


Labs 24H


Laboratory Tests 2


9/4/21 00:57: 


Immature Granulocyte % (Auto) 0.5, Neutrophils (%) (Auto) 86.0H, Lymphocytes (%)

(Auto) 6.9L, Monocytes (%) (Auto) 6.1, Eosinophils (%) (Auto) 0.3, Basophils (%)

(Auto) 0.2, Neutrophils # (Auto) 14.9H, Lymphocytes # (Auto) 1.2L, Monocytes # 

(Auto) 1.1H, Eosinophils # (Auto) 0.1, Basophils # (Auto) 0.0, Nucleated Red 

Blood Cells % (auto) 0.0, Erythrocyte Sedimentation Rate 16, Prothrombin Time 

15.5H, Prothromb Time International Ratio 1.19, Activated Partial Thromboplast 

Time 24.5L, Anion Gap 7L, Glomerular Filtration Rate > 60.0, Calcium Level 8.3L,

Total Bilirubin 0.2, Direct Bilirubin < 0.1, Aspartate Amino Transf (AST/SGOT) 

20, Alanine Aminotransferase (ALT/SGPT) 17, Alkaline Phosphatase 251H, Total 

Creatine Kinase 44, Creatine Kinase MB 4.2H, Creatine Kinase MB Relative Index 

9.55H, Troponin I < 0.02, C-Reactive Protein, Quantitative 11.30H, NT-Pro-B-Type

Natriuretic Peptide 8681H, Total Protein 5.1L, Albumin 1.8L, Albumin/Globulin 

Ratio 0.5, Lipase 84, Thyroid Stimulating Hormone (TSH) 8.480H, Free Thyroxine 

0.83


9/4/21 02:42: 


Coronavirus (COVID-19)(PCR) NEGATIVE, Influenza Type A (RT-PCR) NEGATIVE, 

Influenza Type B (RT-PCR) NEGATIVE, Respiratory Syncytial Virus (PCR) NEGATIVE


9/4/21 04:54: 


POC pH (Misc Panel) 7.073*L, POC Base Excess (Misc Panel) -20.0L, POC Saturated 

Percent O2 (Misc) 87L, POC pO2 (Misc Panel) 73.0L, POC pCO2 (Misc Panel) 34.2L, 

POC HCO3 (Misc Panel) 10.0L, POC Total CO2 (Misc Panel) 11.0L


9/4/21 05:12: 


Urine Color YELLOW, Urine Appearance CLEAR, Urine pH 5.0, Urine Specific Gravity

1.040, Urine Protein NEGATIVE, Urine Glucose (UA) NEGATIVE, Urine Ketones 

NEGATIVE, Urine Blood NEGATIVE, Urine Nitrite NEGATIVE, Urine Bilirubin 

NEGATIVE, Urine Urobilinogen 0.2, Urine Leukocyte Esterase 2+H, Urine WBC (Auto)

27H, Urine RBC (Auto) 3, Urine Hyaline Casts (Auto) 0, Urine Bacteria (Auto) 

NEGATIVE, Urine Squamous Epithelial Cells 1, Urine Mucus (Auto) SMALL, Urine 

Sperm (Auto) 


9/4/21 05:23: 


CBC/BMP


Laboratory Tests


9/4/21 00:57








Microbiology





Microbiology


9/4/21 Urine Culture, Received


         Pending


9/4/21 Blood Culture, Received


         Pending





 Assessment/Plan


Mr. Roberts is a 65-year-old male with anion gap metabolic acidosis chronically 

on bicarb and paroxysmal atrial fibrillation who is here for intractable chest 

pain and throat pain.  Chest pain is unlikely to be cardiac in nature.  Initial 

troponins negative.  Pending repeat troponin.  Chest pain suspected to be 

costochondritis.  Will start IV ketorolac.  Otherwise patient complains of 

throat burning and CT chest demonstrates esophagitis.  We will do a trial of GI 

cocktail.  We will also start Carafate and Protonix.  Otherwise per previous H&

P, patient has non-anion gap metabolic acidosis and chronically is on bicarb by 

nephrology





Plan / VTE


VTE Prophylaxis Ordered?:  Yes





Plan


Plan


1.  Atypical chest pain


It is a sharp pain that is reproducible.  Also pleuritic


Most likely costochondritis


IV ketorolac





2.  Odynophagia


Imaging demonstrates esophagitis


We will do a trial of GI cocktail


Start Carafate and Protonix


Patient may need EGD in the future for evaluation of esophagitis





3.  Non-anion gap metabolic acidosis


Continue sodium bicarb





4.  Diarrhea


Patient reports having watery diarrhea over a month


Continue cholestyramine


We will check a GI panel





5.  Pancreatic insufficiency


Continue pancreatic enzymes





6.  Hyperlipidemia


Continue Lipitor





7.  Leukocytosis


No clear evidence of infection


Patient given a dose of ceftriaxone.  Initially was thinking COPD exacerbation,

but no wheezing on examination


Monitor leukocytosis, possibly reactive





8.  Paroxysmal atrial fibrillation


Med rec not complete at the time of this dictation


He is possibly on Lopressor 50 mg twice daily and apixaban


If completed med rec suggest otherwise, please correct





9.  DVT prophylaxis


On apixaban





At time of this dictation, med rec was not completed.  Please adjust medications

based off of final med rec.











MARTY GOMEZ DO                Sep 4, 2021 07:03

## 2021-09-04 NOTE — REPVR
PROCEDURE INFORMATION: 

Exam: XR Chest 

Exam date and time: 9/4/2021 12:44 AM 

Age: 65 years old 

Clinical indication: Pain; On breathing; Additional info: Chest pain 



TECHNIQUE: 

Imaging protocol: XR of the chest. 

Views: 1 view. 



COMPARISON: 

CT Chest with contrast 8/25/2021 10:23 PM 



FINDINGS: 

Tubes, catheters and devices: Surgical clips are noted in the neck. 



Lungs: Calcified granuloma in the right lung base. Relative lucency of lung 

with slight interstitial prominence. 

Pleural spaces: Unremarkable. No pleural effusion. No pneumothorax. 

Heart/Mediastinum: Unremarkable. No cardiomegaly. Calcified AP window node. 

Bones/joints: The forStatus post sternotomy. Residua of old right clavicular 

fracture. There are degenerative changes of the shoulders. 



IMPRESSION: 

1. Status post sternotomy. 

2. Suggestion of some degree of COPD. 

3. Old granulomatous disease. 

4. Otherwise negative chest. No acute process is identified. 



Electronically signed by: Kev Lucas On 09/04/2021  01:37:46 AM

## 2021-09-04 NOTE — REPVR
PROCEDURE INFORMATION: 

Exam: US Duplex Right Lower Extremity Veins, Limited 

Exam date and time: 9/4/2021 2:32 AM 

Age: 65 years old 

Clinical indication: Edema, localized; Lower extremity, right; Additional info: 

Swelling 



TECHNIQUE: 

Imaging protocol: Real-time Duplex ultrasound of the Right Lower Extremity with 

2-D gray scale, color Doppler flow and spectral waveform analysis with image 

documentation. Limited exam was focused on the right lower extremity veins. 



COMPARISON: 

RENAL US 7/30/2021 2:41 PM 



FINDINGS: 

Right deep veins: Unremarkable. The common femoral, femoral, proximal profunda 

femoral and popliteal veins are patent without thrombus. Normal Doppler 

waveforms. Normal compressibility and/or augmentation response.  

Right superficial veins: Unremarkable. Saphenofemoral junction is patent 

without thrombus. 



Soft tissues: Unremarkable. 



IMPRESSION: 

Negative right lower extremity venous duplex exam without evidence of deep 

venous thrombosis. 



Electronically signed by: Kev Lucas On 09/04/2021  02:40:08 AM

## 2021-09-04 NOTE — REPVR
PROCEDURE INFORMATION: 

Exam: XR Chest 

Exam date and time: 9/4/2021 11:18 PM 

Age: 65 years old 

Clinical indication: Other: SOB 



TECHNIQUE: 

Imaging protocol: XR of the chest. 

Views: 1 view. 



COMPARISON: 

CR PORTABLE CHEST X-RAY 9/4/2021 12:38 AM 



FINDINGS: 

Lungs: Calcified granuloma in the right lung base. There are no interval 

infiltrates. Mild pulmonary hyperinflation. 

Pleural spaces: Unremarkable. No pleural effusion. No pneumothorax. 

Heart/Mediastinum: The heart and mediastinum are unchanged. A left atrial 

appendage exclusion device is again noted. 

Bones/joints: Status post sternotomy. 



IMPRESSION: 

Stable chest since a study done earlier in the day with evidence of COPD. No 

acute interval process is identified. 



Electronically signed by: Kev Lucas On 09/04/2021  23:51:05 PM

## 2021-09-05 VITALS — SYSTOLIC BLOOD PRESSURE: 144 MMHG | DIASTOLIC BLOOD PRESSURE: 64 MMHG

## 2021-09-05 VITALS — DIASTOLIC BLOOD PRESSURE: 59 MMHG | SYSTOLIC BLOOD PRESSURE: 118 MMHG

## 2021-09-05 VITALS — DIASTOLIC BLOOD PRESSURE: 74 MMHG | SYSTOLIC BLOOD PRESSURE: 149 MMHG

## 2021-09-05 VITALS — DIASTOLIC BLOOD PRESSURE: 67 MMHG | SYSTOLIC BLOOD PRESSURE: 150 MMHG

## 2021-09-05 LAB
ALBUMIN SERPL BCG-MCNC: 1.6 GM/DL (ref 3.2–5.2)
ALT SERPL W P-5'-P-CCNC: 14 U/L (ref 12–78)
AMYLASE SERPL-CCNC: 58 U/L (ref 25–115)
BASOPHILS # BLD AUTO: 0 10^3/UL (ref 0–0.2)
BASOPHILS NFR BLD AUTO: 0.1 % (ref 0–1)
BILIRUB SERPL-MCNC: 0.1 MG/DL (ref 0.2–1)
BUN SERPL-MCNC: 17 MG/DL (ref 7–18)
CALCIUM SERPL-MCNC: 7.4 MG/DL (ref 8.8–10.2)
CHLORIDE SERPL-SCNC: 107 MEQ/L (ref 98–107)
CO2 SERPL-SCNC: 22 MEQ/L (ref 21–32)
CREAT SERPL-MCNC: 1.11 MG/DL (ref 0.7–1.3)
CRP SERPL-MCNC: 12.2 MG/DL (ref 0–0.3)
EOSINOPHIL # BLD AUTO: 0 10^3/UL (ref 0–0.5)
EOSINOPHIL NFR BLD AUTO: 0 % (ref 0–3)
ERYTHROCYTE [SEDIMENTATION RATE] IN BLOOD BY WESTERGREN METHOD: 16 MM/HR (ref 0–20)
GFR SERPL CREATININE-BSD FRML MDRD: > 60 ML/MIN/{1.73_M2} (ref 49–?)
GLUCOSE SERPL-MCNC: 175 MG/DL (ref 70–100)
HCT VFR BLD AUTO: 37.1 % (ref 42–52)
HGB BLD-MCNC: 11.1 G/DL (ref 13.5–17.5)
LIPASE SERPL-CCNC: 101 U/L (ref 73–393)
LYMPHOCYTES # BLD AUTO: 0.7 10^3/UL (ref 1.5–5)
LYMPHOCYTES NFR BLD AUTO: 4.5 % (ref 24–44)
MAGNESIUM SERPL-MCNC: 1.5 MG/DL (ref 1.8–2.4)
MCH RBC QN AUTO: 25.5 PG (ref 27–33)
MCHC RBC AUTO-ENTMCNC: 29.9 G/DL (ref 32–36.5)
MCV RBC AUTO: 85.1 FL (ref 80–96)
MONOCYTES # BLD AUTO: 0.2 10^3/UL (ref 0–0.8)
MONOCYTES NFR BLD AUTO: 1.2 % (ref 2–8)
NEUTROPHILS # BLD AUTO: 14.4 10^3/UL (ref 1.5–8.5)
NEUTROPHILS NFR BLD AUTO: 93.6 % (ref 36–66)
PLATELET # BLD AUTO: 271 10^3/UL (ref 150–450)
POTASSIUM SERPL-SCNC: 4.9 MEQ/L (ref 3.5–5.1)
PROT SERPL-MCNC: 4.7 GM/DL (ref 6.4–8.2)
RBC # BLD AUTO: 4.36 10^6/UL (ref 4.3–6.1)
SODIUM SERPL-SCNC: 136 MEQ/L (ref 136–145)
WBC # BLD AUTO: 15.4 10^3/UL (ref 4–10)

## 2021-09-05 PROCEDURE — 02HV33Z INSERTION OF INFUSION DEVICE INTO SUPERIOR VENA CAVA, PERCUTANEOUS APPROACH: ICD-10-PCS | Performed by: SURGERY

## 2021-09-05 RX ADMIN — CHOLESTYRAMINE SCH GM: 4 POWDER, FOR SUSPENSION ORAL at 06:47

## 2021-09-05 RX ADMIN — SUCRALFATE SCH GM: 1 SUSPENSION ORAL at 19:57

## 2021-09-05 RX ADMIN — METRONIDAZOLE SCH MLS/HR: 500 INJECTION, SOLUTION INTRAVENOUS at 08:00

## 2021-09-05 RX ADMIN — LEVALBUTEROL HYDROCHLORIDE SCH MG: 1.25 SOLUTION, CONCENTRATE RESPIRATORY (INHALATION) at 11:29

## 2021-09-05 RX ADMIN — METRONIDAZOLE SCH MLS/HR: 500 INJECTION, SOLUTION INTRAVENOUS at 17:10

## 2021-09-05 RX ADMIN — SODIUM BICARBONATE SCH MG: 325 TABLET ORAL at 08:58

## 2021-09-05 RX ADMIN — METHYLPREDNISOLONE SODIUM SUCCINATE SCH MG: 125 INJECTION, POWDER, FOR SOLUTION INTRAMUSCULAR; INTRAVENOUS at 13:22

## 2021-09-05 RX ADMIN — SUCRALFATE SCH GM: 1 SUSPENSION ORAL at 17:09

## 2021-09-05 RX ADMIN — ATORVASTATIN CALCIUM SCH MG: 20 TABLET, FILM COATED ORAL at 20:01

## 2021-09-05 RX ADMIN — LEVALBUTEROL HYDROCHLORIDE SCH MG: 1.25 SOLUTION, CONCENTRATE RESPIRATORY (INHALATION) at 22:52

## 2021-09-05 RX ADMIN — SUCRALFATE SCH GM: 1 SUSPENSION ORAL at 06:47

## 2021-09-05 RX ADMIN — PANCRELIPASE SCH EA: 24000; 76000; 120000 CAPSULE, DELAYED RELEASE PELLETS ORAL at 13:24

## 2021-09-05 RX ADMIN — METHYLPREDNISOLONE SODIUM SUCCINATE SCH MG: 125 INJECTION, POWDER, FOR SOLUTION INTRAMUSCULAR; INTRAVENOUS at 21:00

## 2021-09-05 RX ADMIN — APIXABAN SCH MG: 5 TABLET, FILM COATED ORAL at 20:00

## 2021-09-05 RX ADMIN — Medication SCH UNITS: at 22:38

## 2021-09-05 RX ADMIN — METOPROLOL TARTRATE SCH MG: 50 TABLET, FILM COATED ORAL at 20:01

## 2021-09-05 RX ADMIN — LEVALBUTEROL HYDROCHLORIDE SCH MG: 1.25 SOLUTION, CONCENTRATE RESPIRATORY (INHALATION) at 07:01

## 2021-09-05 RX ADMIN — CHOLESTYRAMINE SCH GM: 4 POWDER, FOR SUSPENSION ORAL at 15:00

## 2021-09-05 RX ADMIN — APIXABAN SCH MG: 5 TABLET, FILM COATED ORAL at 09:00

## 2021-09-05 RX ADMIN — DIPHENOXYLATE HYDROCHLORIDE AND ATROPINE SULFATE SCH EA: 2.5; .025 TABLET ORAL at 17:58

## 2021-09-05 RX ADMIN — CHOLESTYRAMINE SCH GM: 4 POWDER, FOR SUSPENSION ORAL at 13:21

## 2021-09-05 RX ADMIN — PANTOPRAZOLE SODIUM SCH MG: 40 TABLET, DELAYED RELEASE ORAL at 08:58

## 2021-09-05 RX ADMIN — DIPHENOXYLATE HYDROCHLORIDE AND ATROPINE SULFATE SCH EA: 2.5; .025 TABLET ORAL at 20:00

## 2021-09-05 RX ADMIN — CIPROFLOXACIN SCH MLS/HR: 2 INJECTION, SOLUTION INTRAVENOUS at 09:00

## 2021-09-05 RX ADMIN — MIDODRINE HYDROCHLORIDE SCH MG: 5 TABLET ORAL at 13:19

## 2021-09-05 RX ADMIN — MIDODRINE HYDROCHLORIDE SCH MG: 5 TABLET ORAL at 08:00

## 2021-09-05 RX ADMIN — CIPROFLOXACIN SCH MLS/HR: 2 INJECTION, SOLUTION INTRAVENOUS at 20:00

## 2021-09-05 RX ADMIN — SODIUM BICARBONATE SCH MG: 325 TABLET ORAL at 20:00

## 2021-09-05 RX ADMIN — METOPROLOL TARTRATE SCH MG: 50 TABLET, FILM COATED ORAL at 08:58

## 2021-09-05 RX ADMIN — DIPHENOXYLATE HYDROCHLORIDE AND ATROPINE SULFATE SCH EA: 2.5; .025 TABLET ORAL at 06:47

## 2021-09-05 RX ADMIN — SODIUM CHLORIDE SCH ML: 9 INJECTION, SOLUTION INTRAMUSCULAR; INTRAVENOUS; SUBCUTANEOUS at 22:38

## 2021-09-05 RX ADMIN — PANTOPRAZOLE SODIUM SCH MG: 40 TABLET, DELAYED RELEASE ORAL at 20:00

## 2021-09-05 RX ADMIN — DIPHENOXYLATE HYDROCHLORIDE AND ATROPINE SULFATE SCH EA: 2.5; .025 TABLET ORAL at 13:19

## 2021-09-05 RX ADMIN — METHYLPREDNISOLONE SODIUM SUCCINATE SCH MG: 125 INJECTION, POWDER, FOR SOLUTION INTRAMUSCULAR; INTRAVENOUS at 12:50

## 2021-09-05 RX ADMIN — PANCRELIPASE SCH EA: 24000; 76000; 120000 CAPSULE, DELAYED RELEASE PELLETS ORAL at 08:00

## 2021-09-05 RX ADMIN — MIDODRINE HYDROCHLORIDE SCH MG: 5 TABLET ORAL at 16:00

## 2021-09-05 RX ADMIN — PANCRELIPASE SCH EA: 24000; 76000; 120000 CAPSULE, DELAYED RELEASE PELLETS ORAL at 17:09

## 2021-09-05 RX ADMIN — SUCRALFATE SCH GM: 1 SUSPENSION ORAL at 13:22

## 2021-09-05 RX ADMIN — MIDODRINE HYDROCHLORIDE SCH MG: 5 TABLET ORAL at 17:09

## 2021-09-05 RX ADMIN — LEVALBUTEROL HYDROCHLORIDE SCH MG: 1.25 SOLUTION, CONCENTRATE RESPIRATORY (INHALATION) at 20:36

## 2021-09-05 RX ADMIN — LEVALBUTEROL HYDROCHLORIDE SCH MG: 1.25 SOLUTION, CONCENTRATE RESPIRATORY (INHALATION) at 03:25

## 2021-09-05 RX ADMIN — LEVALBUTEROL HYDROCHLORIDE SCH MG: 1.25 SOLUTION, CONCENTRATE RESPIRATORY (INHALATION) at 16:11

## 2021-09-05 RX ADMIN — CHOLESTYRAMINE SCH GM: 4 POWDER, FOR SUSPENSION ORAL at 22:37

## 2021-09-05 NOTE — IPN
PROGRESS NOTE



DATE:  09/05/2021



SUBJECTIVE:  The patient complains of significant shortness of breath this

morning without fever or chills, cough, also complains of persistent diarrhea,

abdominal discomfort, denies any diaphoresis, feeling of impending doom,

palpitations, lightheadedness, dizziness or near syncope. The patient was found

to have lactic acidosis, shortness of breath with BNP of 8681. Patient has lost

his IV site overnight. Multiple attempts by nursing were unsuccessful. Surgery

consulted for line placement.



OBJECTIVE: 

Vital signs: Temperature 98.2, pulse 80, respiratory rate 18, blood pressure

118/59, 97% on two liters nasal cannula.

General: The patient is in mild distress, able to speak 6-7 words but has use

of respiratory accessory muscles, barrel chested, leaning forward with

prolonged expirations, positive mild JVD, no thyromegaly, cervical

lymphadenopathy, dry mucous membranes.

Lungs: Diminished. Bilateral faint expiratory wheezing but scant. Fine crackles

noted bilaterally.

Heart: S1, S2, sinus rhythm.

Abdomen: Soft, nontender, nondistended, positive bowel sounds. No rebound,

guarding.

Extremities: No cyanosis, clubbing or pitting edema.



LABORATORY DATA: Microbiology, imaging studies have been reviewed.



ASSESSMENT AND PLAN: This is a 65-year-old male with history of systolic

dysfunction, EF of 32% on echo by Dr. Velasquez, July 19, 2021 with moderate to

moderately severe LV hypokinesis of the left ventricle, moderate mitral

regurgitation with no LV thrombus, mild tricuspid regurgitation, COPD,

emphysema, admitted on 9/4/2021 with complaints of atypical chest pain with

history of paroxysmal atrial fibrillation and chronic non-anion gap metabolic

acidosis on bicarb. Patient does have a history of atrial flutter on chronic

Eliquis, hypertension, history of CVA, hypothyroidism, pancreatic insufficiency

with chronic diarrhea previously admitted and started on Questran, Lomotil and

Colestipol for possible dumping syndrome, chronic low back pain, polysubstance

abuse, developed significant electrolyte imbalance due to diarrhea with low

magnesium, found to have significant lactic acidosis, complained of atypical

chest pain with negative troponin, shortness of breath with elevated BNP.

Active issues are as follows.



CURRENT ISSUES:

1.  Acute COPD exacerbation.

2.  Decompensated congestive heart failure with low ejection fraction, systolic

    dysfunction. BNP>1000.

3.  Lactic acidosis.

4.  Chronic diarrhea, rule out dumping syndrome. 

5.  Poor IV access. Patient has lost IV access.

6.  History of non-anion gap metabolic acidosis.

7.  Hypomagnesemia.

8.  Acute-on-chronic anemia.

9.  History of paroxysmal atrial fibrillation.

10.  History of CAD.

11.  Hypertension, controlled.

12.  Hypothyroidism.

13.  Pancreatic insufficiency secondary to pancreatic mass resection.

14.  Chronic low back pain.



PLAN: The patient has lost IV access. General surgeon, Dr. Naqvi has been

consulted in order to place a triple lumen catheter due to lactic acidosis and

complaints of abdominal pain. We will obtain a CT, abdomen and pelvis today.

Due to worsening shortness of breath and COPD exacerbation with crackles on

exam, the patient will be treated for both COPD and CHF exacerbation. He has

been given one dose of Lasix. We will continue to monitor strict I&Os, daily

weights and maintain on fluid restriction. IV antibiotics have been started. He

has developed steroid induced leukocytosis from yesterday. Telemetry monitoring

and transfer to PCU. For electrolyte abnormalities, oral med surge with

telemetry. Patient has been given Cipro and Flagyl for possible intra-abdominal

infection. Midodrine has been given in order to allow for good diuresis from

Lasix. Continue to monitor patient's volume status. If develops acute kidney

injury, may need to hold off on further Lasix and continue with Solu-Medrol and

nebulizer treatments for now. 

MTDD

## 2021-09-05 NOTE — REP
INDICATION:

sob.



COMPARISON:

Portable chest, 09/04/2021



TECHNIQUE:

Upright AP portable chest image was obtained.



FINDINGS:

There is severe emphysema.  There is a fiducial marker in the right mid lung.  The

heart size is normal.  There is a left atrial appendage clip.  There is calcific

vascular disease of the thoracic aorta.  There are surgical clips at the base of the

neck on the left possible carotid endarterectomy.  There are surgical clips in both

lungs of uncertain significance.  Status post median sternotomy.  The upper abdominal

bowel gas pattern is normal.  There is significant arthritis of both glenohumeral

joints.



IMPRESSION:

1.  Emphysema.

2.  Other findings as noted, not significantly changed.





<Electronically signed by Reyes Garcia > 09/05/21 [id number]

## 2021-09-05 NOTE — ROOPDOC
[facility] Report Of Operation


Report of Operation


DATE OF PROCEDURE: 9/5/21





PREPROCEDURE DIAGNOSES: chest pain, lack of adequate peripheral access.





POSTPROCEDURE DIAGNOSES: same.





PROCEDURE PERFORMED: insertion of right IJ triple lumen catheter under US 

guidance. 





SURGEON: Phillip Naqvi MD





ANESTHESIA: local anesthesia with 1% lidocaine.





ESTIMATED BLOOD LOSS: Approximately 5 mL. 





COMPLICATIONS: none. 





FINDINGS: 





Previous vascular operations to the head and neck not available and patient not 

really fully aware of what it was but it looks like patient has a left-sided 

carotid to subclavian bypass.  He is bilateral subclavian incisions and the 

tract of the left carotid to subclavian bypass is noticeable over on the left 

side.





SPECIMENS REMOVED: None. 





DESCRIPTION OF PROCEDURE: 





Patient remained in his room and positioned on his bed which was positioned 

flat.  Both arms tucked.  He was positioned facing towards the left side to 

extend his right side of the neck.  This was prepped with the included 

chlorhexidine prep.  We used a line bundle.  White sterile drapes placed.  We 

paused for a surgical timeout using both pre-incision safety checklist to verify

correct patient, procedure site and additional clinical information prior to 

beginning the procedure





Using ultrasound, the location of the subclavian vein and related structures 

including the carotid artery is determined.  The most appropriate area at the 

neck was chosen.  A direct anterior approach was used.  The overlying skin 

infiltrated with 1% lidocaine.  Using the finder needle, this was introduced 

into the internal jugular vein under direct vision of the ultrasound.  This was 

repositioned to be tangential and the guidewire was placed.  The tract was then 

enlarged with the fascial dilator and the triple-lumen catheter was placed 

through the guidewire in position at 17 cm at the skin level without any 

difficulty.  All three ports were tested and noted to be aspirating and flushing

well.  This was then secured to the skin with 3-0 silk.  A chlorhexidine-

containing nonocclusive dressing then placed to cover the skin exit of the line.

 Patient tolerated the procedure well.  A postoperative portable chest x-ray was

performed and confirms adequate placement.  No pneumothorax found


.











PHILLIP NAQVI MD          Sep 5, 2021 12:53

## 2021-09-05 NOTE — REP
INDICATION:

IJ PLACEMENT.



COMPARISON:

Portable chest, 09/05/2021, 8:19 a.m..



TECHNIQUE:

Upright AP portable chest image was obtained.



FINDINGS:

There is no significant lung disease.  There is a fiducial marker in the right mid

lung.  The heart size is normal.  There is a left atrial appendage clip.  There is

calcific vascular disease of the thoracic aorta.  There are surgical clips at the base

of the neck on the left consistent with carotid endarterectomy.  There are surgical

clips at the base of the neck on the right of uncertain significance.  There are

surgical clips in the left lung apex of uncertain significance.  Status post CABG

surgery.  Status post median sternotomy.  There is a new right internal jugular deep

line with the tip in the area of the superior vena cava.



IMPRESSION:

1.  No acute lung disease.

2.  New right IJ deep line appears in good position.

3.  Other findings as noted, not significantly changed.





<Electronically signed by Reyes Garcia > 09/05/21 [id number]

## 2021-09-06 VITALS — DIASTOLIC BLOOD PRESSURE: 67 MMHG | SYSTOLIC BLOOD PRESSURE: 150 MMHG

## 2021-09-06 VITALS — DIASTOLIC BLOOD PRESSURE: 83 MMHG | SYSTOLIC BLOOD PRESSURE: 136 MMHG

## 2021-09-06 VITALS — SYSTOLIC BLOOD PRESSURE: 151 MMHG | DIASTOLIC BLOOD PRESSURE: 68 MMHG

## 2021-09-06 LAB
ALBUMIN SERPL BCG-MCNC: 1.5 GM/DL (ref 3.2–5.2)
ALT SERPL W P-5'-P-CCNC: 13 U/L (ref 12–78)
BASOPHILS # BLD AUTO: 0 10^3/UL (ref 0–0.2)
BASOPHILS NFR BLD AUTO: 0.1 % (ref 0–1)
BILIRUB SERPL-MCNC: 0.1 MG/DL (ref 0.2–1)
BUN SERPL-MCNC: 15 MG/DL (ref 7–18)
CALCIUM SERPL-MCNC: 7 MG/DL (ref 8.8–10.2)
CHLORIDE SERPL-SCNC: 103 MEQ/L (ref 98–107)
CK MB CFR.DF SERPL CALC: 10.26
CK MB SERPL-MCNC: 3.9 NG/ML (ref ?–3.6)
CK SERPL-CCNC: 38 U/L (ref 39–308)
CO2 SERPL-SCNC: 21 MEQ/L (ref 21–32)
CREAT SERPL-MCNC: 1.16 MG/DL (ref 0.7–1.3)
CRP SERPL-MCNC: 4.9 MG/DL (ref 0–0.3)
EOSINOPHIL # BLD AUTO: 0 10^3/UL (ref 0–0.5)
EOSINOPHIL NFR BLD AUTO: 0 % (ref 0–3)
ERYTHROCYTE [SEDIMENTATION RATE] IN BLOOD BY WESTERGREN METHOD: 23 MM/HR (ref 0–20)
GFR SERPL CREATININE-BSD FRML MDRD: > 60 ML/MIN/{1.73_M2} (ref 49–?)
GLUCOSE SERPL-MCNC: 225 MG/DL (ref 70–100)
HCT VFR BLD AUTO: 33 % (ref 42–52)
HGB BLD-MCNC: 10 G/DL (ref 13.5–17.5)
LYMPHOCYTES # BLD AUTO: 0.3 10^3/UL (ref 1.5–5)
LYMPHOCYTES NFR BLD AUTO: 2.3 % (ref 24–44)
MCH RBC QN AUTO: 25.3 PG (ref 27–33)
MCHC RBC AUTO-ENTMCNC: 30.3 G/DL (ref 32–36.5)
MCV RBC AUTO: 83.5 FL (ref 80–96)
MONOCYTES # BLD AUTO: 0.1 10^3/UL (ref 0–0.8)
MONOCYTES NFR BLD AUTO: 0.9 % (ref 2–8)
NEUTROPHILS # BLD AUTO: 13.1 10^3/UL (ref 1.5–8.5)
NEUTROPHILS NFR BLD AUTO: 95.8 % (ref 36–66)
NT-PRO BNP: 7219 PG/ML (ref ?–125)
PLATELET # BLD AUTO: 266 10^3/UL (ref 150–450)
POTASSIUM SERPL-SCNC: 3.1 MEQ/L (ref 3.5–5.1)
PROT SERPL-MCNC: 4.5 GM/DL (ref 6.4–8.2)
RBC # BLD AUTO: 3.95 10^6/UL (ref 4.3–6.1)
SODIUM SERPL-SCNC: 136 MEQ/L (ref 136–145)
TROPONIN I SERPL-MCNC: 0.02 NG/ML (ref ?–0.1)
VIT B12 SERPL-MCNC: 1183 PG/ML (ref 247–911)
WBC # BLD AUTO: 13.6 10^3/UL (ref 4–10)

## 2021-09-06 RX ADMIN — MIDODRINE HYDROCHLORIDE SCH MG: 5 TABLET ORAL at 08:38

## 2021-09-06 RX ADMIN — METRONIDAZOLE SCH MLS/HR: 500 INJECTION, SOLUTION INTRAVENOUS at 00:13

## 2021-09-06 RX ADMIN — CHOLESTYRAMINE SCH GM: 4 POWDER, FOR SUSPENSION ORAL at 15:59

## 2021-09-06 RX ADMIN — DIPHENOXYLATE HYDROCHLORIDE AND ATROPINE SULFATE SCH EA: 2.5; .025 TABLET ORAL at 20:29

## 2021-09-06 RX ADMIN — METHYLPREDNISOLONE SODIUM SUCCINATE SCH MG: 125 INJECTION, POWDER, FOR SOLUTION INTRAMUSCULAR; INTRAVENOUS at 08:39

## 2021-09-06 RX ADMIN — MIDODRINE HYDROCHLORIDE SCH MG: 5 TABLET ORAL at 11:34

## 2021-09-06 RX ADMIN — SUCRALFATE SCH GM: 1 SUSPENSION ORAL at 08:37

## 2021-09-06 RX ADMIN — METOPROLOL TARTRATE SCH MG: 50 TABLET, FILM COATED ORAL at 20:27

## 2021-09-06 RX ADMIN — PANTOPRAZOLE SODIUM SCH MG: 40 TABLET, DELAYED RELEASE ORAL at 08:38

## 2021-09-06 RX ADMIN — METHYLPREDNISOLONE SODIUM SUCCINATE SCH MG: 125 INJECTION, POWDER, FOR SOLUTION INTRAMUSCULAR; INTRAVENOUS at 03:18

## 2021-09-06 RX ADMIN — MIDODRINE HYDROCHLORIDE SCH MG: 5 TABLET ORAL at 15:39

## 2021-09-06 RX ADMIN — LEVALBUTEROL HYDROCHLORIDE SCH MG: 1.25 SOLUTION, CONCENTRATE RESPIRATORY (INHALATION) at 06:26

## 2021-09-06 RX ADMIN — SODIUM CHLORIDE SCH MLS/HR: 9 INJECTION, SOLUTION INTRAVENOUS at 12:42

## 2021-09-06 RX ADMIN — CIPROFLOXACIN SCH MLS/HR: 2 INJECTION, SOLUTION INTRAVENOUS at 20:30

## 2021-09-06 RX ADMIN — CHOLESTYRAMINE SCH GM: 4 POWDER, FOR SUSPENSION ORAL at 11:34

## 2021-09-06 RX ADMIN — Medication SCH UNITS: at 05:52

## 2021-09-06 RX ADMIN — SUCRALFATE SCH GM: 1 SUSPENSION ORAL at 16:57

## 2021-09-06 RX ADMIN — LEVALBUTEROL HYDROCHLORIDE SCH MG: 1.25 SOLUTION, CONCENTRATE RESPIRATORY (INHALATION) at 07:01

## 2021-09-06 RX ADMIN — DIPHENOXYLATE HYDROCHLORIDE AND ATROPINE SULFATE SCH EA: 2.5; .025 TABLET ORAL at 08:38

## 2021-09-06 RX ADMIN — CIPROFLOXACIN SCH MLS/HR: 2 INJECTION, SOLUTION INTRAVENOUS at 10:02

## 2021-09-06 RX ADMIN — PANTOPRAZOLE SODIUM SCH MG: 40 TABLET, DELAYED RELEASE ORAL at 20:27

## 2021-09-06 RX ADMIN — SUCRALFATE SCH GM: 1 SUSPENSION ORAL at 11:33

## 2021-09-06 RX ADMIN — METHYLPREDNISOLONE SODIUM SUCCINATE SCH MG: 125 INJECTION, POWDER, FOR SOLUTION INTRAMUSCULAR; INTRAVENOUS at 20:29

## 2021-09-06 RX ADMIN — SODIUM CHLORIDE SCH ML: 9 INJECTION, SOLUTION INTRAMUSCULAR; INTRAVENOUS; SUBCUTANEOUS at 11:34

## 2021-09-06 RX ADMIN — CHOLESTYRAMINE SCH GM: 4 POWDER, FOR SUSPENSION ORAL at 05:55

## 2021-09-06 RX ADMIN — METOPROLOL TARTRATE SCH MG: 50 TABLET, FILM COATED ORAL at 08:38

## 2021-09-06 RX ADMIN — CHOLESTYRAMINE SCH GM: 4 POWDER, FOR SUSPENSION ORAL at 23:43

## 2021-09-06 RX ADMIN — SODIUM BICARBONATE SCH MG: 325 TABLET ORAL at 08:38

## 2021-09-06 RX ADMIN — METRONIDAZOLE SCH MLS/HR: 500 INJECTION, SOLUTION INTRAVENOUS at 23:46

## 2021-09-06 RX ADMIN — LEVALBUTEROL HYDROCHLORIDE SCH MG: 1.25 SOLUTION, CONCENTRATE RESPIRATORY (INHALATION) at 11:07

## 2021-09-06 RX ADMIN — Medication SCH UNITS: at 11:34

## 2021-09-06 RX ADMIN — SODIUM CHLORIDE SCH ML: 9 INJECTION, SOLUTION INTRAMUSCULAR; INTRAVENOUS; SUBCUTANEOUS at 23:44

## 2021-09-06 RX ADMIN — DIPHENOXYLATE HYDROCHLORIDE AND ATROPINE SULFATE SCH EA: 2.5; .025 TABLET ORAL at 16:57

## 2021-09-06 RX ADMIN — SUCRALFATE SCH GM: 1 SUSPENSION ORAL at 20:26

## 2021-09-06 RX ADMIN — METRONIDAZOLE SCH MLS/HR: 500 INJECTION, SOLUTION INTRAVENOUS at 15:59

## 2021-09-06 RX ADMIN — APIXABAN SCH MG: 5 TABLET, FILM COATED ORAL at 08:37

## 2021-09-06 RX ADMIN — DIPHENOXYLATE HYDROCHLORIDE AND ATROPINE SULFATE SCH EA: 2.5; .025 TABLET ORAL at 11:34

## 2021-09-06 RX ADMIN — SODIUM BICARBONATE SCH MG: 325 TABLET ORAL at 20:29

## 2021-09-06 RX ADMIN — SODIUM CHLORIDE SCH MLS/HR: 9 INJECTION, SOLUTION INTRAVENOUS at 23:55

## 2021-09-06 RX ADMIN — LEVALBUTEROL HYDROCHLORIDE SCH MG: 1.25 SOLUTION, CONCENTRATE RESPIRATORY (INHALATION) at 19:21

## 2021-09-06 RX ADMIN — PANCRELIPASE SCH EA: 24000; 76000; 120000 CAPSULE, DELAYED RELEASE PELLETS ORAL at 08:38

## 2021-09-06 RX ADMIN — METHYLPREDNISOLONE SODIUM SUCCINATE SCH MG: 125 INJECTION, POWDER, FOR SOLUTION INTRAMUSCULAR; INTRAVENOUS at 15:59

## 2021-09-06 RX ADMIN — SODIUM CHLORIDE SCH ML: 9 INJECTION, SOLUTION INTRAMUSCULAR; INTRAVENOUS; SUBCUTANEOUS at 05:52

## 2021-09-06 RX ADMIN — APIXABAN SCH MG: 5 TABLET, FILM COATED ORAL at 20:28

## 2021-09-06 RX ADMIN — METRONIDAZOLE SCH MLS/HR: 500 INJECTION, SOLUTION INTRAVENOUS at 08:37

## 2021-09-06 RX ADMIN — LEVALBUTEROL HYDROCHLORIDE SCH MG: 1.25 SOLUTION, CONCENTRATE RESPIRATORY (INHALATION) at 23:28

## 2021-09-06 RX ADMIN — PANCRELIPASE SCH EA: 24000; 76000; 120000 CAPSULE, DELAYED RELEASE PELLETS ORAL at 11:33

## 2021-09-06 RX ADMIN — ATORVASTATIN CALCIUM SCH MG: 20 TABLET, FILM COATED ORAL at 20:28

## 2021-09-06 RX ADMIN — LEVALBUTEROL HYDROCHLORIDE SCH MG: 1.25 SOLUTION, CONCENTRATE RESPIRATORY (INHALATION) at 14:58

## 2021-09-06 RX ADMIN — PANCRELIPASE SCH EA: 24000; 76000; 120000 CAPSULE, DELAYED RELEASE PELLETS ORAL at 16:58

## 2021-09-06 RX ADMIN — Medication SCH UNITS: at 23:43

## 2021-09-06 NOTE — ECGEPIP
[facility] - ED

                                       

                                       Test Date:    2021

Pat Name:     CHACORTA NOBLES             Department:   

Patient ID:   [id number]                 Room:         

Gender:       Male                     Technician:   KIN

:          1955               Requested By: RISHI HELMS

Order Number: [id number]     Reading MD:   Ashely Varner

                                 Measurements

Intervals                              Axis          

Rate:         99                       P:            66

LA:           156                      QRS:          16

QRSD:         90                       T:            105

QT:           378                                    

QTc:          485                                    

                           Interpretive Statements

Sinus rhythm with occasional premature ventricular complexes

Possible Inferior infarct , age undetermined

NSTTW abnormalities

increased rate 21

Electronically Signed on 2021 16:34:57 EDT by Ashely Varner

## 2021-09-06 NOTE — IPN
PROGRESS NOTE



DATE:  09/06/2021



SUBJECTIVE:  The patient complains of eight loose bowel movements yesterday. No

nausea, vomiting, or fever; shortness of breath improved after the patient was

given Lasix and Solu-Medrol yesterday, still with some expiratory wheezing. He

denies any lightheadedness, chest pain, pressure, tightness, dizziness or near

syncope this morning.



OBJECTIVE: 

Vitals: Temperature 98.5, pulse 92, respiratory rate 16, blood pressure 136/83,

97% on room air.

General: The patient is awake, alert and oriented x3, answering questions

appropriately, cachectic with bitemporal wasting, no respiratory distress. Mild

JVD, no thyromegaly, cervical lymphadenopathy.

Lungs: Diminished, faint expiratory wheezing bilaterally. Air entry is much

improved from yesterday.

Heart: S1, S2, sinus rhythm, no murmur, rub or gallop.

Abdomen: Soft, nontender, non-distended, positive bowel sounds.

Extremities: No cyanosis, clubbing.



Laboratory data, imaging studies, microbiology have all been reviewed; notable

for yeast in the urine. GI panel on 09/04 was negative.



ASSESSMENT AND PLAN: This is a 65-year-old male with history of COPD,

congestive heart failure, systolic dysfunction, ejection fraction of 32% on

echo done by Dr. Velasquez, July 19, 2021 with moderate to moderately severe left

ventricular hypokinesis, moderate mitral regurg with no LV thrombus, mild

tricuspid regurg, COPD, emphysema, admitted on 09/04/2021 with complaints of

atypical chest pain with history of paroxysmal atrial fibrillation and chronic

non-anion gap metabolic acidosis on chronic bicarbonate use. He does have a

history of atrial flutter on chronic Eliquis, hypertension, history of CVA,

hypothyroidism, pancreatic insufficiency with chronic diarrhea with possible

dumping syndrome with prior history of dehydration and electrolyte

abnormalities, started on Questran, Lomotil and Colestipol with chronic low

back pain, polysubstance abuse, admitted due to complaints of atypical chest

pain with negative troponins and shortness of breath with elevated BNP. Active

issues are as follows.

1.  Acute decompensated congestive heart failure with low ejection fraction

    with BNP of over 1000, improved with dose of Lasix, kept on strict I's and

    O's, daily weights and fluid restriction. Will need to assess the patient's

    fluid balance on daily basis due to ongoing diarrhea to prevent

    hypernatremia and dehydration.

2.  Acute COPD exacerbation, improved on Solu-Medrol which is continue every 6

    hours as well as nebulizer treatments, supplemental oxygen to keep

    saturations at 88-92%. The patient is currently on Ciprofloxacin.

3.  Chronic diarrhea, most likely dumping syndrome. The patient has history of

    chronic diarrhea with pancreatic insufficiency and Questran, Lomotil and

    Colestipol with no significant improvement. Lasix is decided on daily basis

    regarding patient's fluid balance due to ongoing volume loss to prevent

    hypernatremia, dehydration and further electrolyte abnormalities. Potassium,

    magnesium, ionized calcium are being monitored and repeated as needed.

4.  Poor IV access. Patient had a triple lumen catheter placed on 09/05/2021 by

    general surgeon, Dr. Naqvi due to losing his peripheral IV access.

5.  Anion gap metabolic acidosis/lactic acidosis: The patient was empirically

    given Cipro and Flagyl yesterday due to lactic acidosis. IV fluids could

    not be given due to elevated BNP and decompensated CHF. If procalcitonin is

    negative will discontinue antibiotics that is being empirically given for

    lactic acidosis and intraabdominal possible source. 

6.  Electrolyte abnormalities with low magnesium that has been repleted.

7.  Acute-on-chronic anemia, no acute RBC transfusion requirement.

8.  History of paroxysmal atrial fibrillation, currently rate controlled.

9.  History of CAD with complaints of atypical chest pain with negative

    troponins. If recurrent episodes the patient should be evaluated with repeat

    EKG and serum myoglobin to rule out acute coronary syndrome.

10.  Esophagitis, on PPI and Carafate.

11.  Hypertension, currently controlled.

12.  Hypothyroidism, on supplement with Synthroid.

13.  Pancreatic insufficiency with dumping syndrome. GI panel is negative. The

     patient is currently on Colestipol, Cholestyramine, and Lomotil.

14.  Chronic low back pain, stable.

15.  Failure to thrive with dumping syndrome, BMI 21.

16.  Protein calorie malnutrition with albumin of 1.6, check nutritionist

     consult.

[facility]

## 2021-09-06 NOTE — REPVR
PROCEDURE INFORMATION: 

Exam: XR Chest 

Exam date and time: 9/6/2021 7:48 AM 

Age: 65 years old 

Clinical indication: Shortness of breath; Additional info: SOB 



TECHNIQUE: 

Imaging protocol: XR of the chest. 

Views: 1 view. 



COMPARISON: 

CR Chest, 1 view 9/5/2021 12:56 PM 



FINDINGS: 

Tubes, catheters and devices: Right neck central venous catheter seen with its 

tip in the distal SVC. The patient is status post sternotomy with grossly 

intact sternal wires. 



Lungs: Unremarkable. No consolidation. 

Pleural spaces: Unremarkable. No pleural effusion. No pneumothorax. 

Heart/Mediastinum: Unremarkable. No cardiomegaly. 

Bones/joints: Significant bilateral shoulder joints DJD seen. Old rib fractures 

seen. 



IMPRESSION: 

1. No focal consolidation. 

2. Right neck CVC with its tip in the distal SVC. 



Electronically signed by: Dimitri Sheikh On 09/06/2021  07:59:28 AM

## 2021-09-07 VITALS — SYSTOLIC BLOOD PRESSURE: 145 MMHG | DIASTOLIC BLOOD PRESSURE: 67 MMHG

## 2021-09-07 VITALS — SYSTOLIC BLOOD PRESSURE: 136 MMHG | DIASTOLIC BLOOD PRESSURE: 65 MMHG

## 2021-09-07 VITALS — SYSTOLIC BLOOD PRESSURE: 141 MMHG | DIASTOLIC BLOOD PRESSURE: 58 MMHG

## 2021-09-07 LAB
ALBUMIN SERPL BCG-MCNC: 1.4 GM/DL (ref 3.2–5.2)
ALT SERPL W P-5'-P-CCNC: 14 U/L (ref 12–78)
BASOPHILS # BLD AUTO: 0 10^3/UL (ref 0–0.2)
BASOPHILS NFR BLD AUTO: 0.1 % (ref 0–1)
BILIRUB SERPL-MCNC: 0.1 MG/DL (ref 0.2–1)
BUN SERPL-MCNC: 16 MG/DL (ref 7–18)
CALCIUM SERPL-MCNC: 6.7 MG/DL (ref 8.8–10.2)
CHLORIDE SERPL-SCNC: 105 MEQ/L (ref 98–107)
CO2 SERPL-SCNC: 25 MEQ/L (ref 21–32)
CREAT SERPL-MCNC: 1 MG/DL (ref 0.7–1.3)
CRP SERPL-MCNC: 2.5 MG/DL (ref 0–0.3)
EOSINOPHIL # BLD AUTO: 0 10^3/UL (ref 0–0.5)
EOSINOPHIL NFR BLD AUTO: 0 % (ref 0–3)
ERYTHROCYTE [SEDIMENTATION RATE] IN BLOOD BY WESTERGREN METHOD: 12 MM/HR (ref 0–20)
GFR SERPL CREATININE-BSD FRML MDRD: > 60 ML/MIN/{1.73_M2} (ref 49–?)
GLUCOSE SERPL-MCNC: 155 MG/DL (ref 70–100)
HCT VFR BLD AUTO: 31.6 % (ref 42–52)
HGB BLD-MCNC: 9.7 G/DL (ref 13.5–17.5)
LYMPHOCYTES # BLD AUTO: 0.3 10^3/UL (ref 1.5–5)
LYMPHOCYTES NFR BLD AUTO: 2.1 % (ref 24–44)
MCH RBC QN AUTO: 25.5 PG (ref 27–33)
MCHC RBC AUTO-ENTMCNC: 30.7 G/DL (ref 32–36.5)
MCV RBC AUTO: 82.9 FL (ref 80–96)
MONOCYTES # BLD AUTO: 0.2 10^3/UL (ref 0–0.8)
MONOCYTES NFR BLD AUTO: 1.3 % (ref 2–8)
NEUTROPHILS # BLD AUTO: 14.4 10^3/UL (ref 1.5–8.5)
NEUTROPHILS NFR BLD AUTO: 95.8 % (ref 36–66)
NT-PRO BNP: 7694 PG/ML (ref ?–125)
PLATELET # BLD AUTO: 232 10^3/UL (ref 150–450)
POTASSIUM SERPL-SCNC: 3 MEQ/L (ref 3.5–5.1)
PROT SERPL-MCNC: 4.6 GM/DL (ref 6.4–8.2)
RBC # BLD AUTO: 3.81 10^6/UL (ref 4.3–6.1)
SODIUM SERPL-SCNC: 135 MEQ/L (ref 136–145)
WBC # BLD AUTO: 15 10^3/UL (ref 4–10)

## 2021-09-07 RX ADMIN — CHOLESTYRAMINE SCH GM: 4 POWDER, FOR SUSPENSION ORAL at 21:07

## 2021-09-07 RX ADMIN — Medication SCH UNITS: at 14:39

## 2021-09-07 RX ADMIN — APIXABAN SCH MG: 5 TABLET, FILM COATED ORAL at 21:06

## 2021-09-07 RX ADMIN — DIPHENOXYLATE HYDROCHLORIDE AND ATROPINE SULFATE SCH EA: 2.5; .025 TABLET ORAL at 18:20

## 2021-09-07 RX ADMIN — APIXABAN SCH MG: 5 TABLET, FILM COATED ORAL at 08:50

## 2021-09-07 RX ADMIN — PANCRELIPASE SCH EA: 24000; 76000; 120000 CAPSULE, DELAYED RELEASE PELLETS ORAL at 11:46

## 2021-09-07 RX ADMIN — IPRATROPIUM BROMIDE SCH PUFF: 17 AEROSOL, METERED RESPIRATORY (INHALATION) at 11:39

## 2021-09-07 RX ADMIN — SUCRALFATE SCH GM: 1 SUSPENSION ORAL at 21:07

## 2021-09-07 RX ADMIN — METOPROLOL TARTRATE SCH MG: 50 TABLET, FILM COATED ORAL at 21:06

## 2021-09-07 RX ADMIN — SODIUM BICARBONATE SCH MG: 325 TABLET ORAL at 21:06

## 2021-09-07 RX ADMIN — SUCRALFATE SCH GM: 1 SUSPENSION ORAL at 11:46

## 2021-09-07 RX ADMIN — ATORVASTATIN CALCIUM SCH MG: 20 TABLET, FILM COATED ORAL at 21:06

## 2021-09-07 RX ADMIN — CALCIUM POLYCARBOPHIL SCH EA: 625 TABLET, FILM COATED ORAL at 21:06

## 2021-09-07 RX ADMIN — IPRATROPIUM BROMIDE AND ALBUTEROL SCH PUFF: 20; 100 SPRAY, METERED RESPIRATORY (INHALATION) at 19:17

## 2021-09-07 RX ADMIN — LEVALBUTEROL HYDROCHLORIDE SCH MG: 1.25 SOLUTION, CONCENTRATE RESPIRATORY (INHALATION) at 07:24

## 2021-09-07 RX ADMIN — SUCRALFATE SCH GM: 1 SUSPENSION ORAL at 18:20

## 2021-09-07 RX ADMIN — DIPHENOXYLATE HYDROCHLORIDE AND ATROPINE SULFATE SCH EA: 2.5; .025 TABLET ORAL at 08:49

## 2021-09-07 RX ADMIN — METOPROLOL TARTRATE SCH MG: 50 TABLET, FILM COATED ORAL at 08:52

## 2021-09-07 RX ADMIN — PANCRELIPASE SCH EA: 24000; 76000; 120000 CAPSULE, DELAYED RELEASE PELLETS ORAL at 18:20

## 2021-09-07 RX ADMIN — Medication SCH UNITS: at 21:07

## 2021-09-07 RX ADMIN — Medication SCH UNITS: at 06:58

## 2021-09-07 RX ADMIN — DIPHENOXYLATE HYDROCHLORIDE AND ATROPINE SULFATE SCH EA: 2.5; .025 TABLET ORAL at 11:46

## 2021-09-07 RX ADMIN — CHOLESTYRAMINE SCH GM: 4 POWDER, FOR SUSPENSION ORAL at 14:38

## 2021-09-07 RX ADMIN — IPRATROPIUM BROMIDE SCH PUFF: 17 AEROSOL, METERED RESPIRATORY (INHALATION) at 15:42

## 2021-09-07 RX ADMIN — SODIUM CHLORIDE SCH ML: 9 INJECTION, SOLUTION INTRAMUSCULAR; INTRAVENOUS; SUBCUTANEOUS at 14:39

## 2021-09-07 RX ADMIN — PANCRELIPASE SCH EA: 24000; 76000; 120000 CAPSULE, DELAYED RELEASE PELLETS ORAL at 08:49

## 2021-09-07 RX ADMIN — CALCIUM POLYCARBOPHIL SCH EA: 625 TABLET, FILM COATED ORAL at 09:00

## 2021-09-07 RX ADMIN — LEVALBUTEROL HYDROCHLORIDE SCH MG: 1.25 SOLUTION, CONCENTRATE RESPIRATORY (INHALATION) at 03:23

## 2021-09-07 RX ADMIN — SODIUM CHLORIDE SCH ML: 9 INJECTION, SOLUTION INTRAMUSCULAR; INTRAVENOUS; SUBCUTANEOUS at 06:59

## 2021-09-07 RX ADMIN — CHOLESTYRAMINE SCH GM: 4 POWDER, FOR SUSPENSION ORAL at 06:58

## 2021-09-07 RX ADMIN — PANTOPRAZOLE SODIUM SCH MG: 40 TABLET, DELAYED RELEASE ORAL at 08:50

## 2021-09-07 RX ADMIN — SODIUM CHLORIDE SCH ML: 9 INJECTION, SOLUTION INTRAMUSCULAR; INTRAVENOUS; SUBCUTANEOUS at 21:07

## 2021-09-07 RX ADMIN — CHOLESTYRAMINE SCH GM: 4 POWDER, FOR SUSPENSION ORAL at 11:46

## 2021-09-07 RX ADMIN — DIPHENOXYLATE HYDROCHLORIDE AND ATROPINE SULFATE SCH EA: 2.5; .025 TABLET ORAL at 21:06

## 2021-09-07 RX ADMIN — METHYLPREDNISOLONE SODIUM SUCCINATE SCH MG: 125 INJECTION, POWDER, FOR SOLUTION INTRAMUSCULAR; INTRAVENOUS at 03:56

## 2021-09-07 RX ADMIN — SODIUM BICARBONATE SCH MG: 325 TABLET ORAL at 08:50

## 2021-09-07 RX ADMIN — SUCRALFATE SCH GM: 1 SUSPENSION ORAL at 08:49

## 2021-09-07 NOTE — IPNPDOC
Subjective


Date Seen


The patient was seen on 9/7/21.





Subjective


Chief Complaint/HPI


Complains of severe diarrhea at least 8-10 times per day.  Denies any abdominal 

pain denies any nausea or vomiting.  No blood in stool.  The stool is light 

brown to whitish in color liquid in consistency.





Objective


Physical Examination


General Exam:  Positive: Alert, Cooperative, No Acute Distress


Eye Exam:  Positive: PERRLA, EOMI; 


   Negative: Sclera icteric


Neck Exam:  Positive: Supple; 


   Negative: JVD


Chest Exam:  Positive: Diminished; 


   Negative: Rales, Rhonchi, Wheezing


Heart Exam:  Positive: Rate Normal, Regular Rhythm, Normal S1, Normal S2


Abdomen Exam:  Positive: Normal bowel sounds, Soft; 


   Negative: Tenderness


Extremity Exam:  Positive: Edema (Trace edema only on the left leg); 


   Negative: Clubbing, Cyanosis


Neuro Exam:  Positive: Normal Speech, Strength at 5/5 X4 ext, Normal Tone


Psych Exam:  Positive: Anxiety, Oriented x 3





Assessment /Plan


Assessment


This is a 65-year-old male with history of COPD, systolic congestive heart 

failure,  ejection fraction of 32% with moderate to  severe left ventricular 

hypokinesis, moderate mitral regurg, COPD, emphysema, paroxysmal atrial 

fibrillation, hypertension, history of CVA, hypothyroidism, history of partial 

pancreatectomy for pancreatitis, gastrojejunostomy, chronic pancreatic 

insufficiency, chronic diarrhea, non-anion gap metabolic acidosis on chronic 

bicarbonate use admitted on 09/04/2021 with complaints of atypical chest pain.  

Patient was ruled out for acute coronary syndrome.  He was felt to have CHF 

exacerbation and COPD exacerbation which has now resolved.  Now his main 

complaint is ongoing severe diarrhea for the past 1 month.





Acute on chronic diarrhea


Patient reports this is going for about 1 month


GI panel x2 


Patient has history of partial pancreatectomy due to pancreatitis and 

gastrojejunostomy 


Likely diarrhea is related to the above


We will continue with cholestyramine, fiber, Imodium, continue Creon





Systolic CHF


Now appears to be compensated


Patient has ongoing severe diarrhea which is keeping him in a negative balance


Not on any diuretics or fluid restriction at this time





COPD with moderately severe pulmonary hypertension


Had exacerbation on admission which has now resolved


Steroid has been tapered


Restarted home inhaler





Hypomagnesemia and hypokalemia


Repleted





Anion gap and non-anion gap metabolic acidosis/lactic acidosis and diarrhea


We will continue bicarb tablets





Acute-on-chronic anemia,


Has history of iron deficiency anemia


no acute RBC transfusion requirement.





Paroxysmal atrial fibrillation


Now in sinus rhythm


Continue Eliquis





History of CAD status post CABG in August 2020 


negative troponins. 





Hypertension


On metoprolol





Hypothyroidism,


Synthroid.





Pancreatic insufficiency with dumping syndrome.


History of severe pancreatitis 8 years ago related to alcohol abuse s/p partial 

resection and gastrojejunostomy.


Chronic pancreatic insufficiency, 


GI panel is negative. 


patient is currently on Colestipol, Cholestyramine, and Lomotil and fiber





Protein calorie malnutrition 


with albumin of 1.6, questions of small muscles of hands and feet, BMI of 20.5





Pancreatic Head mass since 2017 had EUS biopsy in 2020 with Gastroenterology and

hepatology of [facility] in 2019 was negative for malignancy





Pulmonary nodules . Had Bx of right Lower lobe pulmonary nodule in sept 2020





History of DM 


Now resolved with weight loss





HLD





CKD stage 3





Osteoarthritis/Low Back Pain





Cataract, Glaucoma





Substance abuse: 


former EtOH and former smoker. Former heroine user. Still doing meth and 

marijuana.





Carotid artery disease with bilateral axillary to carotid bypass. 





Chronic aortic insufficiency.





History of CVA/TIA





History of peptic ulcer disease with gastrointestinal (GI) bleeding/ Esophageal 

stenosis/pyloric stenosis


H/o GIB due to Anastomotic ulcer at the gastrojejunostomy, esophagitis





RLS





Plan/VTE


VTE Prophylaxis Ordered?:  Yes





VS, I&O, 24H, Fishbone


Vital Signs/I&O





Vital Signs








  Date Time  Temp Pulse Resp B/P (MAP) Pulse Ox O2 Delivery O2 Flow Rate FiO2


 


9/7/21 08:52  93  137/74    


 


9/7/21 06:06 98.7  20  97 Room Air  


 


9/5/21 02:09       2.0 














I&O- Last 24 Hours up to 6 AM 


 


 9/7/21





 05:59


 


Intake Total 3440 ml


 


Output Total 2400 ml


 


Balance 1040 ml











Laboratory Data


24H LABS


Laboratory Tests 2


9/6/21 17:01: Lactic Acid Followup at 4 Hours 4.9*H


9/7/21 07:42: 


Immature Granulocyte % (Auto) 0.7, Neutrophils (%) (Auto) 95.8H, Lymphocytes (%)

(Auto) 2.1L, Monocytes (%) (Auto) 1.3L, Eosinophils (%) (Auto) 0.0, Basophils 

(%) (Auto) 0.1, Neutrophils # (Auto) 14.4H, Lymphocytes # (Auto) 0.3L, Monocytes

# (Auto) 0.2, Eosinophils # (Auto) 0.0, Basophils # (Auto) 0.0, Nucleated Red 

Blood Cells % (auto) 0.0, Erythrocyte Sedimentation Rate 12, Anion Gap 5L, 

Glomerular Filtration Rate > 60.0, Lactic Acid Level 1.9, Calcium Level 6.7L, 

Total Bilirubin 0.1L, Aspartate Amino Transf (AST/SGOT) 11, Alanine 

Aminotransferase (ALT/SGPT) 14, Alkaline Phosphatase 206H, C-Reactive Protein, 

Quantitative 2.50H, NT-Pro-B-Type Natriuretic Peptide 7694H, Total Protein 4.6L,

Albumin 1.4L, Albumin/Globulin Ratio 0.4


9/7/21 14:24: Lab Scanned Report Miscellaneous Lab


CBC/BMP


Laboratory Tests


9/7/21 07:42








Microbiology





Microbiology


9/4/21 Gastrointestinal Tract Panel (PCR) - Final, Complete


         


9/4/21 Blood Culture - Preliminary, Resulted


         No Growth after 72 hours. All specime...


9/4/21 Urine Culture - Final, Complete


         Yeast Like Organism


         Lactobacillus Species


         Staphylococcus Epidermidis


9/4/21 Blood Culture - Preliminary, Resulted


         No Growth after 72 hours. All specime...











Stephy Fontaine MD                    Sep 7, 2021 16:28

## 2021-09-07 NOTE — REP
INDICATION:

sob.



COMPARISON:

Comparison chest x-ray September 6, 2021.



TECHNIQUE:

Portable upright AP chest radiograph.



FINDINGS:

Lungs are symmetrically aerated and free of infiltrate.  Median sternotomy wires, a

left atrial appendage clamp, and a right internal jugular central venous line are seen

unchanged in position.  Old healed clavicle fracture on the right.  Osteoarthritic

changes in the shoulders are noted bilaterally.  There are old healed rib fractures

bilaterally.  Pulmonary vasculature is not increased.  No infiltrate is seen.  The

pleural angles are sharp.  Heart size is normal.



IMPRESSION:

No active disease.





<Electronically signed by Emmett Radford > 09/07/21 [id number]

## 2021-09-08 VITALS — DIASTOLIC BLOOD PRESSURE: 64 MMHG | SYSTOLIC BLOOD PRESSURE: 135 MMHG

## 2021-09-08 VITALS — SYSTOLIC BLOOD PRESSURE: 140 MMHG | DIASTOLIC BLOOD PRESSURE: 67 MMHG

## 2021-09-08 VITALS — DIASTOLIC BLOOD PRESSURE: 70 MMHG | SYSTOLIC BLOOD PRESSURE: 140 MMHG

## 2021-09-08 LAB
BUN SERPL-MCNC: 19 MG/DL (ref 7–18)
CALCIUM SERPL-MCNC: 6.8 MG/DL (ref 8.8–10.2)
CHLORIDE SERPL-SCNC: 103 MEQ/L (ref 98–107)
CO2 SERPL-SCNC: 26 MEQ/L (ref 21–32)
CREAT SERPL-MCNC: 1.16 MG/DL (ref 0.7–1.3)
GFR SERPL CREATININE-BSD FRML MDRD: > 60 ML/MIN/{1.73_M2} (ref 49–?)
GLUCOSE SERPL-MCNC: 108 MG/DL (ref 70–100)
HCT VFR BLD AUTO: 33 % (ref 42–52)
HGB BLD-MCNC: 10.3 G/DL (ref 13.5–17.5)
MCH RBC QN AUTO: 25.9 PG (ref 27–33)
MCHC RBC AUTO-ENTMCNC: 31.2 G/DL (ref 32–36.5)
MCV RBC AUTO: 82.9 FL (ref 80–96)
PLATELET # BLD AUTO: 265 10^3/UL (ref 150–450)
POTASSIUM SERPL-SCNC: 3.2 MEQ/L (ref 3.5–5.1)
RBC # BLD AUTO: 3.98 10^6/UL (ref 4.3–6.1)
SODIUM SERPL-SCNC: 135 MEQ/L (ref 136–145)
WBC # BLD AUTO: 13.9 10^3/UL (ref 4–10)

## 2021-09-08 RX ADMIN — IPRATROPIUM BROMIDE AND ALBUTEROL SCH PUFF: 20; 100 SPRAY, METERED RESPIRATORY (INHALATION) at 12:22

## 2021-09-08 RX ADMIN — APIXABAN SCH MG: 5 TABLET, FILM COATED ORAL at 21:09

## 2021-09-08 RX ADMIN — Medication SCH UNITS: at 15:21

## 2021-09-08 RX ADMIN — IPRATROPIUM BROMIDE AND ALBUTEROL SCH PUFF: 20; 100 SPRAY, METERED RESPIRATORY (INHALATION) at 07:32

## 2021-09-08 RX ADMIN — DIPHENOXYLATE HYDROCHLORIDE AND ATROPINE SULFATE SCH EA: 2.5; .025 TABLET ORAL at 13:00

## 2021-09-08 RX ADMIN — SUCRALFATE SCH GM: 1 SUSPENSION ORAL at 12:59

## 2021-09-08 RX ADMIN — SUCRALFATE SCH GM: 1 SUSPENSION ORAL at 18:06

## 2021-09-08 RX ADMIN — SUCRALFATE SCH GM: 1 SUSPENSION ORAL at 08:08

## 2021-09-08 RX ADMIN — SODIUM BICARBONATE SCH MG: 325 TABLET ORAL at 08:08

## 2021-09-08 RX ADMIN — CALCIUM POLYCARBOPHIL SCH EA: 625 TABLET, FILM COATED ORAL at 08:08

## 2021-09-08 RX ADMIN — METOPROLOL TARTRATE SCH MG: 50 TABLET, FILM COATED ORAL at 08:09

## 2021-09-08 RX ADMIN — PANTOPRAZOLE SODIUM SCH MG: 40 TABLET, DELAYED RELEASE ORAL at 08:08

## 2021-09-08 RX ADMIN — DIPHENOXYLATE HYDROCHLORIDE AND ATROPINE SULFATE SCH EA: 2.5; .025 TABLET ORAL at 08:09

## 2021-09-08 RX ADMIN — SODIUM BICARBONATE SCH MG: 325 TABLET ORAL at 21:09

## 2021-09-08 RX ADMIN — IPRATROPIUM BROMIDE AND ALBUTEROL SCH PUFF: 20; 100 SPRAY, METERED RESPIRATORY (INHALATION) at 16:10

## 2021-09-08 RX ADMIN — CHOLESTYRAMINE SCH GM: 4 POWDER, FOR SUSPENSION ORAL at 21:07

## 2021-09-08 RX ADMIN — Medication SCH UNITS: at 05:01

## 2021-09-08 RX ADMIN — CHOLESTYRAMINE SCH GM: 4 POWDER, FOR SUSPENSION ORAL at 15:21

## 2021-09-08 RX ADMIN — APIXABAN SCH MG: 5 TABLET, FILM COATED ORAL at 08:09

## 2021-09-08 RX ADMIN — Medication SCH UNITS: at 21:08

## 2021-09-08 RX ADMIN — SUCRALFATE SCH GM: 1 SUSPENSION ORAL at 21:08

## 2021-09-08 RX ADMIN — CALCIUM POLYCARBOPHIL SCH EA: 625 TABLET, FILM COATED ORAL at 21:11

## 2021-09-08 RX ADMIN — SODIUM CHLORIDE SCH ML: 9 INJECTION, SOLUTION INTRAMUSCULAR; INTRAVENOUS; SUBCUTANEOUS at 15:21

## 2021-09-08 RX ADMIN — CALCIUM CARBONATE (ANTACID) CHEW TAB 500 MG PRN MG: 500 CHEW TAB at 01:07

## 2021-09-08 RX ADMIN — SODIUM CHLORIDE SCH ML: 9 INJECTION, SOLUTION INTRAMUSCULAR; INTRAVENOUS; SUBCUTANEOUS at 05:01

## 2021-09-08 RX ADMIN — DIPHENOXYLATE HYDROCHLORIDE AND ATROPINE SULFATE SCH EA: 2.5; .025 TABLET ORAL at 21:09

## 2021-09-08 RX ADMIN — DIPHENOXYLATE HYDROCHLORIDE AND ATROPINE SULFATE SCH EA: 2.5; .025 TABLET ORAL at 18:07

## 2021-09-08 RX ADMIN — PANCRELIPASE SCH EA: 24000; 76000; 120000 CAPSULE, DELAYED RELEASE PELLETS ORAL at 13:00

## 2021-09-08 RX ADMIN — IPRATROPIUM BROMIDE AND ALBUTEROL SCH PUFF: 20; 100 SPRAY, METERED RESPIRATORY (INHALATION) at 19:34

## 2021-09-08 RX ADMIN — CHOLESTYRAMINE SCH GM: 4 POWDER, FOR SUSPENSION ORAL at 05:34

## 2021-09-08 RX ADMIN — CHOLESTYRAMINE SCH GM: 4 POWDER, FOR SUSPENSION ORAL at 13:00

## 2021-09-08 RX ADMIN — SODIUM CHLORIDE SCH ML: 9 INJECTION, SOLUTION INTRAMUSCULAR; INTRAVENOUS; SUBCUTANEOUS at 21:11

## 2021-09-08 RX ADMIN — PANCRELIPASE SCH EA: 24000; 76000; 120000 CAPSULE, DELAYED RELEASE PELLETS ORAL at 08:08

## 2021-09-08 RX ADMIN — PANCRELIPASE SCH EA: 24000; 76000; 120000 CAPSULE, DELAYED RELEASE PELLETS ORAL at 18:07

## 2021-09-08 RX ADMIN — METOPROLOL TARTRATE SCH MG: 50 TABLET, FILM COATED ORAL at 21:10

## 2021-09-08 RX ADMIN — ATORVASTATIN CALCIUM SCH MG: 20 TABLET, FILM COATED ORAL at 21:10

## 2021-09-08 NOTE — IPNPDOC
Subjective


Date Seen


The patient was seen on 9/8/21.





Subjective


Chief Complaint/HPI


Patient reports that his diarrhea is a little better.  This morning he said his 

stool was hard.  However after breakfast he again had diarrhea.  Denies any 

abdominal pain nausea or vomiting.  Requests more fluids in his diet.





Objective


Physical Examination


General Exam:  Positive: Alert, Cooperative, No Acute Distress


Eye Exam:  Positive: PERRLA, EOMI; 


   Negative: Sclera icteric


Neck Exam:  Positive: Supple; 


   Negative: JVD


Chest Exam:  Positive: Diminished; 


   Negative: Rales, Rhonchi, Wheezing


Heart Exam:  Positive: Rate Normal, Regular Rhythm, Normal S1, Normal S2


Abdomen Exam:  Positive: Normal bowel sounds, Soft; 


   Negative: Tenderness


Extremity Exam:  Positive: Edema (Trace edema only on the left leg); 


   Negative: Clubbing, Cyanosis


Neuro Exam:  Positive: Normal Speech, Strength at 5/5 X4 ext, Normal Tone


Psych Exam:  Positive: Anxiety, Oriented x 3





Assessment /Plan


Assessment


This is a 65-year-old male with history of COPD, systolic congestive heart 

failure,  ejection fraction of 32% with moderate to  severe left ventricular 

hypokinesis, moderate mitral regurg, COPD, emphysema, paroxysmal atrial 

fibrillation, hypertension, history of CVA, hypothyroidism, history of partial 

pancreatectomy for pancreatitis, gastrojejunostomy, chronic pancreatic 

insufficiency, chronic diarrhea, non-anion gap metabolic acidosis on chronic 

bicarbonate use admitted on 09/04/2021 with complaints of atypical chest pain.  

Patient was ruled out for acute coronary syndrome.  He was felt to have CHF 

exacerbation and COPD exacerbation which has now resolved.  Now his main 

complaint is ongoing severe diarrhea for the past 1 month.





Acute on chronic diarrhea


Patient reports this is going for about 1 month


GI panel x2 


Patient has history of partial pancreatectomy due to pancreatitis and gastr

ojejunostomy 


Likely diarrhea is related to the above worsened with the recent use of 

antibiotics


We will continue with cholestyramine, fiber, Imodium, continue Creon





Systolic CHF


Now appears to be compensated


Patient has ongoing severe diarrhea which is keeping him in a negative balance


Not on any diuretics or fluid restriction at this time





COPD with moderately severe pulmonary hypertension


Had exacerbation on admission which has now resolved


Steroid has been tapered


Restarted home inhaler





Hypomagnesemia and hypokalemia


Repleted





Anion gap and non-anion gap metabolic acidosis/lactic acidosis and diarrhea


We will continue bicarb tablets





Acute-on-chronic anemia,


Has history of iron deficiency anemia


no acute RBC transfusion requirement.





Paroxysmal atrial fibrillation


Now in sinus rhythm


Continue Eliquis





History of CAD status post CABG in August 2020 


negative troponins. 





Hypertension


On metoprolol





Hypothyroidism,


Synthroid.





Pancreatic insufficiency with dumping syndrome.


History of severe pancreatitis 8 years ago related to alcohol abuse s/p partial 

resection and gastrojejunostomy.


Chronic pancreatic insufficiency, 


GI panel is negative. 


patient is currently on Colestipol, Cholestyramine, and Lomotil and fiber





Protein calorie malnutrition 


with albumin of 1.6, questions of small muscles of hands and feet, BMI of 20.5





Pancreatic Head mass since 2017 had EUS biopsy in 2020 with Gastroenterology and

hepatology of [facility] in 2019 was negative for malignancy





Pulmonary nodules . Had Bx of right Lower lobe pulmonary nodule in sept 2020





History of DM 


Now resolved with weight loss





HLD





CKD stage 3





Osteoarthritis/Low Back Pain





Cataract, Glaucoma





Substance abuse: 


former EtOH and former smoker. Former heroine user. Still doing meth and 

marijuana.





Carotid artery disease with bilateral axillary to carotid bypass. 





Chronic aortic insufficiency.





History of CVA/TIA





History of peptic ulcer disease with gastrointestinal (GI) bleeding/ Esophageal 

stenosis/pyloric stenosis


H/o GIB due to Anastomotic ulcer at the gastrojejunostomy, esophagitis





RLS





Plan/VTE


VTE Prophylaxis Ordered?:  Yes





VS, I&O, 24H, Fishbone


Vital Signs/I&O





Vital Signs








  Date Time  Temp Pulse Resp B/P (MAP) Pulse Ox O2 Delivery O2 Flow Rate FiO2


 


9/8/21 08:09  82  142/68    


 


9/8/21 06:00 98.2  20  96 Room Air  


 


9/5/21 02:09       2.0 














I&O- Last 24 Hours up to 6 AM 


 


 9/8/21





 06:00


 


Intake Total 1640 ml


 


Output Total 650 ml


 


Balance 990 ml











Laboratory Data


24H LABS


Laboratory Tests 2


9/7/21 14:24: Lab Scanned Report Miscellaneous Lab


9/8/21 04:53: 


Nucleated Red Blood Cells % (auto) 0.0, Anion Gap 6L, Glomerular Filtration Rate

> 60.0, Calcium Level 6.8L


CBC/BMP


Laboratory Tests


9/8/21 04:53








Microbiology





Microbiology


9/4/21 Gastrointestinal Tract Panel (PCR) - Final, Complete


         


9/4/21 Blood Culture - Preliminary, Resulted


         No Growth after 72 hours. All specime...


9/4/21 Urine Culture - Final, Complete


         Yeast Like Organism


         Lactobacillus Species


         Staphylococcus Epidermidis


9/4/21 Blood Culture - Preliminary, Resulted


         No Growth after 72 hours. All specime...











Stephy Fontaine MD                    Sep 8, 2021 11:32

## 2021-09-09 VITALS — SYSTOLIC BLOOD PRESSURE: 143 MMHG | DIASTOLIC BLOOD PRESSURE: 78 MMHG

## 2021-09-09 VITALS — SYSTOLIC BLOOD PRESSURE: 104 MMHG | DIASTOLIC BLOOD PRESSURE: 50 MMHG

## 2021-09-09 LAB
BAKER'S YEAST IGG QN IA: 172 UNITS (ref 0–50)
BUN SERPL-MCNC: 18 MG/DL (ref 7–18)
CALCIUM SERPL-MCNC: 6.7 MG/DL (ref 8.8–10.2)
CCP IGG SERPL-ACNC: 6 UNITS (ref 0–19)
CHITOBIOSIDE IGA SERPL IA-ACNC: 23 UNITS (ref 0–90)
CHLORIDE SERPL-SCNC: 106 MEQ/L (ref 98–107)
CO2 SERPL-SCNC: 28 MEQ/L (ref 21–32)
CREAT SERPL-MCNC: 0.96 MG/DL (ref 0.7–1.3)
GFR SERPL CREATININE-BSD FRML MDRD: > 60 ML/MIN/{1.73_M2} (ref 49–?)
GLUCOSE SERPL-MCNC: 93 MG/DL (ref 70–100)
HCT VFR BLD AUTO: 31.5 % (ref 42–52)
HGB BLD-MCNC: 9.9 G/DL (ref 13.5–17.5)
LAMINARIBIOSIDE IGG SERPL IA-ACNC: 96 UNITS (ref 0–60)
MANNOBIOSIDE IGG SERPL IA-ACNC: 50 UNITS (ref 0–100)
MCH RBC QN AUTO: 25.8 PG (ref 27–33)
MCHC RBC AUTO-ENTMCNC: 31.4 G/DL (ref 32–36.5)
MCV RBC AUTO: 82 FL (ref 80–96)
PLATELET # BLD AUTO: 204 10^3/UL (ref 150–450)
POTASSIUM SERPL-SCNC: 3.8 MEQ/L (ref 3.5–5.1)
RBC # BLD AUTO: 3.84 10^6/UL (ref 4.3–6.1)
SODIUM SERPL-SCNC: 136 MEQ/L (ref 136–145)
WBC # BLD AUTO: 9.5 10^3/UL (ref 4–10)

## 2021-09-09 RX ADMIN — METOPROLOL TARTRATE SCH MG: 50 TABLET, FILM COATED ORAL at 09:00

## 2021-09-09 RX ADMIN — IPRATROPIUM BROMIDE AND ALBUTEROL SCH PUFF: 20; 100 SPRAY, METERED RESPIRATORY (INHALATION) at 11:03

## 2021-09-09 RX ADMIN — PANTOPRAZOLE SODIUM SCH MG: 40 TABLET, DELAYED RELEASE ORAL at 08:27

## 2021-09-09 RX ADMIN — SODIUM BICARBONATE SCH MG: 325 TABLET ORAL at 08:29

## 2021-09-09 RX ADMIN — SUCRALFATE SCH GM: 1 SUSPENSION ORAL at 08:27

## 2021-09-09 RX ADMIN — PANCRELIPASE SCH EA: 24000; 76000; 120000 CAPSULE, DELAYED RELEASE PELLETS ORAL at 08:00

## 2021-09-09 RX ADMIN — APIXABAN SCH MG: 5 TABLET, FILM COATED ORAL at 08:28

## 2021-09-09 RX ADMIN — METOPROLOL TARTRATE SCH MG: 50 TABLET, FILM COATED ORAL at 08:28

## 2021-09-09 RX ADMIN — CALCIUM POLYCARBOPHIL SCH EA: 625 TABLET, FILM COATED ORAL at 08:28

## 2021-09-09 RX ADMIN — DIPHENOXYLATE HYDROCHLORIDE AND ATROPINE SULFATE SCH EA: 2.5; .025 TABLET ORAL at 07:30

## 2021-09-09 RX ADMIN — IPRATROPIUM BROMIDE AND ALBUTEROL SCH PUFF: 20; 100 SPRAY, METERED RESPIRATORY (INHALATION) at 07:17

## 2021-09-09 RX ADMIN — SODIUM CHLORIDE SCH ML: 9 INJECTION, SOLUTION INTRAMUSCULAR; INTRAVENOUS; SUBCUTANEOUS at 05:10

## 2021-09-09 RX ADMIN — PANTOPRAZOLE SODIUM SCH MG: 40 TABLET, DELAYED RELEASE ORAL at 09:00

## 2021-09-09 RX ADMIN — SODIUM BICARBONATE SCH MG: 325 TABLET ORAL at 09:00

## 2021-09-09 RX ADMIN — Medication SCH UNITS: at 05:10

## 2021-09-09 RX ADMIN — CALCIUM CARBONATE (ANTACID) CHEW TAB 500 MG PRN MG: 500 CHEW TAB at 08:45

## 2021-09-09 RX ADMIN — CALCIUM POLYCARBOPHIL SCH EA: 625 TABLET, FILM COATED ORAL at 09:00

## 2021-09-09 RX ADMIN — APIXABAN SCH MG: 5 TABLET, FILM COATED ORAL at 09:00

## 2021-09-09 RX ADMIN — PANCRELIPASE SCH EA: 24000; 76000; 120000 CAPSULE, DELAYED RELEASE PELLETS ORAL at 08:29

## 2021-09-09 RX ADMIN — CHOLESTYRAMINE SCH GM: 4 POWDER, FOR SUSPENSION ORAL at 05:09

## 2021-09-09 RX ADMIN — DIPHENOXYLATE HYDROCHLORIDE AND ATROPINE SULFATE SCH EA: 2.5; .025 TABLET ORAL at 08:28

## 2021-09-09 RX ADMIN — SUCRALFATE SCH GM: 1 SUSPENSION ORAL at 07:30

## 2021-09-09 NOTE — DS.PDOC
Discharge Summary


General


Date of Admission


Sep 4, 2021 at 00:20


Date of Discharge


9/9/21





Discharge Summary


PROCEDURES PERFORMED DURING STAY: [None].





DISCHARGE DIAGNOSES:


Atypical chest pain likely related to esophageal spasm/acute GERD/acute 

esophagitis


Acute on chronic diarrhea non infective likely due to pancreatic insufficiency, 

dumping syndrome


Recent left 11th rib fracture


Positive IBD serology tests needs to follow-up with own GI in [place]





Secondary diagnosis


COPD 


CAD s/p CABG in August 2020 


Systolic CHF ejection fraction 25-30% with mitral regurgitation (MR) 


Moderately severe pulmonary hypertension


Chronic aortic insufficiency.


Cirrhotic liver in CT scan of the abdomen.


History of severe pancreatitis 8 years ago related to alcohol abuse s/p partial 

resection and gastrojejunostomy.


Chronic pancreatic insufficiency, 


Pancreatic Head mass since 2017 had EUS biopsy recently with Gastroenterology 

and hepatology of [facility] in 2019


Reflux esophagitis


Gastric antrum stenosis 


H/o GIB due to Anastomotic ulcer at the gastrojejunostomy.


Pulmonary nodules . Had Bx of right Lower lobe pulmonary nodule in sept 2020


DM 


Anemia


HTN


HLD


Hypothyroidism


CKD stage 3


Osteoarthritis


Low Back Pain


Cataract


Glaucoma


TIA


Carotid artery disease with bilateral axillary to carotid bypass. 


History of CVA.


iron deficiency anemia 


RLS


Substance abuse: former EtOH and former smoker. Former heroine user. Still doing

 meth and marijuana.


Noncompliance with medications


Old bilateral rib fractures


Transient atrial flutter during prior admission.





COMPLICATIONS/CHIEF COMPLAINT: Atypical Chest Pain.





HOSPITAL COURSE: This is a 65-year-old male with history of COPD, systolic 

congestive heart failure,  ejection fraction of 32% with moderate to  severe 

left ventricular hypokinesis, moderate mitral regurg, COPD, emphysema, 

paroxysmal atrial fibrillation, hypertension, history of CVA, hypothyroidism, 

history of partial pancreatectomy for pancreatitis, gastrojejunostomy, chronic 

pancreatic insufficiency, chronic diarrhea, non-anion gap metabolic acidosis on 

chronic bicarbonate use admitted on 09/04/2021 with complaints of atypical chest

 pain.  Patient was ruled out for acute coronary syndrome.  He was felt to have 

CHF exacerbation and COPD exacerbation which has now resolved.  Now his main co

mplaint is ongoing severe diarrhea for the past 1 month.





Acute on chronic diarrhea


Patient reports this is going for about 1 month


GI panel x2 negative


Patient has history of partial pancreatectomy due to pancreatitis and 

gastrojejunostomy, dumping syndrome


Likely diarrhea is related to the above worsened with the recent use of 

antibiotics


We will continue with cholestyramine, fiber, Imodium, continue Creon





Systolic CHF


Now appears to be compensated


Patient has ongoing severe diarrhea which is keeping him in a negative balance


Not on any diuretics or fluid restriction at this time





COPD with moderately severe pulmonary hypertension


Had exacerbation on admission which has now resolved


Steroid has been tapered


Restarted home inhaler





Hypomagnesemia and hypokalemia


Repleted





Anion gap and non-anion gap metabolic acidosis/lactic acidosis and diarrhea


We will continue bicarb tablets





Acute-on-chronic anemia,


Has history of iron deficiency anemia


no acute RBC transfusion requirement.





Paroxysmal atrial fibrillation


Now in sinus rhythm


Continue Eliquis





History of CAD status post CABG in August 2020 


negative troponins. 





Hypertension


On metoprolol





Hypothyroidism,


Synthroid.





Pancreatic insufficiency with dumping syndrome.


History of severe pancreatitis 8 years ago related to alcohol abuse s/p partial 

resection and gastrojejunostomy.


Chronic pancreatic insufficiency, 


GI panel is negative. 


patient is currently on Colestipol, Cholestyramine, and Lomotil and fiber





Protein calorie malnutrition 


with albumin of 1.6, questions of small muscles of hands and feet, BMI of 20.5





Pancreatic Head mass since 2017 had EUS biopsy in 2020 with Gastroenterology and

 hepatology of [facility] in 2019 was negative for malignancy





Pulmonary nodules . Had Bx of right Lower lobe pulmonary nodule in sept 2020





History of DM 


Now resolved with weight loss





HLD





CKD stage 3





Osteoarthritis/Low Back Pain





Cataract, Glaucoma





Substance abuse: 


former EtOH and former smoker. Former heroine user. Still doing meth and 

marijuana.





Carotid artery disease with bilateral axillary to carotid bypass. 





Chronic aortic insufficiency.





History of CVA/TIA





History of peptic ulcer disease with gastrointestinal (GI) bleeding/ Esophageal 

stenosis/pyloric stenosis


H/o GIB due to Anastomotic ulcer at the gastrojejunostomy, esophagitis





DISCHARGE MEDICATIONS: Please see below.


 


ALLERGIES: Please see below.





PHYSICAL EXAMINATION ON DISCHARGE:


VITAL SIGNS: Please see below.


General Exam:  Positive: Alert, Cooperative, No Acute Distress


Eye Exam:  Positive: PERRLA, EOMI; 


   Negative: Sclera icteric


Neck Exam:  Positive: Supple; 


   Negative: JVD


Chest Exam:  Positive: Diminished; 


   Negative: Rales, Rhonchi, Wheezing


Heart Exam:  Positive: Rate Normal, Regular Rhythm, Normal S1, Normal S2


Abdomen Exam:  Positive: Normal bowel sounds, Soft; 


   Negative: Tenderness


Extremity Exam:  Positive: Edema (Trace edema only on the left leg); 


   Negative: Clubbing, Cyanosis


Neuro Exam:  Positive: Normal Speech, Strength at 5/5 X4 ext, Normal Tone


Psych Exam:  Positive: Anxiety, Oriented x 3





LABORATORY DATA: Please see below.





ACTIVITY: [As tolerated].





DIET: As tolerated





DISCHARGE PLAN: Home





DISPOSITION: 01 Home, Self-Care.





DISCHARGE INSTRUCTIONS:


Follow-up with PMD in 1 week


Suggested referral to GI for chronic diarrhea





DISCHARGE CONDITION: [Stable].





TIME SPENT ON DISCHARGE: 35 minutes.





Vital Signs/I&Os





Vital Signs








  Date Time  Temp Pulse Resp B/P (MAP) Pulse Ox O2 Delivery O2 Flow Rate FiO2


 


9/9/21 10:57   16   Room Air  


 


9/9/21 09:00  82  143/78    


 


9/9/21 05:10 97.9    95   


 


9/5/21 02:09       2.0 














I&O- Last 24 Hours up to 6 AM 


 


 9/9/21





 06:00


 


Intake Total 2580 ml


 


Balance 2580 ml











Laboratory Data


Labs 24H


Laboratory Tests 2


9/9/21 05:09: 


Nucleated Red Blood Cells % (auto) 0.0, Anion Gap 2L, Glomerular Filtration Rate

 > 60.0, Calcium Level 6.7L


CBC/BMP


Laboratory Tests


9/9/21 05:09











Microbiology





Microbiology


9/4/21 Gastrointestinal Tract Panel (PCR) - Final, Complete


         


9/4/21 Blood Culture - Final, Complete


         NO GROWTH AFTER 5 DAYS


9/4/21 Urine Culture - Final, Complete


         Yeast Like Organism


         Lactobacillus Species


         Staphylococcus Epidermidis


9/4/21 Blood Culture - Final, Complete


         NO GROWTH AFTER 5 DAYS





Discharge Medications


Scheduled


Apixaban (Eliquis) 5 Mg Tablet, 5 MG PO BID, (Reported)


   used external history 


Aspirin (Aspirin EC) 81 Mg Tablet.dr, 81 MG PO DAILY, (Reported)


Cholestyramine (Cholestyramine Packet) 4 Gm Powd.pack, 4 GM PO BID


   used external history and previous discharge 


Ferrous Sulfate (Ferrous Sulfate) 325 Mg Tablet, 325 MG PO DAILY, (Reported)


Ipratropium Bromide (Atrovent Hfa) 12.9 Gm Hfa.aer.ad, 2 PUFF INH QID, (Rep

orted)


   used external history and previous discharge 


L.acidoph/L.bulg/B.bif/S.therm (Bacid Caplet) 1 Each Tablet, 1 TAB PO WM, 

(Reported)


Magnesium Oxide (Magnesium Oxide) 400 Mg Tablet, 800 MG PO BID, (Reported)


Metoprolol Tartrate (Metoprolol Tartrate) 50 Mg Tablet, 50 MG PO BID, (Reported)


   used external history 


Pancreatic Enzymes (Creon Dr 24,000 Units Capsule) 1 Each Capsule.dr, 2 CAP PO 

WM


   used external history and previous discharge information 


Pantoprazole Sodium (Pantoprazole Sodium) 40 Mg Tablet.dr, 40 MG PO DAILY


   used external history and previous discharge 


Sodium Bicarbonate (Sodium Bicarbonate) 325 Mg Tablet, 650 MG PO BID, (Reported)


Sucralfate (Sucralfate) 1 Gm Tablet, 1 GM PO QID, (Reported)


   used external history and previous discharge 





Scheduled PRN


Albuterol Sulf (Albuterol Sulfate) 2.5 Mg/3 Ml Vial.neb, 2.5 MG INH Q6H PRN for 

SHORTNESS OF BREATH, (Reported)


Albuterol Sulfate (Proair Hfa) 8.5 Gm Hfa.aer.ad, 2 PUFF INH Q4HP PRN for 

SHORTNESS OF BREATH, (Reported)


   used external history and previous discharge 


Diphenoxylate HCl/Atropine (Diphenoxylate-Atrop 2.5-0.025) 1 Each Tablet, 1 TAB 

PO QID PRN for DIARRHEA, (Reported)





Allergies


Coded Allergies:  


     amoxicillin (Verified  Allergy, Mild, rash, 8/25/21)


     clavulanic acid (Verified  Allergy, Mild, rash, 8/25/21)


     pregabalin (Verified  Adverse Reaction, Intermediate, seizures, 8/25/21)


     warfarin (Verified  Adverse Reaction, Intermediate, bleeding, 8/25/21)











Stephy Fontaine MD                    Sep 9, 2021 17:57

## 2021-10-05 ENCOUNTER — HOSPITAL ENCOUNTER (INPATIENT)
Dept: HOSPITAL 53 - M ICU | Age: 66
LOS: 1 days | DRG: 871 | End: 2021-10-06
Attending: STUDENT IN AN ORGANIZED HEALTH CARE EDUCATION/TRAINING PROGRAM | Admitting: STUDENT IN AN ORGANIZED HEALTH CARE EDUCATION/TRAINING PROGRAM
Payer: MEDICARE

## 2021-10-05 VITALS — BODY MASS INDEX: 18.94 KG/M2 | HEIGHT: 70 IN | WEIGHT: 132.28 LBS

## 2021-10-05 DIAGNOSIS — J96.01: ICD-10-CM

## 2021-10-05 DIAGNOSIS — Z91.19: ICD-10-CM

## 2021-10-05 DIAGNOSIS — C25.9: ICD-10-CM

## 2021-10-05 DIAGNOSIS — Z66: ICD-10-CM

## 2021-10-05 DIAGNOSIS — I48.91: ICD-10-CM

## 2021-10-05 DIAGNOSIS — E87.2: ICD-10-CM

## 2021-10-05 DIAGNOSIS — A41.9: Primary | ICD-10-CM

## 2021-10-05 DIAGNOSIS — R65.21: ICD-10-CM

## 2021-10-05 DIAGNOSIS — I50.9: ICD-10-CM

## 2021-10-05 DIAGNOSIS — J44.9: ICD-10-CM

## 2021-10-05 DIAGNOSIS — J69.0: ICD-10-CM

## 2021-10-05 DIAGNOSIS — D64.9: ICD-10-CM

## 2021-10-05 DIAGNOSIS — N17.9: ICD-10-CM

## 2021-10-05 DIAGNOSIS — Z88.0: ICD-10-CM

## 2021-10-05 DIAGNOSIS — Z88.8: ICD-10-CM

## 2021-10-05 DIAGNOSIS — E03.9: ICD-10-CM

## 2021-10-05 DIAGNOSIS — K92.2: ICD-10-CM

## 2021-10-05 DIAGNOSIS — Z95.1: ICD-10-CM

## 2021-10-05 DIAGNOSIS — Z79.899: ICD-10-CM

## 2021-10-05 DIAGNOSIS — F32.9: ICD-10-CM

## 2021-10-06 VITALS — SYSTOLIC BLOOD PRESSURE: 82 MMHG | DIASTOLIC BLOOD PRESSURE: 52 MMHG

## 2021-10-06 VITALS — SYSTOLIC BLOOD PRESSURE: 110 MMHG | DIASTOLIC BLOOD PRESSURE: 57 MMHG

## 2021-10-06 VITALS — SYSTOLIC BLOOD PRESSURE: 117 MMHG | DIASTOLIC BLOOD PRESSURE: 66 MMHG

## 2021-10-06 VITALS — SYSTOLIC BLOOD PRESSURE: 98 MMHG | DIASTOLIC BLOOD PRESSURE: 54 MMHG

## 2021-10-06 VITALS — DIASTOLIC BLOOD PRESSURE: 54 MMHG | SYSTOLIC BLOOD PRESSURE: 86 MMHG

## 2021-10-06 VITALS — DIASTOLIC BLOOD PRESSURE: 52 MMHG | SYSTOLIC BLOOD PRESSURE: 106 MMHG

## 2021-10-06 VITALS — SYSTOLIC BLOOD PRESSURE: 89 MMHG | DIASTOLIC BLOOD PRESSURE: 53 MMHG

## 2021-10-06 VITALS — DIASTOLIC BLOOD PRESSURE: 46 MMHG | SYSTOLIC BLOOD PRESSURE: 65 MMHG

## 2021-10-06 VITALS — DIASTOLIC BLOOD PRESSURE: 63 MMHG | SYSTOLIC BLOOD PRESSURE: 125 MMHG

## 2021-10-06 VITALS — DIASTOLIC BLOOD PRESSURE: 47 MMHG | SYSTOLIC BLOOD PRESSURE: 87 MMHG

## 2021-10-06 VITALS — DIASTOLIC BLOOD PRESSURE: 53 MMHG | SYSTOLIC BLOOD PRESSURE: 90 MMHG

## 2021-10-06 VITALS — SYSTOLIC BLOOD PRESSURE: 116 MMHG | DIASTOLIC BLOOD PRESSURE: 80 MMHG

## 2021-10-06 VITALS — DIASTOLIC BLOOD PRESSURE: 61 MMHG | SYSTOLIC BLOOD PRESSURE: 81 MMHG

## 2021-10-06 VITALS — SYSTOLIC BLOOD PRESSURE: 98 MMHG | DIASTOLIC BLOOD PRESSURE: 52 MMHG

## 2021-10-06 VITALS — DIASTOLIC BLOOD PRESSURE: 54 MMHG | SYSTOLIC BLOOD PRESSURE: 138 MMHG

## 2021-10-06 VITALS — DIASTOLIC BLOOD PRESSURE: 65 MMHG | SYSTOLIC BLOOD PRESSURE: 143 MMHG

## 2021-10-06 VITALS — DIASTOLIC BLOOD PRESSURE: 60 MMHG | SYSTOLIC BLOOD PRESSURE: 122 MMHG

## 2021-10-06 VITALS — DIASTOLIC BLOOD PRESSURE: 52 MMHG | SYSTOLIC BLOOD PRESSURE: 96 MMHG

## 2021-10-06 VITALS — DIASTOLIC BLOOD PRESSURE: 51 MMHG | SYSTOLIC BLOOD PRESSURE: 85 MMHG

## 2021-10-06 VITALS — DIASTOLIC BLOOD PRESSURE: 56 MMHG | SYSTOLIC BLOOD PRESSURE: 120 MMHG

## 2021-10-06 VITALS — SYSTOLIC BLOOD PRESSURE: 118 MMHG | DIASTOLIC BLOOD PRESSURE: 59 MMHG

## 2021-10-06 VITALS — SYSTOLIC BLOOD PRESSURE: 98 MMHG | DIASTOLIC BLOOD PRESSURE: 53 MMHG

## 2021-10-06 VITALS — SYSTOLIC BLOOD PRESSURE: 67 MMHG | DIASTOLIC BLOOD PRESSURE: 36 MMHG

## 2021-10-06 VITALS — DIASTOLIC BLOOD PRESSURE: 49 MMHG | SYSTOLIC BLOOD PRESSURE: 100 MMHG

## 2021-10-06 VITALS — SYSTOLIC BLOOD PRESSURE: 111 MMHG | DIASTOLIC BLOOD PRESSURE: 60 MMHG

## 2021-10-06 VITALS — SYSTOLIC BLOOD PRESSURE: 100 MMHG | DIASTOLIC BLOOD PRESSURE: 57 MMHG

## 2021-10-06 VITALS — DIASTOLIC BLOOD PRESSURE: 80 MMHG | SYSTOLIC BLOOD PRESSURE: 104 MMHG

## 2021-10-06 VITALS — SYSTOLIC BLOOD PRESSURE: 101 MMHG | DIASTOLIC BLOOD PRESSURE: 60 MMHG

## 2021-10-06 VITALS — DIASTOLIC BLOOD PRESSURE: 58 MMHG | SYSTOLIC BLOOD PRESSURE: 109 MMHG

## 2021-10-06 VITALS — SYSTOLIC BLOOD PRESSURE: 93 MMHG | DIASTOLIC BLOOD PRESSURE: 70 MMHG

## 2021-10-06 VITALS — DIASTOLIC BLOOD PRESSURE: 70 MMHG | SYSTOLIC BLOOD PRESSURE: 117 MMHG

## 2021-10-06 VITALS — SYSTOLIC BLOOD PRESSURE: 114 MMHG | DIASTOLIC BLOOD PRESSURE: 53 MMHG

## 2021-10-06 VITALS — DIASTOLIC BLOOD PRESSURE: 58 MMHG | SYSTOLIC BLOOD PRESSURE: 125 MMHG

## 2021-10-06 VITALS — DIASTOLIC BLOOD PRESSURE: 60 MMHG | SYSTOLIC BLOOD PRESSURE: 95 MMHG

## 2021-10-06 VITALS — DIASTOLIC BLOOD PRESSURE: 59 MMHG | SYSTOLIC BLOOD PRESSURE: 126 MMHG

## 2021-10-06 VITALS — SYSTOLIC BLOOD PRESSURE: 95 MMHG | DIASTOLIC BLOOD PRESSURE: 75 MMHG

## 2021-10-06 VITALS — SYSTOLIC BLOOD PRESSURE: 125 MMHG | DIASTOLIC BLOOD PRESSURE: 60 MMHG

## 2021-10-06 VITALS — DIASTOLIC BLOOD PRESSURE: 48 MMHG | SYSTOLIC BLOOD PRESSURE: 82 MMHG

## 2021-10-06 VITALS — DIASTOLIC BLOOD PRESSURE: 72 MMHG | SYSTOLIC BLOOD PRESSURE: 123 MMHG

## 2021-10-06 VITALS — DIASTOLIC BLOOD PRESSURE: 68 MMHG | SYSTOLIC BLOOD PRESSURE: 100 MMHG

## 2021-10-06 VITALS — SYSTOLIC BLOOD PRESSURE: 104 MMHG | DIASTOLIC BLOOD PRESSURE: 58 MMHG

## 2021-10-06 VITALS — SYSTOLIC BLOOD PRESSURE: 153 MMHG | DIASTOLIC BLOOD PRESSURE: 93 MMHG

## 2021-10-06 VITALS — DIASTOLIC BLOOD PRESSURE: 60 MMHG | SYSTOLIC BLOOD PRESSURE: 134 MMHG

## 2021-10-06 VITALS — SYSTOLIC BLOOD PRESSURE: 113 MMHG | DIASTOLIC BLOOD PRESSURE: 56 MMHG

## 2021-10-06 VITALS — DIASTOLIC BLOOD PRESSURE: 58 MMHG | SYSTOLIC BLOOD PRESSURE: 104 MMHG

## 2021-10-06 VITALS — DIASTOLIC BLOOD PRESSURE: 56 MMHG | SYSTOLIC BLOOD PRESSURE: 115 MMHG

## 2021-10-06 VITALS — SYSTOLIC BLOOD PRESSURE: 68 MMHG | DIASTOLIC BLOOD PRESSURE: 52 MMHG

## 2021-10-06 VITALS — SYSTOLIC BLOOD PRESSURE: 108 MMHG | DIASTOLIC BLOOD PRESSURE: 55 MMHG

## 2021-10-06 VITALS — DIASTOLIC BLOOD PRESSURE: 48 MMHG | SYSTOLIC BLOOD PRESSURE: 99 MMHG

## 2021-10-06 VITALS — SYSTOLIC BLOOD PRESSURE: 95 MMHG | DIASTOLIC BLOOD PRESSURE: 71 MMHG

## 2021-10-06 VITALS — SYSTOLIC BLOOD PRESSURE: 123 MMHG | DIASTOLIC BLOOD PRESSURE: 59 MMHG

## 2021-10-06 VITALS — SYSTOLIC BLOOD PRESSURE: 94 MMHG | DIASTOLIC BLOOD PRESSURE: 66 MMHG

## 2021-10-06 VITALS — SYSTOLIC BLOOD PRESSURE: 103 MMHG | DIASTOLIC BLOOD PRESSURE: 68 MMHG

## 2021-10-06 VITALS — SYSTOLIC BLOOD PRESSURE: 113 MMHG | DIASTOLIC BLOOD PRESSURE: 64 MMHG

## 2021-10-06 VITALS — DIASTOLIC BLOOD PRESSURE: 32 MMHG | SYSTOLIC BLOOD PRESSURE: 69 MMHG

## 2021-10-06 VITALS — SYSTOLIC BLOOD PRESSURE: 107 MMHG | DIASTOLIC BLOOD PRESSURE: 57 MMHG

## 2021-10-06 VITALS — DIASTOLIC BLOOD PRESSURE: 69 MMHG | SYSTOLIC BLOOD PRESSURE: 132 MMHG

## 2021-10-06 VITALS — DIASTOLIC BLOOD PRESSURE: 54 MMHG | SYSTOLIC BLOOD PRESSURE: 109 MMHG

## 2021-10-06 VITALS — SYSTOLIC BLOOD PRESSURE: 103 MMHG | DIASTOLIC BLOOD PRESSURE: 55 MMHG

## 2021-10-06 VITALS — DIASTOLIC BLOOD PRESSURE: 58 MMHG | SYSTOLIC BLOOD PRESSURE: 95 MMHG

## 2021-10-06 VITALS — SYSTOLIC BLOOD PRESSURE: 175 MMHG | DIASTOLIC BLOOD PRESSURE: 70 MMHG

## 2021-10-06 VITALS — DIASTOLIC BLOOD PRESSURE: 62 MMHG | SYSTOLIC BLOOD PRESSURE: 130 MMHG

## 2021-10-06 VITALS — SYSTOLIC BLOOD PRESSURE: 105 MMHG | DIASTOLIC BLOOD PRESSURE: 67 MMHG

## 2021-10-06 VITALS — DIASTOLIC BLOOD PRESSURE: 58 MMHG | SYSTOLIC BLOOD PRESSURE: 121 MMHG

## 2021-10-06 VITALS — SYSTOLIC BLOOD PRESSURE: 133 MMHG | DIASTOLIC BLOOD PRESSURE: 56 MMHG

## 2021-10-06 LAB
ALBUMIN SERPL BCG-MCNC: 1.2 GM/DL (ref 3.2–5.2)
ALT SERPL W P-5'-P-CCNC: 135 U/L (ref 12–78)
BASE EXCESS BLDA CALC-SCNC: -15 MMOL/L (ref -2–2)
BASE EXCESS BLDA CALC-SCNC: -17.1 MMOL/L (ref -2–2)
BASOPHILS # BLD AUTO: 0.1 10^3/UL (ref 0–0.2)
BASOPHILS NFR BLD AUTO: 0.2 % (ref 0–1)
BILIRUB SERPL-MCNC: 0.7 MG/DL (ref 0.2–1)
BUN SERPL-MCNC: 31 MG/DL (ref 7–18)
BUN SERPL-MCNC: 32 MG/DL (ref 7–18)
BUN SERPL-MCNC: 34 MG/DL (ref 7–18)
CALCIUM SERPL-MCNC: 6.1 MG/DL (ref 8.8–10.2)
CALCIUM SERPL-MCNC: 6.3 MG/DL (ref 8.8–10.2)
CALCIUM SERPL-MCNC: 6.5 MG/DL (ref 8.8–10.2)
CHLORIDE SERPL-SCNC: 107 MEQ/L (ref 98–107)
CHLORIDE SERPL-SCNC: 109 MEQ/L (ref 98–107)
CHLORIDE SERPL-SCNC: 110 MEQ/L (ref 98–107)
CO2 BLDA CALC-SCNC: 13.2 MEQ/L (ref 23–31)
CO2 BLDA CALC-SCNC: 14.2 MEQ/L (ref 23–31)
CO2 SERPL-SCNC: 12 MEQ/L (ref 21–32)
CO2 SERPL-SCNC: 14 MEQ/L (ref 21–32)
CO2 SERPL-SCNC: 19 MEQ/L (ref 21–32)
CREAT SERPL-MCNC: 4.16 MG/DL (ref 0.7–1.3)
CREAT SERPL-MCNC: 4.4 MG/DL (ref 0.7–1.3)
CREAT SERPL-MCNC: 4.58 MG/DL (ref 0.7–1.3)
EOSINOPHIL # BLD AUTO: 0 10^3/UL (ref 0–0.5)
EOSINOPHIL NFR BLD AUTO: 0 % (ref 0–3)
GFR SERPL CREATININE-BSD FRML MDRD: 13.7 ML/MIN/{1.73_M2} (ref 49–?)
GFR SERPL CREATININE-BSD FRML MDRD: 14.4 ML/MIN/{1.73_M2} (ref 49–?)
GFR SERPL CREATININE-BSD FRML MDRD: 15.4 ML/MIN/{1.73_M2} (ref 49–?)
GLUCOSE SERPL-MCNC: 152 MG/DL (ref 70–100)
GLUCOSE SERPL-MCNC: 159 MG/DL (ref 70–100)
GLUCOSE SERPL-MCNC: 166 MG/DL (ref 70–100)
HCO3 BLDA-SCNC: 11.9 MEQ/L (ref 22–26)
HCO3 BLDA-SCNC: 13 MEQ/L (ref 22–26)
HCO3 STD BLDA-SCNC: 11.3 MEQ/L (ref 22–26)
HCO3 STD BLDA-SCNC: 12.8 MEQ/L (ref 22–26)
HCT VFR BLD AUTO: 29.2 % (ref 42–52)
HCT VFR BLD AUTO: 32.4 % (ref 42–52)
HGB BLD-MCNC: 8.6 G/DL (ref 13.5–17.5)
HGB BLD-MCNC: 9.4 G/DL (ref 13.5–17.5)
LYMPHOCYTES # BLD AUTO: 0.7 10^3/UL (ref 1.5–5)
LYMPHOCYTES NFR BLD AUTO: 2 % (ref 24–44)
MAGNESIUM SERPL-MCNC: 1.6 MG/DL (ref 1.8–2.4)
MCH RBC QN AUTO: 25.1 PG (ref 27–33)
MCH RBC QN AUTO: 25.6 PG (ref 27–33)
MCHC RBC AUTO-ENTMCNC: 29 G/DL (ref 32–36.5)
MCHC RBC AUTO-ENTMCNC: 29.5 G/DL (ref 32–36.5)
MCV RBC AUTO: 85.4 FL (ref 80–96)
MCV RBC AUTO: 88.3 FL (ref 80–96)
MONOCYTES # BLD AUTO: 0.9 10^3/UL (ref 0–0.8)
MONOCYTES NFR BLD AUTO: 2.7 % (ref 2–8)
NEUTROPHILS # BLD AUTO: 30.1 10^3/UL (ref 1.5–8.5)
NEUTROPHILS NFR BLD AUTO: 94.3 % (ref 36–66)
PCO2 BLDA: 39 MMHG (ref 35–45)
PCO2 BLDA: 41.1 MMHG (ref 35–45)
PH BLDA: 7.08 UNITS (ref 7.35–7.45)
PH BLDA: 7.14 UNITS (ref 7.35–7.45)
PHOSPHATE SERPL-MCNC: 9.3 MG/DL (ref 2.5–4.9)
PLATELET # BLD AUTO: 552 10^3/UL (ref 150–450)
PLATELET # BLD AUTO: 668 10^3/UL (ref 150–450)
PO2 BLDA: 111.3 MMHG (ref 75–100)
PO2 BLDA: 92.9 MMHG (ref 75–100)
POTASSIUM SERPL-SCNC: 3.3 MEQ/L (ref 3.5–5.1)
POTASSIUM SERPL-SCNC: 3.7 MEQ/L (ref 3.5–5.1)
POTASSIUM SERPL-SCNC: 3.7 MEQ/L (ref 3.5–5.1)
PROT SERPL-MCNC: 4.3 GM/DL (ref 6.4–8.2)
RBC # BLD AUTO: 3.42 10^6/UL (ref 4.3–6.1)
RBC # BLD AUTO: 3.67 10^6/UL (ref 4.3–6.1)
SAO2 % BLDA: 93 % (ref 95–99)
SAO2 % BLDA: 96.8 % (ref 95–99)
SODIUM SERPL-SCNC: 143 MEQ/L (ref 136–145)
SODIUM SERPL-SCNC: 145 MEQ/L (ref 136–145)
SODIUM SERPL-SCNC: 146 MEQ/L (ref 136–145)
TROPONIN I SERPL-MCNC: 0.04 NG/ML (ref ?–0.1)
WBC # BLD AUTO: 31.9 10^3/UL (ref 4–10)
WBC # BLD AUTO: 40 10^3/UL (ref 4–10)

## 2021-10-06 PROCEDURE — 5A1935Z RESPIRATORY VENTILATION, LESS THAN 24 CONSECUTIVE HOURS: ICD-10-PCS | Performed by: STUDENT IN AN ORGANIZED HEALTH CARE EDUCATION/TRAINING PROGRAM

## 2021-10-06 RX ADMIN — CHLORHEXIDINE GLUCONATE 0.12% ORAL RINSE SCH ML: 1.2 LIQUID ORAL at 08:01

## 2021-10-06 RX ADMIN — IPRATROPIUM BROMIDE AND ALBUTEROL SULFATE SCH ML: .5; 3 SOLUTION RESPIRATORY (INHALATION) at 12:46

## 2021-10-06 RX ADMIN — SODIUM CHLORIDE SCH MLS/HR: 9 INJECTION, SOLUTION INTRAVENOUS at 00:10

## 2021-10-06 RX ADMIN — CHLORHEXIDINE GLUCONATE 0.12% ORAL RINSE SCH ML: 1.2 LIQUID ORAL at 21:00

## 2021-10-06 RX ADMIN — DEXTROSE MONOHYDRATE SCH MLS/HR: 50 INJECTION, SOLUTION INTRAVENOUS at 02:56

## 2021-10-06 RX ADMIN — SODIUM CHLORIDE SCH MLS/HR: 9 INJECTION, SOLUTION INTRAVENOUS at 06:24

## 2021-10-06 RX ADMIN — DEXTROSE MONOHYDRATE SCH MLS/HR: 5 INJECTION INTRAVENOUS at 16:00

## 2021-10-06 RX ADMIN — IPRATROPIUM BROMIDE AND ALBUTEROL SULFATE SCH ML: .5; 3 SOLUTION RESPIRATORY (INHALATION) at 19:34

## 2021-10-06 RX ADMIN — DEXTROSE MONOHYDRATE SCH MLS/HR: 50 INJECTION, SOLUTION INTRAVENOUS at 12:39

## 2021-10-06 RX ADMIN — DEXTROSE MONOHYDRATE SCH MLS/HR: 50 INJECTION, SOLUTION INTRAVENOUS at 08:01

## 2021-10-06 RX ADMIN — DEXTROSE MONOHYDRATE SCH MLS/HR: 50 INJECTION, SOLUTION INTRAVENOUS at 12:38

## 2021-10-06 RX ADMIN — DEXTROSE MONOHYDRATE SCH MLS/HR: 5 INJECTION INTRAVENOUS at 08:01

## 2021-10-06 RX ADMIN — DEXTROSE MONOHYDRATE SCH MLS/HR: 50 INJECTION, SOLUTION INTRAVENOUS at 17:30

## 2021-10-06 RX ADMIN — IPRATROPIUM BROMIDE AND ALBUTEROL SULFATE SCH ML: .5; 3 SOLUTION RESPIRATORY (INHALATION) at 07:00

## 2021-10-06 NOTE — REP
INDICATION:

assess for mesenteric artery occlusion.



COMPARISON:

Comparison is made with a noncontrast CT study from August 26, 2021.



TECHNIQUE:

Helical scanning is acquired following the intravenous injection of 100 mL of Isovue

370.  3 mm axial images re-formatted.  Coronal and sagittal MPR images are generated

and maximum intensity projection images are included this abdominal and pelvic CT

angio study.  IV contrast was administered after extensive discussion with the

referring intensivist in consultation with nephrology and in difference the fact that

the patient is progressively acidotic and probably already incomplete renal failure.



FINDINGS:

There is good opacification of the thoraco abdominal aorta and the aortic branches.

There is a high-grade focal stenosis, 85-90% 1 cm from the origin of the SMA.  There

is calcific plaquing at the origin.  There is diffuse 40-50% narrowing of the celiac

axis over a 1 cm area.  The inferior mesenteric artery shows patency but also has a

high-grade stenosis originating from a anterior wall aortic plaque.  There is

atherosclerotic rib irregularity and plaquing of the distal SMA as well.  The renal

arteries are small caliber bilaterally.  There is an irregular focal stenosis of the

left main renal artery, 85-90%.  The infrarenal abdominal aorta is relatively small

and circumferentially calcified.  There is mixed predominantly calcific plaquing in

the common iliac arteries bilaterally with moderate common iliac artery stenoses.

Similarly, extensive plaquing is seen in the external iliac arteries bilaterally.  The

internal iliac arteries appear to be patent although atherosclerotic irregular

plaquing is observed.



There is no evidence of pneumoperitoneum.  However, there is intramural gas in the

splenic flexure and proximal descending colon which is a new finding.  And compatible

with ischemic colitis.  There is a Luque catheter in the urinary bladder.  The

gallbladder is mildly distended.



IMPRESSION:

Stenoses are observed in the proximal celiac, proximal SMA, and proximal CAROLYN although

these vessels are patent.  Extensive diffuse atherosclerotic changes are noted.

Aortoiliac atherosclerosis is seen.  Left renal artery stenosis.  There is pneumatosis

intestinalis in the wall of the left colon at the splenic flexure and proximal

descending segments which is a new finding suggestive of bowel wall ischemia.



Critical Findings: Bowel wall pneumatosis left colon findings consistent with ischemic

colitis.

The critical information above was relayed directly by me by telephone to ELZA DAWSON on 10/6/2021 at 3:16 pm with readback verification.





<Electronically signed by Emmett Radford > 10/06/21 [id number]

## 2021-10-06 NOTE — CR
CONSULTATION

DATE: 10/06/2021



CONSULTATION FOR: Cricket Riley M.D. 



REASON FOR CONSULTATION:  Oliguric renal failure and respiratory failure.



HISTORY OF PRESENT ILLNESS:  Mr. Roberts is a 65-year-old male who is admitted

to intensive care unit at [facility], a transfer from [facility]. He has known history of coronary artery disease, prior coronary

artery bypass graft (CABG), history of pancreatic cancer, hypertension, and

gastrointestinal (GI) bleed. He presented to emergency room at [facility] with difficulty breathing and was found to be in respiratory failure.

Apparently patient has signed papers for DO NOT RESUSCITATE and DO NOT

INTUBATE. Patient was intubated in the emergency room (ER) in [facility]. He was noted to have coffee-ground emesis during intubation. He was

hemodynamically unstable, and a central line was placed and vasopressor

started. He was then transferred to [facility] for further care.

Patient is not making any urine, and a nephrology consultation is requested for

urgent dialysis. 



MEDICAL HISTORY:

Significant for:

1. Pancreatic cancer, for which patient has declined any treatment. 

2. Coronary artery disease, status post CABG. 

3. Chronic obstructive pulmonary disease (COPD).

4. Depression.

5. Hypertension. 

6. Hypothyroidism.

7. History of GI bleed.

8. History of congestive heart failure.

9. Anemia.



SURGICAL HISTORY:

Significant for:

1. CABG. 

2. Carotid surgery.

3. Pancreatic tumors removed.

4. Tonsillectomy.

5. Abdominal surgery.



FAMILY HISTORY:  Not remarkable and unobtainable at present. 



PERSONAL AND SOCIAL HISTORY:  According to his records, patient has a history

of smoking. No alcohol or intravenous drug use. 



CURRENT MEDICATIONS:

- Zosyn 2.25 grams every 8 hours

- pantoprazole drip

- sodium bicarbonate drip 50 mL per hour

- vasopressin

- Levophed drip

- midazolam for sedation



ALLERGIES: Has allergy to AMOXICILLIN, PREGABALIN, WARFARIN, and CLAVULANIC

ACID.



REVIEW OF SYSTEMS: Not possible, as patient is unresponsive and intubated. 



PHYSICAL EXAMINATION: This is a very cachectic and malnourished gentleman who

is intubated and sedated. He is not able to answer any questions or respond to

any stimuli. Temperature 98.6 degrees Fahrenheit, heart rate 96 per minute,

respiratory rate 24 per minute on the ventilator. Blood pressure is 98/52 mmHg,

 and he is on the ventilator with 50% FiO2. He is chronically ill looking and

cachectic with significant wasting of muscle and fat. Endotracheal and

nasogastric tubes are in place. He also has a central line in right internal

jugular vein. Heart sounds are somewhat tachycardic. Lungs have moderate

bilateral air entry. Abdomen is nondistended, and bowel sounds are hypoactive.

Extremities have cyanosis on his toes and no edema or clubbing. Neurologically

he is totally unresponsive. 



LABORATORY DATA:

WBC count is 40.0, hemoglobin 9.4, hematocrit 32.4, platelets 552. 



Blood gas showed a pH of 7.0, pCO2 of 41, pO2 of 93, and bicarbonate 11. Sodium

is 145, potassium 3.7, chloride 109, CO2 of 12, BUN 32, and creatinine 4.4.

Glucose 152 and calcium 6.3. Lactic acid level is 11.6. , , and

alkaline phosphatase 430. Albumin level is only 1.2. 



PROBLEMS:

1. Anuric acute renal failure. This is most likely related to septic shock.

Patient has been given intravenous (IV) fluid; however, he is still not making

any urine. Considering his multiple comorbid conditions, including pancreatic

cancer and other comorbid conditions, I am not certain if dialysis will be

appropriate for him. At this point, I will recommend to continue with IV sodium

bicarbonate drip and consider to readdress the issue of DO NOT RESUSCITATE with

his niece. Apparently, the patient had a DO NOT RESUSCITATE and DO NOT INTUBATE

status. 



2. Septic shock. Patient has a lactic acid level of 11.6. He is currently on

pressors after giving IV fluids. I will recommend to give further IV fluids if

needed. He remains on Zosyn.



3. Metabolic acidosis. Most likely his acidosis is related to septic shock and

acute renal failure. An intra-abdominal source of sepsis is possible. 



4. Respiratory failure. Patient is totally unresponsive at present. Apparently

he had a DO NOT INTUBATE and DO NOT RESUSCITATE status according to his wishes.

I am not sure under what circumstances he was intubated, but this can probably

be readdressed with his niece if she is available.



Thank you for asking me to participate in the care of Arsh Roberts. At this

point, I would not recommend dialysis,  and I will recommend to consider

comfort care for him.

## 2021-10-06 NOTE — HPEPDOC
[facility] Medical History & Physical


Date of Admission


Oct 6, 2021


Date of Service:  Oct 6, 2021


Primary Care Physician:  ELZA DAWSON MD





History and Physical


REASON FOR CRITICAL CARE CONSULTATION/CHEIF COMPLAINT: Respiratory failure





HISTORY OF PRESENT ILLNESS: 





History obtained from the chart


This is a 65-year-old male who was transferred from [facility] for 

respiratory failure.  He was brought in by ambulance with a chief complaint of 

shortness of breath.  On arrival to the ER he was in respiratory distress and 

hypoxic respiratory failure.  He was intubated in the ER.  He was noted to have 

coffee-ground emesis in the mouth during intubation which were suctioned.  

Patient was hemodynamically unstable, central line was placed in the ER and 

vasopressors were started.  He was given 4 L of normal saline in the ED.  He was

also given 1 dose of Zosyn and 1 dose of Solu-Medrol in the ED.  Once he was st

abilized he was sent to [facility].  Patient is seen and examined 

at bedside.  He is intubated, he is not responsive and he is not on any 

sedatives.  He is on 60% FiO2.  





ALLERGIES: Please see below.  Augmentin with possible itching, Lyrica, morphine 

itching, warfarin bleeding





HOME MEDICATIONS: Please see below.  Albuterol, amlodipine 10 mg daily, 

Atrovent, Combivent, hydrochlorothiazide 12.5 mg daily, Lasix 40 mg daily, 

levothyroxine 88 mcg daily, losartan, pantoprazole 40 mg daily, paroxetine 10 mg

daily, ropinirole 0.25 mg at bedtime, Spiriva, tramadol as needed





PAST MEDICAL HISTORY:


Poor medical compliance, CAD, CABG 2 months ago, COPD, depression, hypertension,

hypothyroidism, pancreatic cancer, GI bleed, chronic anemia, CHF





PAST SURGICAL HISTORY: 


Cardiac bypass, carotid surgery, pancreatic tumors removed, tonsillectomy, 

abdominal surgery





FAMILY HISTORY:


Unable to obtain





SOCIAL HISTORY:


Active smoker about half pack per day, no alcohol use, marijuana use.





REVIEW OF SYSTEMS: Unable to obtain as the patient is intubated and not 

responsive





PHYSICAL EXAMINATION:





VITAL SIGNS: Please see below.  Hypothermic


GENERAL APPEARANCE: Cachectic, malnourished, intubated 


HEENT: Pupils 3 mm bilaterally and nonreactive, no thyromegaly, trachea midline,

 normal mucous membranes 


RESPIRATORY: breathing over the vent, Reduced breath sounds on the right ,CTA 

B/L, good air entry. 


CARDIOVASCULAR: +s1 s2, no murmurs, irregular rhythm. 


ABDOMEN: nontender, not distended, +BS 


EXTREMITIES: no edema or erythema. palpable distal pulses 


SKIN: no rash, no purpura


NEUROLOGICAL: no sensory or motor deficits, orientedx3. 





LABS/IMAGING:





Labs from [facility]


ABG 7.09 CO2 49 O2 471


WBC 27 hemoglobin 9.4 platelets 214


Sodium 143 potassium 3.1 chloride 96 bicarb 10 glucose 169 BUN 30 creatinine 4.5

albumin 2 calcium 6.9 total bili less than 0.7 alk phos 485 AST 84 ALT 32


Lactic acid 15.4


INR 1.72


PT TT 43.4


Troponin 0 0.09





Portable chest x-ray post intubation at [facility] shows endotracheal 

tube tip above the rony, NG tube tip below the diaphragm projected over the 

stomach, no acute infiltrate





IMPRESSION:





The most critical problems requiring my immediate presence at bedside are:





1.  Acute hypoxic respiratory failure


2.  Possible Aspiration pneumonitis


3.  Upper GI bleed


4.  HAGMA secondary to renal failure and lactic acidosis


5.  Acute kidney injury oliguric 


6.  Septic shock


7.  History of A. fib not on anticoagulation due to bleeding


8.  History of poor medical compliance


9.  History of COPD and active smoker





PLAN:





CNS: RASS -1, Versed and fentanyl, daily SAT





PULM: HOB 30 degrees. Maintain Sp02 94-98%.  ABG in the ICU 7.14/39/111. Vent 

changes: Reduce fio2 to 50%, RR 24. Bronchodilators around-the-clock and as 

needed





CARDIO: Maintain MAPs 60-65.  Continue with vasopressor support with Levophed, 

add vasopressin if tachyarrhythmia or nearing max dose levo. Start Bicarb drip 

50cc/h





GI: Pantoprazole drip. Surgery eval for EGD when stable. NG tube to low 

intermittent suction.  Keep n.p.o.





RENAL: monitor lytes.  Keep Luque for now





ID: Broad-spectrum antibiotics with Zosyn, Vanco 1000mg x 1, blood cultures, 

tracheal aspirate cultures





ENDO: Monitor FS per routine. Goal range 140-180. 





HEME: DVT ppx with teds and SCDs, cbc q8h





LINES/CATHETERS: Right IJ central line, Luque catheter in place. 





CODE STATUS: DNR.





DISPOSITION: ICU.





A total of 55 minutes of critical care time was spent on patient care, not 

including procedures





Vital Signs





Vital Signs








  Date Time  Temp Pulse Resp B/P (MAP) Pulse Ox O2 Delivery O2 Flow Rate FiO2


 


10/6/21 02:56 96.6 94 23 138/54 100 Ventilator  











Home Medications


Scheduled


Apixaban (Eliquis) 5 Mg Tablet, 5 MG PO BID


   used external history 


Aspirin (Aspirin EC) 81 Mg Tablet.dr, 81 MG PO DAILY


Cholestyramine (Cholestyramine Packet) 4 Gm Powd.pack, 4 GM PO BID


   used external history and previous discharge 


Ferrous Sulfate (Ferrous Sulfate) 325 Mg Tablet, 325 MG PO DAILY


Ipratropium Bromide (Atrovent Hfa) 12.9 Gm Hfa.aer.ad, 2 PUFF INH QID


   used external history and previous discharge 


L.acidoph/L.bulg/B.bif/S.therm (Bacid Caplet) 1 Each Tablet, 1 TAB PO WM


Magnesium Oxide (Magnesium Oxide) 400 Mg Tablet, 800 MG PO BID


Metoprolol Tartrate (Metoprolol Tartrate) 50 Mg Tablet, 50 MG PO BID


   used external history 


Pancreatic Enzymes (Creon Dr 24,000 Units Capsule) 1 Each Capsule.dr, 2 CAP PO 

WM


   used external history and previous discharge information 


Pantoprazole Sodium (Pantoprazole Sodium) 40 Mg Tablet.dr, 40 MG PO DAILY


   used external history and previous discharge 


Sodium Bicarbonate (Sodium Bicarbonate) 325 Mg Tablet, 650 MG PO BID


Sucralfate (Sucralfate) 1 Gm Tablet, 1 GM PO QID


   used external history and previous discharge 





Scheduled PRN


Albuterol Sulf (Albuterol Sulfate) 2.5 Mg/3 Ml Vial.neb, 2.5 MG INH Q6H PRN for 

SHORTNESS OF BREATH


Albuterol Sulfate (Proair Hfa) 8.5 Gm Hfa.aer.ad, 2 PUFF INH Q4HP PRN for 

SHORTNESS OF BREATH


   used external history and previous discharge 


Diphenoxylate HCl/Atropine (Diphenoxylate-Atrop 2.5-0.025) 1 Each Tablet, 1 TAB 

PO QID PRN for DIARRHEA





Allergies


Coded Allergies:  


     amoxicillin (Verified  Allergy, Mild, rash, 8/25/21)


     clavulanic acid (Verified  Allergy, Mild, rash, 8/25/21)


     pregabalin (Verified  Adverse Reaction, Intermediate, seizures, 8/25/21)


     warfarin (Verified  Adverse Reaction, Intermediate, bleeding, 8/25/21)





A-FIB/CHADSVASC


A-FIB History


Current/History of A-Fib/PAF?:  Yes


Current PO Anticoag Therapy:  No











ELZA DAWSON MD          Oct 6, 2021 03:27

## 2021-10-06 NOTE — REP
INDICATION:

ETT placement



COMPARISON:

10/06/2021 at 3:08 a.m.



TECHNIQUE:

Portable AP view of the chest



FINDINGS:

Endotracheal tube approximately 3 cm above the rony.

Nasogastric tube courses below left hemidiaphragm.

Right IJ line with tip in the SVC.



Lung fields demonstrate diffuse chronic appearing interstitial changes.  Subtle

superimposed viral/interstitial pneumonia pattern cannot be excluded.  Left upper lobe

opacity on prior examination appears improved.  No definite effusion.  No pneumothorax.



Mediastinum and cardiac silhouette stable.  Skeletal structures stable.





IMPRESSION:

1. Lines and tubes in satisfactory position.

2. Relatively stable pleuroparenchymal changes.  Left upper lobe opacity on prior

examination may be minimally improved.







<Electronically signed by Hadley Chao > 10/06/21 [id number]

## 2021-10-06 NOTE — REPVR
PROCEDURE INFORMATION: 

Exam: XR Chest 

Exam date and time: 10/6/2021 3:15 AM 

Age: 65 years old 

Clinical indication: Device placement; Ett placement (vent status); Additional 

info: Intubated 



TECHNIQUE: 

Imaging protocol: XR of the chest. 

Views: 1 view. 



COMPARISON: 

[facility] PORTABLE CHEST X-RAY 9/7/2021 7:59 AM 



FINDINGS: 

Tubes, catheters and devices: Feeding tube seen with its tip in the stomach. ET 

tube is seen however exact position is difficult to discern due to obscure a 

shin by mediastinal surgical clips and unclear visualization of the tracheal 

bifurcation. Right neck central venous catheter seen with its tip in the mid 

SVC. Overlying EKG leads are seen. Left neck surgical clip seen. 



Lungs: There is left apical lung infiltrates. 

Pleural spaces: There is questionable trace bilateral pleural effusions. 

Heart/Mediastinum: Mediastinal surgical clips are seen. The patient is status 

post sternotomy with grossly intact sternal wires. 

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 



IMPRESSION: 

1. Tubes and lines as above. 

2. Left apical lung infiltrate and questionable trace pleural effusions versus 

basilar atelectatic changes. 



Electronically signed by: Dimitri Sheikh On 10/06/2021  05:25:44 AM